# Patient Record
Sex: MALE | Race: BLACK OR AFRICAN AMERICAN | Employment: FULL TIME | URBAN - METROPOLITAN AREA
[De-identification: names, ages, dates, MRNs, and addresses within clinical notes are randomized per-mention and may not be internally consistent; named-entity substitution may affect disease eponyms.]

---

## 2017-01-03 ENCOUNTER — HOSPITAL ENCOUNTER (OUTPATIENT)
Dept: PHYSICAL THERAPY | Facility: CLINIC | Age: 62
Setting detail: THERAPIES SERIES
End: 2017-01-03
Attending: PHYSICAL MEDICINE & REHABILITATION
Payer: COMMERCIAL

## 2017-01-03 ENCOUNTER — HOSPITAL ENCOUNTER (OUTPATIENT)
Dept: OCCUPATIONAL THERAPY | Facility: CLINIC | Age: 62
Setting detail: THERAPIES SERIES
End: 2017-01-03
Attending: PHYSICAL MEDICINE & REHABILITATION
Payer: COMMERCIAL

## 2017-01-03 PROCEDURE — 97112 NEUROMUSCULAR REEDUCATION: CPT | Mod: GO | Performed by: OCCUPATIONAL THERAPIST

## 2017-01-03 PROCEDURE — 40000125 ZZHC STATISTIC OT OUTPT VISIT: Performed by: OCCUPATIONAL THERAPIST

## 2017-01-03 PROCEDURE — 97110 THERAPEUTIC EXERCISES: CPT | Mod: GP | Performed by: PHYSICAL THERAPIST

## 2017-01-03 PROCEDURE — 97116 GAIT TRAINING THERAPY: CPT | Mod: GP | Performed by: PHYSICAL THERAPIST

## 2017-01-03 PROCEDURE — 40000719 ZZHC STATISTIC PT DEPARTMENT NEURO VISIT: Performed by: PHYSICAL THERAPIST

## 2017-01-05 ENCOUNTER — HOSPITAL ENCOUNTER (OUTPATIENT)
Dept: PHYSICAL THERAPY | Facility: CLINIC | Age: 62
Setting detail: THERAPIES SERIES
End: 2017-01-05
Attending: PHYSICAL MEDICINE & REHABILITATION
Payer: COMMERCIAL

## 2017-01-05 PROCEDURE — 97116 GAIT TRAINING THERAPY: CPT | Mod: GP | Performed by: PHYSICAL THERAPIST

## 2017-01-05 PROCEDURE — 97112 NEUROMUSCULAR REEDUCATION: CPT | Mod: GP | Performed by: PHYSICAL THERAPIST

## 2017-01-05 PROCEDURE — 40000719 ZZHC STATISTIC PT DEPARTMENT NEURO VISIT: Performed by: PHYSICAL THERAPIST

## 2017-01-10 ENCOUNTER — HOSPITAL ENCOUNTER (OUTPATIENT)
Dept: OCCUPATIONAL THERAPY | Facility: CLINIC | Age: 62
Setting detail: THERAPIES SERIES
End: 2017-01-10
Attending: PHYSICAL MEDICINE & REHABILITATION
Payer: COMMERCIAL

## 2017-01-10 PROCEDURE — 97110 THERAPEUTIC EXERCISES: CPT | Mod: GO | Performed by: OCCUPATIONAL THERAPIST

## 2017-01-10 PROCEDURE — 40000125 ZZHC STATISTIC OT OUTPT VISIT: Performed by: OCCUPATIONAL THERAPIST

## 2017-01-10 PROCEDURE — 97112 NEUROMUSCULAR REEDUCATION: CPT | Mod: GO | Performed by: OCCUPATIONAL THERAPIST

## 2017-01-12 ENCOUNTER — HOSPITAL ENCOUNTER (OUTPATIENT)
Dept: PHYSICAL THERAPY | Facility: CLINIC | Age: 62
Setting detail: THERAPIES SERIES
End: 2017-01-12
Attending: PHYSICAL MEDICINE & REHABILITATION
Payer: COMMERCIAL

## 2017-01-12 PROCEDURE — 40000719 ZZHC STATISTIC PT DEPARTMENT NEURO VISIT: Performed by: PHYSICAL THERAPIST

## 2017-01-12 PROCEDURE — 97110 THERAPEUTIC EXERCISES: CPT | Mod: GP | Performed by: PHYSICAL THERAPIST

## 2017-01-12 PROCEDURE — 97112 NEUROMUSCULAR REEDUCATION: CPT | Mod: GP | Performed by: PHYSICAL THERAPIST

## 2017-01-13 NOTE — ADDENDUM NOTE
Encounter addended by: Chayo Mckeon, OT on: 1/13/2017  2:30 PM<BR>     Documentation filed: Inpatient Document Flowsheet

## 2017-01-16 ENCOUNTER — OFFICE VISIT (OUTPATIENT)
Dept: PHYSICAL MEDICINE AND REHAB | Facility: CLINIC | Age: 62
End: 2017-01-16

## 2017-01-16 VITALS
BODY MASS INDEX: 29.43 KG/M2 | DIASTOLIC BLOOD PRESSURE: 76 MMHG | WEIGHT: 172.4 LBS | HEIGHT: 64 IN | HEART RATE: 96 BPM | SYSTOLIC BLOOD PRESSURE: 137 MMHG

## 2017-01-16 DIAGNOSIS — G81.11 RIGHT SPASTIC HEMIPARESIS (H): Primary | ICD-10-CM

## 2017-01-16 ASSESSMENT — PAIN SCALES - GENERAL: PAINLEVEL: NO PAIN (0)

## 2017-01-16 NOTE — Clinical Note
"1/16/2017       RE: Luis Trinidad  [address]  [facility] [address]     Dear Colleague,    Thank you for referring your patient, Luis Trinidad, to the [facility] PHYSICAL MEDICINE AND REHABILITATION at [facility]. Please see a copy of my visit note below.    PM&R Clinic & Procedure Note:    Luis Trinidad is 61-year-old male with a history of stroke resulting in residual right spastic hemiparesis.  Last seen 10/17/2016, at which time he received 200 U Botox to his right upper extremity.  Rogelio reports some reduction in tone which facilitated some movements and ROM in the right arm. The benefits have started to war off about the last week (thrapy reporting some wear off sooner than that). Denies any side effects from that.    He has participated in considerable outpatient therapy. I have notes and update from OT Chayo Mckeon. Plan is to work on an intense cycled approach with 4 days per week followed by 3 weeks off. Chayo is endorsing tone reduction at the biceps and finger flexors in particular.    He would like to proceed with repeat Botox injections today.  He denies receiving any vaccinations within the last 2 weeks, and acknowledges understanding that he is to wait 2 weeks before receiving any vaccines in the future.     Physical exam:  /76 mmHg  Pulse 96  Ht 5' 4\" (1.626 m)  Wt 172 lb 6.4 oz (78.2 kg)  BMI 29.58 kg/m2  Alert, conversant, very pleasant, no acute distress  Limited active range of motion in right upper extremity, not full range flexion or extension at fingers but some partial range. Similar with elbow flexors/extensors have some movement but not full range. Passively however able to full range elbow, wrist and fingers. Shoulder PROM approximately shoulder height.  Passive range of motion demonstrates 2/5 spasticity of finger flexors/extensors and able to achieve full range of motion, elbow flexors 3/5 with full ROM, elbow extensors 2/5 with " full ROM, pronators 3/5 with full ROM, shoulder abduction to 90 is achieved without pain as is 90 degrees of shoulder flexion.  Slight anterior shift of glenohumeral joint, mild sulcus sign with humeral traction but none at rest, minimal pain on palpation of shoulder.    Assessment and recommendations:  1. Repeat Botox though increase dose to 300 U. Given improvement in pain and and some function as well discussed slight risk of excess weakness.     2. Continue with ROM and other therapies. Agree with trial of intense cycle OT for UE function.  3. Continue oral baclofen for partial tone reduction in broader distribution.  4. Workability: Rogelio continues to be fully disabled and NOT able to return to work. He is disabled and at maximal medical improvement from that perspective.    5. Follow up in 3 months, can re-evaluate sooner if concern for weakness.    15 minutes spent in direct patient interaction discussing above items, greater than 50% in education. This is independent from procedure time.      Procedure:   Chemodenervation to RUE utilizing botulinum toxin.   Began with formal consent which he signed. Had formal time-out prior to procedure.    300 U of Botox lot -F6, expiration 08/2019 , Botox NDC 4598-4421-58 was utilized. Diluted with 2 mL normal saline in a 1:1 ratio.   All areas were cleansed with chlor prep prior to injecting.   EMG guidance was utilized to identify most active motor units while avoiding surrounding structures.     The following muscles were then injected, two body areas  Right trunk:  1. Right Pectoralis Major: 60 units divided in 2 sites    Right arm  1. Biceps 80 units divided 2 sites.  2. FDS 30 units  3. FDP 30 units  4. FCR 10 units  5. FCU 10 units  6. Triceps 70 units, divided 2 sites.  7. Pronator teres 10 units    Procedure was tolerated well, no adverse reactions.    Chele Reno MD

## 2017-01-16 NOTE — MR AVS SNAPSHOT
After Visit Summary   1/16/2017    Luis Trinidad    MRN: [id number]           Patient Information     Date Of Birth          1955        Visit Information        Provider Department      1/16/2017 10:00 AM Chele Reno MD [facility] Physical Medicine and Rehabilitation         Follow-ups after your visit        Your next 10 appointments already scheduled     Jan 17, 2017 10:30 AM   Treatment 45 with Yen Villanueva, PT   [facility] Physical Therapy ([facility])    [address] [facility] [address]   [phone]            Jan 17, 2017 11:15 AM   Treatment 45 with Chayo Mckeon, OT   [facility] Occupational Therapy ([facility])    [address] [facility] [address]   [phone]            Jan 19, 2017 10:30 AM   Treatment 45 with Yen Villanueva, PT   [facility] Physical Therapy ([facility])    [address] [facility] [address]   [phone]            Jan 24, 2017 10:30 AM   Treatment 45 with Yen Villanueva, PT   [facility] Physical Therapy ([facility])    [address] [facility] [address]   [phone]            Jan 24, 2017 11:15 AM   Treatment 45 with Chayo Mckeon, OT   [facility] Occupational Therapy ([facility])    [address] [facility] [address]   [phone]            Jan 26, 2017 12:00 PM   Treatment 45 with Yen Villanueva, PT   [facility] Physical Therapy ([facility])    [address] [facility] [address]   [phone]            Jan 31, 2017 10:30 AM   Treatment 45 with Yen Villanueva, PT   [facility] Physical Therapy ([facility])    [address] [facility] [address]   [phone]            Jan 31, 2017 11:15 AM   Treatment 45 with Chayo Mckeon, OT   [facility] Occupational Therapy ([facility])    [address]  [facility] [address]   [phone]            2017 10:00 AM   (Arrive by 9:45 AM)   Return Botox with Chele Reno MD   [facility] Physical Medicine and Rehabilitation ([facility])    [address] [place] [id number] [phone]              Who to contact     Please call your clinic at [phone] to:    Ask questions about your health    Make or cancel appointments    Discuss your medicines    Learn about your test results    Speak to your doctor   If you have compliments or concerns about an experience at your clinic, or if you wish to file a complaint, please contact [facility] Patient Relations at [phone] or email us at [email or web address]         Additional Information About Your Visit        [employer] Information     [employer] is an electronic gateway that provides easy, online access to your medical records. With [employer], you can request a clinic appointment, read your test results, renew a prescription or communicate with your care team.     To sign up for [employer] visit the website at [email or web address]   You will be asked to enter the access code listed below, as well as some personal information. Please follow the directions to create your username and password.     Your access code is: [email or web address]  Expires: 2017 10:56 AM     Your access code will  in 90 days. If you need help or a new code, please contact your [facility] Clinic or call [phone] for assistance.        Care EveryWhere ID     This is your Care EveryWhere ID. This could be used by other organizations to access your [place] medical records  [id number]        Your Vitals Were     Pulse Height BMI (Body Mass Index)             96 1.626 m (5' 4\") 29.58 kg/m2          Blood Pressure from Last 3 Encounters:   17 137/76   10/17/16 140/87   16 138/76    Weight " from Last 3 Encounters:   01/16/17 78.2 kg (172 lb 6.4 oz)   10/17/16 75.932 kg (167 lb 6.4 oz)   07/11/16 72.576 kg (160 lb)              Today, you had the following     No orders found for display       Primary Care Provider Office Phone # Fax #    Zaid Whitten [phone]       FRANCISCO NICOLLET [place] [address]   [place] [facility] [address]        Thank you!     Thank you for choosing [facility] PHYSICAL MEDICINE AND REHABILITATION  for your care. Our goal is always to provide you with excellent care. Hearing back from our patients is one way we can continue to improve our services. Please take a few minutes to complete the written survey that you may receive in the mail after your visit with us. Thank you!             Your Updated Medication List - Protect others around you: Learn how to safely use, store and throw away your medicines at www.disposemymeds.org.          This list is accurate as of: 1/16/17 10:56 AM.  Always use your most recent med list.                   Brand Name Dispense Instructions for use    aspirin 81 MG EC tablet      Take 1 tablet (81 mg) by mouth daily       baclofen 10 MG tablet    LIORESAL    210 tablet    20 mg in morning, 20 mg mid day and 30 mg bedtime.       cholecalciferol 2000 UNITS tablet     30 tablet    Take 2,000 Units by mouth daily       clopidogrel 75 MG tablet    PLAVIX    30 tablet    Take 1 tablet (75 mg) by mouth daily       co-enzyme Q-10 100 MG Caps capsule     30 capsule    Take 1 capsule (100 mg) by mouth daily       lisinopril-hydrochlorothiazide 10-12.5 MG per tablet    PRINZIDE/ZESTORETIC     Take 1 tablet by mouth       simvastatin 10 MG tablet    ZOCOR    30 tablet    Take 1 tablet (10 mg) by mouth every evening       tetrahydrozoline 0.05 % ophthalmic solution      Place 1 drop into both eyes 3 times daily as needed

## 2017-01-17 ENCOUNTER — HOSPITAL ENCOUNTER (OUTPATIENT)
Dept: OCCUPATIONAL THERAPY | Facility: CLINIC | Age: 62
Setting detail: THERAPIES SERIES
End: 2017-01-17
Attending: PHYSICAL MEDICINE & REHABILITATION
Payer: COMMERCIAL

## 2017-01-17 ENCOUNTER — HOSPITAL ENCOUNTER (OUTPATIENT)
Dept: PHYSICAL THERAPY | Facility: CLINIC | Age: 62
Setting detail: THERAPIES SERIES
End: 2017-01-17
Attending: PHYSICAL MEDICINE & REHABILITATION
Payer: COMMERCIAL

## 2017-01-17 PROCEDURE — 97112 NEUROMUSCULAR REEDUCATION: CPT | Mod: GO | Performed by: OCCUPATIONAL THERAPIST

## 2017-01-17 PROCEDURE — 97110 THERAPEUTIC EXERCISES: CPT | Mod: GP | Performed by: PHYSICAL THERAPIST

## 2017-01-17 PROCEDURE — 40000125 ZZHC STATISTIC OT OUTPT VISIT: Performed by: OCCUPATIONAL THERAPIST

## 2017-01-17 PROCEDURE — 97112 NEUROMUSCULAR REEDUCATION: CPT | Mod: GP | Performed by: PHYSICAL THERAPIST

## 2017-01-17 PROCEDURE — 40000719 ZZHC STATISTIC PT DEPARTMENT NEURO VISIT: Performed by: PHYSICAL THERAPIST

## 2017-01-19 ENCOUNTER — HOSPITAL ENCOUNTER (OUTPATIENT)
Dept: PHYSICAL THERAPY | Facility: CLINIC | Age: 62
Setting detail: THERAPIES SERIES
End: 2017-01-19
Attending: PHYSICAL MEDICINE & REHABILITATION
Payer: COMMERCIAL

## 2017-01-19 PROCEDURE — 40000719 ZZHC STATISTIC PT DEPARTMENT NEURO VISIT: Performed by: PHYSICAL THERAPIST

## 2017-01-19 PROCEDURE — 97112 NEUROMUSCULAR REEDUCATION: CPT | Mod: GP | Performed by: PHYSICAL THERAPIST

## 2017-01-19 PROCEDURE — 97110 THERAPEUTIC EXERCISES: CPT | Mod: GP | Performed by: PHYSICAL THERAPIST

## 2017-01-22 NOTE — PROGRESS NOTES
"PM&R Clinic & Procedure Note:    Luis Trinidad is 61-year-old male with a history of stroke resulting in residual right spastic hemiparesis.  Last seen 10/17/2016, at which time he received 200 U Botox to his right upper extremity.  Rogelio reports some reduction in tone which facilitated some movements and ROM in the right arm. The benefits have started to war off about the last week (thrapy reporting some wear off sooner than that). Denies any side effects from that.    He has participated in considerable outpatient therapy. I have notes and update from OT Chayo Mckeon. Plan is to work on an intense cycled approach with 4 days per week followed by 3 weeks off. Chayo is endorsing tone reduction at the biceps and finger flexors in particular.    He would like to proceed with repeat Botox injections today.  He denies receiving any vaccinations within the last 2 weeks, and acknowledges understanding that he is to wait 2 weeks before receiving any vaccines in the future.     Physical exam:  /76 mmHg  Pulse 96  Ht 5' 4\" (1.626 m)  Wt 172 lb 6.4 oz (78.2 kg)  BMI 29.58 kg/m2  Alert, conversant, very pleasant, no acute distress  Limited active range of motion in right upper extremity, not full range flexion or extension at fingers but some partial range. Similar with elbow flexors/extensors have some movement but not full range. Passively however able to full range elbow, wrist and fingers. Shoulder PROM approximately shoulder height.  Passive range of motion demonstrates 2/5 spasticity of finger flexors/extensors and able to achieve full range of motion, elbow flexors 3/5 with full ROM, elbow extensors 2/5 with full ROM, pronators 3/5 with full ROM, shoulder abduction to 90 is achieved without pain as is 90 degrees of shoulder flexion.  Slight anterior shift of glenohumeral joint, mild sulcus sign with humeral traction but none at rest, minimal pain on palpation of shoulder.    Assessment and recommendations:  1. " Repeat Botox though increase dose to 300 U. Given improvement in pain and and some function as well discussed slight risk of excess weakness.     2. Continue with ROM and other therapies. Agree with trial of intense cycle OT for UE function.  3. Continue oral baclofen for partial tone reduction in broader distribution.  4. Workability: Rogelio continues to be fully disabled and NOT able to return to work. He is disabled and at maximal medical improvement from that perspective.    5. Follow up in 3 months, can re-evaluate sooner if concern for weakness.    15 minutes spent in direct patient interaction discussing above items, greater than 50% in education. This is independent from procedure time.      Procedure:   Chemodenervation to RUE utilizing botulinum toxin.   Began with formal consent which he signed. Had formal time-out prior to procedure.    300 U of Botox lot -J3, expiration 08/2019 , Botox NDC 3697-0110-24 was utilized. Diluted with 2 mL normal saline in a 1:1 ratio.   All areas were cleansed with chlor prep prior to injecting.   EMG guidance was utilized to identify most active motor units while avoiding surrounding structures.     The following muscles were then injected, two body areas  Right trunk:  1. Right Pectoralis Major: 60 units divided in 2 sites    Right arm  1. Biceps 80 units divided 2 sites.  2. FDS 30 units  3. FDP 30 units  4. FCR 10 units  5. FCU 10 units  6. Triceps 70 units, divided 2 sites.  7. Pronator teres 10 units    Procedure was tolerated well, no adverse reactions.

## 2017-01-24 ENCOUNTER — HOSPITAL ENCOUNTER (OUTPATIENT)
Dept: PHYSICAL THERAPY | Facility: CLINIC | Age: 62
Setting detail: THERAPIES SERIES
End: 2017-01-24
Attending: PHYSICAL MEDICINE & REHABILITATION
Payer: COMMERCIAL

## 2017-01-24 ENCOUNTER — HOSPITAL ENCOUNTER (OUTPATIENT)
Dept: OCCUPATIONAL THERAPY | Facility: CLINIC | Age: 62
Setting detail: THERAPIES SERIES
End: 2017-01-24
Attending: PHYSICAL MEDICINE & REHABILITATION
Payer: COMMERCIAL

## 2017-01-24 PROCEDURE — 97112 NEUROMUSCULAR REEDUCATION: CPT | Mod: GP | Performed by: PHYSICAL THERAPIST

## 2017-01-24 PROCEDURE — 97112 NEUROMUSCULAR REEDUCATION: CPT | Mod: GO | Performed by: OCCUPATIONAL THERAPIST

## 2017-01-24 PROCEDURE — 40000719 ZZHC STATISTIC PT DEPARTMENT NEURO VISIT: Performed by: PHYSICAL THERAPIST

## 2017-01-24 PROCEDURE — 40000125 ZZHC STATISTIC OT OUTPT VISIT: Performed by: OCCUPATIONAL THERAPIST

## 2017-01-24 PROCEDURE — 97110 THERAPEUTIC EXERCISES: CPT | Mod: GP | Performed by: PHYSICAL THERAPIST

## 2017-01-25 DIAGNOSIS — R25.2 SPASTICITY: Primary | ICD-10-CM

## 2017-01-25 RX ORDER — BACLOFEN 10 MG/1
TABLET ORAL
Qty: 210 TABLET | Refills: 11 | Status: SHIPPED | OUTPATIENT
Start: 2017-01-25 | End: 2017-07-24

## 2017-01-26 ENCOUNTER — HOSPITAL ENCOUNTER (OUTPATIENT)
Dept: PHYSICAL THERAPY | Facility: CLINIC | Age: 62
Setting detail: THERAPIES SERIES
End: 2017-01-26
Attending: PHYSICAL MEDICINE & REHABILITATION
Payer: COMMERCIAL

## 2017-01-26 PROCEDURE — 97110 THERAPEUTIC EXERCISES: CPT | Mod: GP | Performed by: PHYSICAL THERAPIST

## 2017-01-26 PROCEDURE — 97112 NEUROMUSCULAR REEDUCATION: CPT | Mod: GP | Performed by: PHYSICAL THERAPIST

## 2017-01-26 PROCEDURE — 40000719 ZZHC STATISTIC PT DEPARTMENT NEURO VISIT: Performed by: PHYSICAL THERAPIST

## 2017-01-31 ENCOUNTER — HOSPITAL ENCOUNTER (OUTPATIENT)
Dept: OCCUPATIONAL THERAPY | Facility: CLINIC | Age: 62
Setting detail: THERAPIES SERIES
End: 2017-01-31
Attending: PHYSICAL MEDICINE & REHABILITATION
Payer: COMMERCIAL

## 2017-01-31 ENCOUNTER — HOSPITAL ENCOUNTER (OUTPATIENT)
Dept: PHYSICAL THERAPY | Facility: CLINIC | Age: 62
Setting detail: THERAPIES SERIES
End: 2017-01-31
Attending: PHYSICAL MEDICINE & REHABILITATION
Payer: COMMERCIAL

## 2017-01-31 PROCEDURE — 40000125 ZZHC STATISTIC OT OUTPT VISIT: Performed by: OCCUPATIONAL THERAPIST

## 2017-01-31 PROCEDURE — 97140 MANUAL THERAPY 1/> REGIONS: CPT | Mod: GO | Performed by: OCCUPATIONAL THERAPIST

## 2017-01-31 PROCEDURE — 97112 NEUROMUSCULAR REEDUCATION: CPT | Mod: GO | Performed by: OCCUPATIONAL THERAPIST

## 2017-01-31 PROCEDURE — 97110 THERAPEUTIC EXERCISES: CPT | Mod: GP | Performed by: PHYSICAL THERAPIST

## 2017-01-31 PROCEDURE — 40000719 ZZHC STATISTIC PT DEPARTMENT NEURO VISIT: Performed by: PHYSICAL THERAPIST

## 2017-01-31 PROCEDURE — 97112 NEUROMUSCULAR REEDUCATION: CPT | Mod: GP | Performed by: PHYSICAL THERAPIST

## 2017-01-31 NOTE — PROGRESS NOTES
Outpatient Occupational Therapy Progress Note     Patient: Yumiko Trinidad  : 1955  Insurance:   Payor/Plan Subscriber Name Rel Member # Group #   MEDICA - MEDICA YUMIKO ONTIVEROS  [id number]      [address] [address]       Beginning/End Dates of Reporting Period:  16 to 2017    Referring Provider: Chele Reno Diagnosis: CVA    Client Self Report: Patient reports he continues to do the dishes and is using his R UE as able.     Objective Measurements:     Objective Measure: R  strength post botox   Details: 34# (though R elbow went into extension towards the end which likely increased the strength measurement and not in total correct position of elbow at 90 degrees), 23# on 2nd trial (most likely diminished from 1/10 temporarily due to botox)      The following measurements completed on 1/10/17 (pre-botox - received botox in R UE on 17)    R  strength    27# and 29#, improved from: 22# on , was 17# on , was average of 14# on  and 8# on         R Pinch strength    Lateral: 14, 14# today; on : 14#, was 9# on  and  and was 8#, 7# on ; Palmar Pinch: 6# today; was 6# on - used medial portion of 1st digit (was 4# on ,  and  but hard to hold - supporting at R wrist and therapist assisting with placement of digits)        R UE AROM    scapular elevation AROM 75% (same as 16 and was 50-75% on 16), scapular retraction near 100%/WNLs (same); the following standing, shoulder flexion AROM: 38 degrees flexion and 31 degrees extension with min to moderate compensation into scaption, but improved (was 55 degrees moderately compensating into scaption  on 16 and was 30 degrees on  and 30 degrees prior to this), shoulder abduction: 60 degrees with min to mod compensation of trunk and shoulder elevation (was 70 degrees with compensation on ); elbow flexion -120 degrees to 110 degrees with only minimal compensation (same as  11/1/16)    R UE strength  scapular elevation and retraction: 5/5 of available range against gravity (same); shoulder flexion and extension 2+/5 - improved, R elbow flexion and extension 5/5 when provided with resistance (same); forearm pronation 3+ (slight decline due to hypertonicity) and supination 1+/5 (same), wrist flexion: 1+/5 (slight decline), 1+/5 for wrist extension (same); R digit flexion - see  above, much improved, R digit extension 1+/5 (varies based on tone)      Outcome Measures (most recent score):    [facility] not completed since May - will have patient complete in near future     Goals:                          Goal Identifier R UE as gross assist   Goal Description Patient to demonstrate functional tasks (feeding self, wiping the counter/table, washing dishes, etc.) with 30% use of R UE as gross stabilizer or gross assist for increased ADL/IADL independence and closer return to prior level of function.   Target Date 01/24/17   Date Met      Progress:  Goal not met but improving.  He is currently able to utilize R UE 20-25% during sessions with cues to utilize properly without compensating.  Also, he is able to use for gross assist (improving) in addition to gross stabilizer.       Goal Identifier functional/normal movment patterns in R UE and trunk   Goal Description Patient to complete therapeutic task (simple kitchen tasks, laundry, etc.) with use of B UE's and no more than 2 cues in 10 minute time frame for correct body mechanics and for decreasing compensatory movements at the trunk and upper body for encouragement of normal movement patterns, increased R UE function and increased ADL/IADL independence.   Target Date 01/24/17   Date Met      Progress:  Goal not fully met but notable improvement demonstrated.  Patient with increasing awareness of appropriate movement patterns vs. compensatory behaviors and is requiring less cues overall, resulting in improved functional use of R UE.     Goal  Identifier R GM/FM coordination   Goal Description Patient to improve R GM/FM coordination skills for increased independence during ADL/IADL tasks (dressing, light housework, etc.) by completing the Box and Blocks Test with R UE in standing with 10 blocks.   Target Date 01/24/17   Date Met      Progress:  Not tested recently, however, patient's tone continues to improve with continued botox injections, closed chain tasks in therapy, use of NMES in therapy and at home, etc.     Goal Identifier visual scanning and reaction time   Goal Description Patient will demonstrate WFLs  visual reaction time and visual scanning skills and divided attention skills for improved visual skills and safe independent community mobility (crossing the street, driving, etc.) by scoring WNLs on all modes of the [employer].   Target Date 01/24/17   Date Met      Progress:  Not completed/addressed since last progress note.     Goal Identifier R pinch strength   Goal Description Patient will demonstrate increased right hand pinch strength (both lateral and palmar) by 3# each for increased independence with ADL/IADLs such as opening containers, pull up pants, maintaining pinch to hold items, etc.   Target Date 01/24/17   Date Met      Progress:  Goal not met - see above.  Notable improvement in  pre-botox.     Progress Toward Goals:    Patient has been seen for 18 sessions since last progress note dated 11/1/16 and a total of 66 sessions since the initial evaluation dated 3/1/16.  Patient continues to demonstrate improvement in R UE function as noted above with largest progress noted in use of R UE in functional, therapeutic tasks. Patient continues to receive botox in R UE, allowing for greater ability to progress patient's R UE for increased functional gain.  Patient has been able to decrease compensatory behaviors further to perform more normal movement patterns in R UE which has contributed to increased function of R UE.  Patient will  continue to benefit from skilled outpatient occupational therapy to address decreased R UE function, decreased functional mobility, questionable visual perceptual deficits (reaction time, scanning, etc.) for improved independence with daily tasks in the patient's home, work and community environments.  Will continue to see patient in outpatient occupational therapy at a frequency of 1x/week, decreasing frequency prn for approximately 8 more weeks as appropriate.  Anticipate that patient will be discharged from occupational therapy within 8 weeks (increased duration since last progress note due to continued progress and patient's recent botox which is allowing greater ability to increase strength and function of R UE).      Plan:  Continue per POC as noted above for 8 more weeks.      Ideally, would like to trial 4 visits/week followed by patient led HEP x 2 weeks x3-4 cycles to maximize motor learning and carryover with neuroplastic changes, however, patient unable to complete this POC due to limited options with transportation.  Will continue at frequency of 1x/week as outlined above.    Discharge:  No

## 2017-01-31 NOTE — ADDENDUM NOTE
Encounter addended by: Chayo Mckeon, OT on: 1/31/2017  4:45 PM<BR>     Documentation filed: Inpatient Document Flowsheet, Notes Section

## 2017-02-03 ENCOUNTER — HOSPITAL ENCOUNTER (OUTPATIENT)
Dept: PHYSICAL THERAPY | Facility: CLINIC | Age: 62
Setting detail: THERAPIES SERIES
End: 2017-02-03
Attending: PHYSICAL MEDICINE & REHABILITATION
Payer: COMMERCIAL

## 2017-02-03 PROCEDURE — 97116 GAIT TRAINING THERAPY: CPT | Mod: GP | Performed by: PHYSICAL THERAPIST

## 2017-02-03 PROCEDURE — 40000719 ZZHC STATISTIC PT DEPARTMENT NEURO VISIT: Performed by: PHYSICAL THERAPIST

## 2017-02-03 PROCEDURE — 97112 NEUROMUSCULAR REEDUCATION: CPT | Mod: GP | Performed by: PHYSICAL THERAPIST

## 2017-02-07 ENCOUNTER — HOSPITAL ENCOUNTER (OUTPATIENT)
Dept: PHYSICAL THERAPY | Facility: CLINIC | Age: 62
Setting detail: THERAPIES SERIES
End: 2017-02-07
Attending: PHYSICAL MEDICINE & REHABILITATION
Payer: COMMERCIAL

## 2017-02-07 PROCEDURE — 97116 GAIT TRAINING THERAPY: CPT | Mod: GP | Performed by: PHYSICAL THERAPIST

## 2017-02-07 PROCEDURE — 97110 THERAPEUTIC EXERCISES: CPT | Mod: GP | Performed by: PHYSICAL THERAPIST

## 2017-02-07 PROCEDURE — 40000719 ZZHC STATISTIC PT DEPARTMENT NEURO VISIT: Performed by: PHYSICAL THERAPIST

## 2017-02-07 NOTE — PROGRESS NOTES
Outpatient Physical Therapy Progress Note     Patient: Luis Trinidad  : 1955    Beginning/End Dates of Reporting Period:  2017 to 2017    Referring Provider: Dr Chele Reno    Therapy Diagnosis: impaired participation in life roles with R hemiparesis s/p CVA     Client Self Report: Hand has felt loose still but feels really tight in shoulder.      Objective Measurements:    Objective Measure: TUG  Details: with airsport brace and cane 16.7 sec,  16.9 sec.  without cane 17 sec,  15.8 sec.    Objective Measure: 6 MWT  Details: 778 feet.  caught toe x 1 during test.  Improved gait pattern and stability.             Goals:  Goal Identifier 1 - [facility]   Goal Description Pt to report improved basic mobility per AM-PAC score >/= 52 to achieve stage 3 for improved functional mobility indoors. (baseline: 38.44, stage 2, limited moving indoors).   Target Date 17   Date Met      Progress:  [facility] not recently taken.  Plan to administer at upcoming session.      Goal Identifier 2 - Endurance: 6MWT   (baseline 16 781' with SPC and R aircast brace, pt reports min-mod fatigue LEs, demos 3x dec R toe clearance tripping with mild mild LOB recovered (I) with SPC, inc fatigued after 2 minutes, maintains abset heelstrike throughout, dec R hip and knee flex in swing, inc R hip circumduction and L trunk lean on SPC with fatigue)   Goal Description Pt to demonstrate improved walking endurance per 6MWT with SPC to 893-981 . (increase by  112-200  considered MDC for pts who have had stroke.)   Target Date 17   Date Met   not met - in progress   Progress:  Testing today pt amb 778 feet with SPC and R aircast brace - no change in distance but demonstrating improved gait pattern, stability and tolerance.  Pt denies significant fatigue in LEs and no significant SOB.  Demonstrates improved knee flexion in swing with improved pendulum motion and decreased circumduction, minimal hip hike and improved trunk  alignment.  Caught right toe at initial swing only once and was able to correct independently with only slight deviation.       Goal Identifier 3 - TUG   Goal Description Pt to achieve dec risk for falls per TUG completed in  < 13.5 sec with less restrictive AD (SEC). (baseline: amb with keren walker and solid AFO 22.91 sec  (> 13.5 inc fall risk).)   Target Date 02/01/17   Date Met      Progress: Improved from baseline but no change in time in last 4 weeks; however, Rogelio is showing improved quality of his gait/movement patterns in that time and is reporting less muscular fatigue.      Goal Identifier 4- Neuromotor control   Goal Description Rogelio will be able to increase active flexion of right knee with hip extended as well as flexed in order to demonstrate improved right LE position in swing for clearance of foot and decreased need for circumduction and compensations on left LE.  This will allow for increased safety and efficiency with gait at home and in the community.     Target Date 02/01/17   Date Met   in progress - improvements noted   Progress:  Rogelio is demonstrating improved pendulum motion of right LE in swing phase with greater ease of movement and flexibility for knee motion, decreased circumduction and decreased right hip hike.  He still has compensations of left foot supination in stance phase but he can correct partially when cued.           Goal Identifier 6 - HEP   Goal Description The client will be independent with a balance, strengthening, and walking program for long term management.   Target Date 02/01/17   Date Met   in progress   Progress:  No recent changes to home program.  Working on increasing challenges for home practice.      Progress Toward Goals:   Progress this reporting period: Rogelio has noted that his R arm (hand specifically) and leg are feeling looser over the last few weeks.  He has been working hard on his home program and practicing his gait pattern at home.  He is showing  noticeable improvements in his gait pattern and especially right knee flexibility with decreased circumduction in swing phase and reporting decreased muscular fatigue with activities in clinic as well as at home.  Although his gait speed on the TUG and 6 MWT have not changed in the last 4 weeks, he is demonstrating improvements in his gait pattern and stability with significant decrease frequency in catching his right toes and less fatigue.      Progress limited due to spasticity of right UE/LE and weakness of knee flexors and ankle dorsiflexors, however, Rogelio is showing gradual but noticeable improvements in these areas.  He will benefit from continued PT to further maximize return of more normal movement patterns for greater safety and efficiency with mobility in all environments.     Plan:  Continue therapy per current plan of care of 2 x per week for another 90 days with reduction in frequency/duration as appropriate.    Changes to therapy plan of care: Was planning on adjusting POC to a more intensive frequency of 4 x per week, every other week to maximize motor learning and enforcement of improved motor pathways, however patient's transportation cannot bring him at that frequency, unfortunately.      Changes to goals: continue with goals above until 5/1/17.     Discharge:  No

## 2017-02-09 ENCOUNTER — HOSPITAL ENCOUNTER (OUTPATIENT)
Dept: PHYSICAL THERAPY | Facility: CLINIC | Age: 62
Setting detail: THERAPIES SERIES
End: 2017-02-09
Attending: PHYSICAL MEDICINE & REHABILITATION
Payer: COMMERCIAL

## 2017-02-09 PROCEDURE — 97112 NEUROMUSCULAR REEDUCATION: CPT | Mod: GP | Performed by: PHYSICAL THERAPIST

## 2017-02-09 PROCEDURE — 40000719 ZZHC STATISTIC PT DEPARTMENT NEURO VISIT: Performed by: PHYSICAL THERAPIST

## 2017-02-09 PROCEDURE — 97116 GAIT TRAINING THERAPY: CPT | Mod: GP | Performed by: PHYSICAL THERAPIST

## 2017-02-14 ENCOUNTER — HOSPITAL ENCOUNTER (OUTPATIENT)
Dept: PHYSICAL THERAPY | Facility: CLINIC | Age: 62
Setting detail: THERAPIES SERIES
End: 2017-02-14
Attending: PHYSICAL MEDICINE & REHABILITATION
Payer: COMMERCIAL

## 2017-02-14 PROCEDURE — 97110 THERAPEUTIC EXERCISES: CPT | Mod: GP | Performed by: PHYSICAL THERAPIST

## 2017-02-14 PROCEDURE — 97116 GAIT TRAINING THERAPY: CPT | Mod: GP | Performed by: PHYSICAL THERAPIST

## 2017-02-14 PROCEDURE — 97112 NEUROMUSCULAR REEDUCATION: CPT | Mod: GP | Performed by: PHYSICAL THERAPIST

## 2017-02-14 PROCEDURE — 40000719 ZZHC STATISTIC PT DEPARTMENT NEURO VISIT: Performed by: PHYSICAL THERAPIST

## 2017-02-21 ENCOUNTER — HOSPITAL ENCOUNTER (OUTPATIENT)
Dept: OCCUPATIONAL THERAPY | Facility: CLINIC | Age: 62
Setting detail: THERAPIES SERIES
End: 2017-02-21
Attending: PHYSICAL MEDICINE & REHABILITATION
Payer: COMMERCIAL

## 2017-02-21 PROCEDURE — 97112 NEUROMUSCULAR REEDUCATION: CPT | Mod: GO | Performed by: OCCUPATIONAL THERAPIST

## 2017-02-21 PROCEDURE — 97110 THERAPEUTIC EXERCISES: CPT | Mod: GO | Performed by: OCCUPATIONAL THERAPIST

## 2017-02-21 PROCEDURE — 40000125 ZZHC STATISTIC OT OUTPT VISIT: Performed by: OCCUPATIONAL THERAPIST

## 2017-02-23 ENCOUNTER — HOSPITAL ENCOUNTER (OUTPATIENT)
Dept: PHYSICAL THERAPY | Facility: CLINIC | Age: 62
Setting detail: THERAPIES SERIES
End: 2017-02-23
Attending: PHYSICAL MEDICINE & REHABILITATION
Payer: COMMERCIAL

## 2017-02-23 PROCEDURE — 97116 GAIT TRAINING THERAPY: CPT | Mod: GP | Performed by: PHYSICAL THERAPIST

## 2017-02-23 PROCEDURE — 40000719 ZZHC STATISTIC PT DEPARTMENT NEURO VISIT: Performed by: PHYSICAL THERAPIST

## 2017-02-23 PROCEDURE — 97112 NEUROMUSCULAR REEDUCATION: CPT | Mod: GP | Performed by: PHYSICAL THERAPIST

## 2017-03-02 ENCOUNTER — HOSPITAL ENCOUNTER (OUTPATIENT)
Dept: OCCUPATIONAL THERAPY | Facility: CLINIC | Age: 62
Setting detail: THERAPIES SERIES
End: 2017-03-02
Attending: PHYSICAL MEDICINE & REHABILITATION
Payer: COMMERCIAL

## 2017-03-02 PROCEDURE — 97110 THERAPEUTIC EXERCISES: CPT | Mod: GO | Performed by: OCCUPATIONAL THERAPIST

## 2017-03-02 PROCEDURE — 97535 SELF CARE MNGMENT TRAINING: CPT | Mod: GO | Performed by: OCCUPATIONAL THERAPIST

## 2017-03-02 PROCEDURE — 97112 NEUROMUSCULAR REEDUCATION: CPT | Mod: GO | Performed by: OCCUPATIONAL THERAPIST

## 2017-03-02 PROCEDURE — 40000125 ZZHC STATISTIC OT OUTPT VISIT: Performed by: OCCUPATIONAL THERAPIST

## 2017-03-07 ENCOUNTER — HOSPITAL ENCOUNTER (OUTPATIENT)
Dept: PHYSICAL THERAPY | Facility: CLINIC | Age: 62
Setting detail: THERAPIES SERIES
End: 2017-03-07
Attending: PHYSICAL MEDICINE & REHABILITATION
Payer: COMMERCIAL

## 2017-03-07 PROCEDURE — 40000719 ZZHC STATISTIC PT DEPARTMENT NEURO VISIT: Performed by: PHYSICAL THERAPIST

## 2017-03-07 PROCEDURE — 97112 NEUROMUSCULAR REEDUCATION: CPT | Mod: GP | Performed by: PHYSICAL THERAPIST

## 2017-03-07 PROCEDURE — 97116 GAIT TRAINING THERAPY: CPT | Mod: GP | Performed by: PHYSICAL THERAPIST

## 2017-03-09 ENCOUNTER — HOSPITAL ENCOUNTER (OUTPATIENT)
Dept: PHYSICAL THERAPY | Facility: CLINIC | Age: 62
Setting detail: THERAPIES SERIES
End: 2017-03-09
Attending: PHYSICAL MEDICINE & REHABILITATION
Payer: COMMERCIAL

## 2017-03-09 ENCOUNTER — HOSPITAL ENCOUNTER (OUTPATIENT)
Dept: OCCUPATIONAL THERAPY | Facility: CLINIC | Age: 62
Setting detail: THERAPIES SERIES
End: 2017-03-09
Attending: PHYSICAL MEDICINE & REHABILITATION
Payer: COMMERCIAL

## 2017-03-09 PROCEDURE — 97110 THERAPEUTIC EXERCISES: CPT | Mod: GP | Performed by: PHYSICAL THERAPIST

## 2017-03-09 PROCEDURE — 40000125 ZZHC STATISTIC OT OUTPT VISIT: Performed by: OCCUPATIONAL THERAPIST

## 2017-03-09 PROCEDURE — 40000719 ZZHC STATISTIC PT DEPARTMENT NEURO VISIT: Performed by: PHYSICAL THERAPIST

## 2017-03-09 PROCEDURE — 97112 NEUROMUSCULAR REEDUCATION: CPT | Mod: GP | Performed by: PHYSICAL THERAPIST

## 2017-03-09 PROCEDURE — 97112 NEUROMUSCULAR REEDUCATION: CPT | Mod: GO | Performed by: OCCUPATIONAL THERAPIST

## 2017-03-14 ENCOUNTER — HOSPITAL ENCOUNTER (OUTPATIENT)
Dept: PHYSICAL THERAPY | Facility: CLINIC | Age: 62
Setting detail: THERAPIES SERIES
End: 2017-03-14
Attending: PHYSICAL MEDICINE & REHABILITATION
Payer: COMMERCIAL

## 2017-03-14 PROCEDURE — 40000719 ZZHC STATISTIC PT DEPARTMENT NEURO VISIT: Performed by: PHYSICAL THERAPIST

## 2017-03-14 PROCEDURE — 97110 THERAPEUTIC EXERCISES: CPT | Mod: GP | Performed by: PHYSICAL THERAPIST

## 2017-03-14 PROCEDURE — 97112 NEUROMUSCULAR REEDUCATION: CPT | Mod: GP | Performed by: PHYSICAL THERAPIST

## 2017-03-16 ENCOUNTER — HOSPITAL ENCOUNTER (OUTPATIENT)
Dept: OCCUPATIONAL THERAPY | Facility: CLINIC | Age: 62
Setting detail: THERAPIES SERIES
End: 2017-03-16
Attending: PHYSICAL MEDICINE & REHABILITATION
Payer: COMMERCIAL

## 2017-03-16 ENCOUNTER — HOSPITAL ENCOUNTER (OUTPATIENT)
Dept: PHYSICAL THERAPY | Facility: CLINIC | Age: 62
Setting detail: THERAPIES SERIES
End: 2017-03-16
Attending: PHYSICAL MEDICINE & REHABILITATION
Payer: COMMERCIAL

## 2017-03-16 PROCEDURE — 97110 THERAPEUTIC EXERCISES: CPT | Mod: GP | Performed by: PHYSICAL THERAPIST

## 2017-03-16 PROCEDURE — 40000125 ZZHC STATISTIC OT OUTPT VISIT: Performed by: OCCUPATIONAL THERAPIST

## 2017-03-16 PROCEDURE — 97112 NEUROMUSCULAR REEDUCATION: CPT | Mod: GP | Performed by: PHYSICAL THERAPIST

## 2017-03-16 PROCEDURE — 97530 THERAPEUTIC ACTIVITIES: CPT | Mod: GO | Performed by: OCCUPATIONAL THERAPIST

## 2017-03-16 PROCEDURE — 40000719 ZZHC STATISTIC PT DEPARTMENT NEURO VISIT: Performed by: PHYSICAL THERAPIST

## 2017-03-16 PROCEDURE — 97112 NEUROMUSCULAR REEDUCATION: CPT | Mod: GO | Performed by: OCCUPATIONAL THERAPIST

## 2017-03-16 PROCEDURE — 97110 THERAPEUTIC EXERCISES: CPT | Mod: GO | Performed by: OCCUPATIONAL THERAPIST

## 2017-03-21 ENCOUNTER — HOSPITAL ENCOUNTER (OUTPATIENT)
Dept: OCCUPATIONAL THERAPY | Facility: CLINIC | Age: 62
Setting detail: THERAPIES SERIES
End: 2017-03-21
Attending: PHYSICAL MEDICINE & REHABILITATION
Payer: COMMERCIAL

## 2017-03-21 PROCEDURE — 40000125 ZZHC STATISTIC OT OUTPT VISIT: Performed by: OCCUPATIONAL THERAPIST

## 2017-03-21 PROCEDURE — 97530 THERAPEUTIC ACTIVITIES: CPT | Mod: GO | Performed by: OCCUPATIONAL THERAPIST

## 2017-03-21 PROCEDURE — 97110 THERAPEUTIC EXERCISES: CPT | Mod: GO | Performed by: OCCUPATIONAL THERAPIST

## 2017-03-23 ENCOUNTER — HOSPITAL ENCOUNTER (OUTPATIENT)
Dept: PHYSICAL THERAPY | Facility: CLINIC | Age: 62
Setting detail: THERAPIES SERIES
End: 2017-03-23
Attending: PHYSICAL MEDICINE & REHABILITATION
Payer: COMMERCIAL

## 2017-03-23 PROCEDURE — 40000719 ZZHC STATISTIC PT DEPARTMENT NEURO VISIT: Performed by: PHYSICAL THERAPIST

## 2017-03-23 PROCEDURE — 97110 THERAPEUTIC EXERCISES: CPT | Mod: GP | Performed by: PHYSICAL THERAPIST

## 2017-03-23 PROCEDURE — 97116 GAIT TRAINING THERAPY: CPT | Mod: GP | Performed by: PHYSICAL THERAPIST

## 2017-03-23 PROCEDURE — 97112 NEUROMUSCULAR REEDUCATION: CPT | Mod: GP | Performed by: PHYSICAL THERAPIST

## 2017-03-28 ENCOUNTER — HOSPITAL ENCOUNTER (OUTPATIENT)
Dept: PHYSICAL THERAPY | Facility: CLINIC | Age: 62
Setting detail: THERAPIES SERIES
End: 2017-03-28
Attending: PHYSICAL MEDICINE & REHABILITATION
Payer: COMMERCIAL

## 2017-03-28 PROCEDURE — 40000719 ZZHC STATISTIC PT DEPARTMENT NEURO VISIT: Performed by: PHYSICAL THERAPIST

## 2017-03-28 PROCEDURE — 97112 NEUROMUSCULAR REEDUCATION: CPT | Mod: GP | Performed by: PHYSICAL THERAPIST

## 2017-03-28 PROCEDURE — 97110 THERAPEUTIC EXERCISES: CPT | Mod: GP | Performed by: PHYSICAL THERAPIST

## 2017-03-28 NOTE — ADDENDUM NOTE
Encounter addended by: Chayo Mckeon, OT on: 3/28/2017  6:03 PM<BR>     Actions taken: Pend clinical note, Sign clinical note, Episode resolved

## 2017-03-28 NOTE — ADDENDUM NOTE
Encounter addended by: Chayo Mckeon, OT on: 3/28/2017  1:58 PM<BR>     Actions taken: Pend clinical note

## 2017-03-28 NOTE — PROGRESS NOTES
Outpatient Occupational Therapy Discharge Note     Patient: Yumiko Trinidad  : 1955  Insurance:   Payor/Plan Subscriber Name Rel Member # Group #   MEDICA - MEDICA YUMIKO ONTIVEROS  [id number]       [address] [id number]       Beginning/End Dates of Reporting Period:  17 to 3/21/2017    Referring Provider: Chele Reno Diagnosis: CVA    Client Self Report: Patient reports he is not using his R arm a whole lot at home, however, in further conversation with him, patient is likely using it at least 5% or a little more than he thinks (to stabilize objects primarily).    Objective Measurements:       Objective Measure: R UE ROM and strength   Details: scapular elevation AROM 75%, scapular retraction near 100%/WNLs; the following standing: shoulder flexion AROM: 35 degrees flexion with min to moderate compensation into scaption, shoulder abduction: 60 degrees with min to mod compensation of trunk and shoulder elevation; elbow flexion -120 degrees to 110 degrees with only minimal compensation and can extend to -80 degrees (unable to extend farther due to tone), 1+/5 for digit extension      R  strength post botox:  27, 24#  (similar strength to 17)        Pinch Strength:  Lateral: 8, 11# (was 14# on 1/10/17); palmar: 4# (used medial portion of 1st digit as previously (was 6# on 1/10/17)    Outcome Measures (most recent score):  [facility] - unable to complete at discharge due to needing to leave (has scheduled transportation).  Will complete in future physical therapy session.     Goals:     Goal Identifier     Goal Description     Target Date     Date Met      Progress:     Goal Identifier R UE as gross assist   Goal Description Patient to demonstrate functional tasks (feeding self, wiping the counter/table, washing dishes, etc.) with 30% use of R UE as gross stabilizer or gross assist for increased ADL/IADL independence and closer return to prior level of function.    Target Date 17    Date Met      Progress:  Goal not fully met, though much improvement noted.  He is likely completing tasks at home with ~5% use of R UE.  During therapy tasks, he is using his R UE up to 20-30% as a gross stabilizer and gross assist primarily.     Goal Identifier functional/normal movment patterns in R UE and trunk   Goal Description Patient to complete therapeutic task (simple kitchen tasks, laundry, etc.) with use of B UE's and no more than 2 cues in 10 minute time frame for correct body mechanics and for decreasing compensatory movements at the trunk and upper body for encouragement of normal movement patterns, increased R UE function and increased ADL/IADL independence.   Target Date 03/21/17   Date Met      Progress:  Goal not fully met, although much improvement noted in this goal.     Goal Identifier R GM/FM coordination   Goal Description Patient to improve R GM/FM coordination skills for increased independence during ADL/IADL tasks (dressing, light housework, etc.) by completing the Box and Blocks Test with R UE in standing with 10 blocks. (NT at discharge)   Target Date 03/21/17   Date Met      Progress:  NT at discharge due to time constraints.  Goal most likely not met due to increased hypertonicity more recently.     Goal Identifier visual scanning and reaction time   Goal Description Patient will demonstrate WFLs  visual reaction time and visual scanning skills and divided attention skills for improved visual skills and safe independent community mobility (crossing the street, driving, etc.) by scoring WNLs on all modes of the [employer].   Target Date 03/21/17   Date Met   11/8/16   Progress:  Goal met with L UE only.     Goal Identifier R pinch strength   Goal Description Patient will demonstrate increased right hand pinch strength (both lateral and palmar) by 3# each for increased independence with ADL/IADLs such as opening containers, pull up pants, maintaining pinch to hold items, etc.   Target  Date 03/21/17   Date Met      Progress:  Goal not met.  Patient actually declined some in lateral pinch with R hand from previous testing.     Progress Toward Goals/Assessment and Recommendations:  Patient has been seen for a total of 72 sessions since the initial evaluation dated 3/1/16 and 6 sessions since last progress note dated 1/24/17 - please refer back to this progress note (and other progress notes) for details of earlier progress.  Patient was seen for skilled outpatient occupational therapy to address decreased R UE function, decreased functional mobility, questionable visual perceptual deficits (reaction time, scanning, etc.), working towards maximizing independence with daily tasks in the patient's home, work and community environments.      Since the evaluation, patient has demonstrated notable progress in R UE strength and function and has increased his functional use of his R UE with daily tasks. Hypertonicity continues to affect progress to some degree and he continues to receive botox in R UE to assist with this.  Patient has been able to decrease compensatory behaviors in trunk and B shoulders to perform more normal movement patterns in R UE which has contributed to increased function of R UE.   In general, patient is now dressing himself completely without assistance and assisting more with household tasks such as washing dishes.  Patient has been provided with behind the wheel driving assessment resources should he decide to complete an evaluation and possibly train in use of adapted vehicle for community mobility.  Main limiting factor with community mobility is decreased R UE and R LE function.    Patient is being discharged from occupational therapy due to limited progress in R UE more recently.    Plan:  Discontinue formal outpatient occupational therapy services at this time.  Patient to continue with current home program as instructed.  Patient in agreement with the plan.  If there are  notable changes in R UE in the future, a re-assessment would likely be warranted.    Discharge:  Yes

## 2017-03-30 ENCOUNTER — HOSPITAL ENCOUNTER (OUTPATIENT)
Dept: PHYSICAL THERAPY | Facility: CLINIC | Age: 62
Setting detail: THERAPIES SERIES
End: 2017-03-30
Attending: PHYSICAL MEDICINE & REHABILITATION
Payer: COMMERCIAL

## 2017-03-30 PROCEDURE — 97116 GAIT TRAINING THERAPY: CPT | Mod: GP | Performed by: PHYSICAL THERAPIST

## 2017-03-30 PROCEDURE — 40000719 ZZHC STATISTIC PT DEPARTMENT NEURO VISIT: Performed by: PHYSICAL THERAPIST

## 2017-03-30 PROCEDURE — 97112 NEUROMUSCULAR REEDUCATION: CPT | Mod: GP | Performed by: PHYSICAL THERAPIST

## 2017-04-04 ENCOUNTER — HOSPITAL ENCOUNTER (OUTPATIENT)
Dept: PHYSICAL THERAPY | Facility: CLINIC | Age: 62
Setting detail: THERAPIES SERIES
End: 2017-04-04
Attending: PHYSICAL MEDICINE & REHABILITATION
Payer: COMMERCIAL

## 2017-04-04 PROCEDURE — 97112 NEUROMUSCULAR REEDUCATION: CPT | Mod: GP | Performed by: PHYSICAL THERAPIST

## 2017-04-04 PROCEDURE — 97110 THERAPEUTIC EXERCISES: CPT | Mod: GP | Performed by: PHYSICAL THERAPIST

## 2017-04-04 PROCEDURE — 40000719 ZZHC STATISTIC PT DEPARTMENT NEURO VISIT: Performed by: PHYSICAL THERAPIST

## 2017-04-11 ENCOUNTER — HOSPITAL ENCOUNTER (OUTPATIENT)
Dept: PHYSICAL THERAPY | Facility: CLINIC | Age: 62
Setting detail: THERAPIES SERIES
End: 2017-04-11
Attending: PHYSICAL MEDICINE & REHABILITATION
Payer: COMMERCIAL

## 2017-04-11 PROCEDURE — 40000719 ZZHC STATISTIC PT DEPARTMENT NEURO VISIT: Performed by: PHYSICAL THERAPIST

## 2017-04-11 PROCEDURE — 97112 NEUROMUSCULAR REEDUCATION: CPT | Mod: GP | Performed by: PHYSICAL THERAPIST

## 2017-04-13 ENCOUNTER — HOSPITAL ENCOUNTER (OUTPATIENT)
Dept: PHYSICAL THERAPY | Facility: CLINIC | Age: 62
Setting detail: THERAPIES SERIES
End: 2017-04-13
Attending: PHYSICAL MEDICINE & REHABILITATION
Payer: COMMERCIAL

## 2017-04-13 PROCEDURE — 97112 NEUROMUSCULAR REEDUCATION: CPT | Mod: GP | Performed by: PHYSICAL THERAPIST

## 2017-04-13 PROCEDURE — 40000719 ZZHC STATISTIC PT DEPARTMENT NEURO VISIT: Performed by: PHYSICAL THERAPIST

## 2017-04-17 ENCOUNTER — OFFICE VISIT (OUTPATIENT)
Dept: PHYSICAL MEDICINE AND REHAB | Facility: CLINIC | Age: 62
End: 2017-04-17

## 2017-04-17 VITALS
WEIGHT: 174.5 LBS | HEIGHT: 64 IN | HEART RATE: 96 BPM | SYSTOLIC BLOOD PRESSURE: 128 MMHG | DIASTOLIC BLOOD PRESSURE: 78 MMHG | BODY MASS INDEX: 29.79 KG/M2

## 2017-04-17 DIAGNOSIS — I69.359 HEMIPARESIS AS LATE EFFECT OF CEREBROVASCULAR ACCIDENT (CVA) (H): Primary | ICD-10-CM

## 2017-04-17 ASSESSMENT — PAIN SCALES - GENERAL: PAINLEVEL: NO PAIN (0)

## 2017-04-17 NOTE — MR AVS SNAPSHOT
After Visit Summary   4/17/2017    Luis Trinidad    MRN: [id number]           Patient Information     Date Of Birth          1955        Visit Information        Provider Department      4/17/2017 10:00 AM Chele Reno MD [facility]        Today's Diagnoses     Hemiparesis as late effect of cerebrovascular accident (CVA) (H)    -  1       Follow-ups after your visit        Follow-up notes from your care team     Return in about 3 months (around 7/17/2017).      Your next 10 appointments already scheduled     Apr 18, 2017  3:15 PM CDT   Treatment 45 with Yen Villanueva, PT   [facility] ([facility])    [address] [address]   [phone]            Apr 20, 2017 10:30 AM CDT   Treatment 45 with Yen Villanueva, PT   [facility] ([facility])    [address] [address]   [phone]            Apr 25, 2017  9:45 AM CDT   Treatment 45 with Yen Villanueva, PT   [facility] ([facility])    [address] [address]   [phone]            Apr 27, 2017 10:30 AM CDT   Treatment 45 with Yen Villanueva, PT   [facility] ([facility])    [address] [address]   [phone]            May 02, 2017 10:30 AM CDT   Treatment 45 with Yen Villanueva, PT   [facility] ([facility])    [address] [address]   [phone]            May 04, 2017 10:30 AM CDT   Treatment 45 with Yen Villanueva, PT   [facility] ([facility])    [address] [address]   [phone]            May 09, 2017 10:30 AM CDT   Treatment 45 with Yen Villanueva, PT   [facility] ([facility])    [address] [facility] [address]   [phone]            May 11, 2017 10:30 AM CDT   Treatment 45 with Yen Villanueva, PT   [facility] ([facility])    [address] [facility] [address]   [phone]            May 16, 2017 10:30 AM CDT   Treatment 45 with Yen Villanueva, PT   [facility] ([facility])    [address] [address]   [phone]            May 18, 2017 10:30 AM CDT   Treatment 45 with Yen Villanueva, PT   [facility] ([facility])    [address] [address]   [phone]              Who to contact     Please call your clinic at [phone] to:    Ask questions about your health    Make or cancel appointments    Discuss your medicines    Learn about your test results    Speak to your doctor   If you have compliments or concerns about an experience at your clinic, or if you wish to file a complaint, please contact [facility] Patient Relations at [phone] or email us at [email or web address]         Additional Information About Your Visit        VSHORE Information     VSHORE is an electronic gateway that provides easy, online access to your medical records. With VSHORE, you can request a clinic appointment, read your test results, renew a prescription or communicate with your care team.     To sign up for VSHORE visit the website at [email or web address]   You will be asked to enter the access code listed below, as well as some personal information. Please follow the directions to create your username and password.     Your access code is: [email or web address]  Expires: 2017 10:56 AM     Your access code will  in 90 days. If you need help or a new code, please contact your [facility] or call [phone] for  "assistance.        Care EveryWhere ID     This is your Care EveryWhere ID. This could be used by other organizations to access your [place] medical records  [id number]        Your Vitals Were     Pulse Height BMI (Body Mass Index)             96 1.626 m (5' 4\") 29.95 kg/m2          Blood Pressure from Last 3 Encounters:   04/17/17 128/78   01/16/17 137/76   10/17/16 140/87    Weight from Last 3 Encounters:   04/17/17 79.2 kg (174 lb 8 oz)   01/16/17 78.2 kg (172 lb 6.4 oz)   10/17/16 75.9 kg (167 lb 6.4 oz)              We Performed the Following     HC CHEMODENERVATION 1 EXTREMITY, 5 OR MORE MUSCLES     HC CHEMODENERVATION OF TRUNK, 6 OR MORE MUSCLES     NEEDLE EMG GUIDE W CHEMODENERVATION        Primary Care Provider Office Phone # Fax #    Zaid Whitten [phone]       [facility] [place] [address] [facility] [address]        Thank you!     Thank you for choosing [facility] PHYSICAL MEDICINE [facility] REHABILITATION  for your care. Our goal is always to provide you with excellent care. Hearing back from our patients is one way we can continue to improve our services. Please take a few minutes to complete the written survey that you may receive in the mail after your visit with us. Thank you!             Your Updated Medication List - Protect others around you: Learn how to safely use, store and throw away your medicines at www.disposemymeds.org.          This list is accurate as of: 4/17/17 10:58 AM.  Always use your most recent med list.                   Brand Name Dispense Instructions for use    aspirin 81 MG EC tablet      Take 1 tablet (81 mg) by mouth daily       baclofen 10 MG tablet    LIORESAL    210 tablet    20 mg in morning, 20 mg mid day and 30 mg bedtime.       cholecalciferol 2000 UNITS tablet     30 tablet    Take 2,000 Units by mouth daily       clopidogrel 75 MG tablet    PLAVIX    30 tablet    Take 1 tablet (75 mg) by mouth daily       co-enzyme Q-10 100 MG " Caps capsule     30 capsule    Take 1 capsule (100 mg) by mouth daily       lisinopril-hydrochlorothiazide 10-12.5 MG per tablet    PRINZIDE/ZESTORETIC     Take 1 tablet by mouth       simvastatin 10 MG tablet    ZOCOR    30 tablet    Take 1 tablet (10 mg) by mouth every evening       tetrahydrozoline 0.05 % ophthalmic solution      Place 1 drop into both eyes 3 times daily as needed

## 2017-04-17 NOTE — LETTER
"4/17/2017       RE: Luis Trinidad  [address]  [place] [address]     Dear Colleague,    Thank you for referring your patient, Luis Trinidad, to the [facility] PHYSICAL MEDICINE AND REHABILITATION at [facility]. Please see a copy of my visit note below.    PM&R Clinic & Procedure Note:    Luis Trinidad is 61-year-old male with a history of stroke resulting in residual right spastic hemiparesis.  Last seen 1/16/17/2016, at which time he received 300 U Botox to his right upper extremity and trunk.  Rogelio reports reduction in tone which facilitated some movements and ROM in the right arm. There is also a resolution of the right shoulder pain. The time prior we used 200 unites and he feels the 300 was incrementally better. The benefits have just started to war off about the last week.  Rogelio is noting some stiffness in the right lateral flank in the latissimus dorsi area. He says this has been present for longer period though we have focused more on the arm and pectoralis. Now those are relatively better, the LD is more noticed.  He denies any side effects from last or prior injections. Feeling well today. No recent vaccinations.     He has participated in considerable outpatient therapy. He has finished with the OT though continues with the PT. He asks today about this schedule.      Physical exam:  /78 (BP Location: Left arm, Patient Position: Chair, Cuff Size: Adult Regular)  Pulse 96  Ht 5' 4\" (1.626 m)  Wt 174 lb 8 oz (79.2 kg)  BMI 29.95 kg/m2  Alert, conversant, very pleasant, no acute distress  Limited active range of motion in right upper extremity, shoulder abduction to about 80 deg though PROM near full at shoulder. Similarly AROM at elbow and  only partial range with full PROM. Elbow with both flexion and extension tone 3/4, wrist fingers with flexion predominance and some pronation predominance.   Slight anterior shift of glenohumeral joint, " mild sulcus sign with humeral traction but none at rest, no pain on palpation of shoulder.  Brings his SEC though gait not observed today. Able to transition independently sit to stand and take few steps unsupported. Has right AFO on.    Assessment and recommendations:  1. Repeat Botox though increase dose to 400 U and add latissimus dorsi in the mix of muscles injected. Given resolution of pain and and some function as well discussed slight risk of excess weakness.  2. Continue with ROM and other maintenance exercises.  3. Had a trial with some more intense OT though unfortunately that didn't translate into the functional gains he was hoping for. Did discuss concept of new normal and a new baseline function. Stressed that some slight gains may occur moving forward though for the most part, his present function will be static.  4. Continue oral baclofen for partial tone reduction in broader distribution.  5. Workability: Rogelio continues to be fully disabled and NOT able to return to work. He is disabled and at maximal medical improvement from that perspective.    6. Follow up in 3 months, can re-evaluate sooner if concern for weakness.    15 minutes spent in direct patient interaction discussing above items, greater than 50% in education. This is independent from procedure time.      Procedure:   Chemodenervation to RUE utilizing botulinum toxin.   Began with formal consent which he signed. Had formal time-out prior to procedure.    400 U of Botox lot -P8, expiration 11/2019 , Botox NDC 8811-2987-55 was utilized. Diluted with normal saline in a 1:1 ratio, total of 4 mL.  All areas were cleansed with chlor prep prior to injecting.   EMG guidance was utilized to identify most active motor units while avoiding surrounding structures.     The following muscles were then injected, two body areas  Right trunk:  1. Right Pectoralis Major: 60 units divided in 2 sites  2. Latissimus Dorsi 50 units 2 sites.    Right  arm  1. Biceps 90 units divided 2 sites.  2. FDS 40 units  3. FDP 40 units  4. FCR 15 units  5. Triceps 90 units, divided 2 sites.  7. Pronator teres 15 units     Procedure was tolerated well, no adverse reactions.

## 2017-04-17 NOTE — PROGRESS NOTES
"PM&R Clinic & Procedure Note:    Luis Trinidad is 61-year-old male with a history of stroke resulting in residual right spastic hemiparesis.  Last seen 1/16/17/2016, at which time he received 300 U Botox to his right upper extremity and trunk.  Rogelio reports reduction in tone which facilitated some movements and ROM in the right arm. There is also a resolution of the right shoulder pain. The time prior we used 200 unites and he feels the 300 was incrementally better. The benefits have just started to war off about the last week.  Rogelio is noting some stiffness in the right lateral flank in the latissimus dorsi area. He says this has been present for longer period though we have focused more on the arm and pectoralis. Now those are relatively better, the LD is more noticed.  He denies any side effects from last or prior injections. Feeling well today. No recent vaccinations.     He has participated in considerable outpatient therapy. He has finished with the OT though continues with the PT. He asks today about this schedule.      Physical exam:  /78 (BP Location: Left arm, Patient Position: Chair, Cuff Size: Adult Regular)  Pulse 96  Ht 5' 4\" (1.626 m)  Wt 174 lb 8 oz (79.2 kg)  BMI 29.95 kg/m2  Alert, conversant, very pleasant, no acute distress  Limited active range of motion in right upper extremity, shoulder abduction to about 80 deg though PROM near full at shoulder. Similarly AROM at elbow and  only partial range with full PROM. Elbow with both flexion and extension tone 3/4, wrist fingers with flexion predominance and some pronation predominance.   Slight anterior shift of glenohumeral joint, mild sulcus sign with humeral traction but none at rest, no pain on palpation of shoulder.  Brings his SEC though gait not observed today. Able to transition independently sit to stand and take few steps unsupported. Has right AFO on.    Assessment and recommendations:  1. Repeat Botox though increase dose to " 400 U and add latissimus dorsi in the mix of muscles injected. Given resolution of pain and and some function as well discussed slight risk of excess weakness.  2. Continue with ROM and other maintenance exercises.  3. Had a trial with some more intense OT though unfortunately that didn't translate into the functional gains he was hoping for. Did discuss concept of new normal and a new baseline function. Stressed that some slight gains may occur moving forward though for the most part, his present function will be static.  4. Continue oral baclofen for partial tone reduction in broader distribution.  5. Workability: Rogelio continues to be fully disabled and NOT able to return to work. He is disabled and at maximal medical improvement from that perspective.    6. Follow up in 3 months, can re-evaluate sooner if concern for weakness.    15 minutes spent in direct patient interaction discussing above items, greater than 50% in education. This is independent from procedure time.      Procedure:   Chemodenervation to RUE utilizing botulinum toxin.   Began with formal consent which he signed. Had formal time-out prior to procedure.    400 U of Botox lot -L9, expiration 11/2019 , Botox NDC 4934-4229-16 was utilized. Diluted with normal saline in a 1:1 ratio, total of 4 mL.  All areas were cleansed with chlor prep prior to injecting.   EMG guidance was utilized to identify most active motor units while avoiding surrounding structures.     The following muscles were then injected, two body areas  Right trunk:  1. Right Pectoralis Major: 60 units divided in 2 sites  2. Latissimus Dorsi 50 units 2 sites.    Right arm  1. Biceps 90 units divided 2 sites.  2. FDS 40 units  3. FDP 40 units  4. FCR 15 units  5. Triceps 90 units, divided 2 sites.  7. Pronator teres 15 units     Procedure was tolerated well, no adverse reactions.

## 2017-04-18 ENCOUNTER — HOSPITAL ENCOUNTER (OUTPATIENT)
Dept: PHYSICAL THERAPY | Facility: CLINIC | Age: 62
Setting detail: THERAPIES SERIES
End: 2017-04-18
Attending: PHYSICAL MEDICINE & REHABILITATION
Payer: COMMERCIAL

## 2017-04-18 PROCEDURE — 40000719 ZZHC STATISTIC PT DEPARTMENT NEURO VISIT: Performed by: PHYSICAL THERAPIST

## 2017-04-18 PROCEDURE — 97112 NEUROMUSCULAR REEDUCATION: CPT | Mod: GP | Performed by: PHYSICAL THERAPIST

## 2017-04-18 PROCEDURE — 97116 GAIT TRAINING THERAPY: CPT | Mod: GP | Performed by: PHYSICAL THERAPIST

## 2017-04-19 DIAGNOSIS — R25.2 SPASTICITY: ICD-10-CM

## 2017-04-19 RX ORDER — BACLOFEN 10 MG/1
TABLET ORAL
Qty: 210 TABLET | Refills: 0 | Status: SHIPPED | OUTPATIENT
Start: 2017-04-19 | End: 2017-07-26

## 2017-04-20 ENCOUNTER — HOSPITAL ENCOUNTER (OUTPATIENT)
Dept: PHYSICAL THERAPY | Facility: CLINIC | Age: 62
Setting detail: THERAPIES SERIES
End: 2017-04-20
Attending: PHYSICAL MEDICINE & REHABILITATION
Payer: COMMERCIAL

## 2017-04-20 PROCEDURE — 97112 NEUROMUSCULAR REEDUCATION: CPT | Mod: GP | Performed by: PHYSICAL THERAPIST

## 2017-04-20 PROCEDURE — 97116 GAIT TRAINING THERAPY: CPT | Mod: GP | Performed by: PHYSICAL THERAPIST

## 2017-04-20 PROCEDURE — 40000719 ZZHC STATISTIC PT DEPARTMENT NEURO VISIT: Performed by: PHYSICAL THERAPIST

## 2017-04-20 PROCEDURE — 97110 THERAPEUTIC EXERCISES: CPT | Mod: GP | Performed by: PHYSICAL THERAPIST

## 2017-04-25 ENCOUNTER — HOSPITAL ENCOUNTER (OUTPATIENT)
Dept: PHYSICAL THERAPY | Facility: CLINIC | Age: 62
Setting detail: THERAPIES SERIES
End: 2017-04-25
Attending: PHYSICAL MEDICINE & REHABILITATION
Payer: COMMERCIAL

## 2017-04-25 PROCEDURE — 97112 NEUROMUSCULAR REEDUCATION: CPT | Mod: GP | Performed by: PHYSICAL THERAPIST

## 2017-04-25 PROCEDURE — 40000719 ZZHC STATISTIC PT DEPARTMENT NEURO VISIT: Performed by: PHYSICAL THERAPIST

## 2017-04-27 ENCOUNTER — HOSPITAL ENCOUNTER (OUTPATIENT)
Dept: PHYSICAL THERAPY | Facility: CLINIC | Age: 62
Setting detail: THERAPIES SERIES
End: 2017-04-27
Attending: PHYSICAL MEDICINE & REHABILITATION
Payer: COMMERCIAL

## 2017-04-27 PROCEDURE — 97112 NEUROMUSCULAR REEDUCATION: CPT | Mod: GP | Performed by: PHYSICAL THERAPIST

## 2017-04-27 PROCEDURE — 97116 GAIT TRAINING THERAPY: CPT | Mod: GP | Performed by: PHYSICAL THERAPIST

## 2017-04-27 PROCEDURE — 40000719 ZZHC STATISTIC PT DEPARTMENT NEURO VISIT: Performed by: PHYSICAL THERAPIST

## 2017-05-02 ENCOUNTER — HOSPITAL ENCOUNTER (OUTPATIENT)
Dept: PHYSICAL THERAPY | Facility: CLINIC | Age: 62
Setting detail: THERAPIES SERIES
End: 2017-05-02
Attending: PHYSICAL MEDICINE & REHABILITATION
Payer: COMMERCIAL

## 2017-05-02 PROCEDURE — 97116 GAIT TRAINING THERAPY: CPT | Mod: GP | Performed by: PHYSICAL THERAPIST

## 2017-05-02 PROCEDURE — 97112 NEUROMUSCULAR REEDUCATION: CPT | Mod: GP | Performed by: PHYSICAL THERAPIST

## 2017-05-02 PROCEDURE — 40000719 ZZHC STATISTIC PT DEPARTMENT NEURO VISIT: Performed by: PHYSICAL THERAPIST

## 2017-05-04 ENCOUNTER — HOSPITAL ENCOUNTER (OUTPATIENT)
Dept: PHYSICAL THERAPY | Facility: CLINIC | Age: 62
Setting detail: THERAPIES SERIES
End: 2017-05-04
Attending: PHYSICAL MEDICINE & REHABILITATION
Payer: COMMERCIAL

## 2017-05-04 PROCEDURE — 40000719 ZZHC STATISTIC PT DEPARTMENT NEURO VISIT: Performed by: PHYSICAL THERAPIST

## 2017-05-04 PROCEDURE — 97112 NEUROMUSCULAR REEDUCATION: CPT | Mod: GP | Performed by: PHYSICAL THERAPIST

## 2017-05-09 ENCOUNTER — HOSPITAL ENCOUNTER (OUTPATIENT)
Dept: PHYSICAL THERAPY | Facility: CLINIC | Age: 62
Setting detail: THERAPIES SERIES
End: 2017-05-09
Attending: PHYSICAL MEDICINE & REHABILITATION
Payer: COMMERCIAL

## 2017-05-09 PROCEDURE — 40000719 ZZHC STATISTIC PT DEPARTMENT NEURO VISIT: Performed by: PHYSICAL THERAPIST

## 2017-05-09 PROCEDURE — 97112 NEUROMUSCULAR REEDUCATION: CPT | Mod: GP | Performed by: PHYSICAL THERAPIST

## 2017-05-09 PROCEDURE — 97116 GAIT TRAINING THERAPY: CPT | Mod: GP | Performed by: PHYSICAL THERAPIST

## 2017-05-11 ENCOUNTER — HOSPITAL ENCOUNTER (OUTPATIENT)
Dept: PHYSICAL THERAPY | Facility: CLINIC | Age: 62
Setting detail: THERAPIES SERIES
End: 2017-05-11
Attending: PHYSICAL MEDICINE & REHABILITATION
Payer: COMMERCIAL

## 2017-05-11 PROCEDURE — 97110 THERAPEUTIC EXERCISES: CPT | Mod: GP | Performed by: PHYSICAL THERAPIST

## 2017-05-11 PROCEDURE — 97112 NEUROMUSCULAR REEDUCATION: CPT | Mod: GP | Performed by: PHYSICAL THERAPIST

## 2017-05-11 PROCEDURE — 40000719 ZZHC STATISTIC PT DEPARTMENT NEURO VISIT: Performed by: PHYSICAL THERAPIST

## 2017-05-11 NOTE — ADDENDUM NOTE
Encounter addended by: Yen Villanueva PT on: 5/11/2017 10:53 AM<BR>     Actions taken: Pend clinical note

## 2017-05-11 NOTE — PROGRESS NOTES
Outpatient Physical Therapy Progress Note     Patient: Luis Trinidad  : 1955    Beginning/End Dates of Reporting Period:  2017 to 2017    Referring Provider: Dr Chele Reno    Therapy Diagnosis: impaired participation in life roles with R hemiparesis s/p CVA     Client Self Report: Feeling really stiff over the weekend and this morning - whole right side.      Objective Measurements:         Objective Measure: TUG (from )  Details: No cane - 15.8 sec.  With single end cane - 15.1 sec.     Objective Measure: 6 MWT  Details: 797 feet with cane.  decreased clearance right foot 25% of the time but overall improved gait mechanics with decreased compensations and improved symmetry.                   Outcome Measures (most recent score):   [facility] not recently tested            Goals:  Goal Identifier 1 - [facility]   Goal Description Pt to report improved basic mobility per AM-PAC score >/= 52 to achieve stage 3 for improved functional mobility indoors. (baseline: 38.44, stage 2, limited moving indoors).   Target Date 17   Date Met      Progress: [facility] not recently assessed.       Goal Identifier 2 - Endurance: 6MWT (baseline 16 781' with SPC and R aircast brace. demos 3x dec R toe clearance tripping with mild mild LOB recovered (I) with SPC,maintains absent heelstrike throughout, dec R hip and knee flex in swing, inc R hip circumduction and L trunk lean on SPC with fatigue)   Goal Description Pt to demonstrate improved walking endurance per 6MWT with SPC to 893-981 . (increase by  112-200  considered MDC for pts who have had stroke.)   Target Date 17   Date Met   in progress   Progress:  Rogelio in showing slow but gradual improvement in his 6MWT.  See objective measures.  Although distance has not yet improved significantly yet, his gait pattern has been showing ongoing excellent improvement and he is experiencing much less fatigue in LEs with walking due to his greater efficiency of his  movement patterns.  He is now able to demonstrate heelstrike bilaterally 75 % of the time, significant decreased circumduction with improved passive knee flexion at initial swing.  Still catches toe in swing at times due to decreased active knee flexion in midswing phase but he does not experience any significant LOB when this occurs.       Goal Identifier 3 - TUG   Goal Description Pt to achieve dec risk for falls per TUG completed in  < 13.5 sec with less restrictive AD (SEC). (baseline: amb with keren walker and solid AFO 22.91 sec  (> 13.5 inc fall risk).)   Target Date 05/01/17   Date Met   in progress   Progress:  Gradual, ongoing improvement from baseline.  See objective measures.      Goal Identifier 4- Neuromotor control   Goal Description Rogelio will be able to increase active flexion of right knee with hip extended as well as flexed in order to demonstrate improved right LE position in swing for clearance of foot and decreased need for circumduction and compensations on left LE.  This will allow for increased safety and efficiency with gait at home and in the community.     Target Date 05/01/17   Date Met   in progress   Progress:  Rogelio has been making ongoing improvement in his swing phase alignment on the right.  He is able to demonstrate improved passive knee flexion at initial swing to bring leg through without circumduction and to allow improved pendulum mechanics for heelstrike on right.  He still has weakness with active knee flexion to sustain adequate clearance of foot throughout swing phase on the right but this is improving gradually.         Goal Identifier 6 - HEP   Goal Description The client will be independent with a balance, strengthening, and walking program for long term management.   Target Date 05/01/17   Date Met    Meeting well to date   Progress:  Rogelio is doing a great job with consistency of his home program activities and walking practice.  Home activities continue to be modified and  progressed as clinically appropriate.      Progress Toward Goals:   Progress this reporting period: Rogelio is making continued consistent, although gradual, improvements in his alignment, neuromotor control and coordination and timing/sequencing of movement patterns to allow greater efficiency, safety, and tolerance for functional movement and activities.   He has excellent potential to progress with his gait and motor control to be able to walk in the community without a device with decreased falls risk, and to improve neuromotor function of his right upper extremity to assist with daily tasks and improve his independence in the home.   Progress limited due to spasticity of right UE/LE and weakness of knee flexors and ankle dorsiflexors, however, Rogelio is showing gradual but noticeable improvements in these areas.  He will benefit from continued PT to further maximize return of more normal movement patterns for greater safety and efficiency with mobility in all environments.     Plan:  Continue therapy per current plan of care of 2 x per week for another 90 days, then will reassess plan/frequency as appropriate.    Changes to goals: Cont goals above until 8/2/17.      New goal 5:   Pt will be able to navigate up and down his stairs with one rail in a reciprocal pattern with minimal circumduction of right LE in order for more efficient and safe access to all levels of his home.  To be met by 8/2/17.      New goal 7:  Rogelio will be able to ambulate community distances of up to 1000 feet without his cane and with minimal fatigue for improved access and safety in his community.  To be met by 8/2/17.        Discharge:  No

## 2017-05-16 ENCOUNTER — HOSPITAL ENCOUNTER (OUTPATIENT)
Dept: PHYSICAL THERAPY | Facility: CLINIC | Age: 62
Setting detail: THERAPIES SERIES
End: 2017-05-16
Attending: PHYSICAL MEDICINE & REHABILITATION
Payer: COMMERCIAL

## 2017-05-16 PROCEDURE — 97112 NEUROMUSCULAR REEDUCATION: CPT | Mod: GP | Performed by: PHYSICAL THERAPIST

## 2017-05-16 PROCEDURE — 40000719 ZZHC STATISTIC PT DEPARTMENT NEURO VISIT: Performed by: PHYSICAL THERAPIST

## 2017-05-18 ENCOUNTER — HOSPITAL ENCOUNTER (OUTPATIENT)
Dept: PHYSICAL THERAPY | Facility: CLINIC | Age: 62
Setting detail: THERAPIES SERIES
End: 2017-05-18
Attending: PHYSICAL MEDICINE & REHABILITATION
Payer: COMMERCIAL

## 2017-05-18 PROCEDURE — 97112 NEUROMUSCULAR REEDUCATION: CPT | Mod: GP | Performed by: PHYSICAL THERAPIST

## 2017-05-18 PROCEDURE — 97110 THERAPEUTIC EXERCISES: CPT | Mod: GP | Performed by: PHYSICAL THERAPIST

## 2017-05-18 PROCEDURE — 40000719 ZZHC STATISTIC PT DEPARTMENT NEURO VISIT: Performed by: PHYSICAL THERAPIST

## 2017-05-21 DIAGNOSIS — R25.2 SPASTICITY: ICD-10-CM

## 2017-05-22 RX ORDER — BACLOFEN 10 MG/1
TABLET ORAL
Qty: 210 TABLET | Refills: 0 | Status: SHIPPED | OUTPATIENT
Start: 2017-05-22 | End: 2018-08-01

## 2017-05-23 ENCOUNTER — HOSPITAL ENCOUNTER (OUTPATIENT)
Dept: PHYSICAL THERAPY | Facility: CLINIC | Age: 62
Setting detail: THERAPIES SERIES
End: 2017-05-23
Attending: PHYSICAL MEDICINE & REHABILITATION
Payer: COMMERCIAL

## 2017-05-23 PROCEDURE — 97110 THERAPEUTIC EXERCISES: CPT | Mod: GP | Performed by: PHYSICAL THERAPIST

## 2017-05-23 PROCEDURE — 97112 NEUROMUSCULAR REEDUCATION: CPT | Mod: GP | Performed by: PHYSICAL THERAPIST

## 2017-05-23 PROCEDURE — 40000719 ZZHC STATISTIC PT DEPARTMENT NEURO VISIT: Performed by: PHYSICAL THERAPIST

## 2017-05-25 ENCOUNTER — HOSPITAL ENCOUNTER (OUTPATIENT)
Dept: PHYSICAL THERAPY | Facility: CLINIC | Age: 62
Setting detail: THERAPIES SERIES
End: 2017-05-25
Attending: PHYSICAL MEDICINE & REHABILITATION
Payer: COMMERCIAL

## 2017-05-25 PROCEDURE — 97110 THERAPEUTIC EXERCISES: CPT | Mod: GP | Performed by: PHYSICAL THERAPIST

## 2017-05-25 PROCEDURE — 97112 NEUROMUSCULAR REEDUCATION: CPT | Mod: GP | Performed by: PHYSICAL THERAPIST

## 2017-05-25 PROCEDURE — 40000719 ZZHC STATISTIC PT DEPARTMENT NEURO VISIT: Performed by: PHYSICAL THERAPIST

## 2017-05-30 ENCOUNTER — HOSPITAL ENCOUNTER (OUTPATIENT)
Dept: PHYSICAL THERAPY | Facility: CLINIC | Age: 62
Setting detail: THERAPIES SERIES
End: 2017-05-30
Attending: PHYSICAL MEDICINE & REHABILITATION
Payer: COMMERCIAL

## 2017-05-30 PROCEDURE — 40000719 ZZHC STATISTIC PT DEPARTMENT NEURO VISIT: Performed by: PHYSICAL THERAPIST

## 2017-05-30 PROCEDURE — 97110 THERAPEUTIC EXERCISES: CPT | Mod: GP | Performed by: PHYSICAL THERAPIST

## 2017-05-30 PROCEDURE — 97112 NEUROMUSCULAR REEDUCATION: CPT | Mod: GP | Performed by: PHYSICAL THERAPIST

## 2017-06-01 ENCOUNTER — HOSPITAL ENCOUNTER (OUTPATIENT)
Dept: PHYSICAL THERAPY | Facility: CLINIC | Age: 62
Setting detail: THERAPIES SERIES
End: 2017-06-01
Attending: PHYSICAL MEDICINE & REHABILITATION
Payer: COMMERCIAL

## 2017-06-01 PROCEDURE — 97110 THERAPEUTIC EXERCISES: CPT | Mod: GP | Performed by: PHYSICAL THERAPIST

## 2017-06-01 PROCEDURE — 40000719 ZZHC STATISTIC PT DEPARTMENT NEURO VISIT: Performed by: PHYSICAL THERAPIST

## 2017-06-01 PROCEDURE — 97112 NEUROMUSCULAR REEDUCATION: CPT | Mod: GP | Performed by: PHYSICAL THERAPIST

## 2017-06-13 ENCOUNTER — HOSPITAL ENCOUNTER (OUTPATIENT)
Dept: PHYSICAL THERAPY | Facility: CLINIC | Age: 62
Setting detail: THERAPIES SERIES
End: 2017-06-13
Attending: PHYSICAL MEDICINE & REHABILITATION
Payer: COMMERCIAL

## 2017-06-13 PROCEDURE — 97112 NEUROMUSCULAR REEDUCATION: CPT | Mod: GP | Performed by: PHYSICAL THERAPIST

## 2017-06-13 PROCEDURE — 40000719 ZZHC STATISTIC PT DEPARTMENT NEURO VISIT: Performed by: PHYSICAL THERAPIST

## 2017-06-13 PROCEDURE — 97110 THERAPEUTIC EXERCISES: CPT | Mod: GP | Performed by: PHYSICAL THERAPIST

## 2017-06-15 ENCOUNTER — HOSPITAL ENCOUNTER (OUTPATIENT)
Dept: PHYSICAL THERAPY | Facility: CLINIC | Age: 62
Setting detail: THERAPIES SERIES
End: 2017-06-15
Attending: PHYSICAL MEDICINE & REHABILITATION
Payer: COMMERCIAL

## 2017-06-15 PROCEDURE — 40000719 ZZHC STATISTIC PT DEPARTMENT NEURO VISIT: Performed by: PHYSICAL THERAPIST

## 2017-06-15 PROCEDURE — 97116 GAIT TRAINING THERAPY: CPT | Mod: GP | Performed by: PHYSICAL THERAPIST

## 2017-06-15 PROCEDURE — 97112 NEUROMUSCULAR REEDUCATION: CPT | Mod: GP | Performed by: PHYSICAL THERAPIST

## 2017-06-20 ENCOUNTER — HOSPITAL ENCOUNTER (OUTPATIENT)
Dept: PHYSICAL THERAPY | Facility: CLINIC | Age: 62
Setting detail: THERAPIES SERIES
End: 2017-06-20
Attending: PHYSICAL MEDICINE & REHABILITATION
Payer: COMMERCIAL

## 2017-06-20 PROCEDURE — 97112 NEUROMUSCULAR REEDUCATION: CPT | Mod: GP | Performed by: PHYSICAL THERAPIST

## 2017-06-20 PROCEDURE — 97110 THERAPEUTIC EXERCISES: CPT | Mod: GP | Performed by: PHYSICAL THERAPIST

## 2017-06-20 PROCEDURE — 40000719 ZZHC STATISTIC PT DEPARTMENT NEURO VISIT: Performed by: PHYSICAL THERAPIST

## 2017-06-22 ENCOUNTER — HOSPITAL ENCOUNTER (OUTPATIENT)
Dept: PHYSICAL THERAPY | Facility: CLINIC | Age: 62
Setting detail: THERAPIES SERIES
End: 2017-06-22
Attending: PHYSICAL MEDICINE & REHABILITATION
Payer: COMMERCIAL

## 2017-06-22 DIAGNOSIS — R25.2 SPASTICITY: ICD-10-CM

## 2017-06-22 PROCEDURE — 97112 NEUROMUSCULAR REEDUCATION: CPT | Mod: GP | Performed by: PHYSICAL THERAPIST

## 2017-06-22 PROCEDURE — 40000719 ZZHC STATISTIC PT DEPARTMENT NEURO VISIT: Performed by: PHYSICAL THERAPIST

## 2017-06-22 PROCEDURE — 97116 GAIT TRAINING THERAPY: CPT | Mod: GP | Performed by: PHYSICAL THERAPIST

## 2017-06-23 NOTE — ADDENDUM NOTE
Encounter addended by: Yen Villanueva, PT on: 6/23/2017 12:51 PM<BR>     Actions taken: Sign clinical note

## 2017-06-23 NOTE — ADDENDUM NOTE
Encounter addended by: Yen Villanueva, PT on: 6/23/2017 12:56 PM<BR>     Actions taken: Flowsheet data copied forward, Flowsheet accepted

## 2017-06-23 NOTE — ADDENDUM NOTE
Encounter addended by: Yen Villanueva, PT on: 6/23/2017 11:34 AM<BR>     Actions taken: Pend clinical note

## 2017-06-26 RX ORDER — BACLOFEN 10 MG/1
TABLET ORAL
Qty: 210 TABLET | Refills: 0 | Status: SHIPPED | OUTPATIENT
Start: 2017-06-26 | End: 2018-08-01

## 2017-06-27 ENCOUNTER — HOSPITAL ENCOUNTER (OUTPATIENT)
Dept: PHYSICAL THERAPY | Facility: CLINIC | Age: 62
Setting detail: THERAPIES SERIES
End: 2017-06-27
Attending: PHYSICAL MEDICINE & REHABILITATION
Payer: COMMERCIAL

## 2017-06-27 PROCEDURE — 97116 GAIT TRAINING THERAPY: CPT | Mod: GP | Performed by: PHYSICAL THERAPIST

## 2017-06-27 PROCEDURE — 97112 NEUROMUSCULAR REEDUCATION: CPT | Mod: GP | Performed by: PHYSICAL THERAPIST

## 2017-06-27 PROCEDURE — 40000719 ZZHC STATISTIC PT DEPARTMENT NEURO VISIT: Performed by: PHYSICAL THERAPIST

## 2017-06-29 ENCOUNTER — HOSPITAL ENCOUNTER (OUTPATIENT)
Dept: PHYSICAL THERAPY | Facility: CLINIC | Age: 62
Setting detail: THERAPIES SERIES
End: 2017-06-29
Attending: PHYSICAL MEDICINE & REHABILITATION
Payer: COMMERCIAL

## 2017-06-29 PROCEDURE — 40000719 ZZHC STATISTIC PT DEPARTMENT NEURO VISIT: Performed by: PHYSICAL THERAPIST

## 2017-06-29 PROCEDURE — 97112 NEUROMUSCULAR REEDUCATION: CPT | Mod: GP | Performed by: PHYSICAL THERAPIST

## 2017-07-06 ENCOUNTER — HOSPITAL ENCOUNTER (OUTPATIENT)
Dept: PHYSICAL THERAPY | Facility: CLINIC | Age: 62
Setting detail: THERAPIES SERIES
End: 2017-07-06
Attending: PHYSICAL MEDICINE & REHABILITATION
Payer: COMMERCIAL

## 2017-07-06 PROCEDURE — 40000719 ZZHC STATISTIC PT DEPARTMENT NEURO VISIT: Performed by: PHYSICAL THERAPIST

## 2017-07-06 PROCEDURE — 97112 NEUROMUSCULAR REEDUCATION: CPT | Mod: GP | Performed by: PHYSICAL THERAPIST

## 2017-07-06 PROCEDURE — 97110 THERAPEUTIC EXERCISES: CPT | Mod: GP | Performed by: PHYSICAL THERAPIST

## 2017-07-11 ENCOUNTER — HOSPITAL ENCOUNTER (OUTPATIENT)
Dept: PHYSICAL THERAPY | Facility: CLINIC | Age: 62
Setting detail: THERAPIES SERIES
End: 2017-07-11
Attending: PHYSICAL MEDICINE & REHABILITATION
Payer: COMMERCIAL

## 2017-07-11 PROCEDURE — 97116 GAIT TRAINING THERAPY: CPT | Mod: GP | Performed by: PHYSICAL THERAPIST

## 2017-07-11 PROCEDURE — 40000719 ZZHC STATISTIC PT DEPARTMENT NEURO VISIT: Performed by: PHYSICAL THERAPIST

## 2017-07-11 PROCEDURE — 97112 NEUROMUSCULAR REEDUCATION: CPT | Mod: GP | Performed by: PHYSICAL THERAPIST

## 2017-07-11 PROCEDURE — 97110 THERAPEUTIC EXERCISES: CPT | Mod: GP | Performed by: PHYSICAL THERAPIST

## 2017-07-13 ENCOUNTER — HOSPITAL ENCOUNTER (OUTPATIENT)
Dept: PHYSICAL THERAPY | Facility: CLINIC | Age: 62
Setting detail: THERAPIES SERIES
End: 2017-07-13
Attending: PHYSICAL MEDICINE & REHABILITATION
Payer: COMMERCIAL

## 2017-07-13 PROCEDURE — 97110 THERAPEUTIC EXERCISES: CPT | Mod: GP | Performed by: PHYSICAL THERAPIST

## 2017-07-13 PROCEDURE — 97112 NEUROMUSCULAR REEDUCATION: CPT | Mod: GP | Performed by: PHYSICAL THERAPIST

## 2017-07-13 PROCEDURE — 40000719 ZZHC STATISTIC PT DEPARTMENT NEURO VISIT: Performed by: PHYSICAL THERAPIST

## 2017-07-18 ENCOUNTER — HOSPITAL ENCOUNTER (OUTPATIENT)
Dept: PHYSICAL THERAPY | Facility: CLINIC | Age: 62
Setting detail: THERAPIES SERIES
End: 2017-07-18
Attending: PHYSICAL MEDICINE & REHABILITATION
Payer: COMMERCIAL

## 2017-07-18 PROCEDURE — 40000719 ZZHC STATISTIC PT DEPARTMENT NEURO VISIT: Performed by: PHYSICAL THERAPIST

## 2017-07-18 PROCEDURE — 97112 NEUROMUSCULAR REEDUCATION: CPT | Mod: GP | Performed by: PHYSICAL THERAPIST

## 2017-07-18 PROCEDURE — 97110 THERAPEUTIC EXERCISES: CPT | Mod: GP | Performed by: PHYSICAL THERAPIST

## 2017-07-18 PROCEDURE — 97116 GAIT TRAINING THERAPY: CPT | Mod: GP | Performed by: PHYSICAL THERAPIST

## 2017-07-21 ENCOUNTER — HOSPITAL ENCOUNTER (OUTPATIENT)
Dept: PHYSICAL THERAPY | Facility: CLINIC | Age: 62
Setting detail: THERAPIES SERIES
End: 2017-07-21
Attending: PHYSICAL MEDICINE & REHABILITATION
Payer: COMMERCIAL

## 2017-07-21 PROCEDURE — 97112 NEUROMUSCULAR REEDUCATION: CPT | Mod: GP | Performed by: PHYSICAL THERAPIST

## 2017-07-21 PROCEDURE — 40000719 ZZHC STATISTIC PT DEPARTMENT NEURO VISIT: Performed by: PHYSICAL THERAPIST

## 2017-07-23 DIAGNOSIS — R25.2 SPASTICITY: ICD-10-CM

## 2017-07-24 DIAGNOSIS — R25.2 SPASTICITY: ICD-10-CM

## 2017-07-24 RX ORDER — BACLOFEN 10 MG/1
TABLET ORAL
Qty: 210 TABLET | Refills: 11 | Status: SHIPPED | OUTPATIENT
Start: 2017-07-24 | End: 2018-08-01

## 2017-07-24 RX ORDER — BACLOFEN 10 MG/1
TABLET ORAL
Qty: 210 TABLET | Refills: 0 | OUTPATIENT
Start: 2017-07-24

## 2017-07-25 ENCOUNTER — HOSPITAL ENCOUNTER (OUTPATIENT)
Dept: PHYSICAL THERAPY | Facility: CLINIC | Age: 62
Setting detail: THERAPIES SERIES
End: 2017-07-25
Attending: PHYSICAL MEDICINE & REHABILITATION
Payer: COMMERCIAL

## 2017-07-25 PROCEDURE — 97110 THERAPEUTIC EXERCISES: CPT | Mod: GP | Performed by: PHYSICAL THERAPIST

## 2017-07-25 PROCEDURE — 97112 NEUROMUSCULAR REEDUCATION: CPT | Mod: GP | Performed by: PHYSICAL THERAPIST

## 2017-07-25 PROCEDURE — 40000719 ZZHC STATISTIC PT DEPARTMENT NEURO VISIT: Performed by: PHYSICAL THERAPIST

## 2017-07-26 ENCOUNTER — OFFICE VISIT (OUTPATIENT)
Dept: PHYSICAL MEDICINE AND REHAB | Facility: CLINIC | Age: 62
End: 2017-07-26

## 2017-07-26 VITALS
HEART RATE: 100 BPM | SYSTOLIC BLOOD PRESSURE: 124 MMHG | HEIGHT: 64 IN | DIASTOLIC BLOOD PRESSURE: 69 MMHG | BODY MASS INDEX: 29.71 KG/M2 | WEIGHT: 174 LBS

## 2017-07-26 DIAGNOSIS — R25.2 SPASTICITY: ICD-10-CM

## 2017-07-26 DIAGNOSIS — G81.11 RIGHT SPASTIC HEMIPARESIS (H): Primary | ICD-10-CM

## 2017-07-26 RX ORDER — BACLOFEN 10 MG/1
TABLET ORAL
Qty: 630 TABLET | Refills: 3 | Status: SHIPPED | OUTPATIENT
Start: 2017-07-26 | End: 2018-02-14

## 2017-07-26 NOTE — MR AVS SNAPSHOT
After Visit Summary   7/26/2017    Luis Trinidad    MRN: [id number]           Patient Information     Date Of Birth          1955        Visit Information        Provider Department      7/26/2017 9:20 AM Chele Reno MD [facility]        Today's Diagnoses     Spasticity           Follow-ups after your visit        Your next 10 appointments already scheduled     Jul 27, 2017 10:30 AM CDT   Treatment 45 with Yen Villanueva, PT   [facility] [place] [facility] ([facility])    [address] [address]   [phone]            Aug 01, 2017  9:45 AM CDT   Treatment 45 with Danyelle Vidal, PT   [facility] [place] [facility] ([facility])    [address] [address]   [phone]            Aug 03, 2017 10:30 AM CDT   Treatment 45 with Yen Villanueva, PT   [facility] ([facility])    [address] [address]   [phone]            Aug 08, 2017 10:30 AM CDT   Treatment 45 with Danyelle Vidal, PT   [facility] ([facility])    [address] [facility] [address]   [phone]            Aug 10, 2017 10:30 AM CDT   Treatment 45 with Yen Villanueva, PT   [facility] [place] [facility] ([facility])    [address] [address]   [phone]            Aug 15, 2017  1:00 PM CDT   Treatment 45 with Danyelle Vidal, PT   [facility] [place] [facility] ([facility])    [address] [facility] [address]   [phone]            Aug 17, 2017 10:30 AM CDT   Treatment 45 with Yen Villanueva, PT   [facility] ([facility])    [address] [facility] [address]   [phone]            Aug 22, 2017 10:00 AM CDT   Treatment 45 with Yen Villanueva, PT    [facility] ([facility])    [address] [facility] [address]   [phone]            Aug 24, 2017 10:30 AM CDT   Treatment 45 with Yen Villanueva PT   [facility] ([facility])    [address] [facility] [address]   [phone]            Aug 29, 2017 10:45 AM CDT   Treatment 45 with Yen Villanueva PT   [facility] ([facility])    [address] [facility] [address]   [phone]              Who to contact     Please call your clinic at [phone] to:    Ask questions about your health    Make or cancel appointments    Discuss your medicines    Learn about your test results    Speak to your doctor   If you have compliments or concerns about an experience at your clinic, or if you wish to file a complaint, please contact [facility] Patient Relations at [phone] or email us at [email or web address]         Additional Information About Your Visit        [employer] Information     [employer] is an electronic gateway that provides easy, online access to your medical records. With [employer], you can request a clinic appointment, read your test results, renew a prescription or communicate with your care team.     To sign up for [employer] visit the website at [email or web address]   You will be asked to enter the access code listed below, as well as some personal information. Please follow the directions to create your username and password.     Your access code is: [email or web address]  Expires: 10/24/2017 10:10 AM     Your access code will  in 90 days. If you need help or a new code, please contact your [facility] or call [phone] for assistance.        Care EveryWhere ID     This is your Care EveryWhere ID. This could be used by other organizations to access your [facility]  "records  [id number]        Your Vitals Were     Pulse Height BMI (Body Mass Index)             100 1.626 m (5' 4\") 29.87 kg/m2          Blood Pressure from Last 3 Encounters:   07/26/17 124/69   04/17/17 128/78   01/16/17 137/76    Weight from Last 3 Encounters:   07/26/17 78.9 kg (174 lb)   04/17/17 79.2 kg (174 lb 8 oz)   01/16/17 78.2 kg (172 lb 6.4 oz)              Today, you had the following     No orders found for display         Today's Medication Changes          These changes are accurate as of: 7/26/17 10:11 AM.  If you have any questions, ask your nurse or doctor.               These medicines have changed or have updated prescriptions.        Dose/Directions    * baclofen 10 MG tablet   Commonly known as:  LIORESAL   This may have changed:  Another medication with the same name was changed. Make sure you understand how and when to take each.   Used for:  Spasticity   Changed by:  Chele Reno MD        TAKE 2 TABLETS BY MOUTH IN THE MORNING, 2 TABLETS MIDDAY, AND 3 TABLETS AT BEDTIME   Quantity:  210 tablet   Refills:  0       * baclofen 10 MG tablet   Commonly known as:  LIORESAL   This may have changed:  Another medication with the same name was changed. Make sure you understand how and when to take each.   Used for:  Spasticity   Changed by:  Chele Reno MD        TAKE 2 TABLETS BY MOUTH IN THE MORNING, 2 TABLETS MIDDAY, AND 3 TABLETS AT BEDTIME   Quantity:  210 tablet   Refills:  0       * baclofen 10 MG tablet   Commonly known as:  LIORESAL   This may have changed:  Another medication with the same name was changed. Make sure you understand how and when to take each.   Used for:  Spasticity   Changed by:  Chele Reno MD        20 mg in morning, 20 mg mid day and 30 mg bedtime.   Quantity:  210 tablet   Refills:  11       * baclofen 10 MG tablet   Commonly known as:  LIORESAL   This may have changed:  See the new instructions.   Used for:  Spasticity   Changed by:  Chele Reno MD    "     TAKE 2 TABLETS BY MOUTH IN THE MORNING, 2 TABLETS MIDDAY, AND 3 TABLETS AT BEDTIME   Quantity:  630 tablet   Refills:  3       * Notice:  This list has 4 medication(s) that are the same as other medications prescribed for you. Read the directions carefully, and ask your doctor or other care provider to review them with you.         Where to get your medicines      These medications were sent to [facility] [id number] [place] [address] [address]  AT [facility] [address]  [address] [facility] [address]     Phone:  [phone]     baclofen 10 MG tablet                Primary Care Provider Office Phone # Fax #    Zaid Whitten [phone]       [facility] [place] [address] [facility] [address]        Equal Access to Services     TERRY MCKEON : Hadii charla wilson hadasho Soomaali, waaxda luqadaha, qaybta kaalmada adeegyada, torsten aguilar hayaavishal garrison . So [facility] [phone].    ATENCIÓN: Si habla español, tiene a bolanos disposición servicios gratuitos de asistencia lingüística. Benoit al [phone].    We comply with applicable federal civil rights laws and [place] laws. We do not discriminate on the basis of race, color, national origin, age, disability sex, sexual orientation or gender identity.            Thank you!     Thank you for choosing [facility] PHYSICAL MEDICINE AND REHABILITATION  for your care. Our goal is always to provide you with excellent care. Hearing back from our patients is one way we can continue to improve our services. Please take a few minutes to complete the written survey that you may receive in the mail after your visit with us. Thank you!             Your Updated Medication List - Protect others around you: Learn how to safely use, store and throw away your medicines at www.disposemymeds.org.          This list is accurate as of: 7/26/17 10:11 AM.  Always use your most recent med list.                   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    Spasticity       * baclofen 10 MG tablet    LIORESAL    630 tablet    TAKE 2 TABLETS BY MOUTH IN THE MORNING, 2 TABLETS MIDDAY, AND 3 TABLETS AT BEDTIME    Spasticity       cholecalciferol 2000 UNITS tablet     30 tablet    Take 2,000 Units by mouth daily    Mixed hyperlipidemia       clopidogrel 75 MG tablet    PLAVIX    30 tablet    Take 1 tablet (75 mg) by mouth daily    Lacunar stroke of left subthalamic region (H)       co-enzyme Q-10 100 MG Caps capsule     30 capsule    Take 1 capsule (100 mg) by mouth daily    Mixed hyperlipidemia       lisinopril-hydrochlorothiazide 10-12.5 MG per tablet    PRINZIDE/ZESTORETIC     Take 1 tablet by mouth        simvastatin 10 MG tablet    ZOCOR    30 tablet    Take 1 tablet (10 mg) by mouth every evening    Mixed hyperlipidemia       tetrahydrozoline 0.05 % ophthalmic solution      Place 1 drop into both eyes 3 times daily as needed        * Notice:  This list has 4 medication(s) that are the same as other medications prescribed for you. Read the directions carefully, and ask your doctor or other care provider to review them with you.

## 2017-07-26 NOTE — LETTER
"7/26/2017       RE: Luis Trinidad  [address] [facility] [address]     Dear Colleague,    Thank you for referring your patient, Luis Trinidad, to the [facility] PH[facility]ICAL MEDICINE AND REHABILITATION at [facility]. Please see a copy of my visit note below.    PM&R Clinic & Procedure Note:    Luis Trinidad is 61-year-old male with a history of stroke resulting in residual right spastic hemiparesis.  Last seen 4/17/17, at which time he received 400 U Botox to his right upper extremity and trunk.  Rogelio reports some nice reduction in pain around the shoulder and latissimus dorsi. Reduction in tone facilitates some movements and ROM in the right arm. The benefits have just started to wear off the last few weeks.  He denies any side effects from last or prior injections. Feeling well today. No recent vaccinations.     He has participated in considerable outpatient therapy. He continues with estim though there has been some electrode malfunction he's working through getting new pads.     About 1-2 weeks ago he started having some sharper pains on the left chest over rib / below pectoralis area. Mostly with some stretches and especially in the morning. No shortness of breath.    Physical exam:  /69  Pulse 100  Ht 5' 4\" (1.626 m)  Wt 174 lb (78.9 kg)  BMI 29.87 kg/m2  Alert, conversant, very pleasant  Limited active range of motion in right upper extremity, active shoulder abduction to about 90 deg though PROM near full. Similarly AROM at elbow and  only partial range with full PROM. Elbow with both flexion and extension tone 3/4, wrist fingers with flexion predominance and some pronation predominance. With full wrist flexion, the fingers easily extend implicating the long finger flexors and not hand intrinsics.  Can reproduce some of the tenderness on left chest with some of the intercostal muscle palpation. No masses felt.    Assessment and recommendations:  1. Repeat " Botox injections and continue with 400 U.  2. Continue with ROM and other maintenance exercises.  3. Continues with some ongoing physical therapy working both on gait and arm functioning. Did review importance of continued maintenance exercise program once that ends. Defer to he and his therapists when they feel there has been maximal functional recovery.  4. Continue oral baclofen 20 20 30 for partial tone reduction in broader distribution.  5. Left chest pain consistent with some muscle strain in the intercostal muscles. Conservative management is fine, no work-up needed.  6. Rogelio continues to be fully disabled and NOT able to return to work. He is disabled and at maximal medical improvement from that perspective.    7. Feel travel is safe for him. Rx for 3 month supply for the baclofen sent to facilitate this. Defer the other meds to his primary provider.  8. Follow up in 3 months.    20 minutes spent in direct patient interaction discussing above items, greater than 50% in education. This is independent from procedure time.      Procedure:   Chemodenervation to right chest and RUE utilizing botulinum toxin.  Began with formal consent which he signed. Had formal time-out prior to procedure.  400 U of Botox lot -T1, expiration 2/2020 , Botox NDC 7605-1504-16 was utilized. Diluted with normal saline in a 100U:1mL ratio, total of 4 mL.  All areas were cleansed with chlor prep prior to injecting. EMG guidance was utilized to identify most active motor units while avoiding surrounding structures.     The following muscles were then injected, two body areas    Right trunk:  1. Right Pectoralis Major: 70 units divided in 2 sites  2. Latissimus Dorsi 50 units 2 sites.    Right arm  1. Biceps 90 units divided 2 sites.  2. FDS 55 units  3. FDP 50 units  4. FCR 20 units  5. Triceps 50 units, divided 2 sites.  6. Pronator teres 15 units     Procedure was tolerated well, no adverse reactions.  Again, thank you for  allowing me to participate in the care of your patient.    Sincerely,  Chele Reno MD

## 2017-07-27 ENCOUNTER — HOSPITAL ENCOUNTER (OUTPATIENT)
Dept: PHYSICAL THERAPY | Facility: CLINIC | Age: 62
Setting detail: THERAPIES SERIES
End: 2017-07-27
Attending: PHYSICAL MEDICINE & REHABILITATION
Payer: COMMERCIAL

## 2017-07-27 PROCEDURE — 97112 NEUROMUSCULAR REEDUCATION: CPT | Mod: GP | Performed by: PHYSICAL THERAPIST

## 2017-07-27 PROCEDURE — 97116 GAIT TRAINING THERAPY: CPT | Mod: GP | Performed by: PHYSICAL THERAPIST

## 2017-07-27 PROCEDURE — 40000719 ZZHC STATISTIC PT DEPARTMENT NEURO VISIT: Performed by: PHYSICAL THERAPIST

## 2017-08-01 ENCOUNTER — HOSPITAL ENCOUNTER (OUTPATIENT)
Dept: PHYSICAL THERAPY | Facility: CLINIC | Age: 62
Setting detail: THERAPIES SERIES
End: 2017-08-01
Attending: PHYSICAL MEDICINE & REHABILITATION
Payer: COMMERCIAL

## 2017-08-01 PROCEDURE — 97112 NEUROMUSCULAR REEDUCATION: CPT | Mod: GP | Performed by: PHYSICAL THERAPIST

## 2017-08-01 PROCEDURE — 97116 GAIT TRAINING THERAPY: CPT | Mod: GP | Performed by: PHYSICAL THERAPIST

## 2017-08-01 PROCEDURE — 40000719 ZZHC STATISTIC PT DEPARTMENT NEURO VISIT: Performed by: PHYSICAL THERAPIST

## 2017-08-03 ENCOUNTER — HOSPITAL ENCOUNTER (OUTPATIENT)
Dept: PHYSICAL THERAPY | Facility: CLINIC | Age: 62
Setting detail: THERAPIES SERIES
End: 2017-08-03
Attending: PHYSICAL MEDICINE & REHABILITATION
Payer: COMMERCIAL

## 2017-08-03 PROCEDURE — 97112 NEUROMUSCULAR REEDUCATION: CPT | Mod: GP | Performed by: PHYSICAL THERAPIST

## 2017-08-03 PROCEDURE — 97116 GAIT TRAINING THERAPY: CPT | Mod: GP | Performed by: PHYSICAL THERAPIST

## 2017-08-03 PROCEDURE — 40000719 ZZHC STATISTIC PT DEPARTMENT NEURO VISIT: Performed by: PHYSICAL THERAPIST

## 2017-08-03 PROCEDURE — 97110 THERAPEUTIC EXERCISES: CPT | Mod: GP | Performed by: PHYSICAL THERAPIST

## 2017-08-08 ENCOUNTER — HOSPITAL ENCOUNTER (OUTPATIENT)
Dept: PHYSICAL THERAPY | Facility: CLINIC | Age: 62
Setting detail: THERAPIES SERIES
End: 2017-08-08
Attending: PHYSICAL MEDICINE & REHABILITATION
Payer: COMMERCIAL

## 2017-08-08 PROCEDURE — 40000719 ZZHC STATISTIC PT DEPARTMENT NEURO VISIT: Performed by: PHYSICAL THERAPIST

## 2017-08-08 PROCEDURE — 97112 NEUROMUSCULAR REEDUCATION: CPT | Mod: GP | Performed by: PHYSICAL THERAPIST

## 2017-08-08 PROCEDURE — 97110 THERAPEUTIC EXERCISES: CPT | Mod: GP | Performed by: PHYSICAL THERAPIST

## 2017-08-08 PROCEDURE — 97116 GAIT TRAINING THERAPY: CPT | Mod: GP | Performed by: PHYSICAL THERAPIST

## 2017-08-10 ENCOUNTER — HOSPITAL ENCOUNTER (OUTPATIENT)
Dept: PHYSICAL THERAPY | Facility: CLINIC | Age: 62
Setting detail: THERAPIES SERIES
End: 2017-08-10
Attending: PHYSICAL MEDICINE & REHABILITATION
Payer: COMMERCIAL

## 2017-08-10 PROCEDURE — 97112 NEUROMUSCULAR REEDUCATION: CPT | Mod: GP | Performed by: PHYSICAL THERAPIST

## 2017-08-10 PROCEDURE — 40000719 ZZHC STATISTIC PT DEPARTMENT NEURO VISIT: Performed by: PHYSICAL THERAPIST

## 2017-08-15 ENCOUNTER — HOSPITAL ENCOUNTER (OUTPATIENT)
Dept: PHYSICAL THERAPY | Facility: CLINIC | Age: 62
Setting detail: THERAPIES SERIES
End: 2017-08-15
Attending: PHYSICAL MEDICINE & REHABILITATION
Payer: COMMERCIAL

## 2017-08-15 PROCEDURE — 97112 NEUROMUSCULAR REEDUCATION: CPT | Mod: GP | Performed by: PHYSICAL THERAPIST

## 2017-08-15 PROCEDURE — 40000719 ZZHC STATISTIC PT DEPARTMENT NEURO VISIT: Performed by: PHYSICAL THERAPIST

## 2017-08-15 PROCEDURE — 97116 GAIT TRAINING THERAPY: CPT | Mod: GP | Performed by: PHYSICAL THERAPIST

## 2017-08-17 ENCOUNTER — HOSPITAL ENCOUNTER (OUTPATIENT)
Dept: PHYSICAL THERAPY | Facility: CLINIC | Age: 62
Setting detail: THERAPIES SERIES
End: 2017-08-17
Attending: PHYSICAL MEDICINE & REHABILITATION
Payer: COMMERCIAL

## 2017-08-17 ENCOUNTER — CARE COORDINATION (OUTPATIENT)
Dept: PHYSICAL MEDICINE AND REHAB | Facility: CLINIC | Age: 62
End: 2017-08-17

## 2017-08-17 PROCEDURE — 97110 THERAPEUTIC EXERCISES: CPT | Mod: GP | Performed by: PHYSICAL THERAPIST

## 2017-08-17 PROCEDURE — 97112 NEUROMUSCULAR REEDUCATION: CPT | Mod: GP | Performed by: PHYSICAL THERAPIST

## 2017-08-17 PROCEDURE — 40000719 ZZHC STATISTIC PT DEPARTMENT NEURO VISIT: Performed by: PHYSICAL THERAPIST

## 2017-08-17 NOTE — ADDENDUM NOTE
Encounter addended by: Yen Villanueva, PT on: 8/17/2017 10:46 AM<BR>     Actions taken: Pend clinical note, Flowsheet accepted

## 2017-08-17 NOTE — PROGRESS NOTES
The Physical Capabilities Evaluation Form was completed and signed by Dr Reno and faxed back to [place] [phone] along with patient's signed release of information and Dr Reno's PMR clinic note dated 7/26/17.

## 2017-08-17 NOTE — PROGRESS NOTES
Outpatient Physical Therapy Progress Note     Patient: Luis Trinidad  : 1955    Beginning/End Dates of Reporting Period:  2017 to 8/3/2017    Referring Provider: Dr Chele Reno    Therapy Diagnosis: impaired participation in life roles with R hemiparesis s/p CVA     Client Self Report:      Objective Measurements:      Objective Measure: 6 MWT  Details: 987 feet with cane.  catches right toes in swing  approximately 2 x every 85 feet.      Objective Measure: 25 foot walk (from )  Details: 15.7 sec, 17 steps.  No AD    Objective Measure: TUG  (from )  Details: with cane 13.1 sec.    With no AD 13.7 sec (average of 2 trials)         Goals:    Goal Identifier 2 - Endurance: 6MWT   (baseline 16 781' with SPC and R aircast brace, demos 3x dec R toe clearance tripping with mild LOB recovered (I) with SPC, inc fatigued after 2 minutes, maintains absent heelstrike throughout, dec R hip and knee flex in swing, inc R hip circumduction and L trunk lean on SPC with fatigue)   Goal Description Pt to demonstrate improved walking endurance per 6MWT with SPC to 893-981 . (increase by  112-200  considered MDC for pts who have had stroke.)   Target Date 17   Date Met   8/3/2017   Progress:  Pt met this goal today with a distance of 987 feet.  He has progressed his distance consistently with each testing.  The only time his distance did not improve was when he performed the test without an AD.  See below for new goal.       Goal Identifier 3 - TUG   Goal Description Pt to achieve dec risk for falls per TUG completed in  < 13.5 sec with less restrictive AD (SEC). (baseline: amb with keren walker and solid AFO 22.91 sec  (> 13.5 inc fall risk).)   Target Date 17   Date Met   2017   Progress:       Goal Identifier 4- Neuromotor control   Goal Description Rogelio will be able to increase active flexion of right knee with hip extended as well as flexed in order to demonstrate improved right LE  position in swing for clearance of foot and decreased need for circumduction and compensations on left LE.  This will allow for increased safety and efficiency with gait at home and in the community.     Target Date 08/02/17   Date Met   in progress   Progress:  Rogelio is improving gradually with knee flexion in swing but still needs more training for timing/coordination of quads/hams on right at initial swing.       Goal Identifier New goal 5 - stairs   Goal Description Pt will be able to navigate up and down his stairs with one rail in a reciprocal pattern with minimal circumduction of right LE in order for more efficient and safe access to all levels of his home.   Target Date 08/02/17   Date Met   in progress   Progress:  Currently needs to use circumduction slightly to clear right foot going up stairs.  Able to navigate down stairs reciprocally with excellent alignment and control.       Goal Identifier 6 - HEP   Goal Description The client will be independent with a balance, strengthening, and walking program for long term management.   Target Date 08/02/17   Date Met   in progress   Progress:  Home program continues to be progressed and modified.       Goal Identifier New goal 7 - community ambulation   Goal Description Rogelio will be able to ambulate community distances of up to 1000 feet without his cane and with minimal fatigue for improved access and safety in his community.    Target Date 08/02/17   Date Met   in progress   Progress:  Rogelio is walking 2 x per day for 30 minutes but is using his cane.  He does state that he will walk without using it for support for short distances at a time but has not tried for full 1000 feet.  Still he is progressing with his mobility at home and in the community.       Progress Toward Goals:   Progress this reporting period: Rogelio has met goals 2 and 3 and has made progress toward his remaining goals.  He is demonstrating improved alignments and movement patterns that have made  his mobility more efficient and safe.      Progress limited due to continued issues with spasticity and tightness of his right UE, trunk and LE as well as weakness of his ankle DF and HS which has been improving but slowly. He continues to show potential to continue to progress with gait and motor control for increased safety and efficiency at home and in the community, and further reduce his risk for injury or further disability due to progression of secondary impairments.        Plan:  Continue therapy per current plan of care.  Changes to therapy plan of care: continue 2 x per week for another 90 days and reassess.    Changes to goals:     Goal Identifier 2 - Endurance: 6MWT   (baseline 6/21/16 781' with SPC and R aircast brace, demos 3x dec R toe clearance tripping with mild LOB recovered (I) with SPC, inc fatigued after 2 minutes, maintains absent heelstrike throughout, dec R hip and knee flex in swing, inc R hip circumduction and L trunk lean on SPC with fatigue)   Goal Description Rogelio will demonstrate improved walking endurance per 6MWT with no AD to 738-826' (increased by 112' - 200  considered MDC for pts who have had stroke.)   Target Date 11/1/2017     Goal Identifier 3 - TUG   Goal Description Rogelio will perform the TUG without and AD in 13.5 sec or less 3/3 trials to demonstrate improved gait abilities and decreased falls risk without AD.   (baseline: amb with keren walker and solid AFO 22.91 sec  (> 13.5 inc fall risk).)   Target Date 11/1/17     Discharge:  No

## 2017-08-22 ENCOUNTER — HOSPITAL ENCOUNTER (OUTPATIENT)
Dept: PHYSICAL THERAPY | Facility: CLINIC | Age: 62
Setting detail: THERAPIES SERIES
End: 2017-08-22
Attending: PHYSICAL MEDICINE & REHABILITATION
Payer: COMMERCIAL

## 2017-08-22 PROCEDURE — 97112 NEUROMUSCULAR REEDUCATION: CPT | Mod: GP | Performed by: PHYSICAL THERAPIST

## 2017-08-22 PROCEDURE — 97110 THERAPEUTIC EXERCISES: CPT | Mod: GP | Performed by: PHYSICAL THERAPIST

## 2017-08-22 PROCEDURE — 40000719 ZZHC STATISTIC PT DEPARTMENT NEURO VISIT: Performed by: PHYSICAL THERAPIST

## 2017-08-23 NOTE — ADDENDUM NOTE
Encounter addended by: Yen Villanueva, PT on: 8/23/2017  4:33 PM<BR>     Actions taken: Pend clinical note

## 2017-08-24 ENCOUNTER — HOSPITAL ENCOUNTER (OUTPATIENT)
Dept: PHYSICAL THERAPY | Facility: CLINIC | Age: 62
Setting detail: THERAPIES SERIES
End: 2017-08-24
Attending: PHYSICAL MEDICINE & REHABILITATION
Payer: COMMERCIAL

## 2017-08-24 PROCEDURE — 97110 THERAPEUTIC EXERCISES: CPT | Mod: GP | Performed by: PHYSICAL THERAPIST

## 2017-08-24 PROCEDURE — 97116 GAIT TRAINING THERAPY: CPT | Mod: GP | Performed by: PHYSICAL THERAPIST

## 2017-08-24 PROCEDURE — 40000719 ZZHC STATISTIC PT DEPARTMENT NEURO VISIT: Performed by: PHYSICAL THERAPIST

## 2017-08-24 PROCEDURE — 97112 NEUROMUSCULAR REEDUCATION: CPT | Mod: GP | Performed by: PHYSICAL THERAPIST

## 2017-08-29 ENCOUNTER — HOSPITAL ENCOUNTER (OUTPATIENT)
Dept: PHYSICAL THERAPY | Facility: CLINIC | Age: 62
Setting detail: THERAPIES SERIES
End: 2017-08-29
Attending: PHYSICAL MEDICINE & REHABILITATION
Payer: COMMERCIAL

## 2017-08-29 PROCEDURE — 97112 NEUROMUSCULAR REEDUCATION: CPT | Mod: GP | Performed by: PHYSICAL THERAPIST

## 2017-08-29 PROCEDURE — 97116 GAIT TRAINING THERAPY: CPT | Mod: GP | Performed by: PHYSICAL THERAPIST

## 2017-08-29 PROCEDURE — 40000719 ZZHC STATISTIC PT DEPARTMENT NEURO VISIT: Performed by: PHYSICAL THERAPIST

## 2017-08-29 PROCEDURE — 97110 THERAPEUTIC EXERCISES: CPT | Mod: GP | Performed by: PHYSICAL THERAPIST

## 2017-08-31 ENCOUNTER — HOSPITAL ENCOUNTER (OUTPATIENT)
Dept: PHYSICAL THERAPY | Facility: CLINIC | Age: 62
Setting detail: THERAPIES SERIES
End: 2017-08-31
Attending: PHYSICAL MEDICINE & REHABILITATION
Payer: COMMERCIAL

## 2017-08-31 PROCEDURE — 40000719 ZZHC STATISTIC PT DEPARTMENT NEURO VISIT: Performed by: PHYSICAL THERAPIST

## 2017-08-31 PROCEDURE — 97112 NEUROMUSCULAR REEDUCATION: CPT | Mod: GP | Performed by: PHYSICAL THERAPIST

## 2017-08-31 PROCEDURE — 97110 THERAPEUTIC EXERCISES: CPT | Mod: GP | Performed by: PHYSICAL THERAPIST

## 2017-09-05 ENCOUNTER — HOSPITAL ENCOUNTER (OUTPATIENT)
Dept: PHYSICAL THERAPY | Facility: CLINIC | Age: 62
Setting detail: THERAPIES SERIES
End: 2017-09-05
Attending: PHYSICAL MEDICINE & REHABILITATION
Payer: COMMERCIAL

## 2017-09-05 PROCEDURE — 97110 THERAPEUTIC EXERCISES: CPT | Mod: GP | Performed by: PHYSICAL THERAPIST

## 2017-09-05 PROCEDURE — 97112 NEUROMUSCULAR REEDUCATION: CPT | Mod: GP | Performed by: PHYSICAL THERAPIST

## 2017-09-05 PROCEDURE — 40000719 ZZHC STATISTIC PT DEPARTMENT NEURO VISIT: Performed by: PHYSICAL THERAPIST

## 2017-09-05 NOTE — ADDENDUM NOTE
Encounter addended by: Yen Villanueva, PT on: 9/5/2017  5:38 PM<BR>     Actions taken: Flowsheet accepted

## 2017-09-05 NOTE — ADDENDUM NOTE
Encounter addended by: Yen Villanueva, PT on: 9/5/2017  5:40 PM<BR>     Actions taken: Flowsheet data copied forward, Flowsheet accepted

## 2017-09-05 NOTE — ADDENDUM NOTE
Encounter addended by: Yen Villanueva, PT on: 9/5/2017  5:36 PM<BR>     Actions taken: Sign clinical note

## 2017-09-05 NOTE — ADDENDUM NOTE
Encounter addended by: Yen Villanueva, PT on: 9/5/2017  5:41 PM<BR>     Actions taken: Flowsheet accepted

## 2017-09-05 NOTE — ADDENDUM NOTE
Encounter addended by: Yen Villanueva PT on: 9/5/2017 11:10 AM<BR>     Actions taken: Pend clinical note

## 2017-09-07 ENCOUNTER — HOSPITAL ENCOUNTER (OUTPATIENT)
Dept: PHYSICAL THERAPY | Facility: CLINIC | Age: 62
Setting detail: THERAPIES SERIES
End: 2017-09-07
Attending: PHYSICAL MEDICINE & REHABILITATION
Payer: COMMERCIAL

## 2017-09-07 PROCEDURE — 97112 NEUROMUSCULAR REEDUCATION: CPT | Mod: GP | Performed by: PHYSICAL THERAPIST

## 2017-09-07 PROCEDURE — 97116 GAIT TRAINING THERAPY: CPT | Mod: GP | Performed by: PHYSICAL THERAPIST

## 2017-09-07 PROCEDURE — 40000719 ZZHC STATISTIC PT DEPARTMENT NEURO VISIT: Performed by: PHYSICAL THERAPIST

## 2017-09-07 PROCEDURE — 97110 THERAPEUTIC EXERCISES: CPT | Mod: GP | Performed by: PHYSICAL THERAPIST

## 2017-09-12 ENCOUNTER — HOSPITAL ENCOUNTER (OUTPATIENT)
Dept: PHYSICAL THERAPY | Facility: CLINIC | Age: 62
Setting detail: THERAPIES SERIES
End: 2017-09-12
Attending: PHYSICAL MEDICINE & REHABILITATION
Payer: COMMERCIAL

## 2017-09-12 PROCEDURE — 97116 GAIT TRAINING THERAPY: CPT | Mod: GP | Performed by: PHYSICAL THERAPIST

## 2017-09-12 PROCEDURE — 40000719 ZZHC STATISTIC PT DEPARTMENT NEURO VISIT: Performed by: PHYSICAL THERAPIST

## 2017-09-12 PROCEDURE — 97112 NEUROMUSCULAR REEDUCATION: CPT | Mod: GP | Performed by: PHYSICAL THERAPIST

## 2017-09-12 PROCEDURE — 97110 THERAPEUTIC EXERCISES: CPT | Mod: GP | Performed by: PHYSICAL THERAPIST

## 2017-09-14 ENCOUNTER — HOSPITAL ENCOUNTER (OUTPATIENT)
Dept: PHYSICAL THERAPY | Facility: CLINIC | Age: 62
Setting detail: THERAPIES SERIES
End: 2017-09-14
Attending: PHYSICAL MEDICINE & REHABILITATION
Payer: COMMERCIAL

## 2017-09-14 PROCEDURE — 97112 NEUROMUSCULAR REEDUCATION: CPT | Mod: GP | Performed by: PHYSICAL THERAPIST

## 2017-09-14 PROCEDURE — 97116 GAIT TRAINING THERAPY: CPT | Mod: GP | Performed by: PHYSICAL THERAPIST

## 2017-09-14 PROCEDURE — 40000719 ZZHC STATISTIC PT DEPARTMENT NEURO VISIT: Performed by: PHYSICAL THERAPIST

## 2017-09-14 PROCEDURE — 97110 THERAPEUTIC EXERCISES: CPT | Mod: GP | Performed by: PHYSICAL THERAPIST

## 2017-09-14 NOTE — PROGRESS NOTES
Outpatient Physical Therapy Discharge Note     Patient: Luis Trinidad  : 1955    Beginning/End Dates of Reporting Period:  8/3/2017 to 2017    Referring Provider: Dr Chele Reno    Therapy Diagnosis: impaired participation in life roles with R hemiparesis s/p CVA     Client Self Report: Pt is leaving for [place] on .  Will be there for 1-2 months.  Is excited but concerned about losing ground when he is there.  Is planning on seeing a physical therapist for treatment while he is there.  Plans to keep stretching as he is able and practicing walking, therapy activities.  Will see Dr. Reno when he returns.    Objective Measurements:        Objective Measure: 25 foot walk (from 17)  Details: with cane - 9.8 sec, 12 steps.   Without cane - 10.6 sec, 13 steps.   no catching of right toes on either trial.    Objective Measure: TUG  Details: 3 trials - 11.4, 11.6, 11.9 sec   no AD, with airsport brace  Objective Measure: 6 MWT (from 8/3/17)  Details: 987 feet with cane.  catches right toes in swing  approximately 2 x every 85 feet.              Goals:    Goal Identifier 2 - Endurance:  6 MWT    (baseline 16 781' with SPC and R aircast brace, demos 3x dec R toe clearance tripping with mild LOB recovered (I) with SPC, inc fatigued after 2 minutes, maintains absent heelstrike throughout, dec R hip and knee flex in swing, inc R hip circumduction and L trunk lean on SPC with fatigue)   Goal Description Rogelio will demonstrate improved walking endurance per 6MWT with no AD to 738-826' (increased by 112' - 200  considered MDC for pts who have had stroke.)   Target Date 17   Date Met      Progress:  Not retested since goal updated on 8/3.  Overall Rogelio has progressed his distance consistently with each testing with his cane.  He does have more challenge in clearing his right foot and with maintaining same distance without an AD.       Goal Identifier 3 - TUG   Goal Description Rogelio will perform  the TUG without an AD in 13.5 sec or less 3/3 trials to demonstrate improved gait abilities and decreased falls risk without AD.   Target Date 11/01/17   Date Met   9/14/2017   Progress:     Goal Identifier 4- Neuromotor control   Goal Description Rogelio will be able to increase active flexion of right knee with hip extended as well as flexed in order to demonstrate improved right LE position in swing for clearance of foot and decreased need for circumduction and compensations on left LE.  This will allow for increased safety and efficiency with gait at home and in the community.     Target Date 11/01/17   Date Met   not fully met.    Progress:  Rogelio has made small gains in right HS activation against gravity but is still limited and this has been slow to develop.  Still he is clearing his right LE more consistently during gait, although when  Trying to increase his speed he does have more difficulty with this.      Goal Identifier New goal 5 - stairs   Goal Description Pt will be able to navigate up and down his stairs with one rail in a reciprocal pattern with minimal circumduction of right LE in order for more efficient and safe access to all levels of his home.   Target Date 11/01/17   Date Met  Partially met   Progress: Currently needs to use circumduction slightly to clear right foot going up stairs.  Able to navigate down stairs reciprocally with excellent alignment and control.     Goal Identifier 6 - HEP   Goal Description The client will be independent with a balance, strengthening, and walking program for long term management.   Target Date 11/01/17   Date Met   met to date   Progress:  Rogelio's home program was progressed and modified ongoing.  Works hard at home and is independent with stretches, gait practice and standing activities.       Goal Identifier New goal 7 - community ambulation   Goal Description Rogelio will be able to ambulate community distances of up to 1000 feet without his cane and with minimal  fatigue for improved access and safety in his community.    Target Date 11/01/17   Date Met   goal not met   Progress:  Had been working toward this goal - improving with endurance, walking 2 x per day at home for up to 30 min each .       Progress Toward Goals:   Progress this reporting period: Rogelio has met goal 3 and shown progress with all other goals.  He is demonstrating improved alignments and movement patterns that have made his mobility more efficient and safe.    Progress limited due to continued issues with spasticity and tightness of his right UE, trunk and LE as well as weakness of his ankle DF and HS which has been improving but slowly.       Plan:  Rogelio continues to show potential to continue to progress with gait and motor control for increased safety and efficiency at home and in the community, and further reduce his risk for injury or further disability due to progression of secondary impairments, however he is going out of the country for a period of time to visit family, therefore will d/c PT at this time.  Pt plans to return to PT when he returns.  Will complete new evaluation at that time.       Discharge:    Reason for Discharge: Pt is going out of the country to [place] to visit his family for 1-2 months.      Equipment Issued: none    Discharge Plan: Patient to continue home program.  Other services: Patient plans to pursue PT services while he is in [place] with his family.    Recommend returning to PT after he returns in a few months.  Pt instructed to contact Dr. Reno's office for a new order to return to PT when he is back from his trip.

## 2017-11-22 DIAGNOSIS — R26.81 GAIT INSTABILITY: ICD-10-CM

## 2017-11-22 DIAGNOSIS — I69.351 HEMIPARESIS AFFECTING RIGHT SIDE AS LATE EFFECT OF STROKE (H): Primary | ICD-10-CM

## 2017-11-29 ENCOUNTER — OFFICE VISIT (OUTPATIENT)
Dept: PHYSICAL MEDICINE AND REHAB | Facility: CLINIC | Age: 62
End: 2017-11-29

## 2017-11-29 VITALS
DIASTOLIC BLOOD PRESSURE: 80 MMHG | BODY MASS INDEX: 30.49 KG/M2 | HEIGHT: 64 IN | SYSTOLIC BLOOD PRESSURE: 130 MMHG | HEART RATE: 84 BPM | WEIGHT: 178.6 LBS

## 2017-11-29 DIAGNOSIS — I69.351 HEMIPARESIS AFFECTING RIGHT SIDE AS LATE EFFECT OF STROKE (H): Primary | ICD-10-CM

## 2017-11-29 DIAGNOSIS — R25.2 SPASTICITY: ICD-10-CM

## 2017-11-29 ASSESSMENT — PAIN SCALES - GENERAL: PAINLEVEL: NO PAIN (0)

## 2017-11-29 NOTE — PROGRESS NOTES
[facility]   PM&R clinic note        Interval history:   Luis Trinidad is a 63 yo RHD M with PMH significant for L CVA with resultant right spastic hemiparesis who presents to clinic today for repeat botox injections.  He was last seen in clinic 7/26/17. At that visit, 400 units of baclofen was injected into his right arm and trunk.     He denies any side effects following the botox injections. He reports that following the botox he has much less pain. He is able to put his shirt on easier though still needs his wife help.    His wife has been attempting to help him with his HEP and ROM but she does not use enough force. The patient feels that his wife is afraid of hurting him. He notes that his wife has had no formal training on ROM/HEP for his RUE. He is planning on PT for additional work on his gait.     Functionally, he ambulates without an AD. He has not had any falls. He does have an AFO but he is not currently wearing it. He needs assistance with bathing his back from his wife. Ind with going to the restroom. He requires help for UBD and LBD from his wife. Ind with eating. No problems choking.     He continues to take the baclofen which he feels is helpful. He denies any side effects     Medications:  Current Outpatient Prescriptions   Medication Sig Dispense Refill     baclofen (LIORESAL) 10 MG tablet TAKE 2 TABLETS BY MOUTH IN THE MORNING, 2 TABLETS MIDDAY, AND 3 TABLETS AT BEDTIME 630 tablet 3     baclofen (LIORESAL) 10 MG tablet 20 mg in morning, 20 mg mid day and 30 mg bedtime. 210 tablet 11     baclofen (LIORESAL) 10 MG tablet TAKE 2 TABLETS BY MOUTH IN THE MORNING, 2 TABLETS MIDDAY, AND 3 TABLETS AT BEDTIME 210 tablet 0     baclofen (LIORESAL) 10 MG tablet TAKE 2 TABLETS BY MOUTH IN THE MORNING, 2 TABLETS MIDDAY, AND 3 TABLETS AT BEDTIME 210 tablet 0     co-enzyme Q-10 100 MG CAPS Take 1 capsule (100 mg) by mouth daily 30 capsule 0     aspirin 81 MG EC tablet Take  "1 tablet (81 mg) by mouth daily       simvastatin (ZOCOR) 10 MG tablet Take 1 tablet (10 mg) by mouth every evening 30 tablet 0     clopidogrel (PLAVIX) 75 MG tablet Take 1 tablet (75 mg) by mouth daily 30 tablet 0     cholecalciferol 2000 UNITS tablet Take 2,000 Units by mouth daily 30 tablet 0     tetrahydrozoline 0.05 % ophthalmic solution Place 1 drop into both eyes 3 times daily as needed       lisinopril-hydrochlorothiazide (PRINZIDE,ZESTORETIC) 10-12.5 MG per tablet Take 1 tablet by mouth            Physical Exam:   /80 (BP Location: Left arm, Patient Position: Sitting, Cuff Size: Adult Regular)  Pulse 84  Ht 1.626 m (5' 4\")  Wt 81 kg (178 lb 9.6 oz)  BMI 30.66 kg/m2  Gen: NAD, pleasant and cooperative   HEENT: NC/AT, glasses on   Cardio: regular pulse  Pulm: non-labored breathing in room air  Abd: benign  Ext: WWP, no edema in BLE, no tenderness in calves  Neuro/MSK:  Right shoulder is not subluxed, right elbow and knee with slow stretch can be fully extended     CN: subtle right facial weakness, hearing intact to conversation, EOMI   Motor: 4/5 Right shoulder abduction , 0/5 Right elbow flexion/extension, wrist extension, and finger extension    4/5 right hip flexion,  knee extension; 2/5 right dorsiflexion, 1/5 right hamstrings   Tone: 2-3 beats of clonus at right ankle    2/4 MAS right pec major    3/4 MAS right biceps    2/4 MAS with wrist extension    2/4 MAS with hamstring   Gait: hemiparetic gait pattern with circumduction of the right leg. He does clear the right toe but just barely. Lands with foot flat. Wide base of gait. Right foot is externally rotated. RUE adducted, internally rotated with elbow flexed and no arm swing. No genu recurvatum. Right shoe with toe wear.         Labs/Imaging:  Lab Results   Component Value Date    WBC 3.7 (L) 11/15/2015    HGB 16.1 11/07/2015    HCT 46.2 11/07/2015    MCV 93 11/07/2015     11/18/2015     Lab Results   Component Value Date     " 11/15/2015    POTASSIUM 4.0 11/15/2015    CHLORIDE 99 11/15/2015    CO2 29 11/15/2015     (H) 11/15/2015     Lab Results   Component Value Date    GFRESTIMATED 75 11/15/2015    GFRESTBLACK >90   GFR Calc   11/15/2015     No results found for: AST, ALT, GGT, ALKPHOS, BILITOTAL, BILICONJ, BILIDIRECT, CALI  Lab Results   Component Value Date    INR 1.02 11/03/2015     Lab Results   Component Value Date    BUN 15 11/15/2015    CR 1.01 11/15/2015              Assessment/Plan     Luis Trinidad is a 61 yo RHD M with PMH significant for L CVA with resultant right spastic hemiparesis which has impaired his mobility and ability to perform ADLs/IADLs.     1. Therapy/equipment/braces: PT ordered for ongoing gait management and OT ordered for family training on ROM/stretching. Patient will bring AFO to PT appointments. He was encouraged to continue ROM and HEP to prevent contracture.   2. Medications: Continue baclofen 20 20 30 for systemic tone management. No side effects.   3. Interventions: Botox injections not performed today given patient not appreciating noticeable difference with them. Will continue to monitor as patient definitely has spasticity. Goals of botox injections would be comfort, easing cares, and skin integrity.   4. Referral - e.g. psychology, ortho, neurology, NSG: Follow up: 3 months    The patient was examined and discussed with Dr. Reno.     Francisca Falcon MD  PGY-4  Physical Medicine & Rehabilitation    I, Dr. Reno, also saw and examined Rogelio. I have reviewed and edited the above resident note and agree.  My key decisions and exam we will forego botulinum toxin injections to the right arm. He definitely has spasticity though it is less clear whether the injections have made any difference with functionality, self-cares, hygiene or other appreciable domain. Stressed the importance of maintenance exercise program and stretching. He reports his wife is not feeling  comfortable with the stretching program. He had completed his occupational therapy though it is reasonable to have at least a short course to assess the functioning outside of the utilization of chin and toxin and to train his wife on the program. Should the therapist feel more strongly about benefits of by Rogelio toxin, we clearly can entertain reinjection. He will continue with a additional physical therapy as the purpose was feeling there is still progress being made with his gait. He does seem to have some right ankle inversion and may benefit still from the utilization of his ankle-foot orthosis though he does not have that with him today in clinic. He should bring it with to the therapy sessions.    40 minutes spent in direct patient interaction greater than 50% in counseling and education.

## 2017-11-29 NOTE — LETTER
11/29/2017       RE: Luis Trinidad  [id number] [facility] [address]     Dear Colleague,    Thank you for referring your patient, Luis Trinidad, to the [facility] PHYSICAL MEDICINE AND REHABILITATION at [facility]. Please see a copy of my visit note below.    [facility]   PM&R clinic note        Interval history:   Luis Trinidad is a 61 yo RHD M with PMH significant for L CVA with resultant right spastic hemiparesis who presents to clinic today for repeat botox injections.  He was last seen in clinic 7/26/17. At that visit, 400 units of baclofen was injected into his right arm and trunk.     He denies any side effects following the botox injections. He reports that following the botox he has much less pain. He is able to put his shirt on easier though still needs his wife help.    His wife has been attempting to help him with his HEP and ROM but she does not use enough force. The patient feels that his wife is afraid of hurting him. He notes that his wife has had no formal training on ROM/HEP for his RUE. He is planning on PT for additional work on his gait.     Functionally, he ambulates without an AD. He has not had any falls. He does have an AFO but he is not currently wearing it. He needs assistance with bathing his back from his wife. Ind with going to the restroom. He requires help for UBD and LBD from his wife. Ind with eating. No problems choking.     He continues to take the baclofen which he feels is helpful. He denies any side effects     Medications:  Current Outpatient Prescriptions   Medication Sig Dispense Refill     baclofen (LIORESAL) 10 MG tablet TAKE 2 TABLETS BY MOUTH IN THE MORNING, 2 TABLETS MIDDAY, AND 3 TABLETS AT BEDTIME 630 tablet 3     baclofen (LIORESAL) 10 MG tablet 20 mg in morning, 20 mg mid day and 30 mg bedtime. 210 tablet 11     baclofen (LIORESAL) 10 MG tablet TAKE 2 TABLETS BY MOUTH IN THE MORNING, 2  "TABLETS MIDDAY, AND 3 TABLETS AT BEDTIME 210 tablet 0     baclofen (LIORESAL) 10 MG tablet TAKE 2 TABLETS BY MOUTH IN THE MORNING, 2 TABLETS MIDDAY, AND 3 TABLETS AT BEDTIME 210 tablet 0     co-enzyme Q-10 100 MG CAPS Take 1 capsule (100 mg) by mouth daily 30 capsule 0     aspirin 81 MG EC tablet Take 1 tablet (81 mg) by mouth daily       simvastatin (ZOCOR) 10 MG tablet Take 1 tablet (10 mg) by mouth every evening 30 tablet 0     clopidogrel (PLAVIX) 75 MG tablet Take 1 tablet (75 mg) by mouth daily 30 tablet 0     cholecalciferol 2000 UNITS tablet Take 2,000 Units by mouth daily 30 tablet 0     tetrahydrozoline 0.05 % ophthalmic solution Place 1 drop into both eyes 3 times daily as needed       lisinopril-hydrochlorothiazide (PRINZIDE,ZESTORETIC) 10-12.5 MG per tablet Take 1 tablet by mouth            Physical Exam:   /80 (BP Location: Left arm, Patient Position: Sitting, Cuff Size: Adult Regular)  Pulse 84  Ht 1.626 m (5' 4\")  Wt 81 kg (178 lb 9.6 oz)  BMI 30.66 kg/m2  Gen: NAD, pleasant and cooperative   HEENT: NC/AT, glasses on   Cardio: regular pulse  Pulm: non-labored breathing in room air  Abd: benign  Ext: WWP, no edema in BLE, no tenderness in calves  Neuro/MSK:  Right shoulder is not subluxed, right elbow and knee with slow stretch can be fully extended     CN: subtle right facial weakness, hearing intact to conversation, EOMI   Motor: 4/5 Right shoulder abduction , 0/5 Right elbow flexion/extension, wrist extension, and finger extension    4/5 right hip flexion,  knee extension; 2/5 right dorsiflexion, 1/5 right hamstrings   Tone: 2-3 beats of clonus at right ankle    2/4 MAS right pec major    3/4 MAS right biceps    2/4 MAS with wrist extension    2/4 MAS with hamstring   Gait: hemiparetic gait pattern with circumduction of the right leg. He does clear the right toe but just barely. Lands with foot flat. Wide base of gait. Right foot is externally rotated. RUE adducted, internally rotated " with elbow flexed and no arm swing. No genu recurvatum. Right shoe with toe wear.         Labs/Imaging:  Lab Results   Component Value Date    WBC 3.7 (L) 11/15/2015    HGB 16.1 11/07/2015    HCT 46.2 11/07/2015    MCV 93 11/07/2015     11/18/2015     Lab Results   Component Value Date     11/15/2015    POTASSIUM 4.0 11/15/2015    CHLORIDE 99 11/15/2015    CO2 29 11/15/2015     (H) 11/15/2015     Lab Results   Component Value Date    GFRESTIMATED 75 11/15/2015    GFRESTBLACK >90   GFR Calc   11/15/2015     No results found for: AST, ALT, GGT, ALKPHOS, BILITOTAL, BILICONJ, BILIDIRECT, CALI  Lab Results   Component Value Date    INR 1.02 11/03/2015     Lab Results   Component Value Date    BUN 15 11/15/2015    CR 1.01 11/15/2015              Assessment/Plan     Luis Tirnidad is a 63 yo RHD M with PMH significant for L CVA with resultant right spastic hemiparesis which has impaired his mobility and ability to perform ADLs/IADLs.     1. Therapy/equipment/braces: PT ordered for ongoing gait management and OT ordered for family training on ROM/stretching. Patient will bring AFO to PT appointments. He was encouraged to continue ROM and HEP to prevent contracture.   2. Medications: Continue baclofen 20 20 30 for systemic tone management. No side effects.   3. Interventions: Botox injections not performed today given patient not appreciating noticeable difference with them. Will continue to monitor as patient definitely has spasticity. Goals of botox injections would be comfort, easing cares, and skin integrity.   4. Referral - e.g. psychology, ortho, neurology, NSG: Follow up: 3 months    The patient was examined and discussed with Dr. Reno.     Francisca Falcon MD  PGY-4  Physical Medicine & Rehabilitation    I, Dr. Reno, also saw and examined Rogelio. I have reviewed and edited the above resident note and agree.  My key decisions and exam we will forego botulinum toxin injections  to the right arm. He definitely has spasticity though it is less clear whether the injections have made any difference with functionality, self-cares, hygiene or other appreciable domain. Stressed the importance of maintenance exercise program and stretching. He reports his wife is not feeling comfortable with the stretching program. He had completed his occupational therapy though it is reasonable to have at least a short course to assess the functioning outside of the utilization of chin and toxin and to train his wife on the program. Should the therapist feel more strongly about benefits of by Rogelio toxin, we clearly can entertain reinjection. He will continue with a additional physical therapy as the purpose was feeling there is still progress being made with his gait. He does seem to have some right ankle inversion and may benefit still from the utilization of his ankle-foot orthosis though he does not have that with him today in clinic. He should bring it with to the therapy sessions.    40 minutes spent in direct patient interaction greater than 50% in counseling and education.      Again, thank you for allowing me to participate in the care of your patient.      Sincerely,    Chele Reno MD

## 2017-11-29 NOTE — MR AVS SNAPSHOT
After Visit Summary   11/29/2017    Luis Trinidad    MRN: [id number]           Patient Information     Date Of Birth          1955        Visit Information        Provider Department      11/29/2017 3:10 PM Chele Reno MD [facility] Physical Medicine and Rehabilitation        Today's Diagnoses     Hemiparesis affecting right side as late effect of stroke (H)    -  1       Follow-ups after your visit        Additional Services     OCCUPATIONAL THERAPY REFERRAL       *This order will print in the [facility] Central Scheduling Office*    [facility] provides Occupational Therapy evaluation and treatment and many specialty services across the [facility] system.  If requesting a specialty program, please choose from the list below.    Call one number to schedule at any [facility] location   [phone].    Treatment: Evaluation & Treatment  Special Instructions/Modalities: already had some extensive OT for right hemiparesis though we are going to try to manage tone without botulinum toxin injections. OT for some additional visits to optimize ROM program and will have his wife attend few visits to learn optimal assistance techniques. Advise if you feel strongly with resuming botulinum toxin injections.  Has seen Chayo CERNA from [facility] in past. Already scheduled for some PT sessions with Yen Magana same day if possible.    Please be aware that coverage of these services is subject to the terms and limitations of your health insurance plan.  Call member services at your health plan with any benefit or coverage questions.      **Note to Provider** To refer patients to therapy outside of the location list, change the order class to External Referral in the order composer.                  Your next 10 appointments already scheduled     Dec 13, 2017  9:00 AM CST   Evaluation with Yen Villanueva, PT   [facility]  Physical Therapy ([facility])    [address] [facility] [facility] [address]   [phone]            Jan 02, 2018  3:15 PM CST   Treatment 45 with Yen Villanueva, PT   [facility] Physical Therapy ([facility])    [address] [facility] [address]   [phone]            Jan 04, 2018 10:30 AM CST   Treatment 45 with Yen Villanueva, PT   [facility] Physical Therapy ([facility])    [address] [facility] [address]   [phone]            Jan 09, 2018 10:30 AM CST   Treatment 45 with Yen Villanueva, PT   [facility] Therapy ([facility])    [address] [facility] [address]   [phone]            Jan 11, 2018 10:30 AM CST   Treatment 45 with Yen Villanueva PT   [facility] Therapy ([facility])    [address] [facility] [address]   [phone]            Jan 16, 2018 10:30 AM CST   Treatment 45 with Yen Villanueva, NAOMI   [facility] Physical Therapy ([facility])    [address] [facility] [address]   [phone]            Jan 17, 2018  3:50 PM CST   (Arrive by 3:35 PM)   Return Botox with Chele Reno MD   [facility] Physical Medicine and Rehabilitation ([facility])    [address] [place] [address] [phone]            Jan 18, 2018 10:30 AM CST   Treatment 45 with Yen Villanueva, PT   [facility] ([facility])    [address] [facility] [address] [address]   [phone]            Jan 23, 2018 10:30 AM CST   Treatment 45 with Yen Villanueva, NAOMI   [facility] Therapy ([facility])    [address] [facility] [address] [address]   [phone]            Jan 25, 2018 10:30 AM CST   Treatment 45 with Yen Villanueva, PT   [facility] Physical Therapy  "([facility])    [address]  [facility] [id number] [phone]              Who to contact     Please call your clinic at [phone] to:    Ask questions about your health    Make or cancel appointments    Discuss your medicines    Learn about your test results    Speak to your doctor   If you have compliments or concerns about an experience at your clinic, or if you wish to file a complaint, please contact [facility] Patient Relations at [phone] or email us at [email or web address]         Additional Information About Your Visit        [employer] Information     [employer] is an electronic gateway that provides easy, online access to your medical records. With [employer], you can request a clinic appointment, read your test results, renew a prescription or communicate with your care team.     To sign up for [employer] visit the website at [email or web address]   You will be asked to enter the access code listed below, as well as some personal information. Please follow the directions to create your username and password.     Your access code is: [email or web address]  Expires: 2018  6:30 AM     Your access code will  in 90 days. If you need help or a new code, please contact your [facility] or call [phone] for assistance.        Care EveryWhere ID     This is your Care EveryWhere ID. This could be used by other organizations to access your [place] medical records  [id number]        Your Vitals Were     Pulse Height BMI (Body Mass Index)             84 1.626 m (5' 4\") 30.66 kg/m2          Blood Pressure from Last 3 Encounters:   17 130/80   17 124/69   17 128/78    Weight from Last 3 Encounters:   17 81 kg (178 lb 9.6 oz)   17 78.9 kg (174 lb)   17 79.2 kg (174 lb 8 oz)              We Performed the Following     OCCUPATIONAL THERAPY REFERRAL        Primary Care Provider " Office Phone # Fax #    Zaid Whitten [phone]       [facility] [address]   [place] [facility] [address]        Equal Access to Services     TERRY MCKEON : Leonarda wilson luizao Sodoroteoali, waaxda luqadaha, qaybta kaalmada adeegyada, torsten calix laKathleentucker lentz. So [facility] [phone].    ATENCIÓN: Si habla español, tiene a bolanos disposición servicios gratuitos de asistencia lingüística. LlProtestant [facility] [phone].    We comply with applicable federal civil rights laws and [place] laws. We do not discriminate on the basis of race, color, national origin, age, disability, sex, sexual orientation, or gender identity.            Thank you!     Thank you for choosing [facility] PHYSICAL MEDICINE AND REHABILITATION  for your care. Our goal is always to provide you with excellent care. Hearing back from our patients is one way we can continue to improve our services. Please take a few minutes to complete the written survey that you may receive in the mail after your visit with us. Thank you!             Your Updated Medication List - Protect others around you: Learn how to safely use, store and throw away your medicines at www.disposemymeds.org.          This list is accurate as of: 11/29/17  3:38 PM.  Always use your most recent med list.                   Brand Name Dispense Instructions for use Diagnosis    aspirin 81 MG EC tablet      Take 1 tablet (81 mg) by mouth daily    Lacunar stroke of left subthalamic region (H)       * baclofen 10 MG tablet    LIORESAL    210 tablet    TAKE 2 TABLETS BY MOUTH IN THE MORNING, 2 TABLETS MIDDAY, AND 3 TABLETS AT BEDTIME    Spasticity       * baclofen 10 MG tablet    LIORESAL    210 tablet    TAKE 2 TABLETS BY MOUTH IN THE MORNING, 2 TABLETS MIDDAY, AND 3 TABLETS AT BEDTIME    Spasticity       * baclofen 10 MG tablet    LIORESAL    210 tablet    20 mg in morning, 20 mg mid day and 30 mg bedtime.    Spasticity       * baclofen 10 MG tablet     LIORESAL    630 tablet    TAKE 2 TABLETS BY MOUTH IN THE MORNING, 2 TABLETS MIDDAY, AND 3 TABLETS AT BEDTIME    Spasticity       cholecalciferol 2000 UNITS tablet     30 tablet    Take 2,000 Units by mouth daily    Mixed hyperlipidemia       clopidogrel 75 MG tablet    PLAVIX    30 tablet    Take 1 tablet (75 mg) by mouth daily    Lacunar stroke of left subthalamic region (H)       co-enzyme Q-10 100 MG Caps capsule     30 capsule    Take 1 capsule (100 mg) by mouth daily    Mixed hyperlipidemia       lisinopril-hydrochlorothiazide 10-12.5 MG per tablet    PRINZIDE/ZESTORETIC     Take 1 tablet by mouth        simvastatin 10 MG tablet    ZOCOR    30 tablet    Take 1 tablet (10 mg) by mouth every evening    Mixed hyperlipidemia       tetrahydrozoline 0.05 % ophthalmic solution      Place 1 drop into both eyes 3 times daily as needed        * Notice:  This list has 4 medication(s) that are the same as other medications prescribed for you. Read the directions carefully, and ask your doctor or other care provider to review them with you.

## 2017-12-13 ENCOUNTER — HOSPITAL ENCOUNTER (OUTPATIENT)
Dept: PHYSICAL THERAPY | Facility: CLINIC | Age: 62
Setting detail: THERAPIES SERIES
End: 2017-12-13
Attending: PHYSICAL MEDICINE & REHABILITATION
Payer: COMMERCIAL

## 2017-12-13 PROCEDURE — 97116 GAIT TRAINING THERAPY: CPT | Mod: GP | Performed by: PHYSICAL THERAPIST

## 2017-12-13 PROCEDURE — 40000719 ZZHC STATISTIC PT DEPARTMENT NEURO VISIT: Performed by: PHYSICAL THERAPIST

## 2017-12-13 PROCEDURE — 97161 PT EVAL LOW COMPLEX 20 MIN: CPT | Mod: GP | Performed by: PHYSICAL THERAPIST

## 2017-12-13 PROCEDURE — 97112 NEUROMUSCULAR REEDUCATION: CPT | Mod: GP | Performed by: PHYSICAL THERAPIST

## 2017-12-13 NOTE — PROGRESS NOTES
12/13/17 0900   Quick Adds   Type of Visit Initial OP PT Evaluation   General Information   Start of Care Date 12/13/17   Referring Physician Chele Reno MD   Orders Evaluate and Treat as Indicated   Order Date 11/22/17   Medical Diagnosis Right hemiparesis due to chronic CVA   Onset of illness/injury or Date of Surgery 11/04/15   Precautions/Limitations fall precautions   Surgical/Medical history reviewed Yes   Pertinent history of current problem (include personal factors and/or comorbidities that impact the POC) Mr. Trinidad is a pleasant gentleman known to me from previous episode of care for right hemiparsis and movement impairments related to his stroke in 2015.  He recently went back to [facility] to visit his family for just under 2 months.  During that time he had physical therapy there 4 days a week for up to 3 hours per day.  Most of the activities focused around exercise such as recumbent bike and aggressive stretching.  He states the stretching was painful but did seem to help him loosen up and gain more movement in his arm and trunk.  Since being home he has noticed he has gotten tighter.  He tries to stretch on his own as much as he can but cannot do it as aggressively on his own.  He did not wear his ankle brace at all during his time in [place] and has been trying to walk most of the time without his cane, except in poor weather.     Pertinent Visual History  wears glasses   Prior level of function comment Prior to stroke pt was independent with all function, driving , working full time as  for [employer].     Improvement after PT Significant   Improvement after OT Mild   Current Community Support Family/friend caregiver;Transportation service  (Wife works 12 hour shifts 1pm to 1am. )   Patient role/Employment history Disabled  (wants to return to work)   Living environment House/townhome   Home/Community Accessibility Comments 14 steps to lower level.  Rail on left going up.  only one  rail.     Current Assistive Devices Standard Cane   ADL Devices Raised Toilet Seat   Assistive Devices Comments has been trying to reduce use of cane   Patient/Family Goals Statement Wants to increase his overall independence with mobility, be able to walk without AD at all times, in all envrironments, tolerate longer distances, increase speed of walking, return to driving and return to work.     Fall Risk Screen   Fall screen completed by PT   Have you fallen 2 or more times in the past year? No   Have you fallen and had an injury in the past year? No   Timed Up and Go score (seconds) deferred.     Is patient a fall risk? Yes   Fall screen comments pt has not had any falls but due to impairments and occasionally decreased clearance or right LE in swing, will consider pt at an increased risk for falls.    Pain   Patient currently in pain No   Cognitive Status Examination   Orientation orientation to person, place and time   Level of Consciousness alert   Follows Commands and Answers Questions 100% of the time   Personal Safety and Judgment intact   Memory intact   Cognitive Comment intact   Integumentary   Integumentary No deficits were identified   Posture   Posture Comments slight abd/elevation of right scapula with mild decreased thor ext at resting standing position.  slight decresaed wt shift to right LE but better than previous episode of care.     Range of Motion (ROM)   ROM Comment decreased length right shoulder/scap musculature, right pec, right elbow flexors, right wrist/finger flexors.  Decreased length right HS and mild right HC.  decreased trunk rotation   Strength   Strength Comments functionally demonstrates decreased sustained activation right right hip extensors and abductors, decreased force production of right HS,  impaired initiation of ankle DF and everters on right.    Bed Mobility   Bed Mobility Comments independent   Transfer Skills   Transfer Comments able to rise to stand and lower to sit  with good control, equal foot position and no UE support with adequate anterior wt shift over feet.     Gait   Gait Comments able to ambulate without AD with nearly symmetric step length.  Decresaed heelstrike at initial contact on right, improved forwrad wt shift over right LE into stance compared to prior episode, however, inconsistent with achieving 3rd rocker into terminal stance alignment on right.  Decreased initiation of right initial swing and knee flexion /isometric ankle DF in swing phase - intermittetnly catches toes.  compensates to clear toes by initiating right swing before fully coming to term stance so does not achieve proper pre and initial swing alignment.  decreased right UE arm swing with right UE in position of slight shoulder flex/IR and elbow flexion.     Gait Special Tests 25 Foot Timed Walk   Seconds 12.6   Steps 13 Steps   Comments no AD   Balance Special Tests Sit to Stand Reps in 30 Seconds   Reps in 30 seconds 8   Height 18 inches   Comments arms crossed at chest   Sensory Examination   Sensory Perception Comments decreased light touch on right UE/LE and trunk   Coordination   Coordination Comments impaired timing / coordination of right hip/knee flexion.  impaired inter-limb coordination   Muscle Tone   Muscle Tone Comments increased tone right UE, LE and trunk   Modality Interventions   Planned Modality Interventions Comments per PT discretion   Planned Therapy Interventions   Planned Therapy Interventions balance training;gait training;motor coordination training;neuromuscular re-education;strengthening;stretching;manual therapy   Clinical Impression   Criteria for Skilled Therapeutic Interventions Met yes, treatment indicated   PT Diagnosis impaired participation in life roles dues to R hemiparesis and sequalae of chronic CVA   Influenced by the following impairments impaired force production and sustainabiltiy of right LE mm, impaired activation and force production of right UE,  decreased tissue length in mm of trunk , right UE, right LE.  abnormal tone    Functional limitations due to impairments impaired postural stability, functional gait, transfers, increased fall risk, unable to work or drive   Clinical Presentation Stable/Uncomplicated   Clinical Presentation Rationale impairments are stable from last episode of care but needs proper amount of repetition, progression of challenge in  approrpriate motor patterns to continue to make gains in strength, functional movement and stability for greater independence and participation in household and community activities.     Clinical Decision Making (Complexity) Low complexity   Therapy Frequency 2 times/Week   Predicted Duration of Therapy Intervention (days/wks) 90 days.     Risk & Benefits of therapy have been explained Yes   Patient, Family & other staff in agreement with plan of care Yes   Education Assessment   Preferred Learning Style Demonstration;Pictures/video   Goal 1   Goal Identifier 1 - Gait pattern/safety   Goal Description Rogelio will be able to achieve proper midstance to terminal stance alignment on right LE and transition into pre and initial swing with proper clearance of right foot for greater efficiency and safety with gait at home and in the community.   Target Date 03/13/18   Goal 2   Goal Identifier 2- Gait speed   Goal Description Rogelio will increase his gait speed on the 25 foot timed walk to 9 sec or less without use of an assistive device  to indicate reduced level of gait impairment and improved community mobility.   Target Date 03/13/18   Goal 3   Goal Identifier 3- stairs   Goal Description Pt will be able to navigate up and down his stairs with one rail in a reciprocal pattern with minimal circumduction of right LE in order for more efficient and safe access to all levels of his home.   Target Date 03/13/18   Goal 4   Goal Identifier 4 - community ambulation   Goal Description Rogelio will be able to ambulate community  distances of up to 1000 feet without his cane and with minimal fatigue for improved access and safety in his community.    Target Date 03/13/18   Goal 5   Goal Identifier 5 - home program   Goal Description Rogelio will be independent with a home activity program to address his impairments and self manage his recovery.    Target Date 03/13/18   Total Evaluation Time   Total Evaluation Time (Minutes) 40

## 2018-01-09 ENCOUNTER — HOSPITAL ENCOUNTER (OUTPATIENT)
Dept: PHYSICAL THERAPY | Facility: CLINIC | Age: 63
Setting detail: THERAPIES SERIES
End: 2018-01-09
Attending: PHYSICAL MEDICINE & REHABILITATION
Payer: COMMERCIAL

## 2018-01-09 PROCEDURE — 97110 THERAPEUTIC EXERCISES: CPT | Mod: GP | Performed by: PHYSICAL THERAPIST

## 2018-01-09 PROCEDURE — 97112 NEUROMUSCULAR REEDUCATION: CPT | Mod: GP | Performed by: PHYSICAL THERAPIST

## 2018-01-09 PROCEDURE — 40000719 ZZHC STATISTIC PT DEPARTMENT NEURO VISIT: Performed by: PHYSICAL THERAPIST

## 2018-01-09 NOTE — ADDENDUM NOTE
Encounter addended by: Yen Villanueva, PT on: 1/9/2018 10:28 AM<BR>     Actions taken: Episode edited

## 2018-01-11 ENCOUNTER — HOSPITAL ENCOUNTER (OUTPATIENT)
Dept: PHYSICAL THERAPY | Facility: CLINIC | Age: 63
Setting detail: THERAPIES SERIES
End: 2018-01-11
Attending: PHYSICAL MEDICINE & REHABILITATION
Payer: COMMERCIAL

## 2018-01-11 DIAGNOSIS — G81.10 SPASTIC HEMIPLEGIA (H): Primary | ICD-10-CM

## 2018-01-11 PROCEDURE — 97112 NEUROMUSCULAR REEDUCATION: CPT | Mod: GP | Performed by: PHYSICAL THERAPIST

## 2018-01-11 PROCEDURE — 40000719 ZZHC STATISTIC PT DEPARTMENT NEURO VISIT: Performed by: PHYSICAL THERAPIST

## 2018-01-11 PROCEDURE — 97110 THERAPEUTIC EXERCISES: CPT | Mod: GP | Performed by: PHYSICAL THERAPIST

## 2018-01-16 ENCOUNTER — HOSPITAL ENCOUNTER (OUTPATIENT)
Dept: PHYSICAL THERAPY | Facility: CLINIC | Age: 63
Setting detail: THERAPIES SERIES
End: 2018-01-16
Attending: PHYSICAL MEDICINE & REHABILITATION
Payer: COMMERCIAL

## 2018-01-16 PROCEDURE — 97110 THERAPEUTIC EXERCISES: CPT | Mod: GP | Performed by: PHYSICAL THERAPIST

## 2018-01-16 PROCEDURE — 40000719 ZZHC STATISTIC PT DEPARTMENT NEURO VISIT: Performed by: PHYSICAL THERAPIST

## 2018-01-16 PROCEDURE — 97112 NEUROMUSCULAR REEDUCATION: CPT | Mod: GP | Performed by: PHYSICAL THERAPIST

## 2018-01-17 ENCOUNTER — OFFICE VISIT (OUTPATIENT)
Dept: PHYSICAL MEDICINE AND REHAB | Facility: CLINIC | Age: 63
End: 2018-01-17
Payer: COMMERCIAL

## 2018-01-17 VITALS
BODY MASS INDEX: 30.55 KG/M2 | HEART RATE: 91 BPM | DIASTOLIC BLOOD PRESSURE: 84 MMHG | SYSTOLIC BLOOD PRESSURE: 132 MMHG | WEIGHT: 178 LBS

## 2018-01-17 DIAGNOSIS — G81.11 RIGHT SPASTIC HEMIPARESIS (H): Primary | ICD-10-CM

## 2018-01-17 NOTE — MR AVS SNAPSHOT
After Visit Summary   1/17/2018    Luis Trinidad    MRN: [id number]           Patient Information     Date Of Birth          1955        Visit Information        Provider Department      1/17/2018 3:50 PM Chele Reno MD [facility] Physical Medicine and Rehabilitation         Follow-ups after your visit        Your next 10 appointments already scheduled     Jan 18, 2018 10:30 AM CST   Treatment 45 with Yen Villanueva, PT   [facility] [place] [facility] ([facility])    [address] [facility] [address]   [phone]            Jan 23, 2018 10:30 AM CST   Treatment 45 with Yen Villanueva, PT   [facility] ([facility])    [address] [address]   [phone]            Jan 25, 2018 10:30 AM CST   Treatment 45 with Yen Villanueva, PT   [facility] ([facility])    [address] [address]   [phone]            Jan 30, 2018 10:30 AM CST   Treatment 45 with Yen Villanueva, PT   [facility] ([facility]    [address] [address]   [phone]            Feb 01, 2018 10:30 AM CST   Treatment 45 with Yen Villanueva, PT   [facility] [place] [facility] ([facility])    [address] [address]   [phone]            Feb 06, 2018 11:15 AM CST   Treatment 45 with Yen Villanueva, PT   [facility] [place] [facility] ([facility]    [address] [address]   [phone]            Feb 08, 2018  9:15 AM CST   Neuro Eval with Chayo Mckeon, OT   [facility] ([facility])    [address] [address]   [phone]            Feb 08, 2018 10:30 AM CST   Treatment 45 with Yen Villanueva, PT   [facility] [place] [facility]  ([facility])    [address] [facility] [address]   [phone]            2018 10:30 AM CST   Treatment 45 with Yen Villanueva, PT   [facility] ([facility])    [address]  [facility] [address]   [phone]            Feb 15, 2018 10:30 AM CST   Treatment 45 with Yen Villanueva, PT   [facility] ([facility]    [address] [facility] [address]   [phone]              Who to contact     Please call your clinic at [phone] to:    Ask questions about your health    Make or cancel appointments    Discuss your medicines    Learn about your test results    Speak to your doctor   If you have compliments or concerns about an experience at your clinic, or if you wish to file a complaint, please contact [facility] Patient Relations at [phone] or email us at [email or web address]         Additional Information About Your Visit        [employer] Information     [employer] is an electronic gateway that provides easy, online access to your medical records. With [employer]ark, you can request a clinic appointment, read your test results, renew a prescription or communicate with your care team.     To sign up for [employer]ark visit the website at [email or web address]   You will be asked to enter the access code listed below, as well as some personal information. Please follow the directions to create your username and password.     Your access code is: [email or web address]  Expires: 2018  6:30 AM     Your access code will  in 90 days. If you need help or a new code, please contact your [facility] or call [phone] for assistance.        Care EveryWhere ID     This is your Care EveryWhere ID. This could be used by other organizations to access your [facility] medical records  [id number]        Your Vitals Were      Pulse BMI (Body Mass Index)                91 30.55 kg/m2           Blood Pressure from Last 3 Encounters:   01/17/18 132/84   11/29/17 130/80   07/26/17 124/69    Weight from Last 3 Encounters:   01/17/18 80.7 kg (178 lb)   11/29/17 81 kg (178 lb 9.6 oz)   07/26/17 78.9 kg (174 lb)              Today, you had the following     No orders found for display       Primary Care Provider Office Phone # Fax #    Zaid Whitten [phone]       [facility] [place] [address]  [place] [address]        Equal Access to Services     [facility] : Hadii charla Trejo, waaxda luqadaha, qaybta kaalmada adekimda, torsten lentz. So [facility] [phone].    ATENCIÓN: Si habla español, tiene a bolanos disposición servicios gratuitos de asistencia lingüística. Llame al [phone].    We comply with applicable federal civil rights laws and [place] laws. We do not discriminate on the basis of race, color, national origin, age, disability, sex, sexual orientation, or gender identity.            Thank you!     Thank you for choosing [facility] PHYSICAL MEDICINE AND REHABILITATION  for your care. Our goal is always to provide you with excellent care. Hearing back from our patients is one way we can continue to improve our services. Please take a few minutes to complete the written survey that you may receive in the mail after your visit with us. Thank you!             Your Updated Medication List - Protect others around you: Learn how to safely use, store and throw away your medicines at www.disposemymeds.org.          This list is accurate as of: 1/17/18  4:39 PM.  Always use your most recent med list.                   Brand Name Dispense Instructions for use Diagnosis    aspirin 81 MG EC tablet      Take 1 tablet (81 mg) by mouth daily    Lacunar stroke of left subthalamic region (H)       * baclofen 10 MG tablet    LIORESAL    210 tablet    TAKE 2 TABLETS BY  MOUTH IN THE MORNING, 2 TABLETS MIDDAY, AND 3 TABLETS AT BEDTIME    Spasticity       * baclofen 10 MG tablet    LIORESAL    210 tablet    TAKE 2 TABLETS BY MOUTH IN THE MORNING, 2 TABLETS MIDDAY, AND 3 TABLETS AT BEDTIME    Spasticity       * baclofen 10 MG tablet    LIORESAL    210 tablet    20 mg in morning, 20 mg mid day and 30 mg bedtime.    Spasticity       * baclofen 10 MG tablet    LIORESAL    630 tablet    TAKE 2 TABLETS BY MOUTH IN THE MORNING, 2 TABLETS MIDDAY, AND 3 TABLETS AT BEDTIME    Spasticity       botulinum toxin type A 100 UNITS injection    BOTOX    400 Units    Inject 400 Units into the muscle every 3 months    Spastic hemiplegia (H)       cholecalciferol 2000 UNITS tablet     30 tablet    Take 2,000 Units by mouth daily    Mixed hyperlipidemia       clopidogrel 75 MG tablet    PLAVIX    30 tablet    Take 1 tablet (75 mg) by mouth daily    Lacunar stroke of left subthalamic region (H)       co-enzyme Q-10 100 MG Caps capsule     30 capsule    Take 1 capsule (100 mg) by mouth daily    Mixed hyperlipidemia       lisinopril-hydrochlorothiazide 10-12.5 MG per tablet    PRINZIDE/ZESTORETIC     Take 1 tablet by mouth        simvastatin 10 MG tablet    ZOCOR    30 tablet    Take 1 tablet (10 mg) by mouth every evening    Mixed hyperlipidemia       tetrahydrozoline 0.05 % ophthalmic solution      Place 1 drop into both eyes 3 times daily as needed        * Notice:  This list has 4 medication(s) that are the same as other medications prescribed for you. Read the directions carefully, and ask your doctor or other care provider to review them with you.

## 2018-01-17 NOTE — LETTER
1/17/2018       RE: Luis Trinidad  [address] [facility] [address]     Dear Colleague,    Thank you for referring your patient, Luis Trinidad, to the [facility] PHYSICAL MEDICINE AND REHABILITATION at [facility]. Please see a copy of my visit note below.    PM&R Clinic & Procedure Note:    Luis Trinidad is 62-year-old male with a history of stroke resulting in residual right spastic hemiparesis.  Last seen 11/29/17, at which time we decided to forego botox given he did not appreciated any noticeable difference following his injection of 400 units of botox on 7/26/17.  Included pectoralis, biceps, triceps, FDS, FDP and pronator teres.  As we reflect today on what was or was not beneficial, there is some ADLs such as getting his shirt on which the elbow extending better and shoulder abducting that her may be advantageous.  He's no longer having shoulder pain but some difficulty abducting shoulder.    He is working with PT currently. Restarted in December, continues 2 x per week. Goals include community ambulation up to 1000' without a cane and minimal fatigue. Ascending/Descending stairs in reciprocal pattern. Improve gat speed on 25' timed walk.   He's consistent with every day exercise program. Includes stretching and Theraband program  Scheduled for OT in Feb at a time when his wife is able to also attend and co-learn assisted stretching program.    Physical exam:  /84  Pulse 91  Wt 178 lb (80.7 kg)  BMI 30.55 kg/m2  Alert, conversant, very pleasant  Limited active range of motion in right upper extremity, active shoulder abduction to about 80 deg though PROM near full. Similarly AROM at elbow and  only partial range with full PROM. Elbow with both flexion and extension tone 3/4, wrist fingers with flexion predominance and some pronation predominance. With full wrist flexion, the fingers easily extend implicating the long finger flexors and not hand  intrinsics.    Assessment and recommendations:  1.  We will plan to inject today but a more limited muscle group with some specific functional goals to abduct shoulder and extend elbow better.  Will target just pectoralis and biceps.  2. Continue with ROM and other maintenance exercises.  3. Continues with some ongoing physical therapy working both on gait and arm functioning. Upcoming OT session to help wife help with ROM assisted program.  4. Continue oral baclofen 20 20 30 for partial tone reduction in broader distribution.  5. Rogelio continues to be fully disabled and NOT able to return to work. He is disabled and at maximal medical improvement from that perspective.    6. Follow up in 3 months.    20 minutes spent in direct patient interaction discussing above items, greater than 50% in education. This is independent from procedure time.      Procedure:   Chemodenervation to right chest and RUE utilizing botulinum toxin.  Began with formal consent which he signed. Had formal time-out prior to procedure.   I was present through entire visit and procedure. Assisted in procedure by resident Dr. Francisca Falcon.  200 U of Botox lot [id number] , expiration 8/2020 , Botox NDC 7633-1836-37 was utilized. Diluted with normal saline in a 100U:1mL ratio, total of 2 mL.  All areas were cleansed with chlor prep prior to injecting. EMG guidance was utilized to identify most active motor units while avoiding surrounding structures.     The following muscles were then injected, two body areas    Right trunk:  1. Right Pectoralis Major: 80 units divided in 2 sites    Right arm  1. [id number] units divided 2 sites.     Procedure was tolerated well, no adverse reactions.        Again, thank you for allowing me to participate in the care of your patient.      Sincerely,    Chele Reno MD

## 2018-01-17 NOTE — PROGRESS NOTES
PM&R Clinic & Procedure Note:    Luis Trinidad is 62-year-old male with a history of stroke resulting in residual right spastic hemiparesis.  Last seen 11/29/17, at which time we decided to forego botox given he did not appreciated any noticeable difference following his injection of 400 units of botox on 7/26/17.  Included pectoralis, biceps, triceps, FDS, FDP and pronator teres.  As we reflect today on what was or was not beneficial, there is some ADLs such as getting his shirt on which the elbow extending better and shoulder abducting that her may be advantageous.  He's no longer having shoulder pain but some difficulty abducting shoulder.    He is working with PT currently. Restarted in December, continues 2 x per week. Goals include community ambulation up to 1000' without a cane and minimal fatigue. Ascending/Descending stairs in reciprocal pattern. Improve gat speed on 25' timed walk.   He's consistent with every day exercise program. Includes stretching and Theraband program  Scheduled for OT in Feb at a time when his wife is able to also attend and co-learn assisted stretching program.    Physical exam:  /84  Pulse 91  Wt 178 lb (80.7 kg)  BMI 30.55 kg/m2  Alert, conversant, very pleasant  Limited active range of motion in right upper extremity, active shoulder abduction to about 80 deg though PROM near full. Similarly AROM at elbow and  only partial range with full PROM. Elbow with both flexion and extension tone 3/4, wrist fingers with flexion predominance and some pronation predominance. With full wrist flexion, the fingers easily extend implicating the long finger flexors and not hand intrinsics.    Assessment and recommendations:  1.  We will plan to inject today but a more limited muscle group with some specific functional goals to abduct shoulder and extend elbow better.  Will target just pectoralis and biceps.  2. Continue with ROM and other maintenance exercises.  3. Continues with  some ongoing physical therapy working both on gait and arm functioning. Upcoming OT session to help wife help with ROM assisted program.  4. Continue oral baclofen 20 20 30 for partial tone reduction in broader distribution.  5. Rogelio continues to be fully disabled and NOT able to return to work. He is disabled and at maximal medical improvement from that perspective.    6. Follow up in 3 months.    20 minutes spent in direct patient interaction discussing above items, greater than 50% in education. This is independent from procedure time.      Procedure:   Chemodenervation to right chest and RUE utilizing botulinum toxin.  Began with formal consent which he signed. Had formal time-out prior to procedure.   I was present through entire visit and procedure. Assisted in procedure by resident Dr. Francisca Falcon.  200 U of Botox lot 4819 , expiration 8/2020 , Botox NDC 8341-1236-55 was utilized. Diluted with normal saline in a 100U:1mL ratio, total of 2 mL.  All areas were cleansed with chlor prep prior to injecting. EMG guidance was utilized to identify most active motor units while avoiding surrounding structures.     The following muscles were then injected, two body areas    Right trunk:  1. Right Pectoralis Major: 80 units divided in 2 sites    Right arm  1. [id number] units divided 2 sites.     Procedure was tolerated well, no adverse reactions.

## 2018-01-18 ENCOUNTER — HOSPITAL ENCOUNTER (OUTPATIENT)
Dept: PHYSICAL THERAPY | Facility: CLINIC | Age: 63
Setting detail: THERAPIES SERIES
End: 2018-01-18
Attending: PHYSICAL MEDICINE & REHABILITATION
Payer: COMMERCIAL

## 2018-01-18 PROCEDURE — 97110 THERAPEUTIC EXERCISES: CPT | Mod: GP | Performed by: PHYSICAL THERAPIST

## 2018-01-18 PROCEDURE — 97112 NEUROMUSCULAR REEDUCATION: CPT | Mod: GP | Performed by: PHYSICAL THERAPIST

## 2018-01-18 PROCEDURE — 40000719 ZZHC STATISTIC PT DEPARTMENT NEURO VISIT: Performed by: PHYSICAL THERAPIST

## 2018-01-25 ENCOUNTER — HOSPITAL ENCOUNTER (OUTPATIENT)
Dept: PHYSICAL THERAPY | Facility: CLINIC | Age: 63
Setting detail: THERAPIES SERIES
End: 2018-01-25
Attending: PHYSICAL MEDICINE & REHABILITATION
Payer: COMMERCIAL

## 2018-01-25 PROCEDURE — 40000719 ZZHC STATISTIC PT DEPARTMENT NEURO VISIT: Performed by: PHYSICAL THERAPIST

## 2018-01-25 PROCEDURE — 97112 NEUROMUSCULAR REEDUCATION: CPT | Mod: GP | Performed by: PHYSICAL THERAPIST

## 2018-01-25 PROCEDURE — 97110 THERAPEUTIC EXERCISES: CPT | Mod: GP | Performed by: PHYSICAL THERAPIST

## 2018-01-30 ENCOUNTER — HOSPITAL ENCOUNTER (OUTPATIENT)
Dept: PHYSICAL THERAPY | Facility: CLINIC | Age: 63
Setting detail: THERAPIES SERIES
End: 2018-01-30
Attending: PHYSICAL MEDICINE & REHABILITATION
Payer: COMMERCIAL

## 2018-01-30 PROCEDURE — 97110 THERAPEUTIC EXERCISES: CPT | Mod: GP | Performed by: PHYSICAL THERAPIST

## 2018-01-30 PROCEDURE — 40000719 ZZHC STATISTIC PT DEPARTMENT NEURO VISIT: Performed by: PHYSICAL THERAPIST

## 2018-01-30 PROCEDURE — 97112 NEUROMUSCULAR REEDUCATION: CPT | Mod: GP | Performed by: PHYSICAL THERAPIST

## 2018-02-01 ENCOUNTER — HOSPITAL ENCOUNTER (OUTPATIENT)
Dept: PHYSICAL THERAPY | Facility: CLINIC | Age: 63
Setting detail: THERAPIES SERIES
End: 2018-02-01
Attending: PHYSICAL MEDICINE & REHABILITATION
Payer: COMMERCIAL

## 2018-02-01 PROCEDURE — 40000719 ZZHC STATISTIC PT DEPARTMENT NEURO VISIT: Performed by: PHYSICAL THERAPIST

## 2018-02-01 PROCEDURE — 97110 THERAPEUTIC EXERCISES: CPT | Mod: GP | Performed by: PHYSICAL THERAPIST

## 2018-02-01 PROCEDURE — 97112 NEUROMUSCULAR REEDUCATION: CPT | Mod: GP | Performed by: PHYSICAL THERAPIST

## 2018-02-06 ENCOUNTER — HOSPITAL ENCOUNTER (OUTPATIENT)
Dept: PHYSICAL THERAPY | Facility: CLINIC | Age: 63
Setting detail: THERAPIES SERIES
End: 2018-02-06
Attending: PHYSICAL MEDICINE & REHABILITATION
Payer: COMMERCIAL

## 2018-02-06 PROCEDURE — 97110 THERAPEUTIC EXERCISES: CPT | Mod: GP | Performed by: PHYSICAL THERAPIST

## 2018-02-06 PROCEDURE — 40000719 ZZHC STATISTIC PT DEPARTMENT NEURO VISIT: Performed by: PHYSICAL THERAPIST

## 2018-02-06 PROCEDURE — 97112 NEUROMUSCULAR REEDUCATION: CPT | Mod: GP | Performed by: PHYSICAL THERAPIST

## 2018-02-08 ENCOUNTER — HOSPITAL ENCOUNTER (OUTPATIENT)
Dept: OCCUPATIONAL THERAPY | Facility: CLINIC | Age: 63
Setting detail: THERAPIES SERIES
End: 2018-02-08
Attending: PHYSICAL MEDICINE & REHABILITATION
Payer: COMMERCIAL

## 2018-02-08 ENCOUNTER — HOSPITAL ENCOUNTER (OUTPATIENT)
Dept: PHYSICAL THERAPY | Facility: CLINIC | Age: 63
Setting detail: THERAPIES SERIES
End: 2018-02-08
Attending: PHYSICAL MEDICINE & REHABILITATION
Payer: COMMERCIAL

## 2018-02-08 PROCEDURE — 97110 THERAPEUTIC EXERCISES: CPT | Mod: GP | Performed by: PHYSICAL THERAPIST

## 2018-02-08 PROCEDURE — 97110 THERAPEUTIC EXERCISES: CPT | Mod: GO | Performed by: OCCUPATIONAL THERAPIST

## 2018-02-08 PROCEDURE — 97112 NEUROMUSCULAR REEDUCATION: CPT | Mod: GP | Performed by: PHYSICAL THERAPIST

## 2018-02-08 PROCEDURE — 97166 OT EVAL MOD COMPLEX 45 MIN: CPT | Mod: GO | Performed by: OCCUPATIONAL THERAPIST

## 2018-02-08 PROCEDURE — 40000719 ZZHC STATISTIC PT DEPARTMENT NEURO VISIT: Performed by: PHYSICAL THERAPIST

## 2018-02-08 PROCEDURE — 40000125 ZZHC STATISTIC OT OUTPT VISIT: Performed by: OCCUPATIONAL THERAPIST

## 2018-02-12 NOTE — ADDENDUM NOTE
Encounter addended by: Chayo Mckeon, OT on: 2/12/2018  9:49 AM<BR>     Actions taken: Sign clinical note, Flowsheet accepted

## 2018-02-12 NOTE — PROGRESS NOTES
" 02/08/18 0900   Quick Adds   Type of Visit Initial Outpatient Occupational Therapy Evaluation   General Information   Start Of Care Date 02/08/18   Referring Physician Chele Reno MD   Orders Evaluate and treat as indicated   Other Orders on OT order from MD: \"already had some extensive OT for right hemiparesis though we are going to try to manage tone without botulinum toxin injections. OT for some additional visits to optimize ROM program and will have his wife attend few visits to learn optimal assistance techniques. Advise if you feel strongly with resuming botulinum toxin injections.\"   Orders Date 11/29/17   Medical Diagnosis Hemiparesis affecting right side as late effect of stroke   Onset of Illness/Injury or Date of Surgery 11/29/17  (order date)   Precautions/Limitations Fall precautions   Surgical/Medical History Reviewed Yes   Additional Occupational Profile Info/Pertinent History of Current Problem Patient diagnosed with ischemic stroke to his left hemisphere causing right-sided hemiparesis on 11/04/15.  Patient was seen at this clinic for a course of outpatient OT following his CVA from 3/2016 through 3/2017.  He went back to [facility] this past fall to visit his family for just under 2 months.  In [facility], he had physical therapy 4 days a week for up to 3 hours per day.  Most of the activities focused around exercise such as recumbent bike and aggressive stretching.  He states the stretching was painful but did seem to help him loosen up and gain more movement in his arm and trunk.  Since being home he has noticed he has gotten tighter.  He tries to stretch on his own as much as he can but cannot do it as aggressively on his own. PMH includes HTN.   Role/Living Environment   Current Community Support Family/friend caregiver  (spouse and teenage son)   Patient role/Employment history Disabled  (was a  for [employer] prior to CVA)   Community/Avocational Activities Hobbies: be with family and " "friends, go to movies, exercise/be active   Current Living Environment [facility]  ([facility] with wife and teenage son)   Number of Stairs Within Home ~16 stairs to upper level   Primary Bathroom Location/Comments uses walk-in shower on upper level; no grab bars; has shower chair but doesn't use   Home/Community Accessibility Comments washer/dryer, bedroom and master bathroom on upper level; 1/2 bath on main   Prior Level Comments Patient was completely independent with all ADL/IADLs prior to CVA in 2015.   Current Assistive Devices - Mobility Standard cane  (only on occasion - mostly community, not using today)   Role/Living Environment Comments patient currently washing dishes (doesn't use ) - trying to use R UE as gross stabilizer and assist; meal prep: uses microwave only and spouse does majority of cooking; doing some laundry - including folding, etc.; wife does cleaning all - she likes to do and doesn't want patient to do per his report   Patient/family Goals Statement would like to return to some type of work - not sure if would be able to do same job, would like to drive; less tightness in R UE, stronger in R UE   Pain   Patient currently in pain No   Pain comments no pain, \"stiffness\" in R UE and \"shoulder blades\"   Fall Risk Screen   Fall screen completed by PT   Have you fallen 2 or more times in the past year? No   Have you fallen and had an injury in the past year? No   Is patient a fall risk? Yes   Fall screen comments reports no falls in the last year; not wearing his ankle brace much anymore and has been trying to walk most of the time without his cane, except in poor weather.     Cognitive Status Examination   Orientation Orientation to person, place and time   Level of Consciousness Alert   Follows Commands and Answers Questions Able to follow single-step instructions;100% of the time   Personal Safety and Judgment Intact  (per observation)   Memory Intact  (per observation)   Memory " Comments In prior course of occupational therapy, patient had reported possible slight decline in memory, but not affecting his memory with taking medications, safety related, etc. and cognition not addressed as patient with overall good memory to recall home program, etc.   Cognitive Comment No noted concerns during evaluation.   Visual Perception   Visual Acuity wears cheaters only for reading   Visual Field appears WFLs per observation   Visual Attention will assess further via Dynavision and Scancourse   Oculomotor appears WFLs   Visual Perception Comments hasn't had eyes checked for a long time; will assess visual skills further in future as it relates to ADL/IADLs   Sensation   Sensation Comments R UE: no c/o numbness - improved, light touch intact, temperature slightly diminished; numb R side of face and gum which is bothersome to patient   Range of Motion (ROM)   ROM Comments L UE WNLs; R UE: AROM Scapular Elevation: 25-50%, scapular retraction % (mild to mod weakness/winging); in standing: shoulder abduction 50 degrees with mod compensation, 20 degrees shoulder flexion (movement moreso in scaption, stopped at 20 degrees as significant increased compensatory movements after this), shoulder extension 20 degrees; elbow: flexion (using shoulder moderately) to 85 degrees (starting at 50 degrees), extension to 65 degrees; wrist flexion 5 degrees, wrist extension 0 degrees   Strength   Strength Comments L UE WNLs; R UE: scapular elevation 4+ within available range; scapular retraction 4/5; shoulder 2+ (see above for specific measurements); elbow flexion: 4 to 4+ within available range; elbow extension 4+ to 5; pronation 2-; supination 0/5, wrist flexion 1+, wrist extension 0/5; digit extension 1+/5 for all digits though greater AROM 2nd and 3rd digits    Hand Strength   Hand Dominance Right   Right Hand  (pounds) (41, 40, 39; was 27# 3/2017)   Right Lateral Pinch (pounds) 18 pounds  (was 11# 3/2017)    Right Three Point Pinch (pounds) (10# (dycem and therapist supporting digits); was 4# 3/2017)   Hand Strength Comments squeezes ball, puts hand in cup; washing dishes - holding dish; reports using R UE <5% at home overall   Muscle Tone   Muscle Tone Assessment - RUE moderately increased tone   Muscle Tone Comments has not been wearing his R forearm/hand splint - instead putting a ball in his hand at night; reports had botox in R shoulder only recently   Coordination   Upper Extremity Coordination Right UE impaired   Coordination Comments will assess further via Box and Blocks in future session; weakness and tone interfering with coordination R UE; unable to do FM coordination tasks/tests   Functional Mobility   Functional Mobility Comments SBA to modified independent (cautious and slightly off balance at times) without AD today (as above, using cane only in bad weather and not wearing AFO as much like previous course of OT)   Tub/Shower Transfer   Tub/Shower Transfer Comments patient reports no assist; modified independent   Bathing   Bathing Comments patient reports no assist; modified independent   Upper Body Dressing   Upper Body Dressing Comments difficulty with fasteners in general, but has zipper for one of his shirts and he is now able to do; uses velcro with jacket   Lower Body Dressing   Lower Body Dressing Comments not wearing his AFO much anymore, just every once in a while and can get it on sometimes by himself - needing assist at other times; modified independent aside from AFO   Toileting   Toileting Comments patient reports no assist; modified independent   Grooming   Grooming Comments shaving - unknown   Eating/Self-Feeding   Level of [place]: Eating independent   Eating/Self Feeding Comments using L % (R hand dominant)   Planned Therapy Interventions   Planned Therapy Interventions ADL training;IADL training;Coordination training;Manual therapy;Neuromuscular re-education;Orthotic  fitting/training;ROM;Self care/Home management;Strengthening;Stretching;Therapeutic activities;Visual perception  (including PAMS prn (estim, Ultrasound, etc.))   Adult OT Eval Goals   OT Eval Goals (Adult) 1;2;3   OT Goal 1   Goal Identifier R shoulder strength   Goal Description Patient to increase R shoulder flexiion AROM to 40 degrees for improved R UE function for ADL/IADLs (during dressing and grooming tasks, carrying items, putting groceries and dishes away).   Target Date 05/03/18   OT Goal 2   Goal Identifier R pinch strength   Goal Description Patient will demonstrate increased right hand pinch strength (both lateral and palmar) by 3# each for increased independence with ADL/IADLs such as opening containers, pull up pants, maintaining pinch to hold items.   Target Date 05/03/18   OT Goal 3   Goal Identifier R GM/FM coordination   Goal Description Patient to improve R GM/FM coordination skills for increased independence during ADL/IADL tasks (dressing, kitchen tasks, feeding self) by completing the Box and Blocks Test with R UE in standing with 10 blocks.   Target Date 05/03/18   OT Goal 4   Goal Identifier R UE as gross assist   Goal Description Patient to demonstrate functional tasks (feeding self, wiping the counter/table, washing dishes, etc.) with 20% use of R UE as gross stabilizer or gross assist for increased ADL/IADL independence and closer return to prior level of function.  (2/8 (eval date): using ~3% at home)   Target Date 05/03/18   OT Goal 5   Goal Identifier functional/normal movment patterns in R UE and trunk   Goal Description Patient to complete therapeutic task (simple kitchen tasks, laundry, etc.) with use of B UE's and no more than 2 cues in 5 minute time frame for correct body mechanics and for decreasing compensatory movements at the trunk and upper body for encouragement of normal movement patterns, increased R UE function and increased ADL/IADL independence.   Target Date 05/03/18    OT Goal 6   Goal Identifier visual skills   Goal Description Patient will demonstrate WFLs visual skills (including reaction time and visual scanning skills) for safe independent community mobility (crossing the street, driving, grocery shopping) by scoring WNLs on all modes of the Dynavision, Scancourse and other visual screens.   Target Date 05/03/18   Clinical Impression   Criteria for Skilled Therapeutic Interventions Met Yes, treatment indicated   OT Diagnosis decreased ADL/IADL independence   Influenced by the following impairments decreased R UE function (strength, tone, coordination, ROM); decreased functional mobility; questionable visual perceptual skills   Assessment of Occupational Performance 3-5 Performance Deficits   Identified Performance Deficits decreased independence with all ADLs (increased effort and time), inability to work and drive and complete household tasks   Clinical Decision Making (Complexity) Moderate complexity   Rehab Potential good, to achieve stated therapy goals   Therapy Frequency 2x/week   Predicted Duration of Therapy Intervention (days/wks) 12 weeks   Risks and Benefits of Treatment have been explained. Yes   Patient, Family & other staff in agreement with plan of care Yes   Clinical Impression Comments Patient known to this therapist from previous course of occupational therapy for post-CVA rehabilitation.  Patient presents today with notable improvement in R hand function since seen last (almost 1 year ago).  He is appropriate for an additional course of occupational therapy to address R UE with these notable changes with potential to continue to improve function and ADL/IADL independence.   Education Assessment   Barriers To Learning Language;Physical;Cultural  (speaks English, though broken at times)   Preferred Learning Style Listening;Demonstration;Pictures/video   Total Evaluation Time   Total Evaluation Time 41

## 2018-02-13 ENCOUNTER — HOSPITAL ENCOUNTER (OUTPATIENT)
Dept: PHYSICAL THERAPY | Facility: CLINIC | Age: 63
Setting detail: THERAPIES SERIES
End: 2018-02-13
Attending: PHYSICAL MEDICINE & REHABILITATION
Payer: COMMERCIAL

## 2018-02-13 PROCEDURE — 40000719 ZZHC STATISTIC PT DEPARTMENT NEURO VISIT: Performed by: PHYSICAL THERAPIST

## 2018-02-13 PROCEDURE — 97110 THERAPEUTIC EXERCISES: CPT | Mod: GP | Performed by: PHYSICAL THERAPIST

## 2018-02-13 PROCEDURE — 97112 NEUROMUSCULAR REEDUCATION: CPT | Mod: GP | Performed by: PHYSICAL THERAPIST

## 2018-02-14 DIAGNOSIS — R25.2 SPASTICITY: ICD-10-CM

## 2018-02-14 RX ORDER — BACLOFEN 10 MG/1
TABLET ORAL
Qty: 630 TABLET | Refills: 3 | Status: SHIPPED | OUTPATIENT
Start: 2018-02-14 | End: 2019-05-16

## 2018-02-15 ENCOUNTER — HOSPITAL ENCOUNTER (OUTPATIENT)
Dept: PHYSICAL THERAPY | Facility: CLINIC | Age: 63
Setting detail: THERAPIES SERIES
End: 2018-02-15
Attending: PHYSICAL MEDICINE & REHABILITATION
Payer: COMMERCIAL

## 2018-02-15 PROCEDURE — 40000719 ZZHC STATISTIC PT DEPARTMENT NEURO VISIT: Performed by: PHYSICAL THERAPIST

## 2018-02-15 PROCEDURE — 97112 NEUROMUSCULAR REEDUCATION: CPT | Mod: GP | Performed by: PHYSICAL THERAPIST

## 2018-02-15 PROCEDURE — 97110 THERAPEUTIC EXERCISES: CPT | Mod: GP | Performed by: PHYSICAL THERAPIST

## 2018-02-20 ENCOUNTER — HOSPITAL ENCOUNTER (OUTPATIENT)
Dept: PHYSICAL THERAPY | Facility: CLINIC | Age: 63
Setting detail: THERAPIES SERIES
End: 2018-02-20
Attending: PHYSICAL MEDICINE & REHABILITATION
Payer: COMMERCIAL

## 2018-02-20 PROCEDURE — 97110 THERAPEUTIC EXERCISES: CPT | Mod: GP | Performed by: PHYSICAL THERAPIST

## 2018-02-20 PROCEDURE — 40000719 ZZHC STATISTIC PT DEPARTMENT NEURO VISIT: Performed by: PHYSICAL THERAPIST

## 2018-02-22 ENCOUNTER — HOSPITAL ENCOUNTER (OUTPATIENT)
Dept: PHYSICAL THERAPY | Facility: CLINIC | Age: 63
Setting detail: THERAPIES SERIES
End: 2018-02-22
Attending: PHYSICAL MEDICINE & REHABILITATION
Payer: COMMERCIAL

## 2018-02-22 PROCEDURE — 97110 THERAPEUTIC EXERCISES: CPT | Mod: GP | Performed by: PHYSICAL THERAPIST

## 2018-02-22 PROCEDURE — 40000719 ZZHC STATISTIC PT DEPARTMENT NEURO VISIT: Performed by: PHYSICAL THERAPIST

## 2018-02-22 PROCEDURE — 97112 NEUROMUSCULAR REEDUCATION: CPT | Mod: GP | Performed by: PHYSICAL THERAPIST

## 2018-03-01 ENCOUNTER — HOSPITAL ENCOUNTER (OUTPATIENT)
Dept: PHYSICAL THERAPY | Facility: CLINIC | Age: 63
Setting detail: THERAPIES SERIES
End: 2018-03-01
Attending: PHYSICAL MEDICINE & REHABILITATION
Payer: COMMERCIAL

## 2018-03-01 PROCEDURE — 40000719 ZZHC STATISTIC PT DEPARTMENT NEURO VISIT: Performed by: PHYSICAL THERAPIST

## 2018-03-01 PROCEDURE — 97116 GAIT TRAINING THERAPY: CPT | Mod: GP | Performed by: PHYSICAL THERAPIST

## 2018-03-01 PROCEDURE — 97112 NEUROMUSCULAR REEDUCATION: CPT | Mod: GP | Performed by: PHYSICAL THERAPIST

## 2018-03-01 PROCEDURE — 97110 THERAPEUTIC EXERCISES: CPT | Mod: GP | Performed by: PHYSICAL THERAPIST

## 2018-03-13 ENCOUNTER — HOSPITAL ENCOUNTER (OUTPATIENT)
Dept: PHYSICAL THERAPY | Facility: CLINIC | Age: 63
Setting detail: THERAPIES SERIES
End: 2018-03-13
Attending: PHYSICAL MEDICINE & REHABILITATION
Payer: COMMERCIAL

## 2018-03-13 PROCEDURE — 40000719 ZZHC STATISTIC PT DEPARTMENT NEURO VISIT: Performed by: PHYSICAL THERAPIST

## 2018-03-13 PROCEDURE — 97110 THERAPEUTIC EXERCISES: CPT | Mod: GP | Performed by: PHYSICAL THERAPIST

## 2018-03-13 PROCEDURE — 97116 GAIT TRAINING THERAPY: CPT | Mod: GP | Performed by: PHYSICAL THERAPIST

## 2018-03-15 ENCOUNTER — HOSPITAL ENCOUNTER (OUTPATIENT)
Dept: PHYSICAL THERAPY | Facility: CLINIC | Age: 63
Setting detail: THERAPIES SERIES
End: 2018-03-15
Attending: PHYSICAL MEDICINE & REHABILITATION
Payer: COMMERCIAL

## 2018-03-15 PROCEDURE — 40000719 ZZHC STATISTIC PT DEPARTMENT NEURO VISIT: Performed by: PHYSICAL THERAPIST

## 2018-03-15 PROCEDURE — 97110 THERAPEUTIC EXERCISES: CPT | Mod: GP | Performed by: PHYSICAL THERAPIST

## 2018-03-15 PROCEDURE — 97112 NEUROMUSCULAR REEDUCATION: CPT | Mod: GP | Performed by: PHYSICAL THERAPIST

## 2018-03-20 ENCOUNTER — HOSPITAL ENCOUNTER (OUTPATIENT)
Dept: PHYSICAL THERAPY | Facility: CLINIC | Age: 63
Setting detail: THERAPIES SERIES
End: 2018-03-20
Attending: PHYSICAL MEDICINE & REHABILITATION
Payer: COMMERCIAL

## 2018-03-20 PROCEDURE — 40000719 ZZHC STATISTIC PT DEPARTMENT NEURO VISIT: Performed by: PHYSICAL THERAPIST

## 2018-03-20 PROCEDURE — 97110 THERAPEUTIC EXERCISES: CPT | Mod: GP | Performed by: PHYSICAL THERAPIST

## 2018-03-22 ENCOUNTER — HOSPITAL ENCOUNTER (OUTPATIENT)
Dept: PHYSICAL THERAPY | Facility: CLINIC | Age: 63
Setting detail: THERAPIES SERIES
End: 2018-03-22
Attending: PHYSICAL MEDICINE & REHABILITATION
Payer: COMMERCIAL

## 2018-03-22 PROCEDURE — 97112 NEUROMUSCULAR REEDUCATION: CPT | Mod: GP | Performed by: PHYSICAL THERAPIST

## 2018-03-22 PROCEDURE — 40000719 ZZHC STATISTIC PT DEPARTMENT NEURO VISIT: Performed by: PHYSICAL THERAPIST

## 2018-03-22 PROCEDURE — 97110 THERAPEUTIC EXERCISES: CPT | Mod: GP | Performed by: PHYSICAL THERAPIST

## 2018-03-29 ENCOUNTER — HOSPITAL ENCOUNTER (OUTPATIENT)
Dept: PHYSICAL THERAPY | Facility: CLINIC | Age: 63
Setting detail: THERAPIES SERIES
End: 2018-03-29
Attending: PHYSICAL MEDICINE & REHABILITATION
Payer: COMMERCIAL

## 2018-03-29 PROCEDURE — 97112 NEUROMUSCULAR REEDUCATION: CPT | Mod: GP | Performed by: PHYSICAL THERAPIST

## 2018-03-29 PROCEDURE — 40000719 ZZHC STATISTIC PT DEPARTMENT NEURO VISIT: Performed by: PHYSICAL THERAPIST

## 2018-03-29 PROCEDURE — 97110 THERAPEUTIC EXERCISES: CPT | Mod: GP | Performed by: PHYSICAL THERAPIST

## 2018-04-05 ENCOUNTER — HOSPITAL ENCOUNTER (OUTPATIENT)
Dept: PHYSICAL THERAPY | Facility: CLINIC | Age: 63
Setting detail: THERAPIES SERIES
End: 2018-04-05
Attending: PHYSICAL MEDICINE & REHABILITATION
Payer: COMMERCIAL

## 2018-04-05 PROCEDURE — 40000719 ZZHC STATISTIC PT DEPARTMENT NEURO VISIT: Performed by: PHYSICAL THERAPIST

## 2018-04-05 PROCEDURE — 97110 THERAPEUTIC EXERCISES: CPT | Mod: GP | Performed by: PHYSICAL THERAPIST

## 2018-04-05 PROCEDURE — 97112 NEUROMUSCULAR REEDUCATION: CPT | Mod: GP | Performed by: PHYSICAL THERAPIST

## 2018-04-10 ENCOUNTER — HOSPITAL ENCOUNTER (OUTPATIENT)
Dept: PHYSICAL THERAPY | Facility: CLINIC | Age: 63
Setting detail: THERAPIES SERIES
End: 2018-04-10
Attending: PHYSICAL MEDICINE & REHABILITATION
Payer: COMMERCIAL

## 2018-04-10 PROCEDURE — 97110 THERAPEUTIC EXERCISES: CPT | Mod: GP | Performed by: PHYSICAL THERAPIST

## 2018-04-10 PROCEDURE — 97112 NEUROMUSCULAR REEDUCATION: CPT | Mod: GP | Performed by: PHYSICAL THERAPIST

## 2018-04-10 PROCEDURE — 40000719 ZZHC STATISTIC PT DEPARTMENT NEURO VISIT: Performed by: PHYSICAL THERAPIST

## 2018-04-12 ENCOUNTER — HOSPITAL ENCOUNTER (OUTPATIENT)
Dept: PHYSICAL THERAPY | Facility: CLINIC | Age: 63
Setting detail: THERAPIES SERIES
End: 2018-04-12
Attending: PHYSICAL MEDICINE & REHABILITATION
Payer: COMMERCIAL

## 2018-04-12 PROCEDURE — 97112 NEUROMUSCULAR REEDUCATION: CPT | Mod: GP | Performed by: PHYSICAL THERAPIST

## 2018-04-12 PROCEDURE — 40000719 ZZHC STATISTIC PT DEPARTMENT NEURO VISIT: Performed by: PHYSICAL THERAPIST

## 2018-04-12 PROCEDURE — 97110 THERAPEUTIC EXERCISES: CPT | Mod: GP | Performed by: PHYSICAL THERAPIST

## 2018-04-12 PROCEDURE — 97116 GAIT TRAINING THERAPY: CPT | Mod: GP | Performed by: PHYSICAL THERAPIST

## 2018-04-17 ENCOUNTER — HOSPITAL ENCOUNTER (OUTPATIENT)
Dept: PHYSICAL THERAPY | Facility: CLINIC | Age: 63
Setting detail: THERAPIES SERIES
End: 2018-04-17
Attending: PHYSICAL MEDICINE & REHABILITATION
Payer: COMMERCIAL

## 2018-04-17 PROCEDURE — 97112 NEUROMUSCULAR REEDUCATION: CPT | Mod: GP | Performed by: PHYSICAL THERAPIST

## 2018-04-17 PROCEDURE — 97110 THERAPEUTIC EXERCISES: CPT | Mod: GP | Performed by: PHYSICAL THERAPIST

## 2018-04-17 PROCEDURE — 40000719 ZZHC STATISTIC PT DEPARTMENT NEURO VISIT: Performed by: PHYSICAL THERAPIST

## 2018-04-19 ENCOUNTER — HOSPITAL ENCOUNTER (OUTPATIENT)
Dept: PHYSICAL THERAPY | Facility: CLINIC | Age: 63
Setting detail: THERAPIES SERIES
End: 2018-04-19
Attending: PHYSICAL MEDICINE & REHABILITATION
Payer: COMMERCIAL

## 2018-04-19 PROCEDURE — 40000719 ZZHC STATISTIC PT DEPARTMENT NEURO VISIT: Performed by: PHYSICAL THERAPIST

## 2018-04-19 PROCEDURE — 97110 THERAPEUTIC EXERCISES: CPT | Mod: GP | Performed by: PHYSICAL THERAPIST

## 2018-04-19 PROCEDURE — 97112 NEUROMUSCULAR REEDUCATION: CPT | Mod: GP | Performed by: PHYSICAL THERAPIST

## 2018-04-24 ENCOUNTER — HOSPITAL ENCOUNTER (OUTPATIENT)
Dept: PHYSICAL THERAPY | Facility: CLINIC | Age: 63
Setting detail: THERAPIES SERIES
End: 2018-04-24
Attending: PHYSICAL MEDICINE & REHABILITATION
Payer: COMMERCIAL

## 2018-04-24 PROCEDURE — 97116 GAIT TRAINING THERAPY: CPT | Mod: GP | Performed by: PHYSICAL THERAPIST

## 2018-04-24 PROCEDURE — 97112 NEUROMUSCULAR REEDUCATION: CPT | Mod: GP | Performed by: PHYSICAL THERAPIST

## 2018-04-24 PROCEDURE — 97110 THERAPEUTIC EXERCISES: CPT | Mod: GP | Performed by: PHYSICAL THERAPIST

## 2018-04-24 PROCEDURE — 40000719 ZZHC STATISTIC PT DEPARTMENT NEURO VISIT: Performed by: PHYSICAL THERAPIST

## 2018-04-24 NOTE — PROGRESS NOTES
Outpatient Physical Therapy Progress Note     Patient: Luis Trinidad  : 1955    Beginning/End Dates of Reporting Period:  2017 to 3/13/2018    Referring Provider: Dr. Chele Reno     Therapy Diagnosis: impaired participation in life roles dues to R hemiparesis and sequalae of chronic CVA     Client Self Report: Feels really stiff from not having any PT last week.      Objective Measurements:    Objective Measure: vitals  Details: with Large BP cuff (per MD instruction) - taken after stepper and walking trials - /78,  HR 96 with steady, regular rhythm.      Objective Measure: 25 foot walk   Details: at comfortable pace - 15.3 sec, 16 steps.  when cued for faster pace 14.7 sec, 17 steps.           Goals:  Goal Identifier 1 - Gait pattern/safety   Goal Description Rogelio will be able to achieve proper midstance to terminal stance alignment on right LE and transition into pre and initial swing with proper clearance of right foot for greater efficiency and safety with gait at home and in the community.   Target Date 18   Date Met   not fully met.    Progress:  In progress.  Rogelio is showing gradual but noticeable improvements in his right foot clearance in swing phase with greater passive knee flexion at preswing to initial swing. He is more consistent with symmetry of his step length and also with stability in right stance phase with greater ability to sustain hip extension to allow better initial contact on the right without right knee hyperextension.       Goal Identifier 2- Gait speed   Goal Description Rogelio will increase his gait speed on the 25 foot timed walk to 9 sec or less without use of an assistive device  to indicate reduced level of gait impairment and improved community mobility.   Target Date 18   Date Met   in progress   Progress:  Rogelio's gait speed has actually slowed slightly since initial evaluation however he is showing improvement in his quality of gait with less  circumduction of his right LE in swing which accounts for his decreased speed.  Rogelio has demonstrated walking 25 feet in 10 seconds but does have decline in some components of the gait patter and will catch his right foot in swing 1-2 x in a 25 foot distance at this faster speed.      Goal Identifier 3- stairs   Goal Description Pt will be able to navigate up and down his stairs with one rail in a reciprocal pattern with minimal circumduction of right LE in order for more efficient and safe access to all levels of his home.   Target Date 03/13/18   Date Met   in progress   Progress:  Rogelio needs only minimal facilitation for right knee flexion to ascend steps without circumduction - this is improving.  Descending he needs no facilitation - only cues to shift forward over his right LE.       Goal Identifier 4 - community ambulation   Goal Description Rogelio will be able to ambulate community distances of up to 1000 feet without his cane and with minimal fatigue for improved access and safety in his community.    Target Date 03/13/18   Date Met   in progress   Progress:  Due to winter season, pt has not been able to work on walking outdoors and therapy sessions have not been focusing on distance as focus has been on improving components of gait and quality of movement patterns with postural stability.  Once weather permits, he will resume gait outdoors; also future sessions in PT plan to incorporate Lite Gait treadmill again to work on greater distances, speed and movement patterns.      Goal Identifier 5 - home program   Goal Description Rogelio will be independent with a home activity program to address his impairments and self manage his recovery.    Target Date 03/13/18   Date Met   in progress     Progress: Rogelio works hard at home - home activities continue to be progressed and modified as appropriate.       Progress Toward Goals:   Progress this reporting period: Rogelio is making continual progress toward all goals as  indicated above.      Progress limited due to continued spasticity and tightness of his right UE, trunk and LE as well as weakness of his ankle DF and HS which has been improving but slowly.  Pt is scheduled to have botox injections next month.     Plan:  Continue therapy per current plan of care.  Changes to therapy plan of care: Feel Rogelio would benefit from more intensive frequency of 4 x per week, every other week to maximize carryover of neuroplastic changes but unfortunately his transportation service cannot accommodate this.    Changes to goals: Continue with goals above until 6/13/18.     Discharge:  No

## 2018-04-24 NOTE — ADDENDUM NOTE
Encounter addended by: Yen Villanueva, PT on: 4/24/2018  1:32 PM<BR>     Actions taken: Flowsheet accepted

## 2018-04-24 NOTE — ADDENDUM NOTE
Encounter addended by: Yen Villanueva, PT on: 4/24/2018  1:31 PM<BR>     Actions taken: Sign clinical note, Flowsheet accepted

## 2018-04-25 ENCOUNTER — OFFICE VISIT (OUTPATIENT)
Dept: PHYSICAL MEDICINE AND REHAB | Facility: CLINIC | Age: 63
End: 2018-04-25

## 2018-04-25 VITALS
HEART RATE: 96 BPM | SYSTOLIC BLOOD PRESSURE: 157 MMHG | WEIGHT: 181 LBS | DIASTOLIC BLOOD PRESSURE: 86 MMHG | HEIGHT: 64 IN | BODY MASS INDEX: 30.9 KG/M2

## 2018-04-25 DIAGNOSIS — G81.11 RIGHT SPASTIC HEMIPARESIS (H): Primary | ICD-10-CM

## 2018-04-25 RX ORDER — LISINOPRIL/HYDROCHLOROTHIAZIDE 10-12.5 MG
2 TABLET ORAL
COMMUNITY
Start: 2018-04-23

## 2018-04-25 RX ORDER — CLOPIDOGREL BISULFATE 75 MG/1
75 TABLET ORAL
COMMUNITY
Start: 2018-04-23

## 2018-04-25 RX ORDER — ATORVASTATIN CALCIUM 40 MG/1
40 TABLET, FILM COATED ORAL
COMMUNITY
Start: 2018-04-23

## 2018-04-25 ASSESSMENT — PAIN SCALES - GENERAL: PAINLEVEL: NO PAIN (0)

## 2018-04-25 NOTE — ADDENDUM NOTE
Encounter addended by: Yen Villanueva, PT on: 4/25/2018  5:04 PM<BR>     Actions taken: Flowsheet data copied forward, Flowsheet accepted

## 2018-04-25 NOTE — ADDENDUM NOTE
Encounter addended by: Yen Villanueva, PT on: 4/25/2018  5:00 PM<BR>     Actions taken: Flowsheet data copied forward, Flowsheet accepted

## 2018-04-25 NOTE — ADDENDUM NOTE
Encounter addended by: Yen Villanueva, PT on: 4/25/2018  5:15 PM<BR>     Actions taken: Flowsheet data copied forward, Flowsheet accepted

## 2018-04-25 NOTE — ADDENDUM NOTE
Encounter addended by: Yen Villanueva, PT on: 4/25/2018  5:02 PM<BR>     Actions taken: Flowsheet data copied forward, Flowsheet accepted

## 2018-04-25 NOTE — ADDENDUM NOTE
Encounter addended by: Yen Villanueva, PT on: 4/25/2018  4:59 PM<BR>     Actions taken: Flowsheet accepted

## 2018-04-25 NOTE — PROGRESS NOTES
"[facility] & Procedure Note:    Luis Trinidad is 62-year-old male with a history of stroke resulting in residual right spastic hemiparesis.  Last seen 1/17/2018, at which time we decreased dose from 400 to just 200 units trial forego right finger / wrist flexors. In hind sight he feels tighter in hand and wanting to go back to prior dose / similar distribution.     He is still working with PT and OT currently.    Physical exam:  /86  Pulse 96  Ht 5' 4\" (1.626 m)  Wt 181 lb (82.1 kg)  BMI 31.07 kg/m2  Alert, conversant, very pleasant  Limited active range of motion in right upper extremity, active shoulder abduction to about 90 deg though PROM near full. Similarly AROM at elbow and  only partial range with full PROM. Some increased difficulty getting to full finger / wrist extension compared to prior visits after those muscles were injected. Elbow with both flexion and extension tone 3/4, wrist fingers with flexion predominance.     Assessment and recommendations:  1.  We will plan to inject today again back to the 400 unit dose with right hest and right arm with finger and wrist flexors.   2.  Continue with ROM and other maintenance exercises.  3.  Continues with some ongoing physical therapy working both on gait and arm functioning.  4.  Continue oral baclofen 20 20 30 for partial tone reduction in broader distribution.  5.  Questions about his atorvastatin. Some confusion between his primary provider and cardiologist. Switched from simvastatin to atorvastatin with questions of suboptimal cholesterol control. I'll need to defer back to those [facility] providers to clarify their intended doses or any discrepancy between them.  6. Rogelio continues to be fully disabled and NOT able to return to work. He is disabled and at maximal medical improvement from that perspective.    7. Follow up in 3 months.    15 minutes spent in direct patient interaction discussing above items, greater than 50% in education. This " is independent from procedure time.      Procedure:   Chemodenervation to right chest and RUE utilizing botulinum toxin.  Began with formal consent which he signed. Had formal time-out prior to procedure.  400 U of Botox lot [id number], expiration 11/2020 , Botox NDC 2787-0521-91 was utilized. Diluted with normal saline in a 100 U:1mL ratio, total of 4 mL.  All areas were cleansed with chlor prep prior to injecting. EMG guidance was utilized to identify most active motor units while avoiding surrounding structures.     The following muscles were then injected, two body areas     Right trunk:  1. Right Pectoralis Major: 100 units divided in 2 sites  2. Latissimus Dorsi 50 units 2 sites. (note less involuntary muscle activity, consider reduced or no dose dislocation next time.)     Right arm  1. Biceps 100 units divided 2 sites.  2. FDS 50 units  3. FDP 50 units  4. FCR 25 units  5. FCU 25 units       Procedure was tolerated well, no adverse reactions.

## 2018-04-25 NOTE — MR AVS SNAPSHOT
After Visit Summary   4/25/2018    Luis Trinidad    MRN: [id number]           Patient Information     Date Of Birth          1955        Visit Information        Provider Department      4/25/2018 12:20 PM Chele Reno MD [facility]        Today's Diagnoses     Right spastic hemiparesis (H)    -  1       Follow-ups after your visit        Follow-up notes from your care team     Return in about 3 months (around 7/25/2018).      Your next 10 appointments already scheduled     Apr 26, 2018  9:45 AM CDT   Treatment 60 with Yen Villanueva, PT   [facility] ([facility])    [address] [address]   [phone]            May 01, 2018  9:45 AM CDT   Neuro Treatment with Chayo Mckeon, OT   [facility] ([facility])    [address] [address]   [phone]            May 01, 2018 10:45 AM CDT   Treatment 60 with Yen Villanueva, PT   [facility] ([facility])    [address] [address]   [phone]            May 03, 2018  9:45 AM CDT   Neuro Treatment with Allyssa Martinez, OT   [facility] ([facility])    [address] [address]   [phone]            May 08, 2018 10:15 AM CDT   Neuro Treatment with Chayo Mckeon, OT   [facility] ([facility])    [address] [address]   [phone]            May 08, 2018 11:15 AM CDT   Treatment 60 with Yen Villanueva, PT   [facility] ([facility])    [address] [address]   [phone]            May 10, 2018  8:45 AM CDT   Neuro Treatment with Chayo Mckeon, OT   [facility] ([facility])    [address] [address]   [phone]            May 10, 2018  9:45 AM CDT   Treatment 60 with Yen Villanueva, PT   [facility] ([facility])    [address] [address]   [phone]            May 15, 2018  9:45 AM CDT   Neuro Treatment with Chayo Mckeon, OT   [facility] ([facility])    [address] [id number] [phone]            May 17, 2018 10:15 AM CDT   Neuro Treatment with Chayo Mckeon, OT   [facility] ([facility])    [address] [address] [phone]              Who to contact     Please call your clinic at [phone] to:    Ask questions about your health    Make or cancel appointments    Discuss your medicines    Learn about your test results    Speak to your doctor            Additional Information About Your Visit        Analiza Information     Analiza is an electronic gateway that provides easy, online access to your medical records. With Analiza, you can request a clinic appointment, read your test results, renew a prescription or communicate with your care team.     To sign up for Analiza visit the website at [email or web address]   You will be asked to enter the access code listed below, as well as some personal information. Please follow the directions to create your username and password.     Your access code is: [email or web address]  Expires: 7/10/2018  6:31 AM     Your access code will  in 90 days. If you need help or a new code, please contact your [facility] or call [phone] for assistance.        Care EveryWhere ID     This is your Care EveryWhere ID. This could be used by other organizations to access your [facility] medical records  [id number]        Your Vitals Were     Pulse Height BMI (Body Mass Index)             96 5' 4\" " (1.626 m) 31.07 kg/m2          Blood Pressure from Last 3 Encounters:   04/25/18 157/86   01/17/18 132/84   11/29/17 130/80    Weight from Last 3 Encounters:   04/25/18 181 lb (82.1 kg)   01/17/18 178 lb (80.7 kg)   11/29/17 178 lb 9.6 oz (81 kg)              We Performed the Following     HC CHEMODENERVATION 1 EXTREMITY, 5 OR MORE MUSCLES     HC CHEMODENERVATION OF TRUNK, 6 OR MORE MUSCLES     NEEDLE EMG GUIDE W CHEMODENERVATION        Primary Care Provider Office Phone # Fax #    Zaid Whitten [phone]       [facility] [address]   [facility] [address]        Equal Access to Services     BERT MCKEON : Hadii charla jarretto Somorris, waaxda luqadaha, qaybta kaalmada adeegyada, torsten garrison . So [facility] [phone].    ATENCIÓN: Si habla español, tiene a bolanos disposición servicios gratuitos de asistencia lingüística. [facility] [phone].    We comply with applicable federal civil rights laws and [place] laws. We do not discriminate on the basis of race, color, national origin, age, disability, sex, sexual orientation, or gender identity.            Thank you!     Thank you for choosing [facility] PHYSICAL MEDICINE AND REHABILITATION  for your care. Our goal is always to provide you with excellent care. Hearing back from our patients is one way we can continue to improve our services. Please take a few minutes to complete the written survey that you may receive in the mail after your visit with us. Thank you!             Your Updated Medication List - Protect others around you: Learn how to safely use, store and throw away your medicines at www.disposemymeds.org.          This list is accurate as of 4/25/18  1:17 PM.  Always use your most recent med list.                   Brand Name Dispense Instructions for use Diagnosis    aspirin 81 MG EC tablet      Take 1 tablet (81 mg) by mouth daily    Lacunar stroke of left subthalamic region (H)        atorvastatin 40 MG tablet    LIPITOR     Take 40 mg by mouth        * baclofen 10 MG tablet    LIORESAL    210 tablet    TAKE 2 TABLETS BY MOUTH IN THE MORNING, 2 TABLETS MIDDAY, AND 3 TABLETS AT BEDTIME    Spasticity       * baclofen 10 MG tablet    LIORESAL    210 tablet    TAKE 2 TABLETS BY MOUTH IN THE MORNING, 2 TABLETS MIDDAY, AND 3 TABLETS AT BEDTIME    Spasticity       * baclofen 10 MG tablet    LIORESAL    210 tablet    20 mg in morning, 20 mg mid day and 30 mg bedtime.    Spasticity       * baclofen 10 MG tablet    LIORESAL    630 tablet    TAKE 2 TABLETS BY MOUTH IN THE MORNING, 2 TABLETS MIDDAY, AND 3 TABLETS AT BEDTIME    Spasticity       botulinum toxin type A 100 units injection    BOTOX    400 Units    Inject 400 Units into the muscle every 3 months    Spastic hemiplegia (H)       cholecalciferol 2000 units tablet     30 tablet    Take 2,000 Units by mouth daily    Mixed hyperlipidemia       * clopidogrel 75 MG tablet    PLAVIX    30 tablet    Take 1 tablet (75 mg) by mouth daily    Lacunar stroke of left subthalamic region (H)       * clopidogrel 75 MG tablet    PLAVIX     Take 75 mg by mouth        co-enzyme Q-10 100 MG Caps capsule     30 capsule    Take 1 capsule (100 mg) by mouth daily    Mixed hyperlipidemia       * lisinopril-hydrochlorothiazide 10-12.5 MG per tablet    PRINZIDE/ZESTORETIC     Take 1 tablet by mouth        * lisinopril-hydrochlorothiazide 10-12.5 MG per tablet    PRINZIDE/ZESTORETIC     Take 2 tablets by mouth        simvastatin 10 MG tablet    ZOCOR    30 tablet    Take 1 tablet (10 mg) by mouth every evening    Mixed hyperlipidemia       tetrahydrozoline 0.05 % ophthalmic solution      Place 1 drop into both eyes 3 times daily as needed        * Notice:  This list has 8 medication(s) that are the same as other medications prescribed for you. Read the directions carefully, and ask your doctor or other care provider to review them with you.

## 2018-04-25 NOTE — ADDENDUM NOTE
Encounter addended by: Yen Villanueva, PT on: 4/25/2018  5:03 PM<BR>     Actions taken: Flowsheet data copied forward, Flowsheet accepted

## 2018-04-25 NOTE — LETTER
"4/25/2018       RE: Luis Trinidad  [address] [facility] [address]     Dear Colleague,    Thank you for referring your patient, Luis Trinidad, to the [facility] PHYSICAL MEDICINE AND REHABILITATION at [facility]. Please see a copy of my visit note below.    PM&R Clinic & Procedure Note:    Luis Trinidad is 62-year-old male with a history of stroke resulting in residual right spastic hemiparesis.  Last seen 1/17/2018, at which time we decreased dose from 400 to just 200 units trial forego right finger / wrist flexors. In hind sight he feels tighter in hand and wanting to go back to prior dose / similar distribution.     He is still working with PT and OT currently.    Physical exam:  /86  Pulse 96  Ht 5' 4\" (1.626 m)  Wt 181 lb (82.1 kg)  BMI 31.07 kg/m2  Alert, conversant, very pleasant  Limited active range of motion in right upper extremity, active shoulder abduction to about 90 deg though PROM near full. Similarly AROM at elbow and  only partial range with full PROM. Some increased difficulty getting to full finger / wrist extension compared to prior visits after those muscles were injected. Elbow with both flexion and extension tone 3/4, wrist fingers with flexion predominance.     Assessment and recommendations:  1.  We will plan to inject today again back to the 400 unit dose with right hest and right arm with finger and wrist flexors.   2.  Continue with ROM and other maintenance exercises.  3.  Continues with some ongoing physical therapy working both on gait and arm functioning.  4.  Continue oral baclofen 20 20 30 for partial tone reduction in broader distribution.  5.  Questions about his atorvastatin. Some confusion between his primary provider and cardiologist. Switched from simvastatin to atorvastatin with questions of suboptimal cholesterol control. I'll need to defer back to those [facility] providers to clarify their intended doses or any " discrepancy between them.  6. Rogelio continues to be fully disabled and NOT able to return to work. He is disabled and at maximal medical improvement from that perspective.    7. Follow up in 3 months.    15 minutes spent in direct patient interaction discussing above items, greater than 50% in education. This is independent from procedure time.      Procedure:   Chemodenervation to right chest and RUE utilizing botulinum toxin.  Began with formal consent which he signed. Had formal time-out prior to procedure.  400 U of Botox lot [id number], expiration 11/2020 , Botox NDC 9811-8659-65 was utilized. Diluted with normal saline in a 100 U:1mL ratio, total of 4 mL.  All areas were cleansed with chlor prep prior to injecting. EMG guidance was utilized to identify most active motor units while avoiding surrounding structures.     The following muscles were then injected, two body areas     Right trunk:  1. Right Pectoralis Major: 100 units divided in 2 sites  2. Latissimus Dorsi 50 units 2 sites. (note less involuntary muscle activity, consider reduced or no dose dislocation next time.)     Right arm  1. Biceps 100 units divided 2 sites.  2. FDS 50 units  3. FDP 50 units  4. FCR 25 units  5. FCU 25 units       Procedure was tolerated well, no adverse reactions.        Again, thank you for allowing me to participate in the care of your patient.      Sincerely,    Chele Reno MD

## 2018-04-25 NOTE — ADDENDUM NOTE
Encounter addended by: Yen Villanueva, PT on: 4/25/2018  5:16 PM<BR>     Actions taken: Flowsheet data copied forward, Flowsheet accepted

## 2018-05-01 ENCOUNTER — HOSPITAL ENCOUNTER (OUTPATIENT)
Dept: PHYSICAL THERAPY | Facility: CLINIC | Age: 63
Setting detail: THERAPIES SERIES
End: 2018-05-01
Attending: PHYSICAL MEDICINE & REHABILITATION
Payer: MEDICARE

## 2018-05-01 PROCEDURE — 97112 NEUROMUSCULAR REEDUCATION: CPT | Mod: GP | Performed by: PHYSICAL THERAPIST

## 2018-05-01 PROCEDURE — 40000719 ZZHC STATISTIC PT DEPARTMENT NEURO VISIT: Performed by: PHYSICAL THERAPIST

## 2018-05-01 PROCEDURE — G8978 MOBILITY CURRENT STATUS: HCPCS | Mod: GP,CL | Performed by: PHYSICAL THERAPIST

## 2018-05-01 PROCEDURE — G8979 MOBILITY GOAL STATUS: HCPCS | Mod: GP,CJ | Performed by: PHYSICAL THERAPIST

## 2018-05-01 PROCEDURE — 97110 THERAPEUTIC EXERCISES: CPT | Mod: GP | Performed by: PHYSICAL THERAPIST

## 2018-05-03 ENCOUNTER — HOSPITAL ENCOUNTER (OUTPATIENT)
Dept: OCCUPATIONAL THERAPY | Facility: CLINIC | Age: 63
Setting detail: THERAPIES SERIES
End: 2018-05-03
Attending: PHYSICAL MEDICINE & REHABILITATION
Payer: MEDICARE

## 2018-05-03 PROCEDURE — 40000125 ZZHC STATISTIC OT OUTPT VISIT: Performed by: OCCUPATIONAL THERAPIST

## 2018-05-03 PROCEDURE — 97535 SELF CARE MNGMENT TRAINING: CPT | Mod: GO | Performed by: OCCUPATIONAL THERAPIST

## 2018-05-03 PROCEDURE — G8985 CARRY GOAL STATUS: HCPCS | Mod: GO,CJ | Performed by: OCCUPATIONAL THERAPIST

## 2018-05-03 PROCEDURE — 97110 THERAPEUTIC EXERCISES: CPT | Mod: GO | Performed by: OCCUPATIONAL THERAPIST

## 2018-05-03 PROCEDURE — G8984 CARRY CURRENT STATUS: HCPCS | Mod: GO,CL | Performed by: OCCUPATIONAL THERAPIST

## 2018-05-08 ENCOUNTER — HOSPITAL ENCOUNTER (OUTPATIENT)
Dept: PHYSICAL THERAPY | Facility: CLINIC | Age: 63
Setting detail: THERAPIES SERIES
End: 2018-05-08
Attending: PHYSICAL MEDICINE & REHABILITATION
Payer: MEDICARE

## 2018-05-08 PROCEDURE — 97112 NEUROMUSCULAR REEDUCATION: CPT | Mod: GP | Performed by: PHYSICAL THERAPIST

## 2018-05-08 PROCEDURE — 97110 THERAPEUTIC EXERCISES: CPT | Mod: GP | Performed by: PHYSICAL THERAPIST

## 2018-05-08 PROCEDURE — 40000719 ZZHC STATISTIC PT DEPARTMENT NEURO VISIT: Performed by: PHYSICAL THERAPIST

## 2018-05-10 ENCOUNTER — HOSPITAL ENCOUNTER (OUTPATIENT)
Dept: PHYSICAL THERAPY | Facility: CLINIC | Age: 63
Setting detail: THERAPIES SERIES
End: 2018-05-10
Attending: PHYSICAL MEDICINE & REHABILITATION
Payer: MEDICARE

## 2018-05-10 ENCOUNTER — HOSPITAL ENCOUNTER (OUTPATIENT)
Dept: OCCUPATIONAL THERAPY | Facility: CLINIC | Age: 63
Setting detail: THERAPIES SERIES
End: 2018-05-10
Attending: PHYSICAL MEDICINE & REHABILITATION
Payer: MEDICARE

## 2018-05-10 PROCEDURE — 97112 NEUROMUSCULAR REEDUCATION: CPT | Mod: GO | Performed by: OCCUPATIONAL THERAPIST

## 2018-05-10 PROCEDURE — 97535 SELF CARE MNGMENT TRAINING: CPT | Mod: GO | Performed by: OCCUPATIONAL THERAPIST

## 2018-05-10 PROCEDURE — 40000719 ZZHC STATISTIC PT DEPARTMENT NEURO VISIT: Performed by: PHYSICAL THERAPIST

## 2018-05-10 PROCEDURE — 97110 THERAPEUTIC EXERCISES: CPT | Mod: GP | Performed by: PHYSICAL THERAPIST

## 2018-05-10 PROCEDURE — 97112 NEUROMUSCULAR REEDUCATION: CPT | Mod: GP | Performed by: PHYSICAL THERAPIST

## 2018-05-10 PROCEDURE — 40000125 ZZHC STATISTIC OT OUTPT VISIT: Performed by: OCCUPATIONAL THERAPIST

## 2018-05-17 ENCOUNTER — HOSPITAL ENCOUNTER (OUTPATIENT)
Dept: OCCUPATIONAL THERAPY | Facility: CLINIC | Age: 63
Setting detail: THERAPIES SERIES
End: 2018-05-17
Attending: PHYSICAL MEDICINE & REHABILITATION
Payer: MEDICARE

## 2018-05-17 ENCOUNTER — HOSPITAL ENCOUNTER (OUTPATIENT)
Dept: PHYSICAL THERAPY | Facility: CLINIC | Age: 63
Setting detail: THERAPIES SERIES
End: 2018-05-17
Attending: PHYSICAL MEDICINE & REHABILITATION
Payer: MEDICARE

## 2018-05-17 PROCEDURE — 97535 SELF CARE MNGMENT TRAINING: CPT | Mod: GO | Performed by: OCCUPATIONAL THERAPIST

## 2018-05-17 PROCEDURE — 40000125 ZZHC STATISTIC OT OUTPT VISIT: Performed by: OCCUPATIONAL THERAPIST

## 2018-05-17 PROCEDURE — 97110 THERAPEUTIC EXERCISES: CPT | Mod: GO | Performed by: OCCUPATIONAL THERAPIST

## 2018-05-17 PROCEDURE — 40000719 ZZHC STATISTIC PT DEPARTMENT NEURO VISIT: Performed by: PHYSICAL THERAPIST

## 2018-05-17 PROCEDURE — 97116 GAIT TRAINING THERAPY: CPT | Mod: GP | Performed by: PHYSICAL THERAPIST

## 2018-05-17 PROCEDURE — 97112 NEUROMUSCULAR REEDUCATION: CPT | Mod: GP | Performed by: PHYSICAL THERAPIST

## 2018-05-22 ENCOUNTER — HOSPITAL ENCOUNTER (OUTPATIENT)
Dept: OCCUPATIONAL THERAPY | Facility: CLINIC | Age: 63
Setting detail: THERAPIES SERIES
End: 2018-05-22
Attending: PHYSICAL MEDICINE & REHABILITATION
Payer: MEDICARE

## 2018-05-22 PROCEDURE — 40000125 ZZHC STATISTIC OT OUTPT VISIT: Performed by: OCCUPATIONAL THERAPIST

## 2018-05-22 PROCEDURE — 97110 THERAPEUTIC EXERCISES: CPT | Mod: GO | Performed by: OCCUPATIONAL THERAPIST

## 2018-05-22 PROCEDURE — 97535 SELF CARE MNGMENT TRAINING: CPT | Mod: GO | Performed by: OCCUPATIONAL THERAPIST

## 2018-05-24 ENCOUNTER — HOSPITAL ENCOUNTER (OUTPATIENT)
Dept: OCCUPATIONAL THERAPY | Facility: CLINIC | Age: 63
Setting detail: THERAPIES SERIES
End: 2018-05-24
Attending: PHYSICAL MEDICINE & REHABILITATION
Payer: MEDICARE

## 2018-05-24 PROCEDURE — 97110 THERAPEUTIC EXERCISES: CPT | Mod: GO | Performed by: OCCUPATIONAL THERAPIST

## 2018-05-24 PROCEDURE — 40000125 ZZHC STATISTIC OT OUTPT VISIT: Performed by: OCCUPATIONAL THERAPIST

## 2018-05-24 PROCEDURE — 97112 NEUROMUSCULAR REEDUCATION: CPT | Mod: GO | Performed by: OCCUPATIONAL THERAPIST

## 2018-05-29 ENCOUNTER — HOSPITAL ENCOUNTER (OUTPATIENT)
Dept: PHYSICAL THERAPY | Facility: CLINIC | Age: 63
Setting detail: THERAPIES SERIES
End: 2018-05-29
Attending: PHYSICAL MEDICINE & REHABILITATION
Payer: MEDICARE

## 2018-05-29 PROCEDURE — 97112 NEUROMUSCULAR REEDUCATION: CPT | Mod: GP | Performed by: PHYSICAL THERAPIST

## 2018-05-29 PROCEDURE — 97116 GAIT TRAINING THERAPY: CPT | Mod: GP | Performed by: PHYSICAL THERAPIST

## 2018-05-29 PROCEDURE — 40000719 ZZHC STATISTIC PT DEPARTMENT NEURO VISIT: Performed by: PHYSICAL THERAPIST

## 2018-05-29 PROCEDURE — 97110 THERAPEUTIC EXERCISES: CPT | Mod: GP | Performed by: PHYSICAL THERAPIST

## 2018-05-31 ENCOUNTER — HOSPITAL ENCOUNTER (OUTPATIENT)
Dept: PHYSICAL THERAPY | Facility: CLINIC | Age: 63
Setting detail: THERAPIES SERIES
End: 2018-05-31
Attending: PHYSICAL MEDICINE & REHABILITATION
Payer: MEDICARE

## 2018-05-31 ENCOUNTER — HOSPITAL ENCOUNTER (OUTPATIENT)
Dept: OCCUPATIONAL THERAPY | Facility: CLINIC | Age: 63
Setting detail: THERAPIES SERIES
End: 2018-05-31
Attending: PHYSICAL MEDICINE & REHABILITATION
Payer: MEDICARE

## 2018-05-31 PROCEDURE — 97110 THERAPEUTIC EXERCISES: CPT | Mod: GP | Performed by: PHYSICAL THERAPIST

## 2018-05-31 PROCEDURE — 97116 GAIT TRAINING THERAPY: CPT | Mod: GP | Performed by: PHYSICAL THERAPIST

## 2018-05-31 PROCEDURE — 40000125 ZZHC STATISTIC OT OUTPT VISIT: Performed by: OCCUPATIONAL THERAPIST

## 2018-05-31 PROCEDURE — 97535 SELF CARE MNGMENT TRAINING: CPT | Mod: GO | Performed by: OCCUPATIONAL THERAPIST

## 2018-05-31 PROCEDURE — 97112 NEUROMUSCULAR REEDUCATION: CPT | Mod: GP | Performed by: PHYSICAL THERAPIST

## 2018-05-31 PROCEDURE — 97110 THERAPEUTIC EXERCISES: CPT | Mod: GO | Performed by: OCCUPATIONAL THERAPIST

## 2018-05-31 PROCEDURE — 40000719 ZZHC STATISTIC PT DEPARTMENT NEURO VISIT: Performed by: PHYSICAL THERAPIST

## 2018-05-31 NOTE — PROGRESS NOTES
Outpatient Occupational Therapy Progress Note     Patient: Luis Trinidad  : 1955    Beginning/End Dates of Reporting Period:  18 to 2018    Referring Provider: Chele Reno MD    Therapy Diagnosis: Hemiparesis affecting right side as late effect of stroke    Client Self Report: N/A - see below    Objective Measurements:  N/A    Goals:     Goal Identifier R shoulder strength   Goal Description Patient to increase R shoulder flexiion AROM to 40 degrees for improved R UE function for ADL/IADLs (during dressing and grooming tasks, carrying items, putting groceries and dishes away).   Target Date 18   Date Met      Progress:  Goal not met due to no treatment beyond evaluation     Goal Identifier R pinch strength   Goal Description Patient will demonstrate increased right hand pinch strength (both lateral and palmar) by 3# each for increased independence with ADL/IADLs such as opening containers, pull up pants, maintaining pinch to hold items.   Target Date 18   Date Met      Progress:  Goal not met due to no treatment beyond evaluation     Goal Identifier R GM/FM coordination   Goal Description Patient to improve R GM/FM coordination skills for increased independence during ADL/IADL tasks (dressing, kitchen tasks, feeding self) by completing the Box and Blocks Test with R UE in standing with 10 blocks.   Target Date 18   Date Met      Progress:  Goal not met due to no treatment beyond evaluation     Goal Identifier R UE as gross assist   Goal Description Patient to demonstrate functional tasks (feeding self, wiping the counter/table, washing dishes, etc.) with 20% use of R UE as gross stabilizer or gross assist for increased ADL/IADL independence and closer return to prior level of function. ( (eval date): using ~3% at home)   Target Date 18   Date Met      Progress:  Goal not met due to no treatment beyond evaluation     Goal Identifier functional/normal movment patterns  in R UE and trunk   Goal Description Patient to complete therapeutic task (simple kitchen tasks, laundry, etc.) with use of B UE's and no more than 2 cues in 5 minute time frame for correct body mechanics and for decreasing compensatory movements at the trunk and upper body for encouragement of normal movement patterns, increased R UE function and increased ADL/IADL independence.   Target Date 07/26/18   Date Met      Progress:  Goal not met due to no treatment beyond evaluation     Goal Identifier visual skills   Goal Description Patient will demonstrate WFLs visual skills (including reaction time and visual scanning skills) for safe independent community mobility (crossing the street, driving, grocery shopping) by scoring WNLs on all modes of the Dynavision, Scancourse and other visual screens.   Target Date 07/26/18   Date Met      Progress:  Goal not met due to no treatment beyond evaluation     Progress Toward Goals:   Not assessed this period.  Goals not met due to no treatment beyond evaluation due to scheduling and transportation conflicts.     Plan:  Continue therapy per original plan of care at frequency of 2x/week for 12 weeks.    Discharge:  No

## 2018-05-31 NOTE — ADDENDUM NOTE
Encounter addended by: Chayo Mckeon, OT on: 5/31/2018  8:20 AM<BR>     Actions taken: Sign clinical note, Flowsheet data copied forward, Flowsheet accepted

## 2018-06-07 ENCOUNTER — HOSPITAL ENCOUNTER (OUTPATIENT)
Dept: PHYSICAL THERAPY | Facility: CLINIC | Age: 63
Setting detail: THERAPIES SERIES
End: 2018-06-07
Attending: PHYSICAL MEDICINE & REHABILITATION
Payer: MEDICARE

## 2018-06-07 PROCEDURE — 40000719 ZZHC STATISTIC PT DEPARTMENT NEURO VISIT: Performed by: PHYSICAL THERAPIST

## 2018-06-07 PROCEDURE — 97116 GAIT TRAINING THERAPY: CPT | Mod: GP | Performed by: PHYSICAL THERAPIST

## 2018-06-07 PROCEDURE — 97110 THERAPEUTIC EXERCISES: CPT | Mod: GP | Performed by: PHYSICAL THERAPIST

## 2018-06-12 ENCOUNTER — HOSPITAL ENCOUNTER (OUTPATIENT)
Dept: OCCUPATIONAL THERAPY | Facility: CLINIC | Age: 63
Setting detail: THERAPIES SERIES
End: 2018-06-12
Attending: PHYSICAL MEDICINE & REHABILITATION
Payer: MEDICARE

## 2018-06-12 ENCOUNTER — HOSPITAL ENCOUNTER (OUTPATIENT)
Dept: PHYSICAL THERAPY | Facility: CLINIC | Age: 63
Setting detail: THERAPIES SERIES
End: 2018-06-12
Attending: PHYSICAL MEDICINE & REHABILITATION
Payer: MEDICARE

## 2018-06-12 PROCEDURE — 97110 THERAPEUTIC EXERCISES: CPT | Mod: GP | Performed by: PHYSICAL THERAPIST

## 2018-06-12 PROCEDURE — G8978 MOBILITY CURRENT STATUS: HCPCS | Mod: GP,CL | Performed by: PHYSICAL THERAPIST

## 2018-06-12 PROCEDURE — G8979 MOBILITY GOAL STATUS: HCPCS | Mod: GP,CJ | Performed by: PHYSICAL THERAPIST

## 2018-06-12 PROCEDURE — 97110 THERAPEUTIC EXERCISES: CPT | Mod: GO | Performed by: OCCUPATIONAL THERAPIST

## 2018-06-12 PROCEDURE — 97116 GAIT TRAINING THERAPY: CPT | Mod: GP | Performed by: PHYSICAL THERAPIST

## 2018-06-12 PROCEDURE — 40000125 ZZHC STATISTIC OT OUTPT VISIT: Performed by: OCCUPATIONAL THERAPIST

## 2018-06-12 PROCEDURE — 40000719 ZZHC STATISTIC PT DEPARTMENT NEURO VISIT: Performed by: PHYSICAL THERAPIST

## 2018-06-14 ENCOUNTER — HOSPITAL ENCOUNTER (OUTPATIENT)
Dept: PHYSICAL THERAPY | Facility: CLINIC | Age: 63
Setting detail: THERAPIES SERIES
End: 2018-06-14
Attending: PHYSICAL MEDICINE & REHABILITATION
Payer: MEDICARE

## 2018-06-14 ENCOUNTER — HOSPITAL ENCOUNTER (OUTPATIENT)
Dept: OCCUPATIONAL THERAPY | Facility: CLINIC | Age: 63
Setting detail: THERAPIES SERIES
End: 2018-06-14
Attending: PHYSICAL MEDICINE & REHABILITATION
Payer: MEDICARE

## 2018-06-14 PROCEDURE — 97112 NEUROMUSCULAR REEDUCATION: CPT | Mod: GO | Performed by: OCCUPATIONAL THERAPIST

## 2018-06-14 PROCEDURE — 40000719 ZZHC STATISTIC PT DEPARTMENT NEURO VISIT: Performed by: PHYSICAL THERAPIST

## 2018-06-14 PROCEDURE — 97110 THERAPEUTIC EXERCISES: CPT | Mod: GO | Performed by: OCCUPATIONAL THERAPIST

## 2018-06-14 PROCEDURE — 97116 GAIT TRAINING THERAPY: CPT | Mod: GP | Performed by: PHYSICAL THERAPIST

## 2018-06-14 PROCEDURE — 97110 THERAPEUTIC EXERCISES: CPT | Mod: GP | Performed by: PHYSICAL THERAPIST

## 2018-06-14 PROCEDURE — 40000125 ZZHC STATISTIC OT OUTPT VISIT: Performed by: OCCUPATIONAL THERAPIST

## 2018-06-19 ENCOUNTER — HOSPITAL ENCOUNTER (OUTPATIENT)
Dept: OCCUPATIONAL THERAPY | Facility: CLINIC | Age: 63
Setting detail: THERAPIES SERIES
End: 2018-06-19
Attending: PHYSICAL MEDICINE & REHABILITATION
Payer: MEDICARE

## 2018-06-19 ENCOUNTER — HOSPITAL ENCOUNTER (OUTPATIENT)
Dept: PHYSICAL THERAPY | Facility: CLINIC | Age: 63
Setting detail: THERAPIES SERIES
End: 2018-06-19
Attending: PHYSICAL MEDICINE & REHABILITATION
Payer: MEDICARE

## 2018-06-19 PROCEDURE — 97112 NEUROMUSCULAR REEDUCATION: CPT | Mod: GP | Performed by: PHYSICAL THERAPIST

## 2018-06-19 PROCEDURE — G8978 MOBILITY CURRENT STATUS: HCPCS | Mod: GP,CL | Performed by: PHYSICAL THERAPIST

## 2018-06-19 PROCEDURE — 40000125 ZZHC STATISTIC OT OUTPT VISIT: Performed by: OCCUPATIONAL THERAPIST

## 2018-06-19 PROCEDURE — 40000719 ZZHC STATISTIC PT DEPARTMENT NEURO VISIT: Performed by: PHYSICAL THERAPIST

## 2018-06-19 PROCEDURE — 97116 GAIT TRAINING THERAPY: CPT | Mod: GP | Performed by: PHYSICAL THERAPIST

## 2018-06-19 PROCEDURE — 97110 THERAPEUTIC EXERCISES: CPT | Mod: GP | Performed by: PHYSICAL THERAPIST

## 2018-06-19 PROCEDURE — G8979 MOBILITY GOAL STATUS: HCPCS | Mod: GP,CJ | Performed by: PHYSICAL THERAPIST

## 2018-06-19 PROCEDURE — 97112 NEUROMUSCULAR REEDUCATION: CPT | Mod: GO | Performed by: OCCUPATIONAL THERAPIST

## 2018-06-19 PROCEDURE — 97110 THERAPEUTIC EXERCISES: CPT | Mod: GO | Performed by: OCCUPATIONAL THERAPIST

## 2018-06-21 ENCOUNTER — HOSPITAL ENCOUNTER (OUTPATIENT)
Dept: PHYSICAL THERAPY | Facility: CLINIC | Age: 63
Setting detail: THERAPIES SERIES
End: 2018-06-21
Attending: PHYSICAL MEDICINE & REHABILITATION
Payer: MEDICARE

## 2018-06-21 ENCOUNTER — HOSPITAL ENCOUNTER (OUTPATIENT)
Dept: OCCUPATIONAL THERAPY | Facility: CLINIC | Age: 63
Setting detail: THERAPIES SERIES
End: 2018-06-21
Attending: PHYSICAL MEDICINE & REHABILITATION
Payer: MEDICARE

## 2018-06-21 PROCEDURE — 40000719 ZZHC STATISTIC PT DEPARTMENT NEURO VISIT: Performed by: PHYSICAL THERAPIST

## 2018-06-21 PROCEDURE — 97112 NEUROMUSCULAR REEDUCATION: CPT | Mod: GP | Performed by: PHYSICAL THERAPIST

## 2018-06-21 PROCEDURE — 97110 THERAPEUTIC EXERCISES: CPT | Mod: GO | Performed by: OCCUPATIONAL THERAPIST

## 2018-06-21 PROCEDURE — 97110 THERAPEUTIC EXERCISES: CPT | Mod: GP | Performed by: PHYSICAL THERAPIST

## 2018-06-21 PROCEDURE — 97116 GAIT TRAINING THERAPY: CPT | Mod: GP | Performed by: PHYSICAL THERAPIST

## 2018-06-21 PROCEDURE — 40000125 ZZHC STATISTIC OT OUTPT VISIT: Performed by: OCCUPATIONAL THERAPIST

## 2018-06-21 PROCEDURE — 97535 SELF CARE MNGMENT TRAINING: CPT | Mod: GO | Performed by: OCCUPATIONAL THERAPIST

## 2018-06-21 PROCEDURE — 97112 NEUROMUSCULAR REEDUCATION: CPT | Mod: GO | Performed by: OCCUPATIONAL THERAPIST

## 2018-06-26 ENCOUNTER — HOSPITAL ENCOUNTER (OUTPATIENT)
Dept: OCCUPATIONAL THERAPY | Facility: CLINIC | Age: 63
Setting detail: THERAPIES SERIES
End: 2018-06-26
Attending: PHYSICAL MEDICINE & REHABILITATION
Payer: MEDICARE

## 2018-06-26 ENCOUNTER — HOSPITAL ENCOUNTER (OUTPATIENT)
Dept: PHYSICAL THERAPY | Facility: CLINIC | Age: 63
Setting detail: THERAPIES SERIES
End: 2018-06-26
Attending: PHYSICAL MEDICINE & REHABILITATION
Payer: MEDICARE

## 2018-06-26 PROCEDURE — 97116 GAIT TRAINING THERAPY: CPT | Mod: GP | Performed by: PHYSICAL THERAPIST

## 2018-06-26 PROCEDURE — 97110 THERAPEUTIC EXERCISES: CPT | Mod: GO | Performed by: OCCUPATIONAL THERAPIST

## 2018-06-26 PROCEDURE — 40000125 ZZHC STATISTIC OT OUTPT VISIT: Performed by: OCCUPATIONAL THERAPIST

## 2018-06-26 PROCEDURE — G8984 CARRY CURRENT STATUS: HCPCS | Mod: GO,CL | Performed by: OCCUPATIONAL THERAPIST

## 2018-06-26 PROCEDURE — 97112 NEUROMUSCULAR REEDUCATION: CPT | Mod: GO | Performed by: OCCUPATIONAL THERAPIST

## 2018-06-26 PROCEDURE — 40000719 ZZHC STATISTIC PT DEPARTMENT NEURO VISIT: Performed by: PHYSICAL THERAPIST

## 2018-06-26 PROCEDURE — 97110 THERAPEUTIC EXERCISES: CPT | Mod: GP | Performed by: PHYSICAL THERAPIST

## 2018-06-26 PROCEDURE — G8985 CARRY GOAL STATUS: HCPCS | Mod: GO,CJ | Performed by: OCCUPATIONAL THERAPIST

## 2018-06-26 PROCEDURE — 97112 NEUROMUSCULAR REEDUCATION: CPT | Mod: GP | Performed by: PHYSICAL THERAPIST

## 2018-06-28 ENCOUNTER — HOSPITAL ENCOUNTER (OUTPATIENT)
Dept: PHYSICAL THERAPY | Facility: CLINIC | Age: 63
Setting detail: THERAPIES SERIES
End: 2018-06-28
Attending: PHYSICAL MEDICINE & REHABILITATION
Payer: MEDICARE

## 2018-06-28 PROCEDURE — 97112 NEUROMUSCULAR REEDUCATION: CPT | Mod: GP | Performed by: PHYSICAL THERAPIST

## 2018-06-28 PROCEDURE — 40000719 ZZHC STATISTIC PT DEPARTMENT NEURO VISIT: Performed by: PHYSICAL THERAPIST

## 2018-06-28 PROCEDURE — 97110 THERAPEUTIC EXERCISES: CPT | Mod: GP | Performed by: PHYSICAL THERAPIST

## 2018-07-03 ENCOUNTER — HOSPITAL ENCOUNTER (OUTPATIENT)
Dept: OCCUPATIONAL THERAPY | Facility: CLINIC | Age: 63
Setting detail: THERAPIES SERIES
End: 2018-07-03
Attending: PHYSICAL MEDICINE & REHABILITATION
Payer: MEDICARE

## 2018-07-03 PROCEDURE — 97110 THERAPEUTIC EXERCISES: CPT | Mod: GO | Performed by: OCCUPATIONAL THERAPIST

## 2018-07-03 PROCEDURE — 40000125 ZZHC STATISTIC OT OUTPT VISIT: Performed by: OCCUPATIONAL THERAPIST

## 2018-07-05 ENCOUNTER — HOSPITAL ENCOUNTER (OUTPATIENT)
Dept: OCCUPATIONAL THERAPY | Facility: CLINIC | Age: 63
Setting detail: THERAPIES SERIES
End: 2018-07-05
Attending: PHYSICAL MEDICINE & REHABILITATION
Payer: MEDICARE

## 2018-07-05 ENCOUNTER — HOSPITAL ENCOUNTER (OUTPATIENT)
Dept: PHYSICAL THERAPY | Facility: CLINIC | Age: 63
Setting detail: THERAPIES SERIES
End: 2018-07-05
Attending: PHYSICAL MEDICINE & REHABILITATION
Payer: MEDICARE

## 2018-07-05 PROCEDURE — 40000719 ZZHC STATISTIC PT DEPARTMENT NEURO VISIT: Performed by: PHYSICAL THERAPIST

## 2018-07-05 PROCEDURE — 97110 THERAPEUTIC EXERCISES: CPT | Mod: GO | Performed by: OCCUPATIONAL THERAPIST

## 2018-07-05 PROCEDURE — 97110 THERAPEUTIC EXERCISES: CPT | Mod: GP | Performed by: PHYSICAL THERAPIST

## 2018-07-05 PROCEDURE — 40000125 ZZHC STATISTIC OT OUTPT VISIT: Performed by: OCCUPATIONAL THERAPIST

## 2018-07-05 PROCEDURE — 97116 GAIT TRAINING THERAPY: CPT | Mod: GP | Performed by: PHYSICAL THERAPIST

## 2018-07-05 PROCEDURE — 97112 NEUROMUSCULAR REEDUCATION: CPT | Mod: GP | Performed by: PHYSICAL THERAPIST

## 2018-07-10 ENCOUNTER — HOSPITAL ENCOUNTER (OUTPATIENT)
Dept: OCCUPATIONAL THERAPY | Facility: CLINIC | Age: 63
Setting detail: THERAPIES SERIES
End: 2018-07-10
Attending: PHYSICAL MEDICINE & REHABILITATION
Payer: MEDICARE

## 2018-07-10 ENCOUNTER — HOSPITAL ENCOUNTER (OUTPATIENT)
Dept: PHYSICAL THERAPY | Facility: CLINIC | Age: 63
Setting detail: THERAPIES SERIES
End: 2018-07-10
Attending: PHYSICAL MEDICINE & REHABILITATION
Payer: MEDICARE

## 2018-07-10 PROCEDURE — 97110 THERAPEUTIC EXERCISES: CPT | Mod: GP | Performed by: PHYSICAL THERAPIST

## 2018-07-10 PROCEDURE — 97535 SELF CARE MNGMENT TRAINING: CPT | Mod: GO | Performed by: OCCUPATIONAL THERAPIST

## 2018-07-10 PROCEDURE — 97112 NEUROMUSCULAR REEDUCATION: CPT | Mod: GO | Performed by: OCCUPATIONAL THERAPIST

## 2018-07-10 PROCEDURE — 40000719 ZZHC STATISTIC PT DEPARTMENT NEURO VISIT: Performed by: PHYSICAL THERAPIST

## 2018-07-10 PROCEDURE — 40000125 ZZHC STATISTIC OT OUTPT VISIT: Performed by: OCCUPATIONAL THERAPIST

## 2018-07-10 PROCEDURE — 97112 NEUROMUSCULAR REEDUCATION: CPT | Mod: GP | Performed by: PHYSICAL THERAPIST

## 2018-07-12 ENCOUNTER — HOSPITAL ENCOUNTER (OUTPATIENT)
Dept: PHYSICAL THERAPY | Facility: CLINIC | Age: 63
Setting detail: THERAPIES SERIES
End: 2018-07-12
Attending: PHYSICAL MEDICINE & REHABILITATION
Payer: MEDICARE

## 2018-07-12 ENCOUNTER — CARE COORDINATION (OUTPATIENT)
Dept: PHYSICAL MEDICINE AND REHAB | Facility: CLINIC | Age: 63
End: 2018-07-12

## 2018-07-12 PROCEDURE — 40000719 ZZHC STATISTIC PT DEPARTMENT NEURO VISIT: Performed by: PHYSICAL THERAPIST

## 2018-07-12 PROCEDURE — 97112 NEUROMUSCULAR REEDUCATION: CPT | Mod: GP | Performed by: PHYSICAL THERAPIST

## 2018-07-12 PROCEDURE — 97110 THERAPEUTIC EXERCISES: CPT | Mod: GP | Performed by: PHYSICAL THERAPIST

## 2018-07-17 ENCOUNTER — HOSPITAL ENCOUNTER (OUTPATIENT)
Dept: OCCUPATIONAL THERAPY | Facility: CLINIC | Age: 63
Setting detail: THERAPIES SERIES
End: 2018-07-17
Attending: PHYSICAL MEDICINE & REHABILITATION
Payer: MEDICARE

## 2018-07-17 ENCOUNTER — HOSPITAL ENCOUNTER (OUTPATIENT)
Dept: PHYSICAL THERAPY | Facility: CLINIC | Age: 63
Setting detail: THERAPIES SERIES
End: 2018-07-17
Attending: PHYSICAL MEDICINE & REHABILITATION
Payer: MEDICARE

## 2018-07-17 PROCEDURE — 97112 NEUROMUSCULAR REEDUCATION: CPT | Mod: GO | Performed by: OCCUPATIONAL THERAPIST

## 2018-07-17 PROCEDURE — 40000719 ZZHC STATISTIC PT DEPARTMENT NEURO VISIT: Performed by: PHYSICAL THERAPIST

## 2018-07-17 PROCEDURE — 97110 THERAPEUTIC EXERCISES: CPT | Mod: GO | Performed by: OCCUPATIONAL THERAPIST

## 2018-07-17 PROCEDURE — 97110 THERAPEUTIC EXERCISES: CPT | Mod: GP | Performed by: PHYSICAL THERAPIST

## 2018-07-17 PROCEDURE — 97112 NEUROMUSCULAR REEDUCATION: CPT | Mod: GP | Performed by: PHYSICAL THERAPIST

## 2018-07-17 PROCEDURE — 40000125 ZZHC STATISTIC OT OUTPT VISIT: Performed by: OCCUPATIONAL THERAPIST

## 2018-07-18 NOTE — ADDENDUM NOTE
Encounter addended by: Yen Villanueva, PT on: 7/18/2018 10:31 AM<BR>     Actions taken: Flowsheet data copied forward, Flowsheet accepted

## 2018-07-18 NOTE — ADDENDUM NOTE
Encounter addended by: Yen Villanueva, PT on: 7/18/2018 10:27 AM<BR>     Actions taken: Flowsheet data copied forward, Flowsheet accepted

## 2018-07-18 NOTE — PROGRESS NOTES
Outpatient Physical Therapy Progress Note     Patient: Luis Trinidad  : 1955    Beginning/End Dates of Reporting Period:  3/13/2018 to 2018    Referring Provider: Dr. Chele Reno                          Therapy Diagnosis: impaired participation in life roles dues to R hemiparesis and sequalae of chronic CVA     Client Self Report: Did not go to son's birthday party due to feeling too stiff.  Has not been able to walk outside due to the weather and feels this is affecting his stiffness.      Objective Measurements:        Objective Measure: 25 foot walk   Details: 14.5 sec, 15 steps.  Improved left heel contact placement at initial contact.                 Goals:  Goal Identifier 1 - Gait pattern/safety   Goal Description Rogelio will be able to achieve proper midstance to terminal stance alignment on right LE and transition into pre and initial swing with proper clearance of right foot for greater efficiency and safety with gait at home and in the community.   Target Date 18   Date Met   in progress     Progress:  Rogelio is gradually improving his gait pattern, especially transition into midstance phase.  He is also demonstrating improved relaxation of quads into passive knee flexion for trailing limb position/preswing, however does not fully clear foot in swing phase yet due to continued impairments in knee flexion activation.       Goal Identifier 2- Gait speed   Goal Description Rogelio will increase his gait speed on the 25 foot timed walk to 9 sec or less without use of an assistive device  to indicate reduced level of gait impairment and improved community mobility.   Target Date 18   Date Met   in progress   Progress:  Rogelio has shown slight improvement in gait speed with improved step length as well.  It is felt that his gait speed has not significantly improved yet because he is focused on improving his gait quality.  Overall his gait mechanics and alignment are showing consistent  improvements and with time will translate into more automaticity of gait that will allow improved speed and efficiency.  He is progressing toward this goal.       Goal Identifier 3- stairs   Goal Description Pt will be able to navigate up and down his stairs with one rail in a reciprocal pattern with minimal circumduction of right LE in order for more efficient and safe access to all levels of his home.   Target Date 06/13/18   Date Met   in progress - partially met.    Progress:  This is gradually improving as Rogelio needs only minimal facilitation to ascend steps without circumduction to clear foot.  This is limited by slow progression of knee flexion activation he needs to clear his foot up the step.  Descending Rogelio is doing very well and can descend reciprocally with one railing with appropriate alignment and stability on each side and without assist or facilitation.      Goal Identifier 4 - community ambulation   Goal Description Rogelio will be able to ambulate community distances of up to 1000 feet without his cane and with minimal fatigue for improved access and safety in his community.    Target Date 06/13/18   Date Met  06/12/18   Progress:     Goal Identifier 5 - home program   Goal Description Rogelio will be independent with a home activity program to address his impairments and self manage his recovery.    Target Date 06/13/18   Date Met   in progress   Progress:  Rogelio works hard on stretching and stance activities at home as well as walking 1-2 x per day.  His home program continues to be modified and progressed as needed.       Progress Toward Goals:   Progress this reporting period: Rogelio has met goal 4, partially met goal 3 and has made gradual but continual progress toward all other goals as indicated above.    Progress limited due to spasticity and tightness of his right UE, trunk and LE as well as weakness of his ankle DF and HS which has been improving but slowly.  Pt is scheduled to have botox injections on  August 1.     Plan:  Continue therapy per current plan of care.  Changes to goals: Continue with goals 1,2,3 and 5 until 9/12/18.      Discharge:  No

## 2018-07-18 NOTE — ADDENDUM NOTE
Encounter addended by: Yen Villanueva, PT on: 7/18/2018 10:27 AM<BR>     Actions taken: Flowsheet accepted

## 2018-07-18 NOTE — ADDENDUM NOTE
Encounter addended by: Yen Villanueva, PT on: 7/18/2018 10:30 AM<BR>     Actions taken: Flowsheet data copied forward, Flowsheet accepted

## 2018-07-18 NOTE — ADDENDUM NOTE
Encounter addended by: Yen Villanueva, PT on: 7/18/2018 10:29 AM<BR>     Actions taken: Flowsheet data copied forward, Flowsheet accepted

## 2018-07-18 NOTE — ADDENDUM NOTE
Encounter addended by: Yen Villanueva, PT on: 7/18/2018 10:28 AM<BR>     Actions taken: Flowsheet data copied forward, Flowsheet accepted

## 2018-07-18 NOTE — ADDENDUM NOTE
Encounter addended by: Yen Villanueva, PT on: 7/18/2018 10:26 AM<BR>     Actions taken: Sign clinical note, Flowsheet accepted

## 2018-07-19 ENCOUNTER — HOSPITAL ENCOUNTER (OUTPATIENT)
Dept: PHYSICAL THERAPY | Facility: CLINIC | Age: 63
Setting detail: THERAPIES SERIES
End: 2018-07-19
Attending: PHYSICAL MEDICINE & REHABILITATION
Payer: MEDICARE

## 2018-07-19 PROCEDURE — 97110 THERAPEUTIC EXERCISES: CPT | Mod: GP | Performed by: PHYSICAL THERAPIST

## 2018-07-19 PROCEDURE — G8979 MOBILITY GOAL STATUS: HCPCS | Mod: GP,CJ | Performed by: PHYSICAL THERAPIST

## 2018-07-19 PROCEDURE — G8978 MOBILITY CURRENT STATUS: HCPCS | Mod: GP,CL | Performed by: PHYSICAL THERAPIST

## 2018-07-19 PROCEDURE — 97112 NEUROMUSCULAR REEDUCATION: CPT | Mod: GP | Performed by: PHYSICAL THERAPIST

## 2018-07-19 PROCEDURE — 40000719 ZZHC STATISTIC PT DEPARTMENT NEURO VISIT: Performed by: PHYSICAL THERAPIST

## 2018-07-24 ENCOUNTER — HOSPITAL ENCOUNTER (OUTPATIENT)
Dept: PHYSICAL THERAPY | Facility: CLINIC | Age: 63
Setting detail: THERAPIES SERIES
End: 2018-07-24
Attending: PHYSICAL MEDICINE & REHABILITATION
Payer: MEDICARE

## 2018-07-24 ENCOUNTER — HOSPITAL ENCOUNTER (OUTPATIENT)
Dept: OCCUPATIONAL THERAPY | Facility: CLINIC | Age: 63
Setting detail: THERAPIES SERIES
End: 2018-07-24
Attending: PHYSICAL MEDICINE & REHABILITATION
Payer: MEDICARE

## 2018-07-24 PROCEDURE — 97110 THERAPEUTIC EXERCISES: CPT | Mod: GP | Performed by: PHYSICAL THERAPIST

## 2018-07-24 PROCEDURE — 40000125 ZZHC STATISTIC OT OUTPT VISIT: Performed by: OCCUPATIONAL THERAPIST

## 2018-07-24 PROCEDURE — 40000719 ZZHC STATISTIC PT DEPARTMENT NEURO VISIT: Performed by: PHYSICAL THERAPIST

## 2018-07-24 PROCEDURE — 97116 GAIT TRAINING THERAPY: CPT | Mod: GP | Performed by: PHYSICAL THERAPIST

## 2018-07-24 PROCEDURE — G8984 CARRY CURRENT STATUS: HCPCS | Mod: GO,CL | Performed by: OCCUPATIONAL THERAPIST

## 2018-07-24 PROCEDURE — 97110 THERAPEUTIC EXERCISES: CPT | Mod: GO | Performed by: OCCUPATIONAL THERAPIST

## 2018-07-24 PROCEDURE — G8985 CARRY GOAL STATUS: HCPCS | Mod: GO,CJ | Performed by: OCCUPATIONAL THERAPIST

## 2018-07-24 NOTE — PROGRESS NOTES
"Outpatient Occupational Therapy Progress Note     Patient: Luis Trinidad  : 1955    Beginning/End Dates of Reporting Period:  18 to 2018       Referring Provider: Chele Reno MD     Therapy Diagnosis: Hemiparesis affecting right side as late effect of stroke    Client Self Report: Patient is observed to use his right hand more functionally by pulling open his bag with his extended index finger.  Patient finding it easier to move his arm for ex or w the pulley system immediately after doing NMES on his wrist/hand first ( extensor pattern of wrist/fingers).  Continues to struggle with very reactive hypertonicity that fluctuates several times ( spasms at times, pulling at others) during a skilled therapy session. Likes using the pulley kit, \" helps me get my shoulder going.\"    Objective Measurements:          Objective Measure: Pinch Strength   Details: Lateral/Key: R 12,10,8 # ( was 18# at OT eval 2018);  ( L 23#) . palmar:  R 3# (using medial portion of 1st digit  vs side of thumb, fluctuates per tone.  Was 10# in Feb OT eval), dycem in place during testing. ( L 17#)      Objective Measure: R UE AROM   Details: scapular elevation AROM 75%, scapular retraction near 100%/WNLs.  Pt has limited scapular upward roation to <1/4 of arc. In standing, shoulder flexion AROM: 25 degrees flexion with moderate compensation into scaption per tone pattern. Shoulder abduction: improved to 78 degrees (from 60 degrees) with min  compensation of trunk and shoulder elevation.       Objective Measure: Box and Blocks   Details: Now moves 7 blocks across 8 inch barrier from inside the box with R hand.   ( 18, was 10 blocks in 10 minutes from side to side in 4 inch box, 8 in barrier removed).    Goals:     Goal Identifier R shoulder strength   Goal Description Patient to increase R shoulder flexion AROM to 40 degrees for improved R UE function for ADL/IADLs (during dressing and grooming tasks, carrying " items, putting groceries and dishes away).   Target Date 10/24/18   Date Met      Progress: Pt is able to carry his tote bag over his R arm in/out of therapy sessions x 75 ft at a time.       Goal Identifier R pinch strength   Goal Description Patient will demonstrate increased right hand pinch strength (both lateral and palmar) by 3# each for increased independence with ADL/IADLs such as opening containers, pull up pants, maintaining pinch to hold items.   Target Date 10/24/18   Date Met      Progress: goal continued--pinch strength is actually decreased, while FMC has improved on Blocks test.       Goal Identifier R GM/FM coordination   Goal Description Patient to improve R GM/FM coordination skills for increased independence during ADL/IADL tasks (dressing, kitchen tasks, feeding self) by completing the Box and Blocks Test with R UE in standing with 10 blocks.   Target Date 10/24/18   Date Met      Progress: Improved per box and blocks test above.      Goal Identifier R UE as gross assist   Goal Description Patient to demonstrate functional tasks (feeding self, wiping the counter/table, washing dishes, etc.) with 20% use of R UE as gross stabilizer or gross assist for increased ADL/IADL independence and closer return to prior level of function. (2/8 (eval date): using ~3% at home)   Target Date 07/26/18   Date Met      Progress:  Using his R hand to stabilize bottom of jacket zipper as he zips with L hand and pulls open drawstring bag w R hand.     Goal Identifier functional/normal movement patterns in R UE and trunk   Goal Description Patient to complete therapeutic task (simple kitchen tasks, laundry, etc.) with use of B UE's and no more than 2 cues in 5 minute time frame for correct body mechanics and for decreasing compensatory movements at the trunk and upper body for encouragement of normal movement patterns, increased R UE function and increased ADL/IADL independence.   Target Date 10/24/18   Date Met       Progress:  Pt in the process of applying body mechanics to carrying and self ROM exercises, requires cues every 1-2 minutes during functional carrying tasks, every 5 minutes with exercises.      Goal Identifier visual skills   Goal Description Patient will demonstrate WFLs visual skills (including reaction time and visual scanning skills) for safe independent community mobility (crossing the street, driving, grocery shopping) by scoring WNLs on all modes of the [employer], [employer]anc[employer] and other visual screens.   Target Date 10/24/18   Date Met      Progress: Not tested recently; goal continued.       Progress Toward Goals:   Progress this reporting period: Measurable gains made in 4 of 6 goals, seen x 16 visits since Feb OT eval.    Current emphasis is getting HEP program to manage his tone better ( via weightbearing, NMES, shoulder pulley kit, prolonged stretches)  and help refine movement patterns to reduce compenstory patterns that can cotnribute to pain/ROM loss if not addressed. We are strarting with pplying these patterns to repetive exercises and tranferring them to home based 2 handed functional tasks ( laundry, washing dishes, carrying/put away groceries).    Plan:  Continue therapy per current plan of care.    Discharge:  Anticipated that pt will continue with OT 2x/week x 12 weeks following botox injection, to maixmize highlest level functional hand use with better tone management.    Thank you for this thoughtful referral! If you have any questions, please call me [phone].  Nice to share in this patient's care with you.    Sincerely,   Danyelle Evans, ARYAN/margarito

## 2018-07-26 ENCOUNTER — HOSPITAL ENCOUNTER (OUTPATIENT)
Dept: PHYSICAL THERAPY | Facility: CLINIC | Age: 63
Setting detail: THERAPIES SERIES
End: 2018-07-26
Attending: PHYSICAL MEDICINE & REHABILITATION
Payer: MEDICARE

## 2018-07-26 PROCEDURE — 40000719 ZZHC STATISTIC PT DEPARTMENT NEURO VISIT: Performed by: PHYSICAL THERAPIST

## 2018-07-26 PROCEDURE — 97110 THERAPEUTIC EXERCISES: CPT | Mod: GP | Performed by: PHYSICAL THERAPIST

## 2018-07-26 PROCEDURE — 97112 NEUROMUSCULAR REEDUCATION: CPT | Mod: GP | Performed by: PHYSICAL THERAPIST

## 2018-07-31 ENCOUNTER — HOSPITAL ENCOUNTER (OUTPATIENT)
Dept: PHYSICAL THERAPY | Facility: CLINIC | Age: 63
Setting detail: THERAPIES SERIES
End: 2018-07-31
Attending: PHYSICAL MEDICINE & REHABILITATION
Payer: MEDICARE

## 2018-07-31 PROCEDURE — 97110 THERAPEUTIC EXERCISES: CPT | Mod: GP | Performed by: PHYSICAL THERAPIST

## 2018-07-31 PROCEDURE — 97116 GAIT TRAINING THERAPY: CPT | Mod: GP | Performed by: PHYSICAL THERAPIST

## 2018-08-01 ENCOUNTER — OFFICE VISIT (OUTPATIENT)
Dept: PHYSICAL MEDICINE AND REHAB | Facility: CLINIC | Age: 63
End: 2018-08-01
Payer: MEDICARE

## 2018-08-01 VITALS
BODY MASS INDEX: 30.9 KG/M2 | WEIGHT: 181 LBS | HEART RATE: 61 BPM | DIASTOLIC BLOOD PRESSURE: 87 MMHG | SYSTOLIC BLOOD PRESSURE: 143 MMHG | HEIGHT: 64 IN

## 2018-08-01 DIAGNOSIS — G81.11 RIGHT SPASTIC HEMIPARESIS (H): Primary | ICD-10-CM

## 2018-08-01 ASSESSMENT — PAIN SCALES - GENERAL: PAINLEVEL: NO PAIN (0)

## 2018-08-01 NOTE — LETTER
"8/1/2018       RE: Luis Trinidad  [id number] [facility] [address]     Dear Colleague,    Thank you for referring your patient, Luis Trinidad, to the [facility] PHYSICAL MEDICINE AND REHABILITATION at [facility]. Please see a copy of my visit note below.    PM&R Clinic & Procedure Note:    Luis Trinidad is 62-year-old male with a history of stroke in Nov 2016 resulting in residual right spastic hemiparesis.  He is accompanied by his son.    1. Mr. Trinidad was last seen on 4/23/2018.  He received 400 units of botox at that time to his Right trunk and right arm.  He was previously seen on 1/17/2018 and received 200 units (in his right pec major and biceps).  He reports that after the last injection he only got about 1 week of good benefit, then a rapid decrease of effectiveness back to his baseline.  He is unable to describe exactly what benefit the botox was giving him on this round.  He feels that he got 'good' relief from his spasticity.  He denies any pain.  No side effects from the injection.  He has not had any vaccines in the past 2 weeks and has not had any new medical conditions, medications, or ED visits.  He is still compliant with his baclofen dosing without any reported side effects or adverse reactions    2. Mr. Trinidad was a former  at [employer].  He desires to return to driving and to return to employment with [employer].  He would like to return back to similar work, but at times, endorses that he realizes he will most likely not have the return of full function of his right arm.  He is still engaged with PT 2x/wk and performs his home exercises.  He denies any acute concerns.      Physical exam:  /87 (BP Location: Left arm, Patient Position: Sitting, Cuff Size: Adult Regular)  Pulse 61  Ht 1.626 m (5' 4\")  Wt 82.1 kg (181 lb)  BMI 31.07 kg/m2   Gen: AAOx3, NAD  Psych: mood is 'good.' slightly flattened affect noted  MSK: Reduced bulk noted in " RUE.  Increased tone noted in RUE and along right pec and right trunk (serratus vs. Intercostal vs. Lattissimus).  AROM: shoulder abduction 90 degree, elbow extension approx 140, wrist remains in neutral, able to extend fingers, but occassionally uses left hand for assistance.  MAS: SA: 3/4, EE 3/4, WE: 3/4, FE: 3/4  Neuro:  MS: speech noted to have some broken and slowed speech, repetition intact  Gait: hemispastic gait noted    Assessment and recommendations:  1.  Long conversation about continuing botulinum toxin injections as his benefit seems to variable.  Much conversation related to what his future goals are.  While botox can be beneficial in reducing spasticity, if there is little functional benefit to be gained, other treatment courses can be pursued.  After discussion with Mr. Trinidad and his son, it was agreed to do another round of botox to reduce the spasticity in his RUE.  Mr. Trinidad will keep a careful log of any changes and how long they last (i.e. Easier to put a shirt on, easier to hold objects, etc).  2. Plan to inject 400 units today into right chest and right arm.  3.  Continue with ROM and other maintenance exercises.  4.  Continues with some ongoing physical therapy working both on gait and arm functioning.  5.  Continue oral baclofen 20 20 30 for partial tone reduction in broader distribution.  5.  Long discussion about return to work.  Mr. Trinidad will look into [employer] options (including if they have an Occupational Medicine Department) for functional assessment and return to work discussion.  If needed, a letter can be provided detailing his current functional limitations but will defer until next appointment.  He is currently at maximal medical improvement with regard to his stroke effect.  6. Did discuss options for driving.  He will discuss with his PT/OT team.  7. Follow up in 3 months.    Procedure:   Chemodenervation to right chest and RUE utilizing botulinum toxin.  Began with formal  "consent which he signed. Had formal time-out prior to procedure.  400 U of Botox lot [id number], expiration 01 2021 , Botox NDC 7752-0882-80 was utilized. Diluted with normal saline in a 100 U:1mL ratio, total of 4 mL.  All areas were cleansed with alcohol and chloroprep prior to injecting. EMG guidance was utilized to identify most active motor units while avoiding surrounding structures.     The following muscles were then injected, two body areas     Right trunk:  1. Right Pectoralis Major: 100 units divided in 3 sites  2. Latissimus Dorsi 30 units 1 sites.     Right arm  1. Biceps 70 units divided 1 sites.  2. FDS 50 units  3. FDP 50 units  4. FCR 50 units  5. FCU 50 units    Procedure was tolerated well, no adverse reactions.    Timothy Harrell,   PGY-2, PM&R Residents    Seen with Dr. Reno    I, Dr. Reno, also saw and examined Rogelio. I have reviewed and edited the above resident note and agree. Was present for entire procedure.  My key decisions and exam benefiting from injections to address spasticity and reinjected again today as above. Longer conversation about workability. 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 He's motivated to return to work for financial reasons.  I expect anything would need to be highly accommodated and likely not meet minimum essential job functions definitions. After discussion, he's hesitant to approach this yet. Should he choose, I could document further for his work some functional concepts. If they need specifics, may need formal functional capacity evaluation.  25 minutes spent in direct patient interaction greater than 50% in counseling and education, This time independent of the procedure specific time.    Again, thank you for allowing me to participate in the care of your patient.      Sincerely,    Chele Reno MD      "

## 2018-08-01 NOTE — PATIENT INSTRUCTIONS
Rogelio,  Want you to be very intentional assessing what (if any) functional changes are occurring from the Botox injections.    If you want to try driving, I'd endorse that but would want a formal behind the wheel assessment and training.    If you are interested in going back to work in a highly accommodated way, I'd start by contacting [employer] human resources and ask if there is any occupational medicine provider that could help assess you. I'd be happy to write a letter at that time identifying some of the initial abilities I envision.

## 2018-08-01 NOTE — PROGRESS NOTES
"PM&R Clinic & Procedure Note:    Luis Trinidad is 62-year-old male with a history of stroke in Nov 2016 resulting in residual right spastic hemiparesis.  He is accompanied by his son.    1. Mr. Trinidad was last seen on 4/23/2018.  He received 400 units of botox at that time to his Right trunk and right arm.  He was previously seen on 1/17/2018 and received 200 units (in his right pec major and biceps).  He reports that after the last injection he only got about 1 week of good benefit, then a rapid decrease of effectiveness back to his baseline.  He is unable to describe exactly what benefit the botox was giving him on this round.  He feels that he got 'good' relief from his spasticity.  He denies any pain.  No side effects from the injection.  He has not had any vaccines in the past 2 weeks and has not had any new medical conditions, medications, or ED visits.  He is still compliant with his baclofen dosing without any reported side effects or adverse reactions    2. Mr. Trinidad was a former  at [employer].  He desires to return to driving and to return to employment with [employer].  He would like to return back to similar work, but at times, endorses that he realizes he will most likely not have the return of full function of his right arm.  He is still engaged with PT 2x/wk and performs his home exercises.  He denies any acute concerns.      Physical exam:  /87 (BP Location: Left arm, Patient Position: Sitting, Cuff Size: Adult Regular)  Pulse 61  Ht 1.626 m (5' 4\")  Wt 82.1 kg (181 lb)  BMI 31.07 kg/m2   Gen: AAOx3, NAD  Psych: mood is 'good.' slightly flattened affect noted  MSK: Reduced bulk noted in RUE.  Increased tone noted in RUE and along right pec and right trunk (serratus vs. Intercostal vs. Lattissimus).  AROM: shoulder abduction 90 degree, elbow extension approx 140, wrist remains in neutral, able to extend fingers, but occassionally uses left hand for assistance.  MAS: SA: 3/4, EE 3/4, " WE: 3/4, FE: 3/4  Neuro:  MS: speech noted to have some broken and slowed speech, repetition intact  Gait: hemispastic gait noted    Assessment and recommendations:  1.  Long conversation about continuing botulinum toxin injections as his benefit seems to variable.  Much conversation related to what his future goals are.  While botox can be beneficial in reducing spasticity, if there is little functional benefit to be gained, other treatment courses can be pursued.  After discussion with Mr. Trinidad and his son, it was agreed to do another round of botox to reduce the spasticity in his RUE.  Mr. Trinidad will keep a careful log of any changes and how long they last (i.e. Easier to put a shirt on, easier to hold objects, etc).  2. Plan to inject 400 units today into right chest and right arm.  3.  Continue with ROM and other maintenance exercises.  4.  Continues with some ongoing physical therapy working both on gait and arm functioning.  5.  Continue oral baclofen 20 20 30 for partial tone reduction in broader distribution.  5.  Long discussion about return to work.  Mr. Trinidad will look into [employer] options (including if they have an Occupational Medicine Department) for functional assessment and return to work discussion.  If needed, a letter can be provided detailing his current functional limitations but will defer until next appointment.  He is currently at maximal medical improvement with regard to his stroke effect.  6. Did discuss options for driving.  He will discuss with his PT/OT team.  7. Follow up in 3 months.    Procedure:   Chemodenervation to right chest and RUE utilizing botulinum toxin.  Began with formal consent which he signed. Had formal time-out prior to procedure.  400 U of Botox lot [id number], expiration 01 2021 , Botox NDC 5748-6615-63 was utilized. Diluted with normal saline in a 100 U:1mL ratio, total of 4 mL.  All areas were cleansed with alcohol and chloroprep prior to injecting. EMG guidance  "was utilized to identify most active motor units while avoiding surrounding structures.     The following muscles were then injected, two body areas     Right trunk:  1. Right Pectoralis Major: 100 units divided in 3 sites  2. Latissimus Dorsi 30 units 1 sites.     Right arm  1. Biceps 70 units divided 1 sites.  2. FDS 50 units  3. FDP 50 units  4. FCR 50 units  5. FCU 50 units    Procedure was tolerated well, no adverse reactions.    Timothy Harrell, DO  PGY-2, PM&R Residents    Seen with Dr. Reno    I, Dr. Reno, also saw and examined Rogelio. I have reviewed and edited the above resident note and agree. Was present for entire procedure.  My key decisions and exam benefiting from injections to address spasticity and reinjected again today as above. Longer conversation about workability. 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 He's motivated to return to work for financial reasons.  I expect anything would need to be highly accommodated and likely not meet minimum essential job functions definitions. After discussion, he's hesitant to approach this yet. Should he choose, I could document further for his work some functional concepts. If they need specifics, may need formal functional capacity evaluation.  25 minutes spent in direct patient interaction greater than 50% in counseling and education, This time independent of the procedure specific time.  "

## 2018-08-01 NOTE — MR AVS SNAPSHOT
After Visit Summary   8/1/2018    Luis Trinidad    MRN: [id number]           Patient Information     Date Of Birth          1955        Visit Information        Provider Department      8/1/2018 2:50 PM Chele Reno MD [facility] Medicine [facility] Rehabilitation        Care Instructions    Rogelio,  Want you to be very intentional assessing what (if any) functional changes are occurring from the Botox injections.    If you want to try driving, I'd endorse that but would want a formal behind the wheel assessment and training.    If you are interested in going back to work in a highly accommodated way, I'd start by contacting [employer] resources and ask if there is any occupational medicine provider that could help assess you. I'd be happy to write a letter at that time identifying some of the initial abilities I envision.           Follow-ups after your visit        Follow-up notes from your care team     Return in about 3 months (around 11/1/2018).      Your next 10 appointments already scheduled     Aug 02, 2018 10:15 AM CDT   Neuro Treatment with Chayo Mckeon, OT   [facility] Occupational Therapy ([facility])    [address] [address]   [phone]            Aug 02, 2018 11:00 AM CDT   Treatment 60 with Yen Villanueva, PT   [facility] Physical Therapy ([facility])    [address] [place] [address]   [phone]            Aug 07, 2018  9:30 AM CDT   Neuro Treatment with Danyelle Evans OTR   [facility] Occupational Therapy ([facility])    [address] [address]   [phone]            Aug 07, 2018 11:00 AM CDT   Treatment 60 with Yen Villanueva, PT   [facility] Physical Therapy ([facility])    [address] [address]   [phone]            Aug 09, 2018 11:00 AM CDT   Treatment 60 with Yen Villanueva, PT    [facility] ([facility])    [address] [facility] [address]   [phone]            Aug 14, 2018  9:30 AM CDT   Neuro Treatment with Danyelle Nathan, OTR   [facility] Occupational Therapy ([facility])    [address] [facility] [address]   [phone]            Aug 14, 2018 11:00 AM CDT   Treatment 60 with Yen Villanueva, PT   [facility] ([facility])    [address] [facility] [address]   [phone]            Aug 16, 2018 11:00 AM CDT   Treatment 60 with Yen Villanueva, PT   [facility] ([facility])    [address] [facility] [address]   [phone]            Aug 21, 2018  9:15 AM CDT   Neuro Treatment with Danyelle Nathan, OTR   [facility]ational [facility] ([facility])    [address] [facility] [address]   [phone]            Aug 21, 2018 10:00 AM CDT   Treatment 60 with Yen Villanueva, PT   [facility] ([facility])    [address] [place] [address]   [phone]              Who to contact     Please call your clinic at [phone] to:    Ask questions about your health    Make or cancel appointments    Discuss your medicines    Learn about your test results    Speak to your doctor            Additional Information About Your Visit        [employer] Information     [employer] is an electronic gateway that provides easy, online access to your medical records. With [employer], you can request a clinic appointment, read your test results, renew a prescription or communicate with your care team.     To sign up for [employer] visit the website at [email or web address]   You will be asked to enter the access code listed below, as well as some personal information. Please follow the directions to create your username and  "password.     Your access code is: [email or web address]  Expires: 10/16/2018  6:30 AM     Your access code will  in 90 days. If you need help or a new code, please contact your [facility] or call [phone] for assistance.        Care EveryWhere ID     This is your Care EveryWhere ID. This could be used by other organizations to access your [facility] medical records  [id number]        Your Vitals Were     Pulse Height BMI (Body Mass Index)             61 1.626 m (5' 4\") 31.07 kg/m2          Blood Pressure from Last 3 Encounters:   18 143/87   18 157/86   18 132/84    Weight from Last 3 Encounters:   18 82.1 kg (181 lb)   18 82.1 kg (181 lb)   18 80.7 kg (178 lb)              Today, you had the following     No orders found for display         Today's Medication Changes          These changes are accurate as of 18  4:19 PM.  If you have any questions, ask your nurse or doctor.               These medicines have changed or have updated prescriptions.        Dose/Directions    baclofen 10 MG tablet   Commonly known as:  LIORESAL   This may have changed:  Another medication with the same name was removed. Continue taking this medication, and follow the directions you see here.   Used for:  Spasticity   Changed by:  Chele Reno MD        TAKE 2 TABLETS BY MOUTH IN THE MORNING, 2 TABLETS MIDDAY, AND 3 TABLETS AT BEDTIME   Quantity:  630 tablet   Refills:  3                Primary Care Provider Office Phone # Fax #    Zaid Whitten [phone]       [place] [id number] [place] [facility] [address]        Equal Access to Services     [facility]: Hadii charla Trejo, waaxda luqadaha, qaybta kaalmada candieyada, torsten lentz. So [facility] [phone].    ATENCIÓN: Si habla español, tiene a bolanos disposición servicios gratuitos de asistencia lingüística. Llame al " [phone].    We comply with applicable federal civil rights laws and [place] laws. We do not discriminate on the basis of race, color, national origin, age, disability, sex, sexual orientation, or gender identity.            Thank you!     Thank you for choosing [facility] PHYSICAL MEDICINE AND REHABILITATION  for your care. Our goal is always to provide you with excellent care. Hearing back from our patients is one way we can continue to improve our services. Please take a few minutes to complete the written survey that you may receive in the mail after your visit with us. Thank you!             Your Updated Medication List - Protect others around you: Learn how to safely use, store and throw away your medicines at www.disposemymeds.org.          This list is accurate as of 8/1/18  4:19 PM.  Always use your most recent med list.                   Brand Name Dispense Instructions for use Diagnosis    aspirin 81 MG EC tablet      Take 1 tablet (81 mg) by mouth daily    Lacunar stroke of left subthalamic region (H)       atorvastatin 40 MG tablet    LIPITOR     Take 40 mg by mouth        baclofen 10 MG tablet    LIORESAL    630 tablet    TAKE 2 TABLETS BY MOUTH IN THE MORNING, 2 TABLETS MIDDAY, AND 3 TABLETS AT BEDTIME    Spasticity       botulinum toxin type A 100 units injection    BOTOX    400 Units    Inject 400 Units into the muscle every 3 months    Spastic hemiplegia (H)       cholecalciferol 2000 units tablet     30 tablet    Take 2,000 Units by mouth daily    Mixed hyperlipidemia       * clopidogrel 75 MG tablet    PLAVIX    30 tablet    Take 1 tablet (75 mg) by mouth daily    Lacunar stroke of left subthalamic region (H)       * clopidogrel 75 MG tablet    PLAVIX     Take 75 mg by mouth        co-enzyme Q-10 100 MG Caps capsule     30 capsule    Take 1 capsule (100 mg) by mouth daily    Mixed hyperlipidemia       * lisinopril-hydrochlorothiazide 10-12.5 MG per tablet    PRINZIDE/ZESTORETIC     Take 1  tablet by mouth        * lisinopril-hydrochlorothiazide 10-12.5 MG per tablet    PRINZIDE/ZESTORETIC     Take 2 tablets by mouth        simvastatin 10 MG tablet    ZOCOR    30 tablet    Take 1 tablet (10 mg) by mouth every evening    Mixed hyperlipidemia       tetrahydrozoline 0.05 % ophthalmic solution      Place 1 drop into both eyes 3 times daily as needed        * Notice:  This list has 4 medication(s) that are the same as other medications prescribed for you. Read the directions carefully, and ask your doctor or other care provider to review them with you.

## 2018-08-02 ENCOUNTER — HOSPITAL ENCOUNTER (OUTPATIENT)
Dept: PHYSICAL THERAPY | Facility: CLINIC | Age: 63
Setting detail: THERAPIES SERIES
End: 2018-08-02
Attending: PHYSICAL MEDICINE & REHABILITATION
Payer: MEDICARE

## 2018-08-02 ENCOUNTER — HOSPITAL ENCOUNTER (OUTPATIENT)
Dept: OCCUPATIONAL THERAPY | Facility: CLINIC | Age: 63
Setting detail: THERAPIES SERIES
End: 2018-08-02
Attending: PHYSICAL MEDICINE & REHABILITATION
Payer: MEDICARE

## 2018-08-02 PROCEDURE — 97112 NEUROMUSCULAR REEDUCATION: CPT | Mod: GO | Performed by: OCCUPATIONAL THERAPIST

## 2018-08-02 PROCEDURE — 97110 THERAPEUTIC EXERCISES: CPT | Mod: GO | Performed by: OCCUPATIONAL THERAPIST

## 2018-08-02 PROCEDURE — 97110 THERAPEUTIC EXERCISES: CPT | Mod: GP | Performed by: PHYSICAL THERAPIST

## 2018-08-02 PROCEDURE — 40000125 ZZHC STATISTIC OT OUTPT VISIT: Performed by: OCCUPATIONAL THERAPIST

## 2018-08-02 PROCEDURE — 40000719 ZZHC STATISTIC PT DEPARTMENT NEURO VISIT: Performed by: PHYSICAL THERAPIST

## 2018-08-02 PROCEDURE — 97112 NEUROMUSCULAR REEDUCATION: CPT | Mod: GP | Performed by: PHYSICAL THERAPIST

## 2018-08-07 ENCOUNTER — HOSPITAL ENCOUNTER (OUTPATIENT)
Dept: OCCUPATIONAL THERAPY | Facility: CLINIC | Age: 63
Setting detail: THERAPIES SERIES
End: 2018-08-07
Attending: PHYSICAL MEDICINE & REHABILITATION
Payer: MEDICARE

## 2018-08-07 ENCOUNTER — HOSPITAL ENCOUNTER (OUTPATIENT)
Dept: PHYSICAL THERAPY | Facility: CLINIC | Age: 63
Setting detail: THERAPIES SERIES
End: 2018-08-07
Attending: PHYSICAL MEDICINE & REHABILITATION
Payer: MEDICARE

## 2018-08-07 PROCEDURE — 97140 MANUAL THERAPY 1/> REGIONS: CPT | Mod: GO | Performed by: OCCUPATIONAL THERAPIST

## 2018-08-07 PROCEDURE — 97110 THERAPEUTIC EXERCISES: CPT | Mod: GP | Performed by: PHYSICAL THERAPIST

## 2018-08-07 PROCEDURE — 40000719 ZZHC STATISTIC PT DEPARTMENT NEURO VISIT: Performed by: PHYSICAL THERAPIST

## 2018-08-07 PROCEDURE — 97530 THERAPEUTIC ACTIVITIES: CPT | Mod: GO | Performed by: OCCUPATIONAL THERAPIST

## 2018-08-07 PROCEDURE — 40000125 ZZHC STATISTIC OT OUTPT VISIT: Performed by: OCCUPATIONAL THERAPIST

## 2018-08-07 PROCEDURE — 97112 NEUROMUSCULAR REEDUCATION: CPT | Mod: GP | Performed by: PHYSICAL THERAPIST

## 2018-08-09 ENCOUNTER — HOSPITAL ENCOUNTER (OUTPATIENT)
Dept: PHYSICAL THERAPY | Facility: CLINIC | Age: 63
Setting detail: THERAPIES SERIES
End: 2018-08-09
Attending: PHYSICAL MEDICINE & REHABILITATION
Payer: MEDICARE

## 2018-08-09 PROCEDURE — 97112 NEUROMUSCULAR REEDUCATION: CPT | Mod: GP | Performed by: PHYSICAL THERAPIST

## 2018-08-09 PROCEDURE — 40000719 ZZHC STATISTIC PT DEPARTMENT NEURO VISIT: Performed by: PHYSICAL THERAPIST

## 2018-08-09 PROCEDURE — 97110 THERAPEUTIC EXERCISES: CPT | Mod: GP | Performed by: PHYSICAL THERAPIST

## 2018-08-09 PROCEDURE — 97116 GAIT TRAINING THERAPY: CPT | Mod: GP | Performed by: PHYSICAL THERAPIST

## 2018-08-11 DIAGNOSIS — R25.2 SPASTICITY: ICD-10-CM

## 2018-08-13 RX ORDER — BACLOFEN 10 MG/1
TABLET ORAL
Qty: 630 TABLET | Refills: 0 | OUTPATIENT
Start: 2018-08-13

## 2018-08-14 ENCOUNTER — HOSPITAL ENCOUNTER (OUTPATIENT)
Dept: PHYSICAL THERAPY | Facility: CLINIC | Age: 63
Setting detail: THERAPIES SERIES
End: 2018-08-14
Attending: PHYSICAL MEDICINE & REHABILITATION
Payer: MEDICARE

## 2018-08-14 ENCOUNTER — HOSPITAL ENCOUNTER (OUTPATIENT)
Dept: OCCUPATIONAL THERAPY | Facility: CLINIC | Age: 63
Setting detail: THERAPIES SERIES
End: 2018-08-14
Attending: PHYSICAL MEDICINE & REHABILITATION
Payer: MEDICARE

## 2018-08-14 PROCEDURE — 40000125 ZZHC STATISTIC OT OUTPT VISIT: Performed by: OCCUPATIONAL THERAPIST

## 2018-08-14 PROCEDURE — 97763 ORTHC/PROSTC MGMT SBSQ ENC: CPT | Mod: GO | Performed by: OCCUPATIONAL THERAPIST

## 2018-08-14 PROCEDURE — 97116 GAIT TRAINING THERAPY: CPT | Mod: GP | Performed by: PHYSICAL THERAPIST

## 2018-08-14 PROCEDURE — 97140 MANUAL THERAPY 1/> REGIONS: CPT | Mod: GO,59 | Performed by: OCCUPATIONAL THERAPIST

## 2018-08-14 PROCEDURE — 97140 MANUAL THERAPY 1/> REGIONS: CPT | Mod: GP | Performed by: PHYSICAL THERAPIST

## 2018-08-14 PROCEDURE — 97110 THERAPEUTIC EXERCISES: CPT | Mod: GP,59 | Performed by: PHYSICAL THERAPIST

## 2018-08-14 PROCEDURE — 40000719 ZZHC STATISTIC PT DEPARTMENT NEURO VISIT: Performed by: PHYSICAL THERAPIST

## 2018-08-16 ENCOUNTER — HOSPITAL ENCOUNTER (OUTPATIENT)
Dept: PHYSICAL THERAPY | Facility: CLINIC | Age: 63
Setting detail: THERAPIES SERIES
End: 2018-08-16
Attending: PHYSICAL MEDICINE & REHABILITATION
Payer: MEDICARE

## 2018-08-16 PROCEDURE — 97140 MANUAL THERAPY 1/> REGIONS: CPT | Mod: GP | Performed by: PHYSICAL THERAPIST

## 2018-08-16 PROCEDURE — 97112 NEUROMUSCULAR REEDUCATION: CPT | Mod: GP | Performed by: PHYSICAL THERAPIST

## 2018-08-16 PROCEDURE — 40000719 ZZHC STATISTIC PT DEPARTMENT NEURO VISIT: Performed by: PHYSICAL THERAPIST

## 2018-08-16 PROCEDURE — 97110 THERAPEUTIC EXERCISES: CPT | Mod: GP | Performed by: PHYSICAL THERAPIST

## 2018-08-21 ENCOUNTER — HOSPITAL ENCOUNTER (OUTPATIENT)
Dept: OCCUPATIONAL THERAPY | Facility: CLINIC | Age: 63
Setting detail: THERAPIES SERIES
End: 2018-08-21
Attending: PHYSICAL MEDICINE & REHABILITATION
Payer: MEDICARE

## 2018-08-21 PROCEDURE — 97140 MANUAL THERAPY 1/> REGIONS: CPT | Mod: GO | Performed by: OCCUPATIONAL THERAPIST

## 2018-08-21 PROCEDURE — 40000125 ZZHC STATISTIC OT OUTPT VISIT: Performed by: OCCUPATIONAL THERAPIST

## 2018-08-28 ENCOUNTER — HOSPITAL ENCOUNTER (OUTPATIENT)
Dept: OCCUPATIONAL THERAPY | Facility: CLINIC | Age: 63
Setting detail: THERAPIES SERIES
End: 2018-08-28
Attending: PHYSICAL MEDICINE & REHABILITATION
Payer: MEDICARE

## 2018-08-28 ENCOUNTER — HOSPITAL ENCOUNTER (OUTPATIENT)
Dept: PHYSICAL THERAPY | Facility: CLINIC | Age: 63
Setting detail: THERAPIES SERIES
End: 2018-08-28
Attending: PHYSICAL MEDICINE & REHABILITATION
Payer: MEDICARE

## 2018-08-28 PROCEDURE — 97110 THERAPEUTIC EXERCISES: CPT | Mod: GP | Performed by: PHYSICAL THERAPIST

## 2018-08-28 PROCEDURE — 97140 MANUAL THERAPY 1/> REGIONS: CPT | Mod: GO | Performed by: OCCUPATIONAL THERAPIST

## 2018-08-28 PROCEDURE — 97112 NEUROMUSCULAR REEDUCATION: CPT | Mod: GP | Performed by: PHYSICAL THERAPIST

## 2018-08-28 PROCEDURE — 40000125 ZZHC STATISTIC OT OUTPT VISIT: Performed by: OCCUPATIONAL THERAPIST

## 2018-08-28 PROCEDURE — 40000719 ZZHC STATISTIC PT DEPARTMENT NEURO VISIT: Performed by: PHYSICAL THERAPIST

## 2018-08-28 PROCEDURE — 97116 GAIT TRAINING THERAPY: CPT | Mod: GP | Performed by: PHYSICAL THERAPIST

## 2018-08-29 NOTE — ADDENDUM NOTE
Encounter addended by: Danyelle Evans OTR on: 8/29/2018 11:50 AM<BR>     Actions taken: Pend clinical note, Sign clinical note, Flowsheet data copied forward, Flowsheet accepted, Charge Capture section accepted

## 2018-08-29 NOTE — PROGRESS NOTES
---10TH VISIT NOTE---       06/26/18 1700   Signing Clinician's Name / Credentials   Signing clinician's name / credentials ARYAN Marinelli/MAGALIE   Session Number   Session Number 11 ([employer] CHOICE)   Additional Session Number 10/10 Medicare   Progress/Recertification   Progress Note Due 07/26/18   Carry: Carrying, Moving & Handling Objects   Carry Current Status,  (eval/re-eval & every progress note) CL: 60-79% impairment   Current Carry Modifier Rationale pt can open bag with Rhand as L stabilizes.   Carry Goal,  (eval/re-eval & every progress note) CJ: 20-39% impairment   Adult OT Eval Goals   OT Eval Goals (Adult) 1;2;3   OT Goal 1   Goal Identifier R shoulder strength   Goal Description Patient to increase R shoulder flexiion AROM to 40 degrees for improved R UE function for ADL/IADLs (during dressing and grooming tasks, carrying items, putting groceries and dishes away).   Target Date 07/26/18   OT Goal 2   Goal Identifier R pinch strength   Goal Description Patient will demonstrate increased right hand pinch strength (both lateral and palmar) by 3# each for increased independence with ADL/IADLs such as opening containers, pull up pants, maintaining pinch to hold items.   Target Date 07/26/18   OT Goal 3   Goal Identifier R GM/FM coordination   Goal Description Patient to improve R GM/FM coordination skills for increased independence during ADL/IADL tasks (dressing, kitchen tasks, feeding self) by completing the Box and Blocks Test with R UE in standing with 10 blocks.   Target Date 07/26/18   OT Goal 4   Goal Identifier R UE as gross assist   Goal Description Patient to demonstrate functional tasks (feeding self, wiping the counter/table, washing dishes, etc.) with 20% use of R UE as gross stabilizer or gross assist for increased ADL/IADL independence and closer return to prior level of function.  (2/8 (eval date): using ~3% at home)   Target Date 07/26/18   OT Goal 5   Goal Identifier functional/normal  movment patterns in R UE and trunk   Goal Description Patient to complete therapeutic task (simple kitchen tasks, laundry, etc.) with use of B UE's and no more than 2 cues in 5 minute time frame for correct body mechanics and for decreasing compensatory movements at the trunk and upper body for encouragement of normal movement patterns, increased R UE function and increased ADL/IADL independence.   Target Date 07/26/18   OT Goal 6   Goal Identifier visual skills   Goal Description Patient will demonstrate WFLs visual skills (including reaction time and visual scanning skills) for safe independent community mobility (crossing the street, driving, grocery shopping) by scoring WNLs on all modes of the Dynavision, Scancourse and other visual screens.   Target Date 07/26/18   Subjective Report   Subjective Report Patient ordered a shoulder pulley but has not received it yet.  Patient would like to drive in the future but is not yet feeling ready to pursue this.  NMES unit not working at home, hasn't been using estim - will bring in next session.   Neuromuscular Re-education   Minutes 17 Minutes   Skilled Intervention neuro-muscular reeducation of R UE for increased functional use, decreased pain/discomfort, etc.   Patient Response appeared to like functional task with  RUE   Treatment Detail Closed chain activities for right upper extremity: Weightbearing on elbows and standing ×3 sets of 20-30 seconds.  Also weightbearing during functional task of squeezing the juice out of an orange using hand citrus juicer - patient able to demonstrate task with minimal assist to support hand on the kitchen tool, good use of shoulder and triceps to complete functional task with R UE.  Hypertonicity in R wrist flexors apparent during task, however, able to adapt with nature of kitchen tool and min assist.   Therapeutic Procedure/Exercise   Minutes 25 Minutes   Skilled Intervention R UE ROM, strengthening for increased functional use  and decreased pain/discomfort   Patient Response likes the feel of the stretches, slight discomfort in posterior deltoid area with abductiona and ER stretches   Treatment Detail Reviewed numerous right shoulder stretches including abduction and external rotation with use of cane in supine position, external rotation with hands clasped behind his head in supine, seated and standing internal rotation with right UE behind his back clasped with left hand incorporating shoulder extension as well.  Patient completed all stretches with minimal to moderate cues for technique and positioning as well as to decrease compensation, 4-6 reps each with 20 to 30 sec holds.  He required moderate assist to keep right shoulder abducted for external rotation stretch in supine and moderate assist for ER with hands clasped behind his head.  Right shoulder active range of motion in gravity eliminated position in supine with mild to moderate compensation with abduction and adduction, unable to fully obtain shoulder adduction.  1+ shoulder external rotation active range of motion in side-lying position.  Patient's hypertonicity interferes at times with active range of motion.  Strength in right upper extremity continues to improve.   Plan   Homework ADL log   Plan for next session have him bring in estim and review HEP; f/u on pulley (was ordered), wt bearing at kitchen counter,f/u on wt bearing at home (prone/4 pt vs forerm WB on table w shin height reaching), forward reach, ,IADL box, have him bring in dowel for  HEP if he'd like it adapted with elastic handle ( instrctions in chart),  stretches with pole, and table stretches/ADL log. do full review of his HEP (see previous course of OT for past HEP) and modify/upgrade (will especially need upgrade for R hand with improved function/strength); neuro re-ed. with much focus on decreasing tone (trial prone on elbows, WB in 4 point, etc.); check splint - have him bring in (he hasn't been  wearing, using a ball in hand at night);  R UE as gross stabilizer and gross assist; LATER: IADL eval and assist with adapted driving evaluation as appropriate; consider vocational rehab referral for return to work if appropriate or volunteer opportunities   Total Session Time   Timed Code Treatment Minutes 42   Total Treatment Time (sum of timed and untimed services) 42       Thank you for this thoughtful referral! If you have any questions, please call me [phone].  Nice to share in this patient's care with you.    Sincerely,   Danyelle Evans, OTR/l

## 2018-08-29 NOTE — ADDENDUM NOTE
Encounter addended by: Danyelle Evans OTR on: 8/29/2018 11:37 AM<BR>     Actions taken: Pend clinical note, Sign clinical note, Flowsheet accepted, Charge Capture section accepted

## 2018-08-29 NOTE — PROGRESS NOTES
[facility]          OUTPATIENT OCCUPATIONAL THERAPY  EVALUATION  PLAN OF TREATMENT FOR OUTPATIENT REHABILITATION  (COMPLETE FOR INITIAL CLAIMS ONLY)  Patient's Last Name, First Name, M.I.  YOB: 1955  Luis Trinidad                        Provider's Name  [facility] Medical Record No.  [id number]                               Onset Date:      11/29/17   Start of Care Date:      2/8/18   Type:     ___PT   _X_OT   ___SLP Medical Diagnosis:     Hemiparesis affecting right side as late effect of stroke                          OT Diagnosis:     decreased ADL/IADL independence Visits from SOC:  1   _________________________________________________________________________________  Plan of Treatment/Functional Goals:       *DELAYED ENTRY*-- Notified of new Medicare status/changed on 5/1/18, OT/PT notified Aug 2018.            Goals  Goal Identifier: R shoulder strength  Goal Description: Patient to increase R shoulder flexiion AROM to 40 degrees for improved R UE function for ADL/IADLs (during dressing and grooming tasks, carrying items, putting groceries and dishes away).  Target Date: 07/26/18     Goal Identifier: R pinch strength  Goal Description: Patient will demonstrate increased right hand pinch strength (both lateral and palmar) by 3# each for increased independence with ADL/IADLs such as opening containers, pull up pants, maintaining pinch to hold items.  Target Date: 07/26/18     Goal Identifier: R GM/FM coordination  Goal Description: Patient to improve R GM/FM coordination skills for increased independence during ADL/IADL tasks (dressing, kitchen tasks, feeding self) by completing the Box and Blocks Test with R UE in standing with 10 blocks.  Target Date: 07/26/18     Goal Identifier: R UE as gross assist  Goal Description: Patient to demonstrate functional tasks (feeding  self, wiping the counter/table, washing dishes, etc.) with 20% use of R UE as gross stabilizer or gross assist for increased ADL/IADL independence and closer return to prior level of function. (2/8 (eval date): using ~3% at home)  Target Date: 07/26/18     Goal Identifier: functional/normal movment patterns in R UE and trunk  Goal Description: Patient to complete therapeutic task (simple kitchen tasks, laundry, etc.) with use of B UE's and no more than 2 cues in 5 minute time frame for correct body mechanics and for decreasing compensatory movements at the trunk and upper body for encouragement of normal movement patterns, increased R UE function and increased ADL/IADL independence.  Target Date: 07/26/18     Goal Identifier: visual skills  Goal Description: Patient will demonstrate WFLs visual skills (including reaction time and visual scanning skills) for safe independent community mobility (crossing the street, driving, grocery shopping) by scoring WNLs on all modes of the Dynavision, Scancourse and other visual screens.  Target Date: 07/26/18               Allyssa Martinez OT          I CERTIFY THE NEED FOR THESE SERVICES FURNISHED UNDER        THIS PLAN OF TREATMENT AND WHILE UNDER MY CARE     (Physician co-signature of this document indicates review and certification of the therapy plan).                 ,     ,                       Initial Assessment        See Epic Evaluation

## 2018-08-29 NOTE — ADDENDUM NOTE
Encounter addended by: Danyelle Evans OTR on: 8/29/2018 11:44 AM<BR>     Actions taken: Flowsheet data copied forward, Sign clinical note, Flowsheet accepted, Charge Capture section accepted

## 2018-08-29 NOTE — PROGRESS NOTES
[facility]    Patient's Last Name, First Name, M.I.                                             YOB: 1955  Luis Trinidad      Provider's Name  [facility] Medical Record No.  [id number]                             Onset Date:      11/29/17 Start of Care Date:      2/8/18   Type:     ___PT   _X_OT   ___SLP Medical Diagnosis:     Hemiparesis affecting right side as late effect of stroke                          OT Diagnosis:     decreased ADL/IADL independence Visits from SOC:  16   _________________________________________________________________________________  Plan of Treatment/Functional Goals:        *DELAYED ENTRY*-- Notified of new Medicare status/changed on 5/1/18, OT/PT notified Aug 2018.               Goals  Goal Identifier: R shoulder strength  Goal Description: Patient to increase R shoulder flexiion AROM to 40 degrees for improved R UE function for ADL/IADLs (during dressing and grooming tasks, carrying items, putting groceries and dishes away).  Target Date: 10/24/18     Goal Identifier: R pinch strength  Goal Description: Patient will demonstrate increased right hand pinch strength (both lateral and palmar) by 3# each for increased independence with ADL/IADLs such as opening containers, pull up pants, maintaining pinch to hold items.  Target Date: 10/24/18     Goal Identifier: R GM/FM coordination  Goal Description: Patient to improve R GM/FM coordination skills for increased independence during ADL/IADL tasks (dressing, kitchen tasks, feeding self) by completing the Box and Blocks Test with R UE in standing with 10 blocks.  Target Date: 10/24/18     Goal Identifier: R UE as gross assist  Goal Description: Patient to demonstrate functional tasks (feeding self, wiping the counter/table, washing dishes, etc.) with 20% use of R UE as gross stabilizer or gross  assist for increased ADL/IADL independence and closer return to prior level of function. (2/8 (eval date): using ~3% at home)  Target Date: 10/24/18     Goal Identifier: functional/normal movment patterns in R UE and trunk  Goal Description: Patient to complete therapeutic task (simple kitchen tasks, laundry, etc.) with use of B UE's and no more than 2 cues in 5 minute time frame for correct body mechanics and for decreasing compensatory movements at the trunk and upper body for encouragement of normal movement patterns, increased R UE function and increased ADL/IADL independence.  Target Date: 10/24/18     Goal Identifier: visual skills  Goal Description: Patient will demonstrate WFLs visual skills (including reaction time and visual scanning skills) for safe independent community mobility (crossing the street, driving, grocery shopping) by scoring WNLs on all modes of the Dynavision, Scancourse and other visual screens.  Target Date: 10/24/18        ARYAN Mathis          I CERTIFY THE NEED FOR THESE SERVICES FURNISHED UNDER        THIS PLAN OF TREATMENT AND WHILE UNDER MY CARE     (Physician co-signature of this document indicates review and certification of the therapy plan).                 ,     ,                       Initial Assessment        See Epic Evaluation

## 2018-08-29 NOTE — ADDENDUM NOTE
Encounter addended by: Danyelle Evans OTR on: 8/29/2018 11:39 AM<BR>     Actions taken: Flowsheet accepted

## 2018-08-30 ENCOUNTER — HOSPITAL ENCOUNTER (OUTPATIENT)
Dept: PHYSICAL THERAPY | Facility: CLINIC | Age: 63
Setting detail: THERAPIES SERIES
End: 2018-08-30
Attending: PHYSICAL MEDICINE & REHABILITATION
Payer: MEDICARE

## 2018-08-30 PROCEDURE — G8979 MOBILITY GOAL STATUS: HCPCS | Mod: GP,CJ | Performed by: PHYSICAL THERAPIST

## 2018-08-30 PROCEDURE — G8978 MOBILITY CURRENT STATUS: HCPCS | Mod: GP,CL | Performed by: PHYSICAL THERAPIST

## 2018-08-30 PROCEDURE — 97116 GAIT TRAINING THERAPY: CPT | Mod: GP | Performed by: PHYSICAL THERAPIST

## 2018-08-30 PROCEDURE — 97112 NEUROMUSCULAR REEDUCATION: CPT | Mod: GP | Performed by: PHYSICAL THERAPIST

## 2018-08-30 PROCEDURE — 40000719 ZZHC STATISTIC PT DEPARTMENT NEURO VISIT: Performed by: PHYSICAL THERAPIST

## 2018-08-30 PROCEDURE — 97110 THERAPEUTIC EXERCISES: CPT | Mod: GP | Performed by: PHYSICAL THERAPIST

## 2018-09-04 ENCOUNTER — HOSPITAL ENCOUNTER (OUTPATIENT)
Dept: OCCUPATIONAL THERAPY | Facility: CLINIC | Age: 63
Setting detail: THERAPIES SERIES
End: 2018-09-04
Attending: PHYSICAL MEDICINE & REHABILITATION
Payer: MEDICARE

## 2018-09-04 PROCEDURE — 40000125 ZZHC STATISTIC OT OUTPT VISIT: Performed by: OCCUPATIONAL THERAPIST

## 2018-09-04 PROCEDURE — 97140 MANUAL THERAPY 1/> REGIONS: CPT | Mod: GO | Performed by: OCCUPATIONAL THERAPIST

## 2018-09-06 ENCOUNTER — HOSPITAL ENCOUNTER (OUTPATIENT)
Dept: PHYSICAL THERAPY | Facility: CLINIC | Age: 63
Setting detail: THERAPIES SERIES
End: 2018-09-06
Attending: PHYSICAL MEDICINE & REHABILITATION
Payer: MEDICARE

## 2018-09-06 ENCOUNTER — HOSPITAL ENCOUNTER (OUTPATIENT)
Dept: OCCUPATIONAL THERAPY | Facility: CLINIC | Age: 63
Setting detail: THERAPIES SERIES
End: 2018-09-06
Attending: PHYSICAL MEDICINE & REHABILITATION
Payer: MEDICARE

## 2018-09-06 PROCEDURE — 40000719 ZZHC STATISTIC PT DEPARTMENT NEURO VISIT: Performed by: PHYSICAL THERAPIST

## 2018-09-06 PROCEDURE — 40000125 ZZHC STATISTIC OT OUTPT VISIT: Performed by: OCCUPATIONAL THERAPIST

## 2018-09-06 PROCEDURE — 97110 THERAPEUTIC EXERCISES: CPT | Mod: GP | Performed by: PHYSICAL THERAPIST

## 2018-09-06 PROCEDURE — 97112 NEUROMUSCULAR REEDUCATION: CPT | Mod: GP | Performed by: PHYSICAL THERAPIST

## 2018-09-06 PROCEDURE — 97110 THERAPEUTIC EXERCISES: CPT | Mod: GO | Performed by: OCCUPATIONAL THERAPIST

## 2018-09-06 PROCEDURE — 97116 GAIT TRAINING THERAPY: CPT | Mod: GP | Performed by: PHYSICAL THERAPIST

## 2018-09-11 ENCOUNTER — HOSPITAL ENCOUNTER (OUTPATIENT)
Dept: PHYSICAL THERAPY | Facility: CLINIC | Age: 63
Setting detail: THERAPIES SERIES
End: 2018-09-11
Attending: PHYSICAL MEDICINE & REHABILITATION
Payer: MEDICARE

## 2018-09-11 PROCEDURE — 97116 GAIT TRAINING THERAPY: CPT | Mod: GP | Performed by: PHYSICAL THERAPIST

## 2018-09-11 PROCEDURE — 40000719 ZZHC STATISTIC PT DEPARTMENT NEURO VISIT: Performed by: PHYSICAL THERAPIST

## 2018-09-11 PROCEDURE — 97112 NEUROMUSCULAR REEDUCATION: CPT | Mod: GP | Performed by: PHYSICAL THERAPIST

## 2018-09-11 PROCEDURE — 97110 THERAPEUTIC EXERCISES: CPT | Mod: GP | Performed by: PHYSICAL THERAPIST

## 2018-09-12 NOTE — ADDENDUM NOTE
Encounter addended by: Yen Villanueva, PT on: 9/12/2018  9:57 AM<BR>     Actions taken: Sign clinical note, Flowsheet accepted

## 2018-09-12 NOTE — PROGRESS NOTES
[facility]        OUTPATIENT PHYSICAL THERAPY FUNCTIONAL EVALUATION  PLAN OF TREATMENT FOR OUTPATIENT REHABILITATION  (COMPLETE FOR INITIAL CLAIMS ONLY)  Patient's Last Name, First Name, M.I.  YOB: 1955  Luis Trinidad     Provider's Name   [facility]   Medical Record No.  [id number]     Start of Care Date:   5/1/2018   Onset Date:   11/4/2015   Type:     _X__PT   ____OT  ____SLP Medical Diagnosis:   Ischemic CVA     PT Diagnosis:   Impaired gait and mobility due to right hemiparesis and spasticity Visits from SOC:  1                              __________________________________________________________________________________  Plan of Treatment/Functional Goals:              GOALS  1 - Gait pattern/safety  Rogelio will be able to achieve proper midstance to terminal stance alignment on right LE and transition into pre and initial swing with proper clearance of right foot for greater efficiency and safety with gait at home and in the community.  07/30/18    2- Gait speed  Rogelio will increase his gait speed on the 25 foot timed walk to 9 sec or less without use of an assistive device  to indicate reduced level of gait impairment and improved community mobility.  07/30/18    3- stairs  Pt will be able to navigate up and down his stairs with one rail in a reciprocal pattern with minimal circumduction of right LE in order for more efficient and safe access to all levels of his home.  07/30/18    4 - community ambulation  Rogelio will be able to ambulate community distances of up to 1000 feet without his cane and with minimal fatigue for improved access and safety in his community.   07/30/18    5 - home program  Rogelio will be independent with a home activity program to address his impairments and self manage his recovery.   07/30/18                Therapy Frequency:    2x per  week  Predicted Duration of Therapy Intervention:   90 days    Yen Villanueva, PT                                    I CERTIFY THE NEED FOR THESE SERVICES FURNISHED UNDER        THIS PLAN OF TREATMENT AND WHILE UNDER MY CARE     (Physician co-signature of this document indicates review and certification of the therapy plan).                Certification Date From:  5/1/2018    Certification Date To:   7/30/2018    Referring Provider:   Dr. Chele Reno    Initial Assessment  See Epic Evaluation-

## 2018-09-12 NOTE — ADDENDUM NOTE
Encounter addended by: Yen Villanueva, PT on: 9/12/2018 10:01 AM<BR>     Actions taken: Flowsheet data copied forward, Flowsheet accepted, Charge Capture section accepted

## 2018-09-12 NOTE — ADDENDUM NOTE
Encounter addended by: Yen Villanueva, PT on: 9/12/2018  2:35 PM<BR>     Actions taken: Flowsheet data copied forward, Flowsheet accepted, Charge Capture section accepted

## 2018-09-12 NOTE — ADDENDUM NOTE
Encounter addended by: Yen Villanueva, PT on: 9/12/2018  2:42 PM<BR>     Actions taken: Flowsheet data copied forward, Flowsheet accepted

## 2018-09-12 NOTE — ADDENDUM NOTE
Encounter addended by: Yen Vlilanueva, PT on: 9/12/2018  2:39 PM<BR>     Actions taken: Flowsheet accepted

## 2018-09-12 NOTE — ADDENDUM NOTE
Encounter addended by: Yen Villanueva, PT on: 9/12/2018  2:43 PM<BR>     Actions taken: Flowsheet data copied forward, Flowsheet accepted

## 2018-09-12 NOTE — ADDENDUM NOTE
Encounter addended by: Yen Villanueva, PT on: 9/12/2018  2:49 PM<BR>     Actions taken: Flowsheet data copied forward, Flowsheet accepted, Charge Capture section accepted, Sign clinical note

## 2018-09-12 NOTE — ADDENDUM NOTE
Encounter addended by: Yen Villanueva, PT on: 9/12/2018  2:59 PM<BR>     Actions taken: Flowsheet accepted

## 2018-09-12 NOTE — ADDENDUM NOTE
Encounter addended by: Yen Villanueva, PT on: 9/12/2018  2:50 PM<BR>     Actions taken: Flowsheet data copied forward, Flowsheet accepted

## 2018-09-12 NOTE — ADDENDUM NOTE
Encounter addended by: Yen Villanueva, PT on: 9/12/2018  2:36 PM<BR>     Actions taken: Flowsheet accepted

## 2018-09-12 NOTE — PROGRESS NOTES
Outpatient Physical Therapy Progress Note     Patient: Luis Trinidad  : 1955    Beginning/End Dates of Reporting Period:  2018 to 2018    Referring Provider: Dr. Chele Reno                          Therapy Diagnosis: impaired participation in life roles dues to R hemiparesis and sequalae of chronic CVA     Client Self Report: right side of face feeling very stiff today.  has constant tingling there as well.        Goals:  Goal Identifier 1 - Gait pattern/safety   Goal Description Rogelio will be able to achieve proper midstance to terminal stance alignment on right LE and transition into pre and initial swing with proper clearance of right foot for greater efficiency and safety with gait at home and in the community.   Target Date 18   Date Met   in progress   Progress:  Rogelio is gradually improving his gait pattern, especially transition into midstance phase.  He is also demonstrating improved relaxation of quads into passive knee flexion for trailing limb position/preswing, however does not fully clear foot in swing phase yet due to continued impairments in knee flexion activation.       Goal Identifier 2- Gait speed   Goal Description Rogelio will increase his gait speed on the 25 foot timed walk to 9 sec or less without use of an assistive device  to indicate reduced level of gait impairment and improved community mobility.   Target Date 18   Date Met   in progress   Progress:  Rogelio has shown slight improvement in gait speed with improved step length as well.  It is felt that his gait speed has not significantly improved yet because he is focused on improving his gait quality.  Overall his gait mechanics and alignment are showing consistent improvements and with time will translate into more automaticity of gait that will allow improved speed and efficiency.  He is progressing toward this goal     Goal Identifier 3- stairs   Goal Description Pt will be able to navigate up and down his  stairs with one rail in a reciprocal pattern with minimal circumduction of right LE in order for more efficient and safe access to all levels of his home.   Target Date 09/12/18   Date Met   in progress   Progress:   This is gradually improving as Rogelio needs only minimal facilitation to ascend steps without circumduction to clear foot.  This is limited by slow progression of knee flexion activation he needs to clear his foot up the step.  Descending Rogelio is doing very well and can descend reciprocally with one railing with appropriate alignment and stability on each side and without assist or facilitation.        Goal Identifier 5 - home program   Goal Description Rogelio will be independent with a home activity program to address his impairments and self manage his recovery.    Target Date 09/12/18   Date Met   in progress   Progress: Rogelio works hard on stretching and stance activities at home as well as walking 1-2 x per day.  His home program continues to be modified and progressed as needed.       Progress Toward Goals:   Progress limited due to spasticity and tightness of his right UE, trunk and LE as well as weakness of his ankle DF and HS which has been improving but slowly.  Pt is scheduled to have botox injections on August 1.     Plan:  Continue therapy per current plan of care.    Discharge:  No

## 2018-09-12 NOTE — ADDENDUM NOTE
Encounter addended by: Yen Villanueva, PT on: 9/12/2018  2:51 PM<BR>     Actions taken: Flowsheet accepted

## 2018-09-12 NOTE — ADDENDUM NOTE
Encounter addended by: Yen Villanueva, PT on: 9/12/2018  9:58 AM<BR>     Actions taken: Flowsheet accepted

## 2018-09-12 NOTE — ADDENDUM NOTE
Encounter addended by: Yen Villanueva, PT on: 9/12/2018  2:29 PM<BR>     Actions taken: Flowsheet data copied forward, Flowsheet accepted

## 2018-09-12 NOTE — ADDENDUM NOTE
Encounter addended by: Yen Villanueva, PT on: 9/12/2018  2:30 PM<BR>     Actions taken: Flowsheet data copied forward, Flowsheet accepted

## 2018-09-12 NOTE — ADDENDUM NOTE
Encounter addended by: Yen Villanueva, PT on: 9/12/2018  2:38 PM<BR>     Actions taken: Flowsheet accepted, Charge Capture section accepted

## 2018-09-12 NOTE — PROGRESS NOTES
[facility]      OUTPATIENT PHYSICAL THERAPY  PLAN OF TREATMENT FOR OUTPATIENT REHABILITATION    Patient's Last Name, First Name, M.I.                YOB: 1955  Luis Trinidad                        Provider's Name  [facility] Medical Record No.  [id number]                               Onset Date: 11/4/2015   Start of Care Date: 5/1/2018   Type:     _X_PT   ___OT   ___SLP Medical Diagnosis: ischemic CVA                       PT Diagnosis:   Impaired gait and mobility due to right hemiparesis and spasticity              _________________________________________________________________________________  Plan of Treatment:    Frequency/Duration: 2 x per week x 90 days     Goals:  Goal Identifier 1 - Gait pattern/safety   Goal Description Rogelio will be able to achieve proper midstance to terminal stance alignment on right LE and transition into pre and initial swing with proper clearance of right foot for greater efficiency and safety with gait at home and in the community.   Target Date 09/12/18   Date Met   in progress   Progress: Rogelio is gradually improving his gait pattern, especially transition into midstance phase.  He is also demonstrating improved relaxation of quads into passive knee flexion for trailing limb position/preswing, however does not fully clear foot in swing phase yet due to continued impairments in knee flexion activation.       Goal Identifier 2- Gait speed   Goal Description Rogelio will increase his gait speed on the 25 foot timed walk to 9 sec or less without use of an assistive device  to indicate reduced level of gait impairment and improved community mobility.   Target Date 09/12/18   Date Met   in progress   Progress: Rogelio has shown slight improvement in gait speed with improved step length as well.  It is felt that his gait speed has not significantly  improved yet because he is focused on improving his gait quality.  Overall his gait mechanics and alignment are showing consistent improvements and with time will translate into more automaticity of gait that will allow improved speed and efficiency.  He is progressing toward this goal     Goal Identifier 3- stairs   Goal Description Pt will be able to navigate up and down his stairs with one rail in a reciprocal pattern with minimal circumduction of right LE in order for more efficient and safe access to all levels of his home.   Target Date 09/12/18   Date Met   in progress   Progress: This is gradually improving as Rogelio needs only minimal facilitation to ascend steps without circumduction to clear foot.  This is limited by slow progression of knee flexion activation he needs to clear his foot up the step.  Descending Rogelio is doing very well and can descend reciprocally with one railing with appropriate alignment and stability on each side and without assist or facilitation.        Goal Identifier 5 - home program   Goal Description Rogelio will be independent with a home activity program to address his impairments and self manage his recovery.    Target Date 09/12/18   Date Met   in progress   Progress: Rogelio works hard on stretching and stance activities at home as well as walking 1-2 x per day.  His home program continues to be modified and progressed as needed.       Progress Toward Goals:   Progress limited due to spasticity and tightness of his right UE, trunk and LE as well as weakness of his ankle DF and HS which has been improving but slowly.  Pt is scheduled to have botox injections on August 1.     Plan: extend date of goals to 10/29/18.    Certification date from 7/31/2018 to 10/29/2018.    Yen Villanueva, PT          I CERTIFY THE NEED FOR THESE SERVICES FURNISHED UNDER        THIS PLAN OF TREATMENT AND WHILE UNDER MY CARE     (Physician co-signature of this document indicates review and certification of  the therapy plan).                Referring Provider: Dr. Chele Reno

## 2018-09-12 NOTE — ADDENDUM NOTE
Encounter addended by: Yen Villanueva, PT on: 9/12/2018  2:32 PM<BR>     Actions taken: Flowsheet data copied forward, Flowsheet accepted

## 2018-09-12 NOTE — ADDENDUM NOTE
Encounter addended by: Yen Villanueva, PT on: 9/12/2018  2:31 PM<BR>     Actions taken: Flowsheet data copied forward, Flowsheet accepted

## 2018-09-12 NOTE — ADDENDUM NOTE
Encounter addended by: Yen Villanueva, PT on: 9/12/2018 10:03 AM<BR>     Actions taken: Flowsheet data copied forward, Flowsheet accepted, Charge Capture section accepted

## 2018-09-12 NOTE — ADDENDUM NOTE
Encounter addended by: Yen Villanueva, PT on: 9/12/2018  2:40 PM<BR>     Actions taken: Flowsheet data copied forward, Flowsheet accepted

## 2018-09-13 ENCOUNTER — HOSPITAL ENCOUNTER (OUTPATIENT)
Dept: OCCUPATIONAL THERAPY | Facility: CLINIC | Age: 63
Setting detail: THERAPIES SERIES
End: 2018-09-13
Attending: PHYSICAL MEDICINE & REHABILITATION
Payer: MEDICARE

## 2018-09-13 ENCOUNTER — HOSPITAL ENCOUNTER (OUTPATIENT)
Dept: PHYSICAL THERAPY | Facility: CLINIC | Age: 63
Setting detail: THERAPIES SERIES
End: 2018-09-13
Attending: PHYSICAL MEDICINE & REHABILITATION
Payer: MEDICARE

## 2018-09-13 PROCEDURE — 97110 THERAPEUTIC EXERCISES: CPT | Mod: GP | Performed by: PHYSICAL THERAPIST

## 2018-09-13 PROCEDURE — 97112 NEUROMUSCULAR REEDUCATION: CPT | Mod: GP | Performed by: PHYSICAL THERAPIST

## 2018-09-13 PROCEDURE — 40000719 ZZHC STATISTIC PT DEPARTMENT NEURO VISIT: Performed by: PHYSICAL THERAPIST

## 2018-09-13 PROCEDURE — 97110 THERAPEUTIC EXERCISES: CPT | Mod: GO | Performed by: OCCUPATIONAL THERAPIST

## 2018-09-13 PROCEDURE — 40000125 ZZHC STATISTIC OT OUTPT VISIT: Performed by: OCCUPATIONAL THERAPIST

## 2018-09-13 PROCEDURE — 97116 GAIT TRAINING THERAPY: CPT | Mod: GP | Performed by: PHYSICAL THERAPIST

## 2018-09-18 ENCOUNTER — HOSPITAL ENCOUNTER (OUTPATIENT)
Dept: PHYSICAL THERAPY | Facility: CLINIC | Age: 63
Setting detail: THERAPIES SERIES
End: 2018-09-18
Attending: PHYSICAL MEDICINE & REHABILITATION
Payer: MEDICARE

## 2018-09-18 ENCOUNTER — HOSPITAL ENCOUNTER (OUTPATIENT)
Dept: OCCUPATIONAL THERAPY | Facility: CLINIC | Age: 63
Setting detail: THERAPIES SERIES
End: 2018-09-18
Attending: PHYSICAL MEDICINE & REHABILITATION
Payer: MEDICARE

## 2018-09-18 PROCEDURE — 97110 THERAPEUTIC EXERCISES: CPT | Mod: GP | Performed by: PHYSICAL THERAPIST

## 2018-09-18 PROCEDURE — 40000719 ZZHC STATISTIC PT DEPARTMENT NEURO VISIT: Performed by: PHYSICAL THERAPIST

## 2018-09-18 PROCEDURE — 97116 GAIT TRAINING THERAPY: CPT | Mod: GP | Performed by: PHYSICAL THERAPIST

## 2018-09-18 PROCEDURE — 97140 MANUAL THERAPY 1/> REGIONS: CPT | Mod: GO | Performed by: OCCUPATIONAL THERAPIST

## 2018-09-18 PROCEDURE — 40000125 ZZHC STATISTIC OT OUTPT VISIT: Performed by: OCCUPATIONAL THERAPIST

## 2018-09-18 PROCEDURE — 97112 NEUROMUSCULAR REEDUCATION: CPT | Mod: GP | Performed by: PHYSICAL THERAPIST

## 2018-09-18 NOTE — ADDENDUM NOTE
Encounter addended by: Yen Villanueva, PT on: 9/18/2018  9:48 AM<BR>     Actions taken: Flowsheet data copied forward, Charge Capture section accepted, Pend clinical note, Flowsheet accepted, Sign clinical note

## 2018-09-18 NOTE — ADDENDUM NOTE
Encounter addended by: Yen Villanueva, PT on: 9/18/2018  9:36 AM<BR>     Actions taken: Flowsheet data copied forward, Flowsheet accepted

## 2018-09-18 NOTE — ADDENDUM NOTE
Encounter addended by: Yen Villanueva, PT on: 9/18/2018  9:35 AM<BR>     Actions taken: Flowsheet data copied forward, Flowsheet accepted

## 2018-09-18 NOTE — ADDENDUM NOTE
Encounter addended by: Yen Villanueva, PT on: 9/18/2018 10:01 AM<BR>     Actions taken: Flowsheet data copied forward, Flowsheet accepted

## 2018-09-18 NOTE — ADDENDUM NOTE
Encounter addended by: Yen Villanueva, PT on: 9/18/2018  9:37 AM<BR>     Actions taken: Flowsheet data copied forward, Flowsheet accepted

## 2018-09-18 NOTE — PROGRESS NOTES
Physical Therapy Progress Note - 10th Visit Note    KURT Reno  Dates: 7/24/2018  To 8/30/2018 08/30/18 1100   Signing Clinician's Name / Credentials   Signing clinician's name / credentials Yen Villanueva PT   Session Number   Session Number 10/10 Medicare   Progress Note/Recertification   Recertification Due Date 10/29/18   Mobility: Walking & Moving Around   Mobility Current Status,  (eval/re-eval & every progress note) CL: 60-79% impairment   Current Mobility Modifier Rationale 25 foot walk no AD   Mobility Goal,  (eval/re-eval, every progress note, & discharge) CJ: 20-39% impairment   Goal 1   Goal Identifier 1 - Gait pattern/safety   Goal Description Rogelio will be able to achieve proper midstance to terminal stance alignment on right LE and transition into pre and initial swing with proper clearance of right foot for greater efficiency and safety with gait at home and in the community.   Target Date 10/29/18   Goal 2   Goal Identifier 2- Gait speed   Goal Description Rogelio will increase his gait speed on the 25 foot timed walk to 9 sec or less without use of an assistive device  to indicate reduced level of gait impairment and improved community mobility.   Target Date 10/29/18   Goal 3   Goal Identifier 3- stairs   Goal Description Pt will be able to navigate up and down his stairs with one rail in a reciprocal pattern with minimal circumduction of right LE in order for more efficient and safe access to all levels of his home.   Target Date 10/29/18   Goal 5   Goal Identifier 5 - home program   Goal Description Rogelio will be independent with a home activity program to address his impairments and self manage his recovery.    Target Date 10/29/18   Subjective Report   Subjective Report Right trunk is a little sore from Tuesday OT and PT sessions.     Vitals Signs   Heart Rate 92   Blood Pressure 121/83   Vital Signs Comments L UE electronic cuff prior to activity. after stepper -  HR 96,  BP  123/77   Therapeutic Procedure/exercise   Minutes 25   Skilled Intervention facil for trunk and right LE alignment, cues,  assist to stabilize right shoulder with UE movements on stepper.  monitoring vitals.    Patient Response right ankle more tight today, needed more frequent adjustments on pedal.     Treatment Detail r. stepper seat 18, 10 min total 30-45 sec  fast pace followed by 45-60 sec normal pace.   s tanding HC stretching on right with 2 UE support on rail and assist for alignment.    Neuromuscular Re-education   Minutes 22   Skilled Intervention facil for timing, sequencing and proper movemnt patterns and alignments.  facil for sustained thoracic extension throughout activity.     Patient Response fatigue but felt he had a good challenge    Treatment Detail activities for sustained right hip ext in midstance  alignments, transitions to term stance.   2 UE support on railing.   progression to tap up with rotation to left to promote right ankle pronation and glutmax/med stability - left foot to step stool, then push stool away, then pull back by hooking toe under edge of stool for right stance demand.     Gait Training   Minutes 10   Skilled Intervention facil to maintain right LE wt shift until IC on left, cues, instruction, education.     Patient Response good tolerance, able to ambulate with improved mechanics and left step lengt/ IC with reduced facilitaiton.    Treatment Detail gait fwd/backward on beam with left UE support, progress to just left HHA. emphasis on heelcontact forward and forwrd shift. gait on ground with emphasis on narrow base of support bringing left heelcontact near midline. progression to faster paces, providing facil to maintain right wt shift , upright coactive trunk.    Plan   Plan for next session monitor vitals.  right term stance stability. quad / hip flexor stretching. continue stepper. functional stretching of trunk , UE and LE, right LE open chain/modified chain activities  for timing/coordination. cardiovascular conditioning.    Comments   Comments scheduled through 11/1/18   Total Session Time   Timed Code Treatment Minutes 57   Total Treatment Time (sum of timed and untimed services) 57       Progress to goals:  Pt is progressing toward all goals.   Progress is slower due to spasticity and tightness of his right UE, trunk and LE as well as weakness of his ankle DF and HS which has been improving but slowly.  Pt received botox injections on August 1.     Plan: Pt will continue to benefit from skilled intervention to maximize functional mobility, movement patterns and safety and reduce risk of disuse atrophy and tissue shortening that lead to increased level of disability and falls risk.

## 2018-09-18 NOTE — ADDENDUM NOTE
Encounter addended by: Yen Villanueva, PT on: 9/18/2018 10:00 AM<BR>     Actions taken: Flowsheet accepted

## 2018-09-18 NOTE — ADDENDUM NOTE
Encounter addended by: Yen Villanueva, PT on: 9/18/2018  9:35 AM<BR>     Actions taken: Flowsheet accepted

## 2018-09-18 NOTE — ADDENDUM NOTE
Encounter addended by: Yen Villanueva, PT on: 9/18/2018  9:48 AM<BR>     Actions taken: Flowsheet data copied forward, Flowsheet accepted

## 2018-09-19 NOTE — ADDENDUM NOTE
Encounter addended by: Allyssa Martinez, OT on: 9/19/2018  1:25 PM<BR>     Actions taken: Flowsheet accepted

## 2018-09-20 ENCOUNTER — HOSPITAL ENCOUNTER (OUTPATIENT)
Dept: OCCUPATIONAL THERAPY | Facility: CLINIC | Age: 63
Setting detail: THERAPIES SERIES
End: 2018-09-20
Attending: PHYSICAL MEDICINE & REHABILITATION
Payer: MEDICARE

## 2018-09-20 ENCOUNTER — HOSPITAL ENCOUNTER (OUTPATIENT)
Dept: PHYSICAL THERAPY | Facility: CLINIC | Age: 63
Setting detail: THERAPIES SERIES
End: 2018-09-20
Attending: PHYSICAL MEDICINE & REHABILITATION
Payer: MEDICARE

## 2018-09-20 PROCEDURE — 40000719 ZZHC STATISTIC PT DEPARTMENT NEURO VISIT: Performed by: PHYSICAL THERAPIST

## 2018-09-20 PROCEDURE — 97112 NEUROMUSCULAR REEDUCATION: CPT | Mod: GP | Performed by: PHYSICAL THERAPIST

## 2018-09-20 PROCEDURE — 97110 THERAPEUTIC EXERCISES: CPT | Mod: GP | Performed by: PHYSICAL THERAPIST

## 2018-09-20 PROCEDURE — 97110 THERAPEUTIC EXERCISES: CPT | Mod: GO | Performed by: OCCUPATIONAL THERAPIST

## 2018-09-20 PROCEDURE — G8985 CARRY GOAL STATUS: HCPCS | Mod: GO,CJ | Performed by: OCCUPATIONAL THERAPIST

## 2018-09-20 PROCEDURE — G8984 CARRY CURRENT STATUS: HCPCS | Mod: GO,CL | Performed by: OCCUPATIONAL THERAPIST

## 2018-09-20 PROCEDURE — 40000125 ZZHC STATISTIC OT OUTPT VISIT: Performed by: OCCUPATIONAL THERAPIST

## 2018-09-25 ENCOUNTER — HOSPITAL ENCOUNTER (OUTPATIENT)
Dept: OCCUPATIONAL THERAPY | Facility: CLINIC | Age: 63
Setting detail: THERAPIES SERIES
End: 2018-09-25
Attending: PHYSICAL MEDICINE & REHABILITATION
Payer: MEDICARE

## 2018-09-25 ENCOUNTER — HOSPITAL ENCOUNTER (OUTPATIENT)
Dept: PHYSICAL THERAPY | Facility: CLINIC | Age: 63
Setting detail: THERAPIES SERIES
End: 2018-09-25
Attending: PHYSICAL MEDICINE & REHABILITATION
Payer: MEDICARE

## 2018-09-25 PROCEDURE — 97535 SELF CARE MNGMENT TRAINING: CPT | Mod: GO | Performed by: OCCUPATIONAL THERAPIST

## 2018-09-25 PROCEDURE — 40000125 ZZHC STATISTIC OT OUTPT VISIT: Performed by: OCCUPATIONAL THERAPIST

## 2018-09-25 PROCEDURE — 97110 THERAPEUTIC EXERCISES: CPT | Mod: GP | Performed by: PHYSICAL THERAPIST

## 2018-09-25 PROCEDURE — 97140 MANUAL THERAPY 1/> REGIONS: CPT | Mod: GO | Performed by: OCCUPATIONAL THERAPIST

## 2018-09-25 PROCEDURE — 40000719 ZZHC STATISTIC PT DEPARTMENT NEURO VISIT: Performed by: PHYSICAL THERAPIST

## 2018-09-25 PROCEDURE — 97112 NEUROMUSCULAR REEDUCATION: CPT | Mod: GP | Performed by: PHYSICAL THERAPIST

## 2018-09-26 NOTE — PROGRESS NOTES
"Outpatient Occupational Therapy Progress Note   Tenth visit Note  Patient: Luis Trinidad  : 1955    Beginning/End Dates of Reporting Period:  2018 to 2018    Referring Provider: Chele Reno MD    Therapy Diagnosis: Hemiparesis afffecting the R  Side as late effect of stroke    Client Self Report: Pt reports he hasn't been able to walk in a few days due to the rain.\" from -Pt this has been the best set of botox he has had yet, accurate placement and the strength has lasted longer.  (strong for 1st month, then faded to medium strength at 4 weeks, staying consistent that way at 6 weeks.) Continues to feel his elbow is looser this week.  He continues to lace fingers behind head before he gets up in the morning for a first stretch.    Objective Measurements:     Objective Measure: from R  strength    Details: R  was 32,20# 9tires quickly), tested start of session after gentle stretches ( L is 100#).   Objective Measure:  Pinch Strength   Details: Lateral/Key: R 15, 16 ( improved from 12,10,8 #);  ( L 23#) . palmar:  R 5,3 # (used medial portion of 1st digit  vs side of thumb.  Improved from 3,3#), dycem in place during testing. ( L 22#)   Objective Measure:  R UE /trunk AROM   Details: Trunk lateral flexion to 30 fo 45 to the R and to 15  of 45 to the L at start of session.  Improves with left lateral flexion to 25 of 45 .   Objective Measure:  R UE ROM and strength   Details: AROM of SH flexion to 35 degrees, mild trunk EXT.  SH ABD AROM to 95 degrees in standing.   Objective Measure: Dynavision       Objective Measure: Box and Blocks   Details: (end of session 4blocks form table top /over 4 inch box edge, needed to push blocks into  fixed ppoint/OTR's hand to engage his TH/pinch.          Goals:     Goal Identifier R shoulder strength   Goal Description Patient to increase R shoulder flexiion AROM to 40 degrees for improved R UE function for ADL/IADLs (during " dressing and grooming tasks, carrying items, putting groceries and dishes away).   Target Date 10/24/18   Date Met      Progress: Pt making gains see objective measure above     Goal Identifier R pinch strength   Goal Description Patient will demonstrate increased right hand pinch strength (both lateral and palmar) by 3# each for increased independence with ADL/IADLs such as opening containers, pull up pants, maintaining pinch to hold items.   Target Date 10/24/18   Date Met     Progress: Gains made with both lateral and carty pinch.  See objective measures above.      Goal Identifier R GM/FM coordination   Goal Description Patient to improve R GM/FM coordination skills for increased independence during ADL/IADL tasks (dressing, kitchen tasks, feeding self) by completing the Box and Blocks Test with R UE in standing with 10 blocks.   Target Date 10/24/18   Date Met      Progress:Pt making gains with fmc/gmc blocks.  See objective measure above.     Goal Identifier R UE as gross assist   Goal Description Patient to demonstrate functional tasks (feeding self, wiping the counter/table, washing dishes, etc.) with 20% use of R UE as gross stabilizer or gross assist for increased ADL/IADL independence and closer return to prior level of function. (2/8 (eval date): using ~3% at home)   Target Date 10/24/18   Date Met      Progress: In progress, Pt using R UE ~10%     Goal Identifier functional/normal movment patterns in R UE and trunk   Goal Description Patient to complete therapeutic task (simple kitchen tasks, laundry, etc.) with use of B UE's and no more than 2 cues in 5 minute time frame for correct body mechanics and for decreasing compensatory movements at the trunk and upper body for encouragement of normal movement patterns, increased R UE function and increased ADL/IADL independence.   Target Date 10/24/18   Date Met      Progress: In progress     Goal Identifier visual skills   Goal Description Patient will  demonstrate WFLs visual skills (including reaction time and visual scanning skills) for safe independent community mobility (crossing the street, driving, grocery shopping) by scoring WNLs on all modes of the Dynavision, Scancourse and other visual screens.   Target Date 10/24/18   Date Met      Progress: Not addressed recently due to other goals as primary focus         Progress Toward Goals:   Progress this reporting period: Progress Toward Goals:   Progress this reporting period: Measurable gains made in 4 of 6 goals, being seen 2x/week since February evaluation as transportation will allow.    Continued emphasis on getting HEP program to manage his tone better ( via weightbearing, NMES, shoulder pulley kit, prolonged stretches)  and help refine movement patterns to reduce compenstory patterns that can cotribute to pain/ROM loss if not addressed. We are applying these patterns to repetive exercises and tranferring them to home based 2 handed functional tasks ( laundry, washing dishes, carrying/put away groceries). Pt has made progress with self stretch at home using a pole for increased trunk flexibliity and and pulley system for R shoulder flexion.        Plan:  Continue therapy per current plan of care.

## 2018-09-26 NOTE — ADDENDUM NOTE
Encounter addended by: Allyssa Martinez, OT on: 9/26/2018  2:53 PM<BR>     Actions taken: Flowsheet accepted, Charge Capture section accepted, Sign clinical note

## 2018-09-27 ENCOUNTER — HOSPITAL ENCOUNTER (OUTPATIENT)
Dept: PHYSICAL THERAPY | Facility: CLINIC | Age: 63
Setting detail: THERAPIES SERIES
End: 2018-09-27
Attending: PHYSICAL MEDICINE & REHABILITATION
Payer: MEDICARE

## 2018-09-27 ENCOUNTER — HOSPITAL ENCOUNTER (OUTPATIENT)
Dept: OCCUPATIONAL THERAPY | Facility: CLINIC | Age: 63
Setting detail: THERAPIES SERIES
End: 2018-09-27
Attending: PHYSICAL MEDICINE & REHABILITATION
Payer: MEDICARE

## 2018-09-27 PROCEDURE — 40000125 ZZHC STATISTIC OT OUTPT VISIT: Performed by: OCCUPATIONAL THERAPIST

## 2018-09-27 PROCEDURE — 97112 NEUROMUSCULAR REEDUCATION: CPT | Mod: GP | Performed by: PHYSICAL THERAPIST

## 2018-09-27 PROCEDURE — 97116 GAIT TRAINING THERAPY: CPT | Mod: GP | Performed by: PHYSICAL THERAPIST

## 2018-09-27 PROCEDURE — 97110 THERAPEUTIC EXERCISES: CPT | Mod: GP | Performed by: PHYSICAL THERAPIST

## 2018-09-27 PROCEDURE — 97110 THERAPEUTIC EXERCISES: CPT | Mod: GO | Performed by: OCCUPATIONAL THERAPIST

## 2018-09-27 PROCEDURE — 40000719 ZZHC STATISTIC PT DEPARTMENT NEURO VISIT: Performed by: PHYSICAL THERAPIST

## 2018-10-02 ENCOUNTER — HOSPITAL ENCOUNTER (OUTPATIENT)
Dept: OCCUPATIONAL THERAPY | Facility: CLINIC | Age: 63
Setting detail: THERAPIES SERIES
End: 2018-10-02
Attending: PHYSICAL MEDICINE & REHABILITATION
Payer: MEDICARE

## 2018-10-02 ENCOUNTER — HOSPITAL ENCOUNTER (OUTPATIENT)
Dept: PHYSICAL THERAPY | Facility: CLINIC | Age: 63
Setting detail: THERAPIES SERIES
End: 2018-10-02
Attending: PHYSICAL MEDICINE & REHABILITATION
Payer: MEDICARE

## 2018-10-02 PROCEDURE — 97112 NEUROMUSCULAR REEDUCATION: CPT | Mod: GP | Performed by: PHYSICAL THERAPIST

## 2018-10-02 PROCEDURE — 40000719 ZZHC STATISTIC PT DEPARTMENT NEURO VISIT: Performed by: PHYSICAL THERAPIST

## 2018-10-02 PROCEDURE — 97140 MANUAL THERAPY 1/> REGIONS: CPT | Mod: GO | Performed by: OCCUPATIONAL THERAPIST

## 2018-10-02 PROCEDURE — 40000125 ZZHC STATISTIC OT OUTPT VISIT: Performed by: OCCUPATIONAL THERAPIST

## 2018-10-02 PROCEDURE — 97110 THERAPEUTIC EXERCISES: CPT | Mod: GP | Performed by: PHYSICAL THERAPIST

## 2018-10-04 ENCOUNTER — HOSPITAL ENCOUNTER (OUTPATIENT)
Dept: OCCUPATIONAL THERAPY | Facility: CLINIC | Age: 63
Setting detail: THERAPIES SERIES
End: 2018-10-04
Attending: PHYSICAL MEDICINE & REHABILITATION
Payer: MEDICARE

## 2018-10-04 PROCEDURE — 40000125 ZZHC STATISTIC OT OUTPT VISIT: Performed by: OCCUPATIONAL THERAPIST

## 2018-10-04 PROCEDURE — 97110 THERAPEUTIC EXERCISES: CPT | Mod: GO | Performed by: OCCUPATIONAL THERAPIST

## 2018-10-04 PROCEDURE — 97535 SELF CARE MNGMENT TRAINING: CPT | Mod: GO | Performed by: OCCUPATIONAL THERAPIST

## 2018-10-09 ENCOUNTER — HOSPITAL ENCOUNTER (OUTPATIENT)
Dept: PHYSICAL THERAPY | Facility: CLINIC | Age: 63
Setting detail: THERAPIES SERIES
End: 2018-10-09
Attending: PHYSICAL MEDICINE & REHABILITATION
Payer: MEDICARE

## 2018-10-09 ENCOUNTER — HOSPITAL ENCOUNTER (OUTPATIENT)
Dept: OCCUPATIONAL THERAPY | Facility: CLINIC | Age: 63
Setting detail: THERAPIES SERIES
End: 2018-10-09
Attending: PHYSICAL MEDICINE & REHABILITATION
Payer: MEDICARE

## 2018-10-09 PROCEDURE — 40000125 ZZHC STATISTIC OT OUTPT VISIT: Performed by: OCCUPATIONAL THERAPIST

## 2018-10-09 PROCEDURE — 97140 MANUAL THERAPY 1/> REGIONS: CPT | Mod: GP | Performed by: PHYSICAL THERAPIST

## 2018-10-09 PROCEDURE — 40000719 ZZHC STATISTIC PT DEPARTMENT NEURO VISIT: Performed by: PHYSICAL THERAPIST

## 2018-10-09 PROCEDURE — 97140 MANUAL THERAPY 1/> REGIONS: CPT | Mod: GO | Performed by: OCCUPATIONAL THERAPIST

## 2018-10-09 PROCEDURE — 97110 THERAPEUTIC EXERCISES: CPT | Mod: GP | Performed by: PHYSICAL THERAPIST

## 2018-10-11 ENCOUNTER — HOSPITAL ENCOUNTER (OUTPATIENT)
Dept: PHYSICAL THERAPY | Facility: CLINIC | Age: 63
Setting detail: THERAPIES SERIES
End: 2018-10-11
Attending: PHYSICAL MEDICINE & REHABILITATION
Payer: MEDICARE

## 2018-10-11 ENCOUNTER — HOSPITAL ENCOUNTER (OUTPATIENT)
Dept: OCCUPATIONAL THERAPY | Facility: CLINIC | Age: 63
Setting detail: THERAPIES SERIES
End: 2018-10-11
Attending: PHYSICAL MEDICINE & REHABILITATION
Payer: MEDICARE

## 2018-10-11 PROCEDURE — 97110 THERAPEUTIC EXERCISES: CPT | Mod: GP | Performed by: PHYSICAL THERAPIST

## 2018-10-11 PROCEDURE — 40000719 ZZHC STATISTIC PT DEPARTMENT NEURO VISIT: Performed by: PHYSICAL THERAPIST

## 2018-10-11 PROCEDURE — 97112 NEUROMUSCULAR REEDUCATION: CPT | Mod: GP | Performed by: PHYSICAL THERAPIST

## 2018-10-11 PROCEDURE — G8978 MOBILITY CURRENT STATUS: HCPCS | Mod: GP,CL | Performed by: PHYSICAL THERAPIST

## 2018-10-11 PROCEDURE — 97110 THERAPEUTIC EXERCISES: CPT | Mod: GO | Performed by: OCCUPATIONAL THERAPIST

## 2018-10-11 PROCEDURE — 40000125 ZZHC STATISTIC OT OUTPT VISIT: Performed by: OCCUPATIONAL THERAPIST

## 2018-10-11 PROCEDURE — 97116 GAIT TRAINING THERAPY: CPT | Mod: GP | Performed by: PHYSICAL THERAPIST

## 2018-10-11 PROCEDURE — G8979 MOBILITY GOAL STATUS: HCPCS | Mod: GP,CJ | Performed by: PHYSICAL THERAPIST

## 2018-10-16 ENCOUNTER — HOSPITAL ENCOUNTER (OUTPATIENT)
Dept: OCCUPATIONAL THERAPY | Facility: CLINIC | Age: 63
Setting detail: THERAPIES SERIES
End: 2018-10-16
Attending: PHYSICAL MEDICINE & REHABILITATION
Payer: MEDICARE

## 2018-10-16 ENCOUNTER — HOSPITAL ENCOUNTER (OUTPATIENT)
Dept: PHYSICAL THERAPY | Facility: CLINIC | Age: 63
Setting detail: THERAPIES SERIES
End: 2018-10-16
Attending: PHYSICAL MEDICINE & REHABILITATION
Payer: MEDICARE

## 2018-10-16 PROCEDURE — 40000719 ZZHC STATISTIC PT DEPARTMENT NEURO VISIT: Performed by: PHYSICAL THERAPIST

## 2018-10-16 PROCEDURE — 97112 NEUROMUSCULAR REEDUCATION: CPT | Mod: GP | Performed by: PHYSICAL THERAPIST

## 2018-10-16 PROCEDURE — 40000125 ZZHC STATISTIC OT OUTPT VISIT: Performed by: OCCUPATIONAL THERAPIST

## 2018-10-16 PROCEDURE — 97116 GAIT TRAINING THERAPY: CPT | Mod: GP | Performed by: PHYSICAL THERAPIST

## 2018-10-16 PROCEDURE — 97140 MANUAL THERAPY 1/> REGIONS: CPT | Mod: GO | Performed by: OCCUPATIONAL THERAPIST

## 2018-10-16 PROCEDURE — 97110 THERAPEUTIC EXERCISES: CPT | Mod: GP | Performed by: PHYSICAL THERAPIST

## 2018-10-18 ENCOUNTER — HOSPITAL ENCOUNTER (OUTPATIENT)
Dept: PHYSICAL THERAPY | Facility: CLINIC | Age: 63
Setting detail: THERAPIES SERIES
End: 2018-10-18
Attending: PHYSICAL MEDICINE & REHABILITATION
Payer: MEDICARE

## 2018-10-18 ENCOUNTER — HOSPITAL ENCOUNTER (OUTPATIENT)
Dept: OCCUPATIONAL THERAPY | Facility: CLINIC | Age: 63
Setting detail: THERAPIES SERIES
End: 2018-10-18
Attending: PHYSICAL MEDICINE & REHABILITATION
Payer: MEDICARE

## 2018-10-18 PROCEDURE — 97110 THERAPEUTIC EXERCISES: CPT | Mod: GP | Performed by: PHYSICAL THERAPIST

## 2018-10-18 PROCEDURE — 40000719 ZZHC STATISTIC PT DEPARTMENT NEURO VISIT: Performed by: PHYSICAL THERAPIST

## 2018-10-18 PROCEDURE — 97112 NEUROMUSCULAR REEDUCATION: CPT | Mod: GP | Performed by: PHYSICAL THERAPIST

## 2018-10-18 PROCEDURE — 97110 THERAPEUTIC EXERCISES: CPT | Mod: GO | Performed by: OCCUPATIONAL THERAPIST

## 2018-10-18 PROCEDURE — 40000125 ZZHC STATISTIC OT OUTPT VISIT: Performed by: OCCUPATIONAL THERAPIST

## 2018-10-23 ENCOUNTER — HOSPITAL ENCOUNTER (OUTPATIENT)
Dept: OCCUPATIONAL THERAPY | Facility: CLINIC | Age: 63
Setting detail: THERAPIES SERIES
End: 2018-10-23
Attending: PHYSICAL MEDICINE & REHABILITATION
Payer: MEDICARE

## 2018-10-23 ENCOUNTER — HOSPITAL ENCOUNTER (OUTPATIENT)
Dept: PHYSICAL THERAPY | Facility: CLINIC | Age: 63
Setting detail: THERAPIES SERIES
End: 2018-10-23
Attending: PHYSICAL MEDICINE & REHABILITATION
Payer: MEDICARE

## 2018-10-23 PROCEDURE — 97140 MANUAL THERAPY 1/> REGIONS: CPT | Mod: GP | Performed by: PHYSICAL THERAPIST

## 2018-10-23 PROCEDURE — 97140 MANUAL THERAPY 1/> REGIONS: CPT | Mod: GO | Performed by: OCCUPATIONAL THERAPIST

## 2018-10-23 PROCEDURE — 97116 GAIT TRAINING THERAPY: CPT | Mod: GP,XU | Performed by: PHYSICAL THERAPIST

## 2018-10-23 PROCEDURE — G8984 CARRY CURRENT STATUS: HCPCS | Mod: GO,CK | Performed by: OCCUPATIONAL THERAPIST

## 2018-10-23 PROCEDURE — 97530 THERAPEUTIC ACTIVITIES: CPT | Mod: GO | Performed by: OCCUPATIONAL THERAPIST

## 2018-10-23 PROCEDURE — G8985 CARRY GOAL STATUS: HCPCS | Mod: GO,CJ | Performed by: OCCUPATIONAL THERAPIST

## 2018-10-23 PROCEDURE — 97110 THERAPEUTIC EXERCISES: CPT | Mod: GP | Performed by: PHYSICAL THERAPIST

## 2018-10-23 PROCEDURE — 40000125 ZZHC STATISTIC OT OUTPT VISIT: Performed by: OCCUPATIONAL THERAPIST

## 2018-10-23 PROCEDURE — 40000719 ZZHC STATISTIC PT DEPARTMENT NEURO VISIT: Performed by: PHYSICAL THERAPIST

## 2018-10-23 NOTE — PROGRESS NOTES
[facility]      OUTPATIENT OCCUPATIONAL THERAPY  PLAN OF TREATMENT FOR OUTPATIENT REHABILITATION    Patient's Last Name, First Name, M.I.                YOB: 1955  [facility] Medical Record No.  [id number]                             Onset Date:      11/29/17 Start of Care Date:      2/8/18   Type:     ___PT   _X_OT   ___SLP Medical Diagnosis:     Hemiparesis affecting right side as late effect of stroke                          OT Diagnosis:     decreased ADL/IADL independence Visits from [facility] [place]:  33   _________________________________________________________________________________  Plan of Treatment/Functional Goals:       Continued emphasis on getting HEP program to manage his tone better ( via myofascial release/Instrument Assisted SOft Tissue Mobilization/IASTM Graston Technique, weightbearing, NMES, shoulder pulley kit, prolonged stretches)  and help refine movement patterns to reduce compenstory patterns that can cotribute to pain/ROM loss if not addressed. We are applying these patterns to repetive exercises and tranferring them to home based 2 handed functional tasks ( laundry, washing dishes, carrying/put away groceries).       Frequency/Duration: 2x/week x 12 weeks     Goals:    Goal Identifier R shoulder strength   Goal Description Patient to increase R shoulder flexiion AROM to 40 degrees for improved R UE function for ADL/IADLs (during dressing and grooming tasks, carrying items, putting groceries and dishes away).   Target Date 1/19/19   Date Met      Progress:     Goal Identifier R pinch strength   Goal Description Patient will demonstrate increased right hand pinch strength (both lateral and palmar) by 3# each for increased independence with ADL/IADLs such as opening containers, pull up pants, maintaining pinch to hold items.   Target Date 10/24/18   Date Met       Progress:     Goal Identifier R GM/FM coordination   Goal Description Patient to improve R GM/FM coordination skills for increased independence during ADL/IADL tasks (dressing, kitchen tasks, feeding self) by completing the Box and Blocks Test with R UE in standing with 10 blocks.   Target Date 1/19/19   Date Met      Progress:     Goal Identifier R UE as gross assist   Goal Description Patient to demonstrate functional tasks (feeding self, wiping the counter/table, washing dishes, etc.) with 20% use of R UE as gross stabilizer or gross assist for increased ADL/IADL independence and closer return to prior level of function. (2/8 (eval date): using ~3% at home)   Target Date 1/19/19   Date Met      Progress:     Goal Identifier functional/normal movment patterns in R UE and trunk   Goal Description Patient to complete therapeutic task (simple kitchen tasks, laundry, etc.) with use of B UE's and no more than 2 cues in 5 minute time frame for correct body mechanics and for decreasing compensatory movements at the trunk and upper body for encouragement of normal movement patterns, increased R UE function and increased ADL/IADL independence.   Target Date 1/19/19   Date Met      Progress: Carries a bag, manages door levers, faucet handles and drawer handles with R hand.  Working outward 2 hands on a cup with consistency.     Goal Identifier visual skills   Goal Description Patient will demonstrate WFLs visual skills (including reaction time and visual scanning skills) for safe independent community mobility (crossing the street, driving, grocery shopping) by scoring WNLs on all modes of the Dynavision, Scancourse and other visual screens.   Target Date 1/19/19   Date Met      Progress: Pt defers on option to pursue adapted driving for now. Not feeling ready for approaching return to work.  Wants most to keep focusing on making post CVA motor improvements.   Progress Toward Goals:   Progress this reporting period: See  attached progress note for details.     Certification date from 10/23/18 - 1/19/19.    Danyelle Evans, OTR          I CERTIFY THE NEED FOR THESE SERVICES FURNISHED UNDER        THIS PLAN OF TREATMENT AND WHILE UNDER MY CARE     (Physician co-signature of this document indicates review and certification of the therapy plan).                Referring Provider: Chele Reno MD

## 2018-10-23 NOTE — PROGRESS NOTES
Outpatient Occupational Therapy Progress Note     Patient: Luis Trinidad  : 1955    Beginning/End Dates of Reporting Period:  18 to 10/23/2018    Referring Provider: Chele Reno MD    Therapy Diagnosis: Decreased ADL/IADL    Client Self Report: Patient has been able to carry his bag in his right hand and also manage door handles with a lever.  With 2 hands on cup, able to move from chest height and sip, following thorough stretch out prior.     Objective Measurements:     Objective Measure: R  strength   32 # R and 100 # L  (as of 18)       Objective Measure: Pinch Strength  Key pinch:  15,16# R and 23 L.  3 pt:  5,3# R, and 22# L.     (as of 18)     Objective Measure:  UE /trunk AROM   Details: At start of session, Trunk lateral flexion to 36 of 45  to the R and to 26  of 45 to the L, trunk rotation/sitting is 36  L and 46  R. Rot'n and LF both pulling at R pec/ssternal margins/T8-10 lateral rib cage tolow axilla, mid/low trap/paraspinal t8-L3 rope like and  gritty, thick at outer 2/3 portions of QL/center looser.   After MFR, IASTM, improves to  38 LF to L and 438of 50 R LF and rotation: 57L and 55 R.  Less pulling noted in R shoulder with with AROM overhead in supine to 138 dgrees SH FL.       Objective Measure: R UE ROM and strength   Details: SH flexion AAROM to 138/180 degrees post IASTM, pulling at lateral ribs 8-10.  ER to 22 of 45 degrees, at ribside.     Objective Measure: Dynavision   Details:  GOAL MET as of 18--  Mode A:   > WNL, getting 70 hits /min where > 52 is WNl.  Mode B:  Gets 67/78 hits/min, > WNL where > 42 is WNL.  Mode B with divided attention:  Gets 37/68 hits while reading 10/10 numbers, WNL where WNL is > 35 hits and > 9/10 # read aloud.       Objective Measure: Box and Blocks  -- NT      Goals:     Goal Identifier R shoulder strength   Goal Description Patient to increase R shoulder flexiion AROM to 40 degrees for improved R UE function for ADL/IADLs  (during dressing and grooming tasks, carrying items, putting groceries and dishes away).   Target Date 01/19/19   Date Met      Progress:     Goal Identifier R pinch strength   Goal Description Patient will demonstrate increased right hand pinch strength (both lateral and palmar) by 3# each for increased independence with ADL/IADLs such as opening containers, pull up pants, maintaining pinch to hold items.   Target Date 01/19/19   Date Met      Progress:     Goal Identifier R GM/FM coordination   Goal Description Patient to improve R GM/FM coordination skills for increased independence during ADL/IADL tasks (dressing, kitchen tasks, feeding self) by completing the Box and Blocks Test with R UE in standing with 10 blocks.   Target Date 01/19/19   Date Met      Progress:     Goal Identifier R UE as gross assist   Goal Description Patient to demonstrate functional tasks (feeding self, wiping the counter/table, washing dishes, etc.) with 20% use of R UE as gross stabilizer or gross assist for increased ADL/IADL independence and closer return to prior level of function. (2/8 (eval date): using ~3% at home)   Target Date 01/19/19   Date Met      Progress: Carries a bag, manages door levers, faucet handles and drawer handles with R hand.  Working outward 2 hands on a cup with consistency.     Goal Identifier functional/normal movment patterns in R UE and trunk   Goal Description Patient to complete therapeutic task (simple kitchen tasks, laundry, etc.) with use of B UE's and no more than 2 cues in 5 minute time frame for correct body mechanics and for decreasing compensatory movements at the trunk and upper body for encouragement of normal movement patterns, increased R UE function and increased ADL/IADL independence.   Target Date 01/19/19   Date Met      Progress: per above     Goal Identifier visual skills   Goal Description Patient will demonstrate WFLs visual skills (including reaction time and visual scanning skills)  for safe independent community mobility (crossing the street, driving, grocery shopping) by scoring WNLs on all modes of the Dynavision, Scancourse and other visual screens.   Target Date 01/19/19   Date Met      Progress: Pt defers on option to pursue adapted driving for now. Not feeling ready for approaching return to work.  Wants most to keep focusing on making post CVA motor improvements.         Progress Toward Goals:   Progress this reporting period: Measurable steady gains made in 4 of 6 goals, being seen 2x/week since February evaluation as transportation will allow.    Continued emphasis on getting HEP program to manage his tone better (weightbearing, NMES, shoulder pulley kit, prolonged stretches),  releaasing tissue texture changes that span the R hip/thrunk/ribs T8-T10 /shoulder ( via myofascial release/Instrument Assisted Soft Tissue Mobilization/IASTM Graston Technique ) and help refine movement patterns to reduce compenstory patterns that can contribute to pain/ROM loss if not addressed. We are applying these patterns to repetitive exercises and tranferring them to home based 2 handed functional tasks ( laundry, washing dishes, carrying/put away groceries). Pt has made progress with self stretch at home using a pole for increased trunk flexibliity and daily use of pulley system for R shoulder flexion.     Plan:  Continue therapy per current plan of care. Current goals remain appropriate.    Discharge:  No.  Continues to be appropriate for 2x/wk x 12 weeks, as his spastic tone changes are affecting all upgrades of activity (walking, UE use and exercises).  It is necessary to keep addressing tone patterns that can be painful and contribute to disuse of affected extremities.    Thank you for this thoughtful referral! If you have any questions, please call me [phone].  Nice to share in this patient's care with you.    Sincerely,   Danyelle Evans, OTR/l

## 2018-10-25 ENCOUNTER — HOSPITAL ENCOUNTER (OUTPATIENT)
Dept: PHYSICAL THERAPY | Facility: CLINIC | Age: 63
Setting detail: THERAPIES SERIES
End: 2018-10-25
Attending: PHYSICAL MEDICINE & REHABILITATION
Payer: MEDICARE

## 2018-10-25 ENCOUNTER — HOSPITAL ENCOUNTER (OUTPATIENT)
Dept: OCCUPATIONAL THERAPY | Facility: CLINIC | Age: 63
Setting detail: THERAPIES SERIES
End: 2018-10-25
Attending: PHYSICAL MEDICINE & REHABILITATION
Payer: MEDICARE

## 2018-10-25 PROCEDURE — 97110 THERAPEUTIC EXERCISES: CPT | Mod: GO | Performed by: OCCUPATIONAL THERAPIST

## 2018-10-25 PROCEDURE — 97112 NEUROMUSCULAR REEDUCATION: CPT | Mod: GP | Performed by: PHYSICAL THERAPIST

## 2018-10-25 PROCEDURE — 97110 THERAPEUTIC EXERCISES: CPT | Mod: GP | Performed by: PHYSICAL THERAPIST

## 2018-10-25 PROCEDURE — 40000719 ZZHC STATISTIC PT DEPARTMENT NEURO VISIT: Performed by: PHYSICAL THERAPIST

## 2018-10-25 PROCEDURE — 97140 MANUAL THERAPY 1/> REGIONS: CPT | Mod: GP | Performed by: PHYSICAL THERAPIST

## 2018-10-25 PROCEDURE — 40000125 ZZHC STATISTIC OT OUTPT VISIT: Performed by: OCCUPATIONAL THERAPIST

## 2018-10-30 ENCOUNTER — HOSPITAL ENCOUNTER (OUTPATIENT)
Dept: PHYSICAL THERAPY | Facility: CLINIC | Age: 63
Setting detail: THERAPIES SERIES
End: 2018-10-30
Attending: PHYSICAL MEDICINE & REHABILITATION
Payer: MEDICARE

## 2018-10-30 ENCOUNTER — HOSPITAL ENCOUNTER (OUTPATIENT)
Dept: OCCUPATIONAL THERAPY | Facility: CLINIC | Age: 63
Setting detail: THERAPIES SERIES
End: 2018-10-30
Attending: PHYSICAL MEDICINE & REHABILITATION
Payer: MEDICARE

## 2018-10-30 PROCEDURE — 40000125 ZZHC STATISTIC OT OUTPT VISIT: Performed by: OCCUPATIONAL THERAPIST

## 2018-10-30 PROCEDURE — 97535 SELF CARE MNGMENT TRAINING: CPT | Mod: GO | Performed by: OCCUPATIONAL THERAPIST

## 2018-10-30 PROCEDURE — 97110 THERAPEUTIC EXERCISES: CPT | Mod: GP,KX | Performed by: PHYSICAL THERAPIST

## 2018-10-30 PROCEDURE — 97110 THERAPEUTIC EXERCISES: CPT | Mod: GO | Performed by: OCCUPATIONAL THERAPIST

## 2018-10-30 PROCEDURE — 97112 NEUROMUSCULAR REEDUCATION: CPT | Mod: GP,KX | Performed by: PHYSICAL THERAPIST

## 2018-10-30 PROCEDURE — G8978 MOBILITY CURRENT STATUS: HCPCS | Mod: GP,CL | Performed by: PHYSICAL THERAPIST

## 2018-10-30 PROCEDURE — 40000719 ZZHC STATISTIC PT DEPARTMENT NEURO VISIT: Performed by: PHYSICAL THERAPIST

## 2018-10-30 PROCEDURE — G8979 MOBILITY GOAL STATUS: HCPCS | Mod: GP,CJ | Performed by: PHYSICAL THERAPIST

## 2018-11-01 ENCOUNTER — HOSPITAL ENCOUNTER (OUTPATIENT)
Dept: PHYSICAL THERAPY | Facility: CLINIC | Age: 63
Setting detail: THERAPIES SERIES
End: 2018-11-01
Attending: PHYSICAL MEDICINE & REHABILITATION
Payer: MEDICARE

## 2018-11-01 ENCOUNTER — HOSPITAL ENCOUNTER (OUTPATIENT)
Dept: OCCUPATIONAL THERAPY | Facility: CLINIC | Age: 63
Setting detail: THERAPIES SERIES
End: 2018-11-01
Attending: PHYSICAL MEDICINE & REHABILITATION
Payer: MEDICARE

## 2018-11-01 PROCEDURE — 97112 NEUROMUSCULAR REEDUCATION: CPT | Mod: GP,KX | Performed by: PHYSICAL THERAPIST

## 2018-11-01 PROCEDURE — 40000125 ZZHC STATISTIC OT OUTPT VISIT: Performed by: OCCUPATIONAL THERAPIST

## 2018-11-01 PROCEDURE — 97140 MANUAL THERAPY 1/> REGIONS: CPT | Mod: GP,KX | Performed by: PHYSICAL THERAPIST

## 2018-11-01 PROCEDURE — 97110 THERAPEUTIC EXERCISES: CPT | Mod: GP,KX | Performed by: PHYSICAL THERAPIST

## 2018-11-01 PROCEDURE — 40000719 ZZHC STATISTIC PT DEPARTMENT NEURO VISIT: Performed by: PHYSICAL THERAPIST

## 2018-11-01 PROCEDURE — 97535 SELF CARE MNGMENT TRAINING: CPT | Mod: GO | Performed by: OCCUPATIONAL THERAPIST

## 2018-11-01 PROCEDURE — 97110 THERAPEUTIC EXERCISES: CPT | Mod: GO | Performed by: OCCUPATIONAL THERAPIST

## 2018-11-06 ENCOUNTER — HOSPITAL ENCOUNTER (OUTPATIENT)
Dept: OCCUPATIONAL THERAPY | Facility: CLINIC | Age: 63
Setting detail: THERAPIES SERIES
End: 2018-11-06
Attending: PHYSICAL MEDICINE & REHABILITATION
Payer: MEDICARE

## 2018-11-06 PROCEDURE — 97140 MANUAL THERAPY 1/> REGIONS: CPT | Mod: GO | Performed by: OCCUPATIONAL THERAPIST

## 2018-11-06 PROCEDURE — 97110 THERAPEUTIC EXERCISES: CPT | Mod: GO,KX | Performed by: OCCUPATIONAL THERAPIST

## 2018-11-06 PROCEDURE — 40000125 ZZHC STATISTIC OT OUTPT VISIT: Performed by: OCCUPATIONAL THERAPIST

## 2018-11-08 ENCOUNTER — HOSPITAL ENCOUNTER (OUTPATIENT)
Dept: OCCUPATIONAL THERAPY | Facility: CLINIC | Age: 63
Setting detail: THERAPIES SERIES
End: 2018-11-08
Attending: PHYSICAL MEDICINE & REHABILITATION
Payer: MEDICARE

## 2018-11-08 ENCOUNTER — HOSPITAL ENCOUNTER (OUTPATIENT)
Dept: PHYSICAL THERAPY | Facility: CLINIC | Age: 63
Setting detail: THERAPIES SERIES
End: 2018-11-08
Attending: PHYSICAL MEDICINE & REHABILITATION
Payer: MEDICARE

## 2018-11-08 PROCEDURE — 97535 SELF CARE MNGMENT TRAINING: CPT | Mod: GO,KX | Performed by: OCCUPATIONAL THERAPIST

## 2018-11-08 PROCEDURE — 40000125 ZZHC STATISTIC OT OUTPT VISIT: Performed by: OCCUPATIONAL THERAPIST

## 2018-11-08 PROCEDURE — 40000719 ZZHC STATISTIC PT DEPARTMENT NEURO VISIT: Performed by: PHYSICAL THERAPIST

## 2018-11-08 PROCEDURE — 97110 THERAPEUTIC EXERCISES: CPT | Mod: GP,KX | Performed by: PHYSICAL THERAPIST

## 2018-11-08 PROCEDURE — 97112 NEUROMUSCULAR REEDUCATION: CPT | Mod: GP,KX | Performed by: PHYSICAL THERAPIST

## 2018-11-08 PROCEDURE — 97116 GAIT TRAINING THERAPY: CPT | Mod: GP,KX | Performed by: PHYSICAL THERAPIST

## 2018-11-08 PROCEDURE — 97110 THERAPEUTIC EXERCISES: CPT | Mod: GO,KX | Performed by: OCCUPATIONAL THERAPIST

## 2018-11-08 NOTE — ADDENDUM NOTE
Encounter addended by: Allyssa Martinez, OT on: 11/8/2018 11:32 AM<BR>     Actions taken: Flowsheet accepted

## 2018-11-13 ENCOUNTER — HOSPITAL ENCOUNTER (OUTPATIENT)
Dept: OCCUPATIONAL THERAPY | Facility: CLINIC | Age: 63
Setting detail: THERAPIES SERIES
End: 2018-11-13
Attending: PHYSICAL MEDICINE & REHABILITATION
Payer: MEDICARE

## 2018-11-13 ENCOUNTER — HOSPITAL ENCOUNTER (OUTPATIENT)
Dept: PHYSICAL THERAPY | Facility: CLINIC | Age: 63
Setting detail: THERAPIES SERIES
End: 2018-11-13
Attending: PHYSICAL MEDICINE & REHABILITATION
Payer: MEDICARE

## 2018-11-13 PROCEDURE — 40000125 ZZHC STATISTIC OT OUTPT VISIT: Performed by: OCCUPATIONAL THERAPIST

## 2018-11-13 PROCEDURE — 97110 THERAPEUTIC EXERCISES: CPT | Mod: GP,KX | Performed by: PHYSICAL THERAPIST

## 2018-11-13 PROCEDURE — 97112 NEUROMUSCULAR REEDUCATION: CPT | Mod: GP,KX | Performed by: PHYSICAL THERAPIST

## 2018-11-13 PROCEDURE — 40000719 ZZHC STATISTIC PT DEPARTMENT NEURO VISIT: Performed by: PHYSICAL THERAPIST

## 2018-11-13 PROCEDURE — 97140 MANUAL THERAPY 1/> REGIONS: CPT | Mod: GO,KX | Performed by: OCCUPATIONAL THERAPIST

## 2018-11-13 NOTE — ADDENDUM NOTE
Encounter addended by: Danyelle Evans OTR on: 11/13/2018  9:03 AM<BR>     Actions taken: Flowsheet accepted, Charge Capture section accepted

## 2018-11-15 ENCOUNTER — HOSPITAL ENCOUNTER (OUTPATIENT)
Dept: OCCUPATIONAL THERAPY | Facility: CLINIC | Age: 63
Setting detail: THERAPIES SERIES
End: 2018-11-15
Attending: PHYSICAL MEDICINE & REHABILITATION
Payer: MEDICARE

## 2018-11-15 ENCOUNTER — HOSPITAL ENCOUNTER (OUTPATIENT)
Dept: PHYSICAL THERAPY | Facility: CLINIC | Age: 63
Setting detail: THERAPIES SERIES
End: 2018-11-15
Attending: PHYSICAL MEDICINE & REHABILITATION
Payer: MEDICARE

## 2018-11-15 PROCEDURE — 40000125 ZZHC STATISTIC OT OUTPT VISIT: Performed by: OCCUPATIONAL THERAPIST

## 2018-11-15 PROCEDURE — 97110 THERAPEUTIC EXERCISES: CPT | Mod: GO,KX | Performed by: OCCUPATIONAL THERAPIST

## 2018-11-15 PROCEDURE — 97110 THERAPEUTIC EXERCISES: CPT | Mod: GP,KX | Performed by: PHYSICAL THERAPIST

## 2018-11-15 PROCEDURE — 97535 SELF CARE MNGMENT TRAINING: CPT | Mod: GO,KX | Performed by: OCCUPATIONAL THERAPIST

## 2018-11-15 PROCEDURE — 40000719 ZZHC STATISTIC PT DEPARTMENT NEURO VISIT: Performed by: PHYSICAL THERAPIST

## 2018-11-15 PROCEDURE — 97112 NEUROMUSCULAR REEDUCATION: CPT | Mod: GP,KX | Performed by: PHYSICAL THERAPIST

## 2018-11-20 ENCOUNTER — HOSPITAL ENCOUNTER (OUTPATIENT)
Dept: PHYSICAL THERAPY | Facility: CLINIC | Age: 63
Setting detail: THERAPIES SERIES
End: 2018-11-20
Attending: PHYSICAL MEDICINE & REHABILITATION
Payer: MEDICARE

## 2018-11-20 PROCEDURE — 40000719 ZZHC STATISTIC PT DEPARTMENT NEURO VISIT: Performed by: PHYSICAL THERAPIST

## 2018-11-20 PROCEDURE — G8978 MOBILITY CURRENT STATUS: HCPCS | Mod: GP,CK | Performed by: PHYSICAL THERAPIST

## 2018-11-20 PROCEDURE — 97116 GAIT TRAINING THERAPY: CPT | Mod: GP,KX | Performed by: PHYSICAL THERAPIST

## 2018-11-20 PROCEDURE — 97110 THERAPEUTIC EXERCISES: CPT | Mod: GP,KX | Performed by: PHYSICAL THERAPIST

## 2018-11-20 PROCEDURE — G8979 MOBILITY GOAL STATUS: HCPCS | Mod: GP,CJ | Performed by: PHYSICAL THERAPIST

## 2018-11-20 NOTE — ADDENDUM NOTE
Encounter addended by: Yen Villanueva, PT on: 11/20/2018  3:34 PM<BR>     Actions taken: Sign clinical note

## 2018-11-20 NOTE — PROGRESS NOTES
[facility]      OUTPATIENT PHYSICAL THERAPY  PLAN OF TREATMENT FOR OUTPATIENT REHABILITATION    Patient's Last Name, First Name, M.I.                YOB: 1955  Luis Trinidad                        Provider's Name  [facility] Medical Record No.  [id number]                               Onset Date: 11/4/2015   Start of Care Date: 5/1/2018   Type:     _X_PT   ___OT   ___SLP Medical Diagnosis: ischemic CVA                       PT Diagnosis: Impaired gait and mobility due to right hemiparesis and spasticity      _________________________________________________________________________________  Plan of Treatment:    Frequency/Duration: 2 x per week x 90 days.     Goals:  Goal Identifier 1 - Gait pattern/safety   Goal Description Rogelio will be able to achieve proper midstance to terminal stance alignment on right LE and transition into pre and initial swing with proper clearance of right foot for greater efficiency and safety with gait at home and in the community.   Target Date 10/29/18   Date Met   in progress   Progress:  Rogelio is showing noticeable improvement in his base of support and right LE stance alignment.  This has allowed him to demo better forward progression and stable placement of left foot at initial contact.  Right foot clearance has improved with better alignment into terminal stance which sets him up for better swing phase, but weakness at ankle DF/everters and knee flexors still results in decreased clearance of his right foot.  This is most noticeable when trying to ambulate at faster paces.       Goal Identifier 2- Gait speed   Goal Description Rogelio will increase his gait speed on the 25 foot timed walk to 9 sec or less without use of an assistive device  to indicate reduced level of gait impairment and improved community mobility.   Target Date 10/29/18   Date  Met    In progress   Progress: best time to date 10.9.   Best time today 11 sec - no shoes, min facil to sustain right hip ext and wt shift through right stance phase.      Goal Identifier 3- stairs   Goal Description Pt will be able to navigate up and down his stairs with one rail in a reciprocal pattern with minimal circumduction of right LE in order for more efficient and safe access to all levels of his home.   Target Date 10/29/18    Date Met   in progress   Progress:  At home Rogelio needs to go down stairs backward due to having only 1 rail. He also needs to use step-to pattern at at home going up due to decreased knee flexion strength to clear step.  In the clinic Rogelio is able to descend steps reciprocally with rail on his right independently and with excellent technique.         Goal Identifier 5 - home program   Goal Description Rogelio will be independent with a home activity program to address his impairments and self manage his recovery.    Target Date 10/29/18   Date Met   in progress   Progress: cont to be progressed and modified as appropriate.     Progress Toward Goals:   Progress this reporting period:  Rogelio continues to make progress toward all goals as indicated above.  Progress toward stair goal limited by environmental factor of having only one railing.   Progress limited due to spasticity and tightness of his right UE, trunk and LE as well as weakness of his ankle DF and HS which has been improving but slowly.      Pt will continue to benefit from skilled intervention 2 x per week to maximize functional mobility, movement patterns and safety and reduce risk of disuse atrophy and tissue shortening that lead to increased level of disability and falls risk.      Certification date from 10/30/2018 to 1/28/2019.    Yen Villanueva, PT          I CERTIFY THE NEED FOR THESE SERVICES FURNISHED UNDER        THIS PLAN OF TREATMENT AND WHILE UNDER MY CARE     (Physician co-signature of this document indicates  review and certification of the therapy plan).                Referring Provider: Dr. Chele Reno

## 2018-11-27 ENCOUNTER — HOSPITAL ENCOUNTER (OUTPATIENT)
Dept: PHYSICAL THERAPY | Facility: CLINIC | Age: 63
Setting detail: THERAPIES SERIES
End: 2018-11-27
Attending: PHYSICAL MEDICINE & REHABILITATION
Payer: MEDICARE

## 2018-11-27 ENCOUNTER — HOSPITAL ENCOUNTER (OUTPATIENT)
Dept: OCCUPATIONAL THERAPY | Facility: CLINIC | Age: 63
Setting detail: THERAPIES SERIES
End: 2018-11-27
Attending: PHYSICAL MEDICINE & REHABILITATION
Payer: MEDICARE

## 2018-11-27 PROCEDURE — 97110 THERAPEUTIC EXERCISES: CPT | Mod: GP,KX | Performed by: PHYSICAL THERAPIST

## 2018-11-27 PROCEDURE — 97140 MANUAL THERAPY 1/> REGIONS: CPT | Mod: GO,KX | Performed by: OCCUPATIONAL THERAPIST

## 2018-11-27 PROCEDURE — 40000125 ZZHC STATISTIC OT OUTPT VISIT: Performed by: OCCUPATIONAL THERAPIST

## 2018-11-27 PROCEDURE — 40000719 ZZHC STATISTIC PT DEPARTMENT NEURO VISIT: Performed by: PHYSICAL THERAPIST

## 2018-11-27 PROCEDURE — 97112 NEUROMUSCULAR REEDUCATION: CPT | Mod: GP,KX | Performed by: PHYSICAL THERAPIST

## 2018-11-27 PROCEDURE — 97110 THERAPEUTIC EXERCISES: CPT | Mod: GO,KX | Performed by: OCCUPATIONAL THERAPIST

## 2018-11-28 NOTE — ADDENDUM NOTE
Encounter addended by: Yen Villanueva, PT on: 11/28/2018  3:11 PM<BR>     Actions taken: Flowsheet accepted

## 2018-11-28 NOTE — ADDENDUM NOTE
Encounter addended by: Yen Villanueva, PT on: 11/28/2018  3:14 PM<BR>     Actions taken: Flowsheet data copied forward, Flowsheet accepted

## 2018-11-28 NOTE — ADDENDUM NOTE
Encounter addended by: Yen Villanueva, PT on: 11/28/2018  3:17 PM<BR>     Actions taken: Flowsheet accepted

## 2018-11-28 NOTE — ADDENDUM NOTE
Encounter addended by: Yen Villanueva, PT on: 11/28/2018  3:15 PM<BR>     Actions taken: Flowsheet data copied forward, Flowsheet accepted

## 2018-11-28 NOTE — ADDENDUM NOTE
Encounter addended by: Yen Villanueva, PT on: 11/28/2018  3:19 PM<BR>     Actions taken: Flowsheet accepted

## 2018-11-28 NOTE — ADDENDUM NOTE
Encounter addended by: Yen Villanueva, PT on: 11/28/2018  3:16 PM<BR>     Actions taken: Flowsheet data copied forward, Flowsheet accepted

## 2018-11-28 NOTE — ADDENDUM NOTE
Encounter addended by: Yen Villanueva, PT on: 11/28/2018  3:09 PM<BR>     Actions taken: Flowsheet data copied forward, Flowsheet accepted, Charge Capture section accepted

## 2018-11-29 ENCOUNTER — HOSPITAL ENCOUNTER (OUTPATIENT)
Dept: PHYSICAL THERAPY | Facility: CLINIC | Age: 63
Setting detail: THERAPIES SERIES
End: 2018-11-29
Attending: PHYSICAL MEDICINE & REHABILITATION
Payer: MEDICARE

## 2018-11-29 ENCOUNTER — OFFICE VISIT (OUTPATIENT)
Dept: PHYSICAL MEDICINE AND REHAB | Facility: CLINIC | Age: 63
End: 2018-11-29
Payer: MEDICARE

## 2018-11-29 ENCOUNTER — HOSPITAL ENCOUNTER (OUTPATIENT)
Dept: OCCUPATIONAL THERAPY | Facility: CLINIC | Age: 63
Setting detail: THERAPIES SERIES
End: 2018-11-29
Attending: PHYSICAL MEDICINE & REHABILITATION
Payer: MEDICARE

## 2018-11-29 VITALS
WEIGHT: 182.8 LBS | OXYGEN SATURATION: 98 % | DIASTOLIC BLOOD PRESSURE: 90 MMHG | BODY MASS INDEX: 31.38 KG/M2 | HEART RATE: 102 BPM | TEMPERATURE: 98 F | SYSTOLIC BLOOD PRESSURE: 141 MMHG

## 2018-11-29 DIAGNOSIS — G81.11 RIGHT SPASTIC HEMIPARESIS (H): Primary | ICD-10-CM

## 2018-11-29 PROCEDURE — 97112 NEUROMUSCULAR REEDUCATION: CPT | Mod: GP,KX | Performed by: PHYSICAL THERAPIST

## 2018-11-29 PROCEDURE — 40000125 ZZHC STATISTIC OT OUTPT VISIT: Performed by: OCCUPATIONAL THERAPIST

## 2018-11-29 PROCEDURE — 97116 GAIT TRAINING THERAPY: CPT | Mod: GP,KX | Performed by: PHYSICAL THERAPIST

## 2018-11-29 PROCEDURE — 97110 THERAPEUTIC EXERCISES: CPT | Mod: GP,KX | Performed by: PHYSICAL THERAPIST

## 2018-11-29 PROCEDURE — 40000719 ZZHC STATISTIC PT DEPARTMENT NEURO VISIT: Performed by: PHYSICAL THERAPIST

## 2018-11-29 PROCEDURE — 97110 THERAPEUTIC EXERCISES: CPT | Mod: GO,KX | Performed by: OCCUPATIONAL THERAPIST

## 2018-11-29 RX ORDER — LISINOPRIL AND HYDROCHLOROTHIAZIDE 12.5; 2 MG/1; MG/1
2 TABLET ORAL
Refills: 3 | COMMUNITY
Start: 2018-11-13

## 2018-11-29 ASSESSMENT — PAIN SCALES - GENERAL: PAINLEVEL: NO PAIN (0)

## 2018-11-29 NOTE — MR AVS SNAPSHOT
After Visit Summary   11/29/2018    Luis Trinidad    MRN: [id number]           Patient Information     Date Of Birth          1955        Visit Information        Provider Department      11/29/2018 6:20 PM Chele Reno MD [facility] Physical Medicine and Rehabilitation        Today's Diagnoses     Right spastic hemiparesis (H)    -  1       Follow-ups after your visit        Follow-up notes from your care team     Return in about 3 months (around 2/28/2019).      Your next 10 appointments already scheduled     Dec 04, 2018 11:00 AM CST   Neuro Treatment with Danyelle Evans, OTR   [facility] Occupational Therapy ([facility])    [address] [facility] [address]   [phone]            Dec 04, 2018 12:00 PM CST   Neuro Treatment with Yen Villanueva, PT   [facility] Physical Therapy ([facility])    [address] [facility] [address]   [phone]            Dec 06, 2018 10:00 AM CST   Neuro Treatment with Yen Villanueva, PT   [facility] Physical Therapy ([facility])    [address] [facility] [address]   [phone]            Dec 06, 2018 11:15 AM CST   Neuro Treatment with Allyssa Martinez, OT   [facility] Occupational Therapy ([facility])    [address] [facility] [address]   [phone]            Dec 11, 2018 11:00 AM CST   Neuro Treatment with Danyelle Evans, OTR   [facility] Occupational Therapy ([facility])    [address] [facility] [address]   [phone]            Dec 11, 2018 12:00 PM CST   Neuro Treatment with Yen Villanueva, PT   [facility] Physical Therapy ([facility])    [address] [facility] [address]   [phone]            Dec 13, 2018 10:30 AM CST   Neuro Treatment with Allyssa Martinez, OT   [facility] Occupational Therapy ([facility])     [address] [facility] [address]   [phone]            Dec 13, 2018 11:30 AM CST   Neuro Treatment with Yen Villanueva, PT   [facility] Physical Therapy ([facility])    [address] [facility] [address] [phone]            Dec 18, 2018  8:45 AM CST   Neuro Treatment with Danyelle Evans OTR   [facility] Occupational Therapy ([facility])    [address] [facility] [id number] [phone]            Dec 18, 2018  9:45 AM CST   Neuro Treatment with Yen Villanueva, PT   [facility] Physical Therapy ([facility])    [address] [facility] [id number] [phone]              Who to contact     Please call your clinic at [phone] to:    Ask questions about your health    Make or cancel appointments    Discuss your medicines    Learn about your test results    Speak to your doctor            Additional Information About Your Visit        Care EveryWhere ID     This is your Care EveryWhere ID. This could be used by other organizations to access your [facility] medical records  [id number]        Your Vitals Were     Pulse Temperature Pulse Oximetry BMI (Body Mass Index)          102 98  F (36.7  C) (Oral) 98% 31.38 kg/m2         Blood Pressure from Last 3 Encounters:   11/29/18 141/90   08/01/18 143/87   04/25/18 157/86    Weight from Last 3 Encounters:   11/29/18 82.9 kg (182 lb 12.8 oz)   08/01/18 82.1 kg (181 lb)   04/25/18 82.1 kg (181 lb)              We Performed the Following     HC CHEMODENERVATION OF TRUNK MUSCLE, 1-5 MUSCLES     HC CHEMODENERVATION ONE EXTREMITY, 1-4 MUSCLE     NEEDLE EMG GUIDE W CHEMODENERVATION        Primary Care Provider Office Phone # Fax #    Zaid Whitten [phone]       [facility] [place] [address] [facility] [address]        Equal Access to Services     TERRY MCKEON AH: Leonarda Trejo, yomi cho,  elisabeth albertharpal mildredtorsten mosley elizabethbobby tinajeroaavishal ah. So [facility] [place] [phone].    ATENCIÓN: Si dax charles, tiene a bolanos disposición servicios gratuitos de asistencia lingüística. Benoit al [phone].    We comply with applicable federal civil rights laws and [place] laws. We do not discriminate on the basis of race, color, national origin, age, disability, sex, sexual orientation, or gender identity.            Thank you!     Thank you for choosing [facility] PHYSICAL MEDICINE AND REHABILITATION  for your care. Our goal is always to provide you with excellent care. Hearing back from our patients is one way we can continue to improve our services. Please take a few minutes to complete the written survey that you may receive in the mail after your visit with us. Thank you!             Your Updated Medication List - Protect others around you: Learn how to safely use, store and throw away your medicines at www.disposemymeds.org.          This list is accurate as of 11/29/18  7:17 PM.  Always use your most recent med list.                   Brand Name Dispense Instructions for use Diagnosis    aspirin 81 MG EC tablet    ASA     Take 1 tablet (81 mg) by mouth daily    Lacunar stroke of left subthalamic region (H)       atorvastatin 40 MG tablet    LIPITOR     Take 40 mg by mouth        baclofen 10 MG tablet    LIORESAL    630 tablet    TAKE 2 TABLETS BY MOUTH IN THE MORNING, 2 TABLETS MIDDAY, AND 3 TABLETS AT BEDTIME    Spasticity       botulinum toxin type A 100 units injection    BOTOX    400 Units    Inject 400 Units into the muscle every 3 months    Spastic hemiplegia (H)       cholecalciferol 2000 units tablet     30 tablet    Take 2,000 Units by mouth daily    Mixed hyperlipidemia       * clopidogrel 75 MG tablet    PLAVIX    30 tablet    Take 1 tablet (75 mg) by mouth daily    Lacunar stroke of left subthalamic region (H)       * clopidogrel 75 MG tablet    PLAVIX     Take 75 mg by mouth         co-enzyme Q-10 100 MG Caps capsule     30 capsule    Take 1 capsule (100 mg) by mouth daily    Mixed hyperlipidemia       * lisinopril-hydrochlorothiazide 10-12.5 MG tablet    PRINZIDE/ZESTORETIC     Take 1 tablet by mouth        * lisinopril-hydrochlorothiazide 10-12.5 MG tablet    PRINZIDE/ZESTORETIC     Take 2 tablets by mouth        * lisinopril-hydrochlorothiazide 20-12.5 MG tablet    PRINZIDE/ZESTORETIC     2 tablets        simvastatin 10 MG tablet    ZOCOR    30 tablet    Take 1 tablet (10 mg) by mouth every evening    Mixed hyperlipidemia       tetrahydrozoline 0.05 % ophthalmic solution    VISINE     Place 1 drop into both eyes 3 times daily as needed        * Notice:  This list has 5 medication(s) that are the same as other medications prescribed for you. Read the directions carefully, and ask your doctor or other care provider to review them with you.

## 2018-11-29 NOTE — LETTER
11/29/2018       RE: Luis Trinidad  [address] [facility] [address]     Dear Colleague,    Thank you for referring your patient, Luis Trinidad, to the [facility] PHYSICAL MEDICINE AND REHABILITATION at [facility]. Please see a copy of my visit note below.    PM&R Clinic & Procedure Note:    Luis Trinidad is 63-year-old male with a history of stroke in Nov 2016 resulting in residual right spastic hemiparesis.  Rogelio was last seen on 8/1/18 at which time he received 400 units of botox at that time to his Right trunk and right arm. He's pleased with the response with particular good benefit in reduction of spasticity, ability to open his fingers and extend the elbow for the first 4 weeks.  It started to then drop off and get more tight slowly over the next many weeks until now.  He continues to be aggressive with his therapy and exercises including range of motion and electrical stimulation.  He does not report any new medical concerns today and is feeling well.  Very interested in reinjection with botulinum toxin today.     Physical exam:  /90 (BP Location: Left arm, Patient Position: Chair, Cuff Size: Adult Regular)  Pulse 102  Temp 98  F (36.7  C) (Oral)  Wt 182 lb 12.8 oz (82.9 kg)  SpO2 98%  BMI 31.38 kg/m2   Alert pleasant bright affect   Increased tone noted in RUE and along right pec and right trunk. AROM: shoulder abduction 90 degree, elbow extension approx 140, wrist remains in neutral, able to extend fingers, but occassionally uses left hand for assistance.  MAS: SA: 3/4, EE 3/4, WE: 3/4, FE: 3/4  Neuro:  Gait: right hemispastic gait noted    Assessment and recommendations:  1.  Plan to re-inject 400 units today into right chest and right arm., slight shift in distribution from wrist flexors to long finger flexors.  2.  Continue with ROM and other maintenance exercises.  3.  Continue oral baclofen 20 20 30 for partial tone reduction in broader  distribution.  4. Follow up in 3 months.    Procedure:   Chemodenervation to right chest and RUE utilizing botulinum toxin.  Began with formal consent which he signed. Had formal time-out prior to procedure.  400 U of Botox lot  C3, expiration 06 2021 , Botox NDC 8166-6131-90 was utilized. Diluted with normal saline in a 100 U:1mL ratio, total of 4 mL.  All areas were cleansed with chloroprep prior to injecting. EMG guidance was utilized to identify most active motor units while avoiding surrounding structures.     The following muscles were then injected, two body areas     Right trunk:  1. Right Pectoralis Major: 100 units divided in 3 sites  2. Latissimus Dorsi 30 units 1 sites.     Right arm  1. Biceps 70 units divided 1 sites.  2. FDS 60 units  3. FDP 60 units  4. FCR 40 units  5. FCU 40 units    Procedure was tolerated well, he leaves clinic feeling well.    Again, thank you for allowing me to participate in the care of your patient.      Sincerely,    Chele Reno MD

## 2018-11-30 NOTE — NURSING NOTE
Chief Complaint   Patient presents with     RECHECK     [facility] RETURN BOTOX       Dannielle Chavarria, EMT

## 2018-11-30 NOTE — PROGRESS NOTES
PM&R Clinic & Procedure Note:    Luis Trinidad is 63-year-old male with a history of stroke in Nov 2016 resulting in residual right spastic hemiparesis.  Rogelio was last seen on 8/1/18 at which time he received 400 units of botox at that time to his Right trunk and right arm. He's pleased with the response with particular good benefit in reduction of spasticity, ability to open his fingers and extend the elbow for the first 4 weeks.  It started to then drop off and get more tight slowly over the next many weeks until now.  He continues to be aggressive with his therapy and exercises including range of motion and electrical stimulation.  He does not report any new medical concerns today and is feeling well.  Very interested in reinjection with botulinum toxin today.     Physical exam:  /90 (BP Location: Left arm, Patient Position: Chair, Cuff Size: Adult Regular)  Pulse 102  Temp 98  F (36.7  C) (Oral)  Wt 182 lb 12.8 oz (82.9 kg)  SpO2 98%  BMI 31.38 kg/m2   Alert pleasant bright affect   Increased tone noted in RUE and along right pec and right trunk. AROM: shoulder abduction 90 degree, elbow extension approx 140, wrist remains in neutral, able to extend fingers, but occassionally uses left hand for assistance.  MAS: SA: 3/4, EE 3/4, WE: 3/4, FE: 3/4  Neuro:  Gait: right hemispastic gait noted    Assessment and recommendations:  1.  Plan to re-inject 400 units today into right chest and right arm., slight shift in distribution from wrist flexors to long finger flexors.  2.  Continue with ROM and other maintenance exercises.  3.  Continue oral baclofen 20 20 30 for partial tone reduction in broader distribution.  4. Follow up in 3 months.    Procedure:   Chemodenervation to right chest and RUE utilizing botulinum toxin.  Began with formal consent which he signed. Had formal time-out prior to procedure.  400 U of Botox lot  C3, expiration 06 2021 , Botox NDC 4849-2578-66 was utilized. Diluted with normal  saline in a 100 U:1mL ratio, total of 4 mL.  All areas were cleansed with chloroprep prior to injecting. EMG guidance was utilized to identify most active motor units while avoiding surrounding structures.     The following muscles were then injected, two body areas     Right trunk:  1. Right Pectoralis Major: 100 units divided in 3 sites  2. Latissimus Dorsi 30 units 1 sites.     Right arm  1. Biceps 70 units divided 1 sites.  2. FDS 60 units  3. FDP 60 units  4. FCR 40 units  5. FCU 40 units    Procedure was tolerated well, he leaves clinic feeling well.

## 2018-12-04 ENCOUNTER — HOSPITAL ENCOUNTER (OUTPATIENT)
Dept: OCCUPATIONAL THERAPY | Facility: CLINIC | Age: 63
Setting detail: THERAPIES SERIES
End: 2018-12-04
Attending: PHYSICAL MEDICINE & REHABILITATION
Payer: MEDICARE

## 2018-12-04 ENCOUNTER — HOSPITAL ENCOUNTER (OUTPATIENT)
Dept: PHYSICAL THERAPY | Facility: CLINIC | Age: 63
Setting detail: THERAPIES SERIES
End: 2018-12-04
Attending: PHYSICAL MEDICINE & REHABILITATION
Payer: MEDICARE

## 2018-12-04 PROCEDURE — 40000719 ZZHC STATISTIC PT DEPARTMENT NEURO VISIT: Performed by: PHYSICAL THERAPIST

## 2018-12-04 PROCEDURE — 97110 THERAPEUTIC EXERCISES: CPT | Mod: GP | Performed by: PHYSICAL THERAPIST

## 2018-12-04 PROCEDURE — 97110 THERAPEUTIC EXERCISES: CPT | Mod: GO,KX | Performed by: OCCUPATIONAL THERAPIST

## 2018-12-04 PROCEDURE — 97116 GAIT TRAINING THERAPY: CPT | Mod: GP | Performed by: PHYSICAL THERAPIST

## 2018-12-04 PROCEDURE — 97112 NEUROMUSCULAR REEDUCATION: CPT | Mod: GP | Performed by: PHYSICAL THERAPIST

## 2018-12-04 PROCEDURE — 40000125 ZZHC STATISTIC OT OUTPT VISIT: Performed by: OCCUPATIONAL THERAPIST

## 2018-12-06 ENCOUNTER — HOSPITAL ENCOUNTER (OUTPATIENT)
Dept: OCCUPATIONAL THERAPY | Facility: CLINIC | Age: 63
Setting detail: THERAPIES SERIES
End: 2018-12-06
Attending: PHYSICAL MEDICINE & REHABILITATION
Payer: MEDICARE

## 2018-12-06 ENCOUNTER — HOSPITAL ENCOUNTER (OUTPATIENT)
Dept: PHYSICAL THERAPY | Facility: CLINIC | Age: 63
Setting detail: THERAPIES SERIES
End: 2018-12-06
Attending: PHYSICAL MEDICINE & REHABILITATION
Payer: MEDICARE

## 2018-12-06 PROCEDURE — G8984 CARRY CURRENT STATUS: HCPCS | Mod: GO,CL | Performed by: OCCUPATIONAL THERAPIST

## 2018-12-06 PROCEDURE — 97116 GAIT TRAINING THERAPY: CPT | Mod: GP,KX | Performed by: PHYSICAL THERAPIST

## 2018-12-06 PROCEDURE — 97110 THERAPEUTIC EXERCISES: CPT | Mod: GO | Performed by: OCCUPATIONAL THERAPIST

## 2018-12-06 PROCEDURE — 40000719 ZZHC STATISTIC PT DEPARTMENT NEURO VISIT: Performed by: PHYSICAL THERAPIST

## 2018-12-06 PROCEDURE — G8985 CARRY GOAL STATUS: HCPCS | Mod: GO,CJ | Performed by: OCCUPATIONAL THERAPIST

## 2018-12-06 PROCEDURE — 97110 THERAPEUTIC EXERCISES: CPT | Mod: GP,KX | Performed by: PHYSICAL THERAPIST

## 2018-12-11 ENCOUNTER — HOSPITAL ENCOUNTER (OUTPATIENT)
Dept: OCCUPATIONAL THERAPY | Facility: CLINIC | Age: 63
Setting detail: THERAPIES SERIES
End: 2018-12-11
Attending: PHYSICAL MEDICINE & REHABILITATION
Payer: MEDICARE

## 2018-12-11 ENCOUNTER — HOSPITAL ENCOUNTER (OUTPATIENT)
Dept: PHYSICAL THERAPY | Facility: CLINIC | Age: 63
Setting detail: THERAPIES SERIES
End: 2018-12-11
Attending: PHYSICAL MEDICINE & REHABILITATION
Payer: MEDICARE

## 2018-12-11 PROCEDURE — 97116 GAIT TRAINING THERAPY: CPT | Mod: GP,KX | Performed by: PHYSICAL THERAPIST

## 2018-12-11 PROCEDURE — G8979 MOBILITY GOAL STATUS: HCPCS | Mod: GP,CJ | Performed by: PHYSICAL THERAPIST

## 2018-12-11 PROCEDURE — 97110 THERAPEUTIC EXERCISES: CPT | Mod: GO | Performed by: OCCUPATIONAL THERAPIST

## 2018-12-11 PROCEDURE — G8978 MOBILITY CURRENT STATUS: HCPCS | Mod: GP,CK | Performed by: PHYSICAL THERAPIST

## 2018-12-11 PROCEDURE — 97112 NEUROMUSCULAR REEDUCATION: CPT | Mod: GP,KX | Performed by: PHYSICAL THERAPIST

## 2018-12-11 PROCEDURE — 97112 NEUROMUSCULAR REEDUCATION: CPT | Mod: GO | Performed by: OCCUPATIONAL THERAPIST

## 2018-12-11 PROCEDURE — 97110 THERAPEUTIC EXERCISES: CPT | Mod: GP,KX | Performed by: PHYSICAL THERAPIST

## 2018-12-11 NOTE — ADDENDUM NOTE
Encounter addended by: Allyssa Martinez, OT on: 12/11/2018 8:21 AM   Actions taken: Flowsheet accepted, Charge Capture section accepted

## 2018-12-12 NOTE — PROGRESS NOTES
Physical Therapy 10th Visit Progress Note    MD: Dr. Chele Reno  Date: 11/1/2018 - 12/11/2018 12/11/18 1200   Signing Clinician's Name / Credentials   Signing clinician's name / credentials Yen Villanueva PT   Session Number   Session Number 10/10 medicare   Authorization status kx modifier needed.  Pt is requires continued PT beyond the therapy cap to maximize safety and reduce risk for falls due to significant spasticity and impaired motor patterns related to his right hemiparesis.    Progress Note/Recertification   Recertification Due Date 01/28/19   PT G-Codes   G-code Mobility: Walking and moving around   Mobility: Walking and Moving Around Current Status () CK   Current Mobility Modifier Rationale 25 foot walk   Mobility: Walking and Moving Around Goal Status () CJ   Goal 1   Goal Description Rogelio will be able to achieve proper midstance to terminal stance alignment on right LE and transition into pre and initial swing with proper clearance of right foot for greater efficiency and safety with gait at home and in the community.   Target Date 01/28/19  (in progress)   Date Met (improving RK with gait, improved midstance alignment)   Goal Identifier 1 - Gait pattern/safety   Goal 2   Goal Description Rogelio will increase his gait speed on the 25 foot timed walk to 9 sec or less without use of an assistive device  to indicate reduced level of gait impairment and improved community mobility.   Target Date 01/28/19  (in progress)   Date Met (improving gait speed without shoes, inconsistent with shoes)   Goal Identifier 2- Gait speed   Goal 3   Goal Description Pt will be able to navigate up and down his stairs with one rail in a reciprocal pattern with minimal circumduction of right LE in order for more efficient and safe access to all levels of his home.   Target Date 01/28/19  (in progress)   Goal Identifier 3- stairs   Date Met (gradual progress in knee flex/ankle DF strenght/coordination)   Goal 5  "  Goal Description Rogelio will be independent with a home activity program to address his impairments and self manage his recovery.    Target Date 01/28/19  (in progress)   Date Met (home activities continue to be progressed and modified)   Goal Identifier 5 - home program   Subjective Report   Subjective Report Doing fine.  Feels Botox may be kicking in in his hand and shoulder a little     Objective Measure 2   Objective Measure 25 foot walk    Details 10.7 sec, 13 steps with facil to maintain wt shift in right stance - 2 trials,   10.8 sec, 14 steps without facil after practice.  with shoes. bootie on right toes.     Vitals Signs   Heart Rate 100   Blood Pressure 126/80   Vital Signs Comments L UE manual large adult cuff.    Treatment Interventions   Interventions Therapeutic Procedure/Exercise;Therapeutic Activity;Neuromuscular Re-education;Gait Training;Manual Therapy   Therapeutic Procedure/exercise   Skilled Intervention monitoring, assist, cues for alignment, manual and dynamic stretching techniques.    Patient Response no c/o.  Tender at achilles   Treatment Detail supine stretching bilateral hip flexors in arturo stretch position with gentle overpressure into knee flexion on right, opp limb passively held in KTC position. C/R technique for right quad stretch.  lifting right LE AG in hip/knee flexion repeated with gentle resistance at ankle x 10. LTR with contract relax and then overpressure into left rotation for right trunk stretch 3 x 30\", then 3 x 30 with hands behind head.  standing R HC strtch  3 x 30\" with gentle STM along achilles/MT junction.    Therapeutic Procedures: strength, endurance, ROM, flexibillity minutes (01756) 30   Neuromuscular Re-education   Skilled Intervention facil for proper alignment and movement patterns.     Patient Response challenged with activity but good performance   Treatment Detail bilateral UE closed chain on railing, tandem stand with left foot in front.  transition into " wt shift fwd with hold, then back. repeated.  repeat on other side.  starting with 2 UE support and gradually reducing left UE support, bringing up to wall, out to side on wall and then down at side, no support.    Neuromuscular re-ed of mvmt, balance, coord, kinesthetic sense, posture, proprioception minutes (92752) 20   Gait Training   Skilled Intervention facil ,cues, assist, progression to faster speed.  education    Patient Response c/o right 4th toe hurting in wt bearing - like it's curling under.    Treatment Detail gait with no shoes, no AD with emphsis on narrow RK and sustained right stance phase.  progression of speed.     gait with 2# weigthed bar behind back and elbows  to promote shoulder ER and greater thoracic exension.  facil to maintain wt shift in R stance.     Gait Training Minutes, includes stair climbing (09345) 10   Plan   Updates to plan of care Continue 2 x per week.  Goals remain valid for remainder of Certification period until 1/28/2019   Plan for next session monitor vitals. right term stance stability. quad / hip flexor stretching. continue stepper. functional stretching of trunk , UE and LE, right LE open chain/modified chain activities for timing/coordination. cardiovascular conditioning.    Total Session Time   Timed Code Treatment Minutes 60   Total Treatment Time (sum of timed and untimed services) 60

## 2018-12-13 ENCOUNTER — HOSPITAL ENCOUNTER (OUTPATIENT)
Dept: PHYSICAL THERAPY | Facility: CLINIC | Age: 63
Setting detail: THERAPIES SERIES
End: 2018-12-13
Attending: PHYSICAL MEDICINE & REHABILITATION
Payer: MEDICARE

## 2018-12-13 ENCOUNTER — HOSPITAL ENCOUNTER (OUTPATIENT)
Dept: OCCUPATIONAL THERAPY | Facility: CLINIC | Age: 63
Setting detail: THERAPIES SERIES
End: 2018-12-13
Attending: PHYSICAL MEDICINE & REHABILITATION
Payer: MEDICARE

## 2018-12-13 PROCEDURE — 97112 NEUROMUSCULAR REEDUCATION: CPT | Mod: GP,KX | Performed by: PHYSICAL THERAPIST

## 2018-12-13 PROCEDURE — 97110 THERAPEUTIC EXERCISES: CPT | Mod: GO,KX | Performed by: OCCUPATIONAL THERAPIST

## 2018-12-13 PROCEDURE — 97535 SELF CARE MNGMENT TRAINING: CPT | Mod: GO,KX | Performed by: OCCUPATIONAL THERAPIST

## 2018-12-13 PROCEDURE — 97116 GAIT TRAINING THERAPY: CPT | Mod: GP,KX | Performed by: PHYSICAL THERAPIST

## 2018-12-13 PROCEDURE — 97110 THERAPEUTIC EXERCISES: CPT | Mod: GP,KX | Performed by: PHYSICAL THERAPIST

## 2018-12-14 NOTE — ADDENDUM NOTE
Encounter addended by: Allyssa Martinez OT on: 12/14/2018 11:42 AM   Actions taken: Flowsheet accepted

## 2018-12-14 NOTE — ADDENDUM NOTE
Encounter addended by: Allyssa Martinez OT on: 12/14/2018 11:33 AM   Actions taken: Sign clinical note

## 2018-12-14 NOTE — PROGRESS NOTES
Outpatient Occupational Therapy Progress Note     Patient: Luis Trinidad  : 1955    Beginning/End Dates of Reporting Period:  10/24/2018 to 2018    Referring Provider: Chele Reno MD    Therapy Diagnosis: Decreased ADL/IADL    Client Self Report: Pt reports he went to see his physician on Thursday where he was given more Botox injections including an additional one for his wrist/fingers.     Objective Measurements:     Objective Measure: R  strength    Details: R 25# L 106#           Objective Measure: R UE ROM and strength   Details: from : At start of session, R shoulder with with AAROM w mod cosed chain, gets to 138 dgrees SH FL and 150 briefly at the end of prolonged breath guided stretching. Mod manual resistance 2+/5 for  SH HAdd IR, SH ABD.  2/5 biceps, SH EX gravity reduced plane. TH  ABD 1/5 ( new).           Objective Measure: Box and Blocks   Details: R seated at tabletop 5 blcks, L 58 blcks      Goals:     Goal Identifier R shoulder strength   Goal Description Patient to increase R shoulder flexiion AROM to 40 degrees for improved R UE function for ADL/IADLs (during dressing and grooming tasks, carrying items, putting groceries and dishes away).   Target Date 19   Date Met      Progress: Progress ongoing:  Pt demonstrated  R UE shoulder flexion/ROM in gravity eliminated, side lying position.     Goal Identifier R pinch strength   Goal Description Patient will demonstrate increased right hand pinch strength (both lateral and palmar) by 3# each for increased independence with ADL/IADLs such as opening containers, pull up pants, maintaining pinch to hold items.   Target Date 19   Date Met      Progress: Progress ongoing:  Pt demonstrating pinch on tissue and small objects     Goal Identifier R GM/FM coordination   Goal Description Patient to improve R GM/FM coordination skills for increased independence during ADL/IADL tasks (dressing, kitchen tasks, feeding self) by  completing the Box and Blocks Test with R UE in standing with 10 blocks.   Target Date 01/19/19   Date Met      Progress: Progress ongoing.  Pt demonstrated five blocks in seated position due to difficulties with completing box and block in stand on day of administration.     Goal Identifier R UE as gross assist   Goal Description Patient to demonstrate functional tasks (feeding self, wiping the counter/table, washing dishes, etc.) with 20% use of R UE as gross stabilizer or gross assist for increased ADL/IADL independence and closer return to prior level of function.(2/8 (eval date): using ~3% at home)   Target Date 01/19/19   Date Met      Progress: Progress ongoing: Pt demonstrating more functional tasks in kitchen with R UE such as opening refrigerator, , grasping items in a drawer     Goal Identifier functional/normal movment patterns in R UE and trunk   Goal Description Patient to complete therapeutic task (simple kitchen tasks, laundry, etc.) with use of B UE's and no more than 2 cues in 5 minute time frame for correct body mechanics and for decreasing compensatory movements at the trunk and upper body for encouragement of normal movement patterns, increased R UE function and increased ADL/IADL independence.   Target Date 01/19/19   Date Met      Progress:Progress ongoing     Goal Identifier visual skills   Goal Description Patient will demonstrate WFLs visual skills (including reaction time and visual scanning skills) for safe independent community mobility (crossing the street, driving, grocery shopping) by scoring WNLs on all modes of the [employer], Scancourse and other visual screens.   Target Date 01/19/19   Date Met      Progress: Pt continues to defer on option to pursue adapted driving for now. Wants most of focus to remain on post CVA motor improvements.       Progress Toward Goals:   Progress this reporting period: Pt being seen 2x per week since February evaluation. Gradual gains  demonstrated in 4/6 goals.   Continued emphasis on getting HEP program to manage his tone better (weightbearing, NMES, shoulder pulley kit, prolonged stretches),  releaasing tissue texture changes that span the R hip/thrunk/ribs T8-T10 /shoulder ( via myofascial release/Instrument Assisted Soft Tissue Mobilization/IASTM Graston Technique ) and help refine movement patterns to reduce compenstory patterns that can contribute to pain/ROM loss if not addressed. We are applying these patterns to repetitive exercises and tranferring them to home based 2 handed functional tasks ( laundry, washing dishes, carrying/put away groceries). Pt has made progress with self stretch at home using a pole for increased trunk flexibliity and daily use of pulley system for R shoulder flexion.  Pt has started positioning his R UE in external rotation as able at home during sleep or sitting during the day and noticed an increase ability with R LE when ambulating.       Plan:  Continue therapy per current plan of care.    Discharge:  No.   Pt continues to be appropriate for 2x per week for 12 weeks.  Patient's tone continues to be a factor in his walking, exercise and UE activity. Addressing patient tone patterns continues to be necessary as they can be painful and further contribute to disuse of affected extremities.    Thank you for your kind referral.  If you have any questions please contact Danyelle Evans OTR/L or Allyssa Martinez OTR/L. [phone]

## 2018-12-14 NOTE — ADDENDUM NOTE
Encounter addended by: Allyssa Martinez, OT on: 12/14/2018 11:41 AM   Actions taken: Flowsheet accepted, Charge Capture section accepted

## 2018-12-18 ENCOUNTER — HOSPITAL ENCOUNTER (OUTPATIENT)
Dept: PHYSICAL THERAPY | Facility: CLINIC | Age: 63
Setting detail: THERAPIES SERIES
End: 2018-12-18
Attending: PHYSICAL MEDICINE & REHABILITATION
Payer: MEDICARE

## 2018-12-18 PROCEDURE — 97116 GAIT TRAINING THERAPY: CPT | Mod: GP,KX | Performed by: PHYSICAL THERAPIST

## 2018-12-18 PROCEDURE — 97110 THERAPEUTIC EXERCISES: CPT | Mod: GP,KX | Performed by: PHYSICAL THERAPIST

## 2018-12-18 PROCEDURE — 97112 NEUROMUSCULAR REEDUCATION: CPT | Mod: GP,KX | Performed by: PHYSICAL THERAPIST

## 2018-12-20 ENCOUNTER — HOSPITAL ENCOUNTER (OUTPATIENT)
Dept: OCCUPATIONAL THERAPY | Facility: CLINIC | Age: 63
Setting detail: THERAPIES SERIES
End: 2018-12-20
Attending: PHYSICAL MEDICINE & REHABILITATION
Payer: MEDICARE

## 2018-12-20 ENCOUNTER — HOSPITAL ENCOUNTER (OUTPATIENT)
Dept: PHYSICAL THERAPY | Facility: CLINIC | Age: 63
Setting detail: THERAPIES SERIES
End: 2018-12-20
Attending: PHYSICAL MEDICINE & REHABILITATION
Payer: MEDICARE

## 2018-12-20 PROCEDURE — 97110 THERAPEUTIC EXERCISES: CPT | Mod: GP | Performed by: PHYSICAL THERAPIST

## 2018-12-20 PROCEDURE — 97110 THERAPEUTIC EXERCISES: CPT | Mod: GO,KX | Performed by: OCCUPATIONAL THERAPIST

## 2018-12-20 PROCEDURE — 97112 NEUROMUSCULAR REEDUCATION: CPT | Mod: GP,KX | Performed by: PHYSICAL THERAPIST

## 2018-12-27 ENCOUNTER — HOSPITAL ENCOUNTER (OUTPATIENT)
Dept: OCCUPATIONAL THERAPY | Facility: CLINIC | Age: 63
Setting detail: THERAPIES SERIES
End: 2018-12-27
Attending: PHYSICAL MEDICINE & REHABILITATION
Payer: MEDICARE

## 2018-12-27 PROCEDURE — 97535 SELF CARE MNGMENT TRAINING: CPT | Mod: GO,KX | Performed by: OCCUPATIONAL THERAPIST

## 2018-12-27 PROCEDURE — 97110 THERAPEUTIC EXERCISES: CPT | Mod: GO,KX | Performed by: OCCUPATIONAL THERAPIST

## 2019-01-03 ENCOUNTER — HOSPITAL ENCOUNTER (OUTPATIENT)
Dept: OCCUPATIONAL THERAPY | Facility: CLINIC | Age: 64
Setting detail: THERAPIES SERIES
End: 2019-01-03
Attending: PHYSICAL MEDICINE & REHABILITATION
Payer: MEDICARE

## 2019-01-03 ENCOUNTER — HOSPITAL ENCOUNTER (OUTPATIENT)
Dept: PHYSICAL THERAPY | Facility: CLINIC | Age: 64
Setting detail: THERAPIES SERIES
End: 2019-01-03
Attending: PHYSICAL MEDICINE & REHABILITATION
Payer: MEDICARE

## 2019-01-03 PROCEDURE — 97112 NEUROMUSCULAR REEDUCATION: CPT | Mod: GP | Performed by: PHYSICAL THERAPIST

## 2019-01-03 PROCEDURE — 97535 SELF CARE MNGMENT TRAINING: CPT | Mod: GO,KX | Performed by: OCCUPATIONAL THERAPIST

## 2019-01-03 PROCEDURE — 97112 NEUROMUSCULAR REEDUCATION: CPT | Mod: GO | Performed by: OCCUPATIONAL THERAPIST

## 2019-01-03 PROCEDURE — 97110 THERAPEUTIC EXERCISES: CPT | Mod: GP | Performed by: PHYSICAL THERAPIST

## 2019-01-03 PROCEDURE — 97110 THERAPEUTIC EXERCISES: CPT | Mod: GO | Performed by: OCCUPATIONAL THERAPIST

## 2019-01-08 ENCOUNTER — HOSPITAL ENCOUNTER (OUTPATIENT)
Dept: OCCUPATIONAL THERAPY | Facility: CLINIC | Age: 64
Setting detail: THERAPIES SERIES
End: 2019-01-08
Attending: PHYSICAL MEDICINE & REHABILITATION
Payer: MEDICARE

## 2019-01-08 PROCEDURE — 97535 SELF CARE MNGMENT TRAINING: CPT | Mod: GO | Performed by: OCCUPATIONAL THERAPIST

## 2019-01-08 PROCEDURE — 97140 MANUAL THERAPY 1/> REGIONS: CPT | Mod: GO | Performed by: OCCUPATIONAL THERAPIST

## 2019-01-08 NOTE — PROGRESS NOTES
Outpatient Occupational Therapy Progress Note     Patient: Luis Trinidad  : 1955    Beginning/End Dates of Reporting Period:  2018 - 19    Referring Provider: Chele Reno MD    Therapy Diagnosis: Decreased ADL/IADL    Client Self Report:  Pt feels his Botox is wearing off quickly, last applied on 2018 with Dr. Reno.  Notices it most in his R hip and lateral T8-T10 rib cage when trying to walk quickly.  Anitipicates the more challenging, uneven srufaces while visiting [facility] for two weeks ( leaves this Thursday 1/10) .    Objective Measurements:     Objective Measure: R  strength    Details: R 25# L 106# (stable since 18)    Objective Measure: R  Pinch strength    Details: Key pinch: 14,15,16 R; 22# L. (stable)    3 pt:  8,9 # [place] 22# L.  (Improved 3#  from 6,5,5 in Nov)         Objective Measure: R UE ROM and strength   Details: from : At start of session, R shoulder with with AAROM w mod cosed chain, gets to 138 dgrees SH FL and 150 briefly at the end of prolonged breath guided stretching. Mod manual resistance 2+/5 for  SH HAdd IR, SH ABD.  2/5 biceps, SH EX gravity reduced plane. TH  ABD 1/5 ( new).      Trunk  AROM:  At start of session,trunk rotation/sitting is 40 then 48  L and 52  R. Rot'n and LF both pulling at R pec/ssternal margins/T8-10 lateral rib cage tolow axilla, mid/low trap/paraspinal t8-L3 rope like and  gritty, thick at outer 2/3 portions of QL/center looser.          Objective Measure: Box and Blocks   Details: R standing at tabletop 4 blocks, L 58 blocks (stable today compared to 18)      Goals:     Goal Identifier R shoulder strength   Goal Description Patient to increase R shoulder flexiion AROM to 40 degrees for improved R UE function for ADL/IADLs (during dressing and grooming tasks, carrying items, putting groceries and dishes away).   Target Date 19   Date Met      Progress: Progress ongoing:  Pt able to demonstrate  R UE shoulder  flexion/ROM in gravity eliminated, side lying position.     Goal Identifier R pinch strength   Goal Description Patient will demonstrate increased right hand pinch strength (both lateral and palmar) by 3# each for increased independence with ADL/IADLs such as opening containers, pull up pants, maintaining pinch to hold items.   Target Date 01/19/19   Date Met      Progress: Progress ongoing:  Pt demonstrating pinch on 1/4 in to 1 in small objects. Learnig how to release them using new TH ABD to opposition movements.     Goal Identifier R GM/FM coordination   Goal Description Patient to improve R GM/FM coordination skills for increased independence during ADL/IADL tasks (dressing, kitchen tasks, feeding self) by completing the Box and Blocks Test with R UE in standing with 10 blocks.   Target Date 01/19/19   Date Met      Progress: Progress ongoing, per above       Goal Identifier R UE as gross assist   Goal Description Patient to demonstrate functional tasks (feeding self, wiping the counter/table, washing dishes, etc.) with 20% use of R UE as gross stabilizer or gross assist for increased ADL/IADL independence and closer return to prior level of function.(2/8 (eval date): using ~3% at home)   Target Date 01/19/19   Date Met      Progress: Progress ongoing: Pt demonstrating more functional tasks in kitchen with R UE such as opening refrigerator, , grasping items in a drawer     Goal Identifier functional/normal movement patterns in R UE and trunk   Goal Description Patient to complete therapeutic task (simple kitchen tasks, laundry, etc.) with use of B UE's and no more than 2 cues in 5 minute time frame for correct body mechanics and for decreasing compensatory movements at the trunk and upper body for encouragement of normal movement patterns, increased R UE function and increased ADL/IADL independence.   Target Date 01/19/19   Date Met      Progress:Progress ongoing     Goal Identifier visual skills    Goal Description Patient will demonstrate WFLs visual skills (including reaction time and visual scanning skills) for safe independent community mobility (crossing the street, driving, grocery shopping) by scoring WNLs on all modes of the Dynavision, Scancourse and other visual screens.   Target Date 01/19/19   Date Met      Progress: Pt continues to defer on option to pursue adapted driving for now. Wants most of focus to remain on post CVA motor improvements.       Progress Toward Goals:   Progress this reporting period: Pt being seen 2x per week (49 visits total)  since February evaluation. Gradual gains demonstrated in 4/6 goals.   Continued emphasis on getting HEP program to manage his tone better (weightbearing, NMES, shoulder pulley kit, prolonged stretches),  releaasing tissue texture changes that span the R hip/thrunk/ribs T8-T10 /shoulder ( via myofascial release/Instrument Assisted Soft Tissue Mobilization/IASTM Graston Technique ) and help refine movement patterns to reduce compenstory patterns that can contribute to pain/ROM loss if not addressed. We are applying these patterns to repetitive exercises and tranferring them to home based 2 handed functional tasks ( laundry, washing dishes, carrying/put away groceries). Pt has made progress with self stretch at home using a pole for increased trunk flexibliity and daily use of pulley system for R shoulder flexion.  Pt has started positioning his R UE in external rotation as able at home during sleep or sitting during the day and noticed an increase ability with R LE when ambulating.   recently added NMES for TH/finger extension.    Plan:  Continue therapy per current plan of care.    Discharge:  No.   Pt continues to be appropriate for 2x per week for 12 weeks.  Patient's tone continues to be a factor in his walking, exercise and UE activity. Addressing patient tone patterns continues to be necessary as they can be painful and further contribute to  disuse of affected extremities.    Thank you for your kind referral. He is a grace to work with.  If you have any questions please contact us!  Thanks!   Danyelle Evans, OTR/L & Allyssa Martinez, OTR/L.     [phone]

## 2019-01-15 NOTE — ADDENDUM NOTE
Encounter addended by: Danyelle Evans, OTR on: 1/15/2019 1:30 PM   Actions taken: Charge Capture section accepted

## 2019-01-16 NOTE — ADDENDUM NOTE
Encounter addended by: Allyssa Martinez, OT on: 1/15/2019 8:23 PM   Actions taken: Charge Capture section accepted

## 2019-01-29 ENCOUNTER — HOSPITAL ENCOUNTER (OUTPATIENT)
Dept: OCCUPATIONAL THERAPY | Facility: CLINIC | Age: 64
Setting detail: THERAPIES SERIES
End: 2019-01-29
Attending: PHYSICAL MEDICINE & REHABILITATION
Payer: MEDICARE

## 2019-01-29 PROCEDURE — 97140 MANUAL THERAPY 1/> REGIONS: CPT | Mod: GO | Performed by: OCCUPATIONAL THERAPIST

## 2019-01-29 PROCEDURE — 97112 NEUROMUSCULAR REEDUCATION: CPT | Mod: GO | Performed by: OCCUPATIONAL THERAPIST

## 2019-01-29 NOTE — ADDENDUM NOTE
Encounter addended by: Danyelle Evans, OTR on: 1/29/2019 1:03 PM   Actions taken: Pend clinical note, Flowsheet accepted, Document created, Document edited, Sign clinical note

## 2019-01-29 NOTE — PROGRESS NOTES
[facility]      OUTPATIENT OCCUPATIONAL THERAPY  PLAN OF TREATMENT FOR OUTPATIENT REHABILITATION    Patient's Last Name, First Name, M.I.                YOB: 1955  Luis Trinidad                        [facility] Medical Record No.  [id number]                             Onset Date:      11/29/17 Start of Care Date:      2/8/18   Type:     ___PT   _X_OT   ___SLP Medical Diagnosis:     Hemiparesis affecting right side as late effect of stroke                          OT Diagnosis:     decreased ADL/IADL independence Visits from SOC:  49   _________________________________________________________________________________  Plan of Treatment/Functional Goals:        Continued emphasis on getting HEP program to manage his tone better ( via myofascial release/Instrument Assisted SOft Tissue Mobilization/IASTM Graston Technique, weightbearing, NMES, shoulder pulley kit, prolonged stretches)  and help refine movement patterns to reduce compenstory patterns that can cotribute to pain/ROM loss if not addressed. We are applying these patterns to repetive exercises and tranferring them to home based 2 handed functional tasks ( laundry, washing dishes, carrying/put away groceries).       Frequency/Duration: 2x/week x 12 weeks         Goals:    Goal Identifier R shoulder strength   Goal Description Patient to increase R shoulder flexiion AROM to 40 degrees for improved R UE function for ADL/IADLs (during dressing and grooming tasks, carrying items, putting groceries and dishes away).   Target Date 4/5/19   Date Met      Progress:     Goal Identifier R pinch strength   Goal Description Patient will demonstrate increased right hand pinch strength (both lateral and palmar) by 3# each for increased independence with ADL/IADLs such as opening containers, pull up pants, maintaining pinch to hold  items.   Target Date 4/5/19   Date Met      Progress:     Goal Identifier R GM/FM coordination   Goal Description Patient to improve R GM/FM coordination skills for increased independence during ADL/IADL tasks (dressing, kitchen tasks, feeding self) by completing the Box and Blocks Test with R UE in standing with 10 blocks.   Target Date 4/5/19   Date Met      Progress:     Goal Identifier R UE as gross assist   Goal Description Patient to demonstrate functional tasks (feeding self, wiping the counter/table, washing dishes, etc.) with 20% use of R UE as gross stabilizer or gross assist for increased ADL/IADL independence and closer return to prior level of function.(2/8 (eval date): using ~3% at home)   Target Date 4/5/19   Date Met      Progress:     Goal Identifier functional/normal movment patterns in R UE and trunk   Goal Description Patient to complete therapeutic task (simple kitchen tasks, laundry, etc.) with use of B UE's and no more than 2 cues in 5 minute time frame for correct body mechanics and for decreasing compensatory movements at the trunk and upper body for encouragement of normal movement patterns, increased R UE function and increased ADL/IADL independence.   Target Date 4/5/19   Date Met      Progress:     Goal Identifier visual skills   Goal Description Patient will demonstrate WFLs visual skills (including reaction time and visual scanning skills) for safe independent community mobility (crossing the street, driving, grocery shopping) by scoring WNLs on all modes of the Dynavision, Scancourse and other visual screens.   Target Date 4/5/19   Date Met      Progress:       Progress Toward Goals:   Progress this reporting period: see attached progress note.    Certification date from 1/8/19  to 4/5/19.    ARYAN Mathis          I CERTIFY THE NEED FOR THESE SERVICES FURNISHED UNDER        THIS PLAN OF TREATMENT AND WHILE UNDER MY CARE     (Physician co-signature of this document indicates review  and certification of the therapy plan).                Referring Provider: Chele Reno MD

## 2019-02-05 ENCOUNTER — HOSPITAL ENCOUNTER (OUTPATIENT)
Dept: OCCUPATIONAL THERAPY | Facility: CLINIC | Age: 64
Setting detail: THERAPIES SERIES
End: 2019-02-05
Attending: PHYSICAL MEDICINE & REHABILITATION
Payer: MEDICARE

## 2019-02-05 ENCOUNTER — HOSPITAL ENCOUNTER (OUTPATIENT)
Dept: PHYSICAL THERAPY | Facility: CLINIC | Age: 64
Setting detail: THERAPIES SERIES
End: 2019-02-05
Attending: PHYSICAL MEDICINE & REHABILITATION
Payer: MEDICARE

## 2019-02-05 PROCEDURE — 97112 NEUROMUSCULAR REEDUCATION: CPT | Mod: GO | Performed by: OCCUPATIONAL THERAPIST

## 2019-02-05 PROCEDURE — 97110 THERAPEUTIC EXERCISES: CPT | Mod: GP | Performed by: PHYSICAL THERAPIST

## 2019-02-05 PROCEDURE — 97116 GAIT TRAINING THERAPY: CPT | Mod: GP | Performed by: PHYSICAL THERAPIST

## 2019-02-05 PROCEDURE — 97140 MANUAL THERAPY 1/> REGIONS: CPT | Mod: GO | Performed by: OCCUPATIONAL THERAPIST

## 2019-02-05 PROCEDURE — 97112 NEUROMUSCULAR REEDUCATION: CPT | Mod: GP | Performed by: PHYSICAL THERAPIST

## 2019-02-07 ENCOUNTER — HOSPITAL ENCOUNTER (OUTPATIENT)
Dept: PHYSICAL THERAPY | Facility: CLINIC | Age: 64
Setting detail: THERAPIES SERIES
End: 2019-02-07
Attending: PHYSICAL MEDICINE & REHABILITATION
Payer: MEDICARE

## 2019-02-07 ENCOUNTER — HOSPITAL ENCOUNTER (OUTPATIENT)
Dept: OCCUPATIONAL THERAPY | Facility: CLINIC | Age: 64
Setting detail: THERAPIES SERIES
End: 2019-02-07
Attending: PHYSICAL MEDICINE & REHABILITATION
Payer: MEDICARE

## 2019-02-07 PROCEDURE — 97110 THERAPEUTIC EXERCISES: CPT | Mod: GO | Performed by: OCCUPATIONAL THERAPIST

## 2019-02-07 PROCEDURE — 97110 THERAPEUTIC EXERCISES: CPT | Mod: GP | Performed by: PHYSICAL THERAPIST

## 2019-02-07 PROCEDURE — 97535 SELF CARE MNGMENT TRAINING: CPT | Mod: GO | Performed by: OCCUPATIONAL THERAPIST

## 2019-02-07 PROCEDURE — 97112 NEUROMUSCULAR REEDUCATION: CPT | Mod: GP | Performed by: PHYSICAL THERAPIST

## 2019-02-12 ENCOUNTER — HOSPITAL ENCOUNTER (OUTPATIENT)
Dept: PHYSICAL THERAPY | Facility: CLINIC | Age: 64
Setting detail: THERAPIES SERIES
End: 2019-02-12
Attending: PHYSICAL MEDICINE & REHABILITATION
Payer: MEDICARE

## 2019-02-12 ENCOUNTER — HOSPITAL ENCOUNTER (OUTPATIENT)
Dept: OCCUPATIONAL THERAPY | Facility: CLINIC | Age: 64
Setting detail: THERAPIES SERIES
End: 2019-02-12
Attending: PHYSICAL MEDICINE & REHABILITATION
Payer: MEDICARE

## 2019-02-12 PROCEDURE — 97110 THERAPEUTIC EXERCISES: CPT | Mod: GP | Performed by: PHYSICAL THERAPIST

## 2019-02-12 PROCEDURE — 97112 NEUROMUSCULAR REEDUCATION: CPT | Mod: GO | Performed by: OCCUPATIONAL THERAPIST

## 2019-02-12 PROCEDURE — 97112 NEUROMUSCULAR REEDUCATION: CPT | Mod: GP | Performed by: PHYSICAL THERAPIST

## 2019-02-12 PROCEDURE — 97140 MANUAL THERAPY 1/> REGIONS: CPT | Mod: GO | Performed by: OCCUPATIONAL THERAPIST

## 2019-02-14 ENCOUNTER — HOSPITAL ENCOUNTER (OUTPATIENT)
Dept: PHYSICAL THERAPY | Facility: CLINIC | Age: 64
Setting detail: THERAPIES SERIES
End: 2019-02-14
Attending: PHYSICAL MEDICINE & REHABILITATION
Payer: MEDICARE

## 2019-02-14 ENCOUNTER — HOSPITAL ENCOUNTER (OUTPATIENT)
Dept: OCCUPATIONAL THERAPY | Facility: CLINIC | Age: 64
Setting detail: THERAPIES SERIES
End: 2019-02-14
Attending: PHYSICAL MEDICINE & REHABILITATION
Payer: MEDICARE

## 2019-02-14 PROCEDURE — 97110 THERAPEUTIC EXERCISES: CPT | Mod: GO | Performed by: OCCUPATIONAL THERAPIST

## 2019-02-14 PROCEDURE — 97110 THERAPEUTIC EXERCISES: CPT | Mod: GP | Performed by: PHYSICAL THERAPIST

## 2019-02-14 PROCEDURE — 97112 NEUROMUSCULAR REEDUCATION: CPT | Mod: GP | Performed by: PHYSICAL THERAPIST

## 2019-02-19 ENCOUNTER — HOSPITAL ENCOUNTER (OUTPATIENT)
Dept: OCCUPATIONAL THERAPY | Facility: CLINIC | Age: 64
Setting detail: THERAPIES SERIES
End: 2019-02-19
Attending: PHYSICAL MEDICINE & REHABILITATION
Payer: MEDICARE

## 2019-02-19 ENCOUNTER — HOSPITAL ENCOUNTER (OUTPATIENT)
Dept: PHYSICAL THERAPY | Facility: CLINIC | Age: 64
Setting detail: THERAPIES SERIES
End: 2019-02-19
Attending: PHYSICAL MEDICINE & REHABILITATION
Payer: MEDICARE

## 2019-02-19 PROCEDURE — 97112 NEUROMUSCULAR REEDUCATION: CPT | Mod: GO | Performed by: OCCUPATIONAL THERAPIST

## 2019-02-19 PROCEDURE — 97110 THERAPEUTIC EXERCISES: CPT | Mod: GP | Performed by: PHYSICAL THERAPIST

## 2019-02-19 PROCEDURE — 97110 THERAPEUTIC EXERCISES: CPT | Mod: GO | Performed by: OCCUPATIONAL THERAPIST

## 2019-02-19 PROCEDURE — 97112 NEUROMUSCULAR REEDUCATION: CPT | Mod: GP | Performed by: PHYSICAL THERAPIST

## 2019-02-19 PROCEDURE — 97116 GAIT TRAINING THERAPY: CPT | Mod: GP | Performed by: PHYSICAL THERAPIST

## 2019-02-19 NOTE — ADDENDUM NOTE
Encounter addended by: Yen Villanueva, PT on: 2/19/2019 10:55 AM   Actions taken: Sign clinical note, Document created, Document edited, Flowsheet accepted

## 2019-02-19 NOTE — ADDENDUM NOTE
Encounter addended by: Yen Villanueva, PT on: 2/19/2019 10:59 AM   Actions taken: Flowsheet accepted

## 2019-02-19 NOTE — ADDENDUM NOTE
Encounter addended by: Yen Villanueva, PT on: 2/19/2019 10:57 AM   Actions taken: Flowsheet accepted

## 2019-02-19 NOTE — ADDENDUM NOTE
Encounter addended by: Yen Villanueva, PT on: 2/19/2019 11:02 AM   Actions taken: Flowsheet accepted

## 2019-02-19 NOTE — PROGRESS NOTES
[facility]      OUTPATIENT PHYSICAL THERAPY  PLAN OF TREATMENT FOR OUTPATIENT REHABILITATION    Patient's Last Name, First Name, M.I.                YOB: 1955  Luis Trinidad                        Provider's Name  [facility] Medical Record No.  [id number]                               Onset Date: 11/4/2015   Start of Care Date: 5/1/2018   Type:     _X_PT   ___OT   ___SLP Medical Diagnosis: ischemic CVA                       PT Diagnosis: Impaired gait and mobility due to right hemiparesis and spasticity         _________________________________________________________________________________  Plan of Treatment:    Frequency/Duration: 2 x per week x 90 days.     Goals:  Goal Identifier 1 - Gait pattern/safety   Goal Description Rogelio will be able to achieve proper midstance to terminal stance alignment on right LE and transition into pre and initial swing with proper clearance of right foot for greater efficiency and safety with gait at home and in the community.   Target Date 01/28/19   Date Met  In progress   Progress:  Rogelio is showing improving transitions over right LE into midstance but it is not consistent yet and often takes practice during session for him to get carryover with proper alignment.  Without shoes he is able to achieve better swing but his foot does drag on the floor due to impaired timing/coordination of R HS and ankle DF/everters.      Goal Identifier 2- Gait speed   Goal Description Rogelio will increase his gait speed on the 25 foot timed walk to 9 sec or less without use of an assistive device  to indicate reduced level of gait impairment and improved community mobility.   Target Date 01/28/19(in progress)   Date Met     Progress: improving gait speed without shoes, inconsistent with shoes due to intermittent catching of right foot.       Goal Identifier 3-  stairs   Goal Description Pt will be able to navigate up and down his stairs with one rail in a reciprocal pattern with minimal circumduction of right LE in order for more efficient and safe access to all levels of his home.   Target Date 01/28/19(in progress)   Date Met  (gradual progress in knee flex/ankle DF strenght/coordination)   Progress:  In progress - stairs have not been focus of therapy in recent sessions but overall working on improving knee flex/ankle DF strength, timing and coordination for gait as well as stairs.      Goal Identifier 5 - home program   Goal Description Rogelio will be independent with a home activity program to address his impairments and self manage his recovery.    Target Date 01/28/19(in progress)   Date Met  (home activities continue to be progressed and modified)   Progress:     Progress Toward Goals:   Progress this reporting period: see above  Progress limited due to spasticity and tightness of his right UE, trunk and LE as well as weakness of his ankle DF and HS which has been improving but slowly.        Pt will continue to benefit from skilled intervention 2 x per week to maximize functional mobility, movement patterns and safety and reduce risk of disuse atrophy and tissue shortening that lead to increased level of disability and falls risk.        Certification date from 1/29/2019 to 4/29/2019.    Yen Villanueva, PT          I CERTIFY THE NEED FOR THESE SERVICES FURNISHED UNDER        THIS PLAN OF TREATMENT AND WHILE UNDER MY CARE     (Physician co-signature of this document indicates review and certification of the therapy plan).                Referring Provider: Chele Reno

## 2019-02-19 NOTE — ADDENDUM NOTE
Encounter addended by: Yen Villanueva, PT on: 2/19/2019 11:01 AM   Actions taken: Flowsheet accepted

## 2019-02-19 NOTE — ADDENDUM NOTE
Encounter addended by: Yen Villanueva, PT on: 2/19/2019 10:58 AM   Actions taken: Flowsheet accepted

## 2019-02-21 ENCOUNTER — HOSPITAL ENCOUNTER (OUTPATIENT)
Dept: PHYSICAL THERAPY | Facility: CLINIC | Age: 64
Setting detail: THERAPIES SERIES
End: 2019-02-21
Attending: PHYSICAL MEDICINE & REHABILITATION
Payer: MEDICARE

## 2019-02-21 ENCOUNTER — HOSPITAL ENCOUNTER (OUTPATIENT)
Dept: OCCUPATIONAL THERAPY | Facility: CLINIC | Age: 64
Setting detail: THERAPIES SERIES
End: 2019-02-21
Attending: PHYSICAL MEDICINE & REHABILITATION
Payer: MEDICARE

## 2019-02-21 ENCOUNTER — OFFICE VISIT (OUTPATIENT)
Dept: PHYSICAL MEDICINE AND REHAB | Facility: CLINIC | Age: 64
End: 2019-02-21
Payer: MEDICARE

## 2019-02-21 VITALS
HEART RATE: 91 BPM | HEIGHT: 64 IN | DIASTOLIC BLOOD PRESSURE: 81 MMHG | TEMPERATURE: 98.4 F | WEIGHT: 182 LBS | BODY MASS INDEX: 31.07 KG/M2 | SYSTOLIC BLOOD PRESSURE: 139 MMHG | RESPIRATION RATE: 16 BRPM | OXYGEN SATURATION: 97 %

## 2019-02-21 DIAGNOSIS — G81.11 RIGHT SPASTIC HEMIPARESIS (H): Primary | ICD-10-CM

## 2019-02-21 PROCEDURE — 97110 THERAPEUTIC EXERCISES: CPT | Mod: GO | Performed by: OCCUPATIONAL THERAPIST

## 2019-02-21 PROCEDURE — 97112 NEUROMUSCULAR REEDUCATION: CPT | Mod: GO | Performed by: OCCUPATIONAL THERAPIST

## 2019-02-21 PROCEDURE — 97110 THERAPEUTIC EXERCISES: CPT | Mod: GP | Performed by: PHYSICAL THERAPIST

## 2019-02-21 PROCEDURE — 97116 GAIT TRAINING THERAPY: CPT | Mod: GP | Performed by: PHYSICAL THERAPIST

## 2019-02-21 PROCEDURE — 97112 NEUROMUSCULAR REEDUCATION: CPT | Mod: GP | Performed by: PHYSICAL THERAPIST

## 2019-02-21 ASSESSMENT — PAIN SCALES - GENERAL: PAINLEVEL: NO PAIN (0)

## 2019-02-21 ASSESSMENT — MIFFLIN-ST. JEOR: SCORE: 1531.55

## 2019-02-21 NOTE — LETTER
"2/21/2019       RE: Luis Trinidad  [address] [facility] [address]     Dear Colleague,    Thank you for referring your patient, Luis Trinidad, to the [facility] PHYSICAL MEDICINE AND REHABILITATION at [facility]. Please see a copy of my visit note below.    PM&R Clinic & Procedure Note:    Luis Trinidad is 63-year-old male with a history of stroke in Nov 2016 resulting in residual right spastic hemiparesis.  Rogelio was last seen on 11/29/18 at which time he received 400 units of botox at that time to his Right trunk and right arm. He's pleased with the response with particular good benefit in reduction of spasticity, ability to open his fingers and extend the elbow.  He actually does not believe the benefits of starting to drop off yet.  He definitely has tightness however.     He continues to be very consistent with the maintenance exercise progra stretching as well as functional electrical stimulation.    He has continued with some ongoing outpatient physical and occupational therapies.  He is feeling well today denying any significant interval medical issues.    Physical exam:  /81 (BP Location: Left arm, Patient Position: Sitting, Cuff Size: Adult Large)   Pulse 91   Temp 98.4  F (36.9  C) (Oral)   Resp 16   Ht 1.626 m (5' 4\")   Wt 82.6 kg (182 lb)   SpO2 97%   BMI 31.24 kg/m      Alert pleasant bright affect   Increased tone noted in RUE and along right pec and right trunk. AROM: shoulder abduction 90 degree, elbow extension approx 140, wrist remains in neutral, able to extend fingers, but occassionally uses left hand for assistance.  MAS: SA: 3/4, EE 3/4, WE: 3/4, FE: 3/4  right hemispastic gait noted    Assessment and recommendations:  1.  Plan to re-inject 400 units today into right chest and right arm.  2.  Continue with ROM and other maintenance exercises.  3.  Continue oral baclofen 20 20 30 for partial tone reduction in broader distribution.  4. " Follow up in 3 months.    Procedure:   Chemodenervation to right chest and RUE utilizing botulinum toxin.  Began with formal consent which he signed. Had formal time-out prior to procedure.  400 U of Botox lot  C3, expiration 08 2021 , Botox NDC 3630-7568-35 was utilized. Diluted with normal saline in a 100 U:1mL ratio, total of 4 mL.  All areas were cleansed with chloroprep prior to injecting. EMG guidance was utilized to identify most active motor units while avoiding surrounding structures.     The following muscles were then injected, two body areas     Right trunk:  1. Right Pectoralis Major: 100 units divided in 3 sites  2. Latissimus Dorsi 30 units 1 sites.     Right arm  1. Biceps 90 units divided 1 sites.  2. FDS 50 units  3. FDP 50 units  4. FCR 40 units  5. FCU 40 units    Procedure was tolerated well, he leaves clinic feeling well.    Again, thank you for allowing me to participate in the care of your patient.      Sincerely,    Chele Reno MD

## 2019-02-25 NOTE — PROGRESS NOTES
"PM&R Clinic & Procedure Note:    Luis Trinidad is 63-year-old male with a history of stroke in Nov 2016 resulting in residual right spastic hemiparesis.  Rogelio was last seen on 11/29/18 at which time he received 400 units of botox at that time to his Right trunk and right arm. He's pleased with the response with particular good benefit in reduction of spasticity, ability to open his fingers and extend the elbow.  He actually does not believe the benefits of starting to drop off yet.  He definitely has tightness however.     He continues to be very consistent with the maintenance exercise progra stretching as well as functional electrical stimulation.    He has continued with some ongoing outpatient physical and occupational therapies.  He is feeling well today denying any significant interval medical issues.    Physical exam:  /81 (BP Location: Left arm, Patient Position: Sitting, Cuff Size: Adult Large)   Pulse 91   Temp 98.4  F (36.9  C) (Oral)   Resp 16   Ht 1.626 m (5' 4\")   Wt 82.6 kg (182 lb)   SpO2 97%   BMI 31.24 kg/m     Alert pleasant bright affect   Increased tone noted in RUE and along right pec and right trunk. AROM: shoulder abduction 90 degree, elbow extension approx 140, wrist remains in neutral, able to extend fingers, but occassionally uses left hand for assistance.  MAS: SA: 3/4, EE 3/4, WE: 3/4, FE: 3/4  right hemispastic gait noted    Assessment and recommendations:  1.  Plan to re-inject 400 units today into right chest and right arm.  2.  Continue with ROM and other maintenance exercises.  3.  Continue oral baclofen 20 20 30 for partial tone reduction in broader distribution.  4. Follow up in 3 months.    Procedure:   Chemodenervation to right chest and RUE utilizing botulinum toxin.  Began with formal consent which he signed. Had formal time-out prior to procedure.  400 U of Botox lot  C3, expiration 08 2021 , Botox NDC 2506-3392-40 was utilized. Diluted with normal saline in " a 100 U:1mL ratio, total of 4 mL.  All areas were cleansed with chloroprep prior to injecting. EMG guidance was utilized to identify most active motor units while avoiding surrounding structures.     The following muscles were then injected, two body areas     Right trunk:  1. Right Pectoralis Major: 100 units divided in 3 sites  2. Latissimus Dorsi 30 units 1 sites.     Right arm  1. Biceps 90 units divided 1 sites.  2. FDS 50 units  3. FDP 50 units  4. FCR 40 units  5. FCU 40 units    Procedure was tolerated well, he leaves clinic feeling well.

## 2019-02-26 ENCOUNTER — HOSPITAL ENCOUNTER (OUTPATIENT)
Dept: OCCUPATIONAL THERAPY | Facility: CLINIC | Age: 64
Setting detail: THERAPIES SERIES
End: 2019-02-26
Attending: PHYSICAL MEDICINE & REHABILITATION
Payer: MEDICARE

## 2019-02-26 PROCEDURE — 97140 MANUAL THERAPY 1/> REGIONS: CPT | Mod: GO | Performed by: OCCUPATIONAL THERAPIST

## 2019-02-26 PROCEDURE — 97112 NEUROMUSCULAR REEDUCATION: CPT | Mod: GO | Performed by: OCCUPATIONAL THERAPIST

## 2019-02-28 ENCOUNTER — HOSPITAL ENCOUNTER (OUTPATIENT)
Dept: PHYSICAL THERAPY | Facility: CLINIC | Age: 64
Setting detail: THERAPIES SERIES
End: 2019-02-28
Attending: PHYSICAL MEDICINE & REHABILITATION
Payer: MEDICARE

## 2019-02-28 ENCOUNTER — HOSPITAL ENCOUNTER (OUTPATIENT)
Dept: OCCUPATIONAL THERAPY | Facility: CLINIC | Age: 64
Setting detail: THERAPIES SERIES
End: 2019-02-28
Attending: PHYSICAL MEDICINE & REHABILITATION
Payer: MEDICARE

## 2019-02-28 PROCEDURE — 97112 NEUROMUSCULAR REEDUCATION: CPT | Mod: GO | Performed by: OCCUPATIONAL THERAPIST

## 2019-02-28 PROCEDURE — 97112 NEUROMUSCULAR REEDUCATION: CPT | Mod: GP | Performed by: PHYSICAL THERAPIST

## 2019-02-28 PROCEDURE — 97110 THERAPEUTIC EXERCISES: CPT | Mod: GO | Performed by: OCCUPATIONAL THERAPIST

## 2019-02-28 PROCEDURE — 97110 THERAPEUTIC EXERCISES: CPT | Mod: GP | Performed by: PHYSICAL THERAPIST

## 2019-02-28 PROCEDURE — 97116 GAIT TRAINING THERAPY: CPT | Mod: GP | Performed by: PHYSICAL THERAPIST

## 2019-03-04 ENCOUNTER — HOSPITAL ENCOUNTER (OUTPATIENT)
Dept: OCCUPATIONAL THERAPY | Facility: CLINIC | Age: 64
Setting detail: THERAPIES SERIES
End: 2019-03-04
Attending: FAMILY MEDICINE
Payer: MEDICARE

## 2019-03-04 PROCEDURE — 97112 NEUROMUSCULAR REEDUCATION: CPT | Mod: GO | Performed by: OCCUPATIONAL THERAPIST

## 2019-03-04 PROCEDURE — 97140 MANUAL THERAPY 1/> REGIONS: CPT | Mod: GO | Performed by: OCCUPATIONAL THERAPIST

## 2019-03-04 NOTE — PROGRESS NOTES
OUTPATIENT OCCUPATIONAL THERAPY 10th NOTE--       03/04/19 1200   Signing Clinician's Name / Credentials   Signing clinician's name / credentials ARYAN Richard/margarito   Session Number   Session Number 10/ 10 (MEDICARE as of 5/1/18)   Additional Session Number 60   Progress/Recertification   Recertification Due 04/05/19   Recertification Completed 01/08/19   Adult OT Eval Goals   OT Eval Goals (Adult) 1;2;3   OT Goal 1   Goal Identifier R shoulder strength   Goal Description Patient to increase R shoulder flexiion AROM to 40 degrees for improved R UE function for ADL/IADLs (during dressing and grooming tasks, carrying items, putting groceries and dishes away).   Target Date 04/05/19   OT Goal 2   Goal Identifier R pinch strength   Goal Description Patient will demonstrate increased right hand pinch strength (both lateral and palmar) by 3# each for increased independence with ADL/IADLs such as opening containers, pull up pants, maintaining pinch to hold items.   Target Date 04/05/19   OT Goal 3   Goal Identifier R GM/FM coordination   Goal Description Patient to improve R GM/FM coordination skills for increased independence during ADL/IADL tasks (dressing, kitchen tasks, feeding self) by completing the Box and Blocks Test with R UE in standing with 10 blocks.   Target Date 04/05/19   OT Goal 4   Goal Identifier R UE as gross assist   Goal Description Patient to demonstrate functional tasks (feeding self, wiping the counter/table, washing dishes, etc.) with 20% use of R UE as gross stabilizer or gross assist for increased ADL/IADL independence and closer return to prior level of function.  (2/8 (eval date): using ~3% at home)   Target Date 04/05/19   OT Goal 5   Goal Identifier functional/normal movment patterns in R UE and trunk   Goal Description Patient to complete therapeutic task (simple kitchen tasks, laundry, etc.) with use of B UE's and no more than 2 cues in 5 minute time frame for correct body mechanics and for  decreasing compensatory movements at the trunk and upper body for encouragement of normal movement patterns, increased R UE function and increased ADL/IADL independence.   Target Date 04/05/19   OT Goal 6   Goal Identifier visual skills   Goal Description Patient will demonstrate WFLs visual skills (including reaction time and visual scanning skills) for safe independent community mobility (crossing the street, driving, grocery shopping) by scoring WNLs on all modes of the Dynavision, Scancourse and other visual screens.   Target Date 04/05/19   Subjective Report   Subjective Report Patient having increased tone at trunk and right shoulder after having no furnace over the weekend with subzero temperatures.   Objective Measure 1   Objective Measure R  strength    Details last tested 1/8/19-- R 25# L 106# (stable since 12/5/18)   Objective Measure 2   Objective Measure Pinch Strength   Details last tested 1/8/19-- Key pinch increased to 16 (from 15) and 3 pt is 8#.     Objective Measure 3   Objective Measure UE /trunk AROM   Details At start of session,trunk rotation/sitting is 42 then 50  L and 52  R. Rot'n and LF both pulling at R pec/ sternal margins/T8-10 lateral rib cage tolow axilla, mid/low trap/paraspinal t8-L3 rope like, anded across inferior angle of scapand  gritty into humeral latissimus insert, thick at upper 1/3 portions of QL and under R t10-11 ribs.    Objective Measure 4   Objective Measure R UE ROM and strength   Details MMT of biceps is 4+/5, combines with SH ABD and scap retraction; isolated 2+/5  limited from 90 to 120 dgrees arc, w MMT of 2+/5  EL EX  is 2+/5 but only able to extend from  end range of flexion   at130 degrees to 90 degrees, then stopped by strong tone.  Gets to lacking 45 degrees of EXT with NMES in place at triceps.   Objective Measure 6   Objective Measure Box and Blocks   Details last tested 1/8/19--R standing at tabletop 4 blocks, L 58 blocks (stable since 12/5/18)    Neuromuscular Re-education   Neuromuscular Re-ed Minutes (35810) 28   Skilled Intervention NMES with reaching  patterns   Patient Response pt able to strength R UE overhead easily after manual resistance and NMES combined.     Treatment Detail COnt using NMES at triceps in supine, 21 intensity black  fingers below  axilla and  red three fingers above olecranon.  ( W Charge--Combined with AROM of SH FLEXIOn to HABD during EL EX. CHARGE off-- H ADD with EL FL, harder to contract into flexion per tone and motor planning alternating motions)  Pt able to replicate 45 degrees more of EL EX with pads off following (in supine with mod support under the elbow) Completed x 16 minutes with 24-26 intensity (program 11). Combined with long arc Manual resistance EXnito Biceps flexion/HADD then assisted with end range SH H ABD and EL EX with COntraction charge going.     Manual Therapy   Manual Therapy Minutes (21721) 18   Skilled Intervention Prolonged stretches, MFR for R UE funcatiaonl use patterns   Patient Response pt moving more symmertically after IASTM.    Treatment Detail OTR completes MFR w  prolonged dynamic stretching for scapular rotation (OTR applied). Pt has thickness and tissue texture changes at the T8 - L3 paraspinal, latissimus/scap to axilla to humerus/insert and upper QL/R ribs t0-12 to diaphragm. OTR cont w instrument assisted soft tissue mobilization (IASTM) using  tissue mobilization tool (Graston type and home based tool with min to moderate pressure in longitudinal patterns at origin, insert and muscle bellies, then cross fiber in fanning patterns  from distal to proximal and proximal to distal patterns, and around radius of bony prominences. OTR stabilizes surrounding skin/removing tissue slack to improve effectiveness.  OTR completes IASTM for broader thickened gritty areas surrounding the areas above, having pt add flossing motions with active trunk rotation during IASTM, for more functional layered  releases.   Pt rates stiffness durng trunk rotation at 6/10 before and 3/10 following IASTM, easier to move between sit to supine and in/out of car.     Plan   Homework rht to help ADL log-ADL list- work towards: Holding a spoon/knife, stabilizing plate on table with some, moving a coffee mug with both hands from chest to chin/mouth donning socks with 2 hands, carrying a plate with 2 hands managing light switches with elbow flexed greater than 90 , and using a zipper from the bottom to midway/top.   Home program carry bag in R forearm, estim to R wrist/digit extensors, pulley system for R shoulder (flexion and abduction)   Plan for next session Try NMES to work on  EL FL, EX from -90 to end range.  Cont NMES per above/ black in mid  ex mm bulk, dorsal snuff box red combine with grasp/release tasks; , repeat/pinch.  NMR of TH ext/grasp/release, f/u on ball stretch, ex rotation while seated. hammer turns for supination, check tone/movement patterns to prep for 11/29 botox, f/u on pole stretch for trunk, IASTM SH and trunk/UE traction w pec release,,sidelying vs shoulder pulley kit, recheck /pinch post botox, NMES hand/wr ,  **find old splint vs order  new splint (he hasn't been able to locate at home as of 8/2); re-test R  ~8/10 to see effect of botox (had on 8/1); 2 handed lifting carrying, transferring tasks ( food containers, bags, larger dishes), Cont w his estim, grasp release patterns with unit in place, issue diagrams per pictures taken. practice setting up and take down of shoulder pulley; ~7/25/18: trial of SH IR/add w pulley, wt bearing at kitchen counter (prone/4 pt vs forerm WB on table w shin height reaching w forward reach), ,IADL box, have him bring in dowel for  HEP if he'd like it adapted with elastic handle ( instructions in chart),  stretches with pole, and table stretches/ADL log. do full review of his HEP (see previous course of OT for past HEP) and modify/upgrade (will especially need  upgrade for R hand with improved function/strength); neuro re-ed. with much focus on decreasing tone (trial prone on elbows, WB in 4 point, etc.); check splint - have him bring in (he hasn't been wearing, using a ball in hand at night);  R UE as gross stabilizer and gross assist; LATER: IADL eval and assist with adapted driving evaluation as appropriate; consider vocational rehab referral for return to work if appropriate or volunteer opportunities.  Consider driving/[employer] and [employer] ~ 10/24/18. *   Total Session Time   Timed Code Treatment Minutes 46   Total Treatment Time (sum of timed and untimed services) 46       Thank you for this thoughtful referral! If you have any questions, please call me [phone].  Nice to share in this patient's care with you.    Sincerely,   Danyelle Evans, OTR/l

## 2019-03-04 NOTE — ADDENDUM NOTE
Encounter addended by: Allyssa Martinez, OT on: 3/4/2019 6:14 AM   Actions taken: Episode edited, Flowsheet accepted, Charge Capture section accepted

## 2019-03-07 ENCOUNTER — HOSPITAL ENCOUNTER (OUTPATIENT)
Dept: OCCUPATIONAL THERAPY | Facility: CLINIC | Age: 64
Setting detail: THERAPIES SERIES
End: 2019-03-07
Attending: FAMILY MEDICINE
Payer: MEDICARE

## 2019-03-07 PROCEDURE — 97110 THERAPEUTIC EXERCISES: CPT | Mod: GO | Performed by: OCCUPATIONAL THERAPIST

## 2019-03-11 ENCOUNTER — HOSPITAL ENCOUNTER (OUTPATIENT)
Dept: OCCUPATIONAL THERAPY | Facility: CLINIC | Age: 64
Setting detail: THERAPIES SERIES
End: 2019-03-11
Attending: FAMILY MEDICINE
Payer: MEDICARE

## 2019-03-11 PROCEDURE — 97110 THERAPEUTIC EXERCISES: CPT | Mod: GO | Performed by: OCCUPATIONAL THERAPIST

## 2019-03-11 PROCEDURE — 97112 NEUROMUSCULAR REEDUCATION: CPT | Mod: GO | Performed by: OCCUPATIONAL THERAPIST

## 2019-03-21 ENCOUNTER — HOSPITAL ENCOUNTER (OUTPATIENT)
Dept: OCCUPATIONAL THERAPY | Facility: CLINIC | Age: 64
Setting detail: THERAPIES SERIES
End: 2019-03-21
Attending: FAMILY MEDICINE
Payer: MEDICARE

## 2019-03-21 PROCEDURE — 97110 THERAPEUTIC EXERCISES: CPT | Mod: GO | Performed by: OCCUPATIONAL THERAPIST

## 2019-03-25 ENCOUNTER — HOSPITAL ENCOUNTER (OUTPATIENT)
Dept: OCCUPATIONAL THERAPY | Facility: CLINIC | Age: 64
Setting detail: THERAPIES SERIES
End: 2019-03-25
Attending: FAMILY MEDICINE
Payer: MEDICARE

## 2019-03-25 PROCEDURE — 97140 MANUAL THERAPY 1/> REGIONS: CPT | Mod: GO | Performed by: OCCUPATIONAL THERAPIST

## 2019-03-25 PROCEDURE — 97112 NEUROMUSCULAR REEDUCATION: CPT | Mod: GO | Performed by: OCCUPATIONAL THERAPIST

## 2019-03-28 ENCOUNTER — HOSPITAL ENCOUNTER (OUTPATIENT)
Dept: OCCUPATIONAL THERAPY | Facility: CLINIC | Age: 64
Setting detail: THERAPIES SERIES
End: 2019-03-28
Attending: FAMILY MEDICINE
Payer: MEDICARE

## 2019-03-28 PROCEDURE — 97110 THERAPEUTIC EXERCISES: CPT | Mod: GO | Performed by: OCCUPATIONAL THERAPIST

## 2019-03-28 PROCEDURE — 97535 SELF CARE MNGMENT TRAINING: CPT | Mod: GO | Performed by: OCCUPATIONAL THERAPIST

## 2019-04-02 ENCOUNTER — HOSPITAL ENCOUNTER (OUTPATIENT)
Dept: OCCUPATIONAL THERAPY | Facility: CLINIC | Age: 64
Setting detail: THERAPIES SERIES
End: 2019-04-02
Attending: PHYSICAL MEDICINE & REHABILITATION
Payer: MEDICARE

## 2019-04-02 ENCOUNTER — HOSPITAL ENCOUNTER (OUTPATIENT)
Dept: PHYSICAL THERAPY | Facility: CLINIC | Age: 64
Setting detail: THERAPIES SERIES
End: 2019-04-02
Attending: PHYSICAL MEDICINE & REHABILITATION
Payer: MEDICARE

## 2019-04-02 PROCEDURE — 97140 MANUAL THERAPY 1/> REGIONS: CPT | Mod: GO | Performed by: OCCUPATIONAL THERAPIST

## 2019-04-02 PROCEDURE — 97140 MANUAL THERAPY 1/> REGIONS: CPT | Mod: GP | Performed by: PHYSICAL THERAPIST

## 2019-04-02 PROCEDURE — 97110 THERAPEUTIC EXERCISES: CPT | Mod: GO | Performed by: OCCUPATIONAL THERAPIST

## 2019-04-02 PROCEDURE — 97110 THERAPEUTIC EXERCISES: CPT | Mod: GP | Performed by: PHYSICAL THERAPIST

## 2019-04-02 PROCEDURE — 97116 GAIT TRAINING THERAPY: CPT | Mod: GP | Performed by: PHYSICAL THERAPIST

## 2019-04-02 PROCEDURE — 97112 NEUROMUSCULAR REEDUCATION: CPT | Mod: GO | Performed by: OCCUPATIONAL THERAPIST

## 2019-04-02 PROCEDURE — 97112 NEUROMUSCULAR REEDUCATION: CPT | Mod: GP | Performed by: PHYSICAL THERAPIST

## 2019-04-04 ENCOUNTER — HOSPITAL ENCOUNTER (OUTPATIENT)
Dept: OCCUPATIONAL THERAPY | Facility: CLINIC | Age: 64
Setting detail: THERAPIES SERIES
End: 2019-04-04
Attending: FAMILY MEDICINE
Payer: MEDICARE

## 2019-04-04 ENCOUNTER — HOSPITAL ENCOUNTER (OUTPATIENT)
Dept: PHYSICAL THERAPY | Facility: CLINIC | Age: 64
Setting detail: THERAPIES SERIES
End: 2019-04-04
Attending: FAMILY MEDICINE
Payer: MEDICARE

## 2019-04-04 PROCEDURE — 97110 THERAPEUTIC EXERCISES: CPT | Mod: GO | Performed by: OCCUPATIONAL THERAPIST

## 2019-04-04 PROCEDURE — 97140 MANUAL THERAPY 1/> REGIONS: CPT | Mod: GO | Performed by: OCCUPATIONAL THERAPIST

## 2019-04-04 PROCEDURE — 97112 NEUROMUSCULAR REEDUCATION: CPT | Mod: GP | Performed by: PHYSICAL THERAPIST

## 2019-04-04 PROCEDURE — 97110 THERAPEUTIC EXERCISES: CPT | Mod: GP | Performed by: PHYSICAL THERAPIST

## 2019-04-04 PROCEDURE — 97116 GAIT TRAINING THERAPY: CPT | Mod: GP | Performed by: PHYSICAL THERAPIST

## 2019-04-04 NOTE — PROGRESS NOTES
[facility]      OUTPATIENT OCCUPATIONAL THERAPY  PLAN OF TREATMENT FOR OUTPATIENT REHABILITATION    Patient's Last Name, First Name, M.I.                YOB: 1955  Luis Trinidad                        Provider's Name  [facility] Medical Record No.  [id number]                               Onset Date: 11/29/17   Start of Care Date: 2/8/2018   Type:     ___PT   _X_OT   ___SLP Medical Diagnosis: hemiparesis of R side as late effect of stroke                       OT Diagnosis: decreased ADL/IADLs      _________________________________________________________________________________  Plan of Treatment:  Continued emphasis on getting HEP program to manage his tone better ( via myofascial release/Instrument Assisted SOft Tissue Mobilization/IASTM Graston Technique, weightbearing, NMES, shoulder pulley kit, prolonged stretches)  and help refine movement patterns to reduce compenstory patterns that can cotribute to pain/ROM loss if not addressed. We are applying these patterns to repetive exercises and tranferring them to home based 2 handed functional tasks ( laundry, washing dishes, carrying/put away groceries).         Frequency/Duration: 2x/week for 12 weeks     Goals:    Goal Identifier R shoulder strength   Goal Description Patient to increase R shoulder flexiion AROM to 40 degrees for improved R UE function for ADL/IADLs (during dressing and grooming tasks, carrying items, putting groceries and dishes away).   Target Date 6/30/2019   Date Met      Progress:     Goal Identifier R pinch strength   Goal Description Patient will demonstrate increased right hand pinch strength (both lateral and palmar) by 3# each for increased independence with ADL/IADLs such as opening containers, pull up pants, maintaining pinch to hold items.   Target Date 6/30/2019   Date Met      Progress:      Goal Identifier R GM/FM coordination   Goal Description Patient to improve R GM/FM coordination skills for increased independence during ADL/IADL tasks (dressing, kitchen tasks, feeding self) by completing the Box and Blocks Test with R UE in standing with 10 blocks.   Target Date 6/30/2019   Date Met      Progress:     Goal Identifier R UE as gross assist   Goal Description Patient to demonstrate functional tasks (feeding self, wiping the counter/table, washing dishes, etc.) with 20% use of R UE as gross stabilizer or gross assist for increased ADL/IADL independence and closer return to prior level of function.(2/8 (eval date): using ~3% at home)   Target Date 6/30/2019   Date Met      Progress:     Goal Identifier functional/normal movment patterns in R UE and trunk   Goal Description Patient to complete therapeutic task (simple kitchen tasks, laundry, etc.) with use of B UE's and no more than 2 cues in 5 minute time frame for correct body mechanics and for decreasing compensatory movements at the trunk and upper body for encouragement of normal movement patterns, increased R UE function and increased ADL/IADL independence.   Target Date 6/30/2019   Date Met      Progress:     Goal Identifier visual skills   Goal Description Patient will demonstrate WFLs visual skills (including reaction time and visual scanning skills) for safe independent community mobility (crossing the street, driving, grocery shopping) by scoring WNLs on all modes of the Dynavision, Scancourse and other visual screens.   Target Date 6/30/2019   Date Met      Progress:       Progress Toward Goals:   See regularly filed progress notes    Certification date from 4/5/2019 to 6/27/2019  .    Allyssa Martinez OT          I CERTIFY THE NEED FOR THESE SERVICES FURNISHED UNDER        THIS PLAN OF TREATMENT AND WHILE UNDER MY CARE     (Physician co-signature of this document indicates review and certification of the therapy plan).                 Referring Provider: Chele Reno MD

## 2019-04-09 ENCOUNTER — HOSPITAL ENCOUNTER (OUTPATIENT)
Dept: OCCUPATIONAL THERAPY | Facility: CLINIC | Age: 64
Setting detail: THERAPIES SERIES
End: 2019-04-09
Attending: PHYSICAL MEDICINE & REHABILITATION
Payer: MEDICARE

## 2019-04-09 ENCOUNTER — HOSPITAL ENCOUNTER (OUTPATIENT)
Dept: PHYSICAL THERAPY | Facility: CLINIC | Age: 64
Setting detail: THERAPIES SERIES
End: 2019-04-09
Attending: PHYSICAL MEDICINE & REHABILITATION
Payer: MEDICARE

## 2019-04-09 PROCEDURE — 97110 THERAPEUTIC EXERCISES: CPT | Mod: GP | Performed by: PHYSICAL THERAPIST

## 2019-04-09 PROCEDURE — 97110 THERAPEUTIC EXERCISES: CPT | Mod: GO | Performed by: OCCUPATIONAL THERAPIST

## 2019-04-09 PROCEDURE — 97116 GAIT TRAINING THERAPY: CPT | Mod: GP | Performed by: PHYSICAL THERAPIST

## 2019-04-09 PROCEDURE — 97140 MANUAL THERAPY 1/> REGIONS: CPT | Mod: GO | Performed by: OCCUPATIONAL THERAPIST

## 2019-04-09 PROCEDURE — 97112 NEUROMUSCULAR REEDUCATION: CPT | Mod: GP | Performed by: PHYSICAL THERAPIST

## 2019-04-16 ENCOUNTER — HOSPITAL ENCOUNTER (OUTPATIENT)
Dept: PHYSICAL THERAPY | Facility: CLINIC | Age: 64
Setting detail: THERAPIES SERIES
End: 2019-04-16
Attending: PHYSICAL MEDICINE & REHABILITATION
Payer: MEDICARE

## 2019-04-16 ENCOUNTER — HOSPITAL ENCOUNTER (OUTPATIENT)
Dept: OCCUPATIONAL THERAPY | Facility: CLINIC | Age: 64
Setting detail: THERAPIES SERIES
End: 2019-04-16
Attending: PHYSICAL MEDICINE & REHABILITATION
Payer: MEDICARE

## 2019-04-16 PROCEDURE — 97110 THERAPEUTIC EXERCISES: CPT | Mod: GP | Performed by: PHYSICAL THERAPIST

## 2019-04-16 PROCEDURE — 97110 THERAPEUTIC EXERCISES: CPT | Mod: GO | Performed by: OCCUPATIONAL THERAPIST

## 2019-04-16 PROCEDURE — 97116 GAIT TRAINING THERAPY: CPT | Mod: GP | Performed by: PHYSICAL THERAPIST

## 2019-04-18 ENCOUNTER — HOSPITAL ENCOUNTER (OUTPATIENT)
Dept: OCCUPATIONAL THERAPY | Facility: CLINIC | Age: 64
Setting detail: THERAPIES SERIES
End: 2019-04-18
Attending: FAMILY MEDICINE
Payer: MEDICARE

## 2019-04-18 ENCOUNTER — HOSPITAL ENCOUNTER (OUTPATIENT)
Dept: PHYSICAL THERAPY | Facility: CLINIC | Age: 64
Setting detail: THERAPIES SERIES
End: 2019-04-18
Attending: FAMILY MEDICINE
Payer: MEDICARE

## 2019-04-18 PROCEDURE — 97110 THERAPEUTIC EXERCISES: CPT | Mod: GP | Performed by: PHYSICAL THERAPIST

## 2019-04-18 PROCEDURE — 97110 THERAPEUTIC EXERCISES: CPT | Mod: GO | Performed by: OCCUPATIONAL THERAPIST

## 2019-04-18 PROCEDURE — 97112 NEUROMUSCULAR REEDUCATION: CPT | Mod: GP | Performed by: PHYSICAL THERAPIST

## 2019-04-22 NOTE — ADDENDUM NOTE
Encounter addended by: Allyssa Martinez OT on: 4/22/2019 6:15 AM   Actions taken: Sign clinical note

## 2019-04-22 NOTE — ADDENDUM NOTE
Encounter addended by: Allyssa Martinez OT on: 4/22/2019 6:19 AM   Actions taken: Flowsheet accepted

## 2019-04-22 NOTE — PROGRESS NOTES
Outpatient Occupational Therapy Progress Note     Patient: Luis Trinidad  : 1955    Beginning/End Dates of Reporting Period:  3/4/2019 to 2019    Referring Provider: Chele Reno MD    Therapy Diagnosis: decreased ADLs/IADLs    Client Self Report: Pt reports tightness in R torso today.  Pt reports he was unable to come last week due to transportation issues.     Objective Measurements:     Objective Measure: R  strength    Details:  from R 24# L 106# (stable since 18, +/- 1#)   Objective Measure: Pinch Strength   Details:  from Key pinch 15  and 3 pt is 8#.  (Stable since 19)   Objective Measure:  UE recruitment /trunk AROM   Details: from -- Can  do palmar ABD in closed chain 0-60 , sliding  on side of finger.  and FL  at IP/MP  into key pinch but lacks TH ADD or EX at CMC/MP/IP but able to consciously relax.     Objective Measure: R UE ROM and strength   Details: from Tested in supine--MMT of H ABD, HAdd 5/5.  SH ABD 5/5.  ER has 1/3 arc AROM ( at ribside), 2/5 MMT.  IR is  5/5.   Biceps is 4+/5, combines with SH ABD and scap retraction; isolated in flexion plane, gets  2+/5  improved to larger arc, now only lacking 60 degrees extension, was lacking 90) and gets to 120 degrees flexion arc AROM. w MMT of 2+/5  EL EX.  Can gets to lacking 45 degrees of EXT with NMES in place at triceps.           Objective Measure: Box and Blocks   Details: R 7blcks L 71 blcks BNL on R WNL on L  increased by 3 on R and by 13 on L since last testing which was in standing position for the R.         Goals:     Goal Identifier R shoulder strength   Goal Description Patient to increase R shoulder flexiion AROM to 40 degrees for improved R UE function for ADL/IADLs (during dressing and grooming tasks, carrying items, putting groceries and dishes away).   Target Date 2019   Date Met      Progress: Pt making gains with Active shoulder flexion at a 45 degree incline. Pt demonstrating sh  flexion in gravity eliminated position.      Goal Identifier R pinch strength   Goal Description Patient will demonstrate increased right hand pinch strength (both lateral and palmar) by 3# each for increased independence with ADL/IADLs such as opening containers, pull up pants, maintaining pinch to hold items.   Target Date 6/30/2019   Date Met      Progress:Stable since 1/8.  See objective measures above, limited function with R due to tone in R hand.      Goal Identifier R GM/FM coordination   Goal Description Patient to improve R GM/FM coordination skills for increased independence during ADL/IADL tasks (dressing, kitchen tasks, feeding self) by completing the Box and Blocks Test with R UE in standing with 10 blocks.   Target Date 6/30/2019   Date Met     Progress: Pt able to transfer 7 blocks in sitting. See objective measures     Goal Identifier R UE as gross assist   Goal Description Patient to demonstrate functional tasks (feeding self, wiping the counter/table, washing dishes, etc.) with 20% use of R UE as gross stabilizer or gross assist for increased ADL/IADL independence and closer return to prior level of function.(2/8 (eval date): using ~3% at home)   Target Date 6/30/2019   Date Met      Progress:Pt continues to use R hand at home for more tasks and as gross stabalizer     Goal Identifier functional/normal movment patterns in R UE and trunk   Goal Description Patient to complete therapeutic task (simple kitchen tasks, laundry, etc.) with use of B UE's and no more than 2 cues in 5 minute time frame for correct body mechanics and for decreasing compensatory movements at the trunk and upper body for encouragement of normal movement patterns, increased R UE function and increased ADL/IADL independence.   Target Date 6/30/2019   Date Met      Progress:Ongoing, making gradual gains     Goal Identifier visual skills   Goal Description Patient will demonstrate WFLs visual skills (including reaction time and  visual scanning skills) for safe independent community mobility (crossing the street, driving, grocery shopping) by scoring WNLs on all modes of the Dynavision, Scancourse and other visual screens.   Target Date 6/30/2019   Date Met      Progress:  Not addressed this period due to focus on other areas       Progress Toward Goals:   Progress this reporting period: Continued emphasis on getting HEP program to manage his tone better ( via myofascial release/Instrument Assisted SOft Tissue Mobilization/IASTM Graston Technique, weightbearing, NMES, shoulder pulley kit, prolonged stretches)  and help refine movement patterns to reduce compenstory patterns that can cotribute to pain/ROM loss if not addressed. We are applying these patterns to repetive exercises and tranferring them to home based 2 handed functional tasks ( laundry, washing dishes, carrying/put away groceries). See objective measures above for further evidence of patient gains.            Plan:  Continue therapy per current plan of care.    Discharge  No

## 2019-04-23 ENCOUNTER — HOSPITAL ENCOUNTER (OUTPATIENT)
Dept: PHYSICAL THERAPY | Facility: CLINIC | Age: 64
Setting detail: THERAPIES SERIES
End: 2019-04-23
Attending: PHYSICAL MEDICINE & REHABILITATION
Payer: MEDICARE

## 2019-04-23 ENCOUNTER — HOSPITAL ENCOUNTER (OUTPATIENT)
Dept: OCCUPATIONAL THERAPY | Facility: CLINIC | Age: 64
Setting detail: THERAPIES SERIES
End: 2019-04-23
Attending: PHYSICAL MEDICINE & REHABILITATION
Payer: MEDICARE

## 2019-04-23 PROCEDURE — 97140 MANUAL THERAPY 1/> REGIONS: CPT | Mod: GO | Performed by: OCCUPATIONAL THERAPIST

## 2019-04-23 PROCEDURE — 97112 NEUROMUSCULAR REEDUCATION: CPT | Mod: GP | Performed by: PHYSICAL THERAPIST

## 2019-04-23 PROCEDURE — 97116 GAIT TRAINING THERAPY: CPT | Mod: GP | Performed by: PHYSICAL THERAPIST

## 2019-04-23 PROCEDURE — 97110 THERAPEUTIC EXERCISES: CPT | Mod: GP | Performed by: PHYSICAL THERAPIST

## 2019-04-25 ENCOUNTER — HOSPITAL ENCOUNTER (OUTPATIENT)
Dept: PHYSICAL THERAPY | Facility: CLINIC | Age: 64
Setting detail: THERAPIES SERIES
End: 2019-04-25
Attending: FAMILY MEDICINE
Payer: MEDICARE

## 2019-04-25 ENCOUNTER — HOSPITAL ENCOUNTER (OUTPATIENT)
Dept: OCCUPATIONAL THERAPY | Facility: CLINIC | Age: 64
Setting detail: THERAPIES SERIES
End: 2019-04-25
Attending: FAMILY MEDICINE
Payer: MEDICARE

## 2019-04-25 PROCEDURE — 97110 THERAPEUTIC EXERCISES: CPT | Mod: GP | Performed by: PHYSICAL THERAPIST

## 2019-04-25 PROCEDURE — 97112 NEUROMUSCULAR REEDUCATION: CPT | Mod: GP | Performed by: PHYSICAL THERAPIST

## 2019-04-25 PROCEDURE — 97110 THERAPEUTIC EXERCISES: CPT | Mod: GO | Performed by: OCCUPATIONAL THERAPIST

## 2019-04-30 ENCOUNTER — HOSPITAL ENCOUNTER (OUTPATIENT)
Dept: PHYSICAL THERAPY | Facility: CLINIC | Age: 64
Setting detail: THERAPIES SERIES
End: 2019-04-30
Attending: FAMILY MEDICINE
Payer: MEDICARE

## 2019-04-30 ENCOUNTER — HOSPITAL ENCOUNTER (OUTPATIENT)
Dept: OCCUPATIONAL THERAPY | Facility: CLINIC | Age: 64
Setting detail: THERAPIES SERIES
End: 2019-04-30
Attending: FAMILY MEDICINE
Payer: MEDICARE

## 2019-04-30 PROCEDURE — 97140 MANUAL THERAPY 1/> REGIONS: CPT | Mod: GO | Performed by: OCCUPATIONAL THERAPIST

## 2019-04-30 PROCEDURE — 97110 THERAPEUTIC EXERCISES: CPT | Mod: GP | Performed by: PHYSICAL THERAPIST

## 2019-04-30 PROCEDURE — 97110 THERAPEUTIC EXERCISES: CPT | Mod: GO | Performed by: OCCUPATIONAL THERAPIST

## 2019-04-30 PROCEDURE — 97116 GAIT TRAINING THERAPY: CPT | Mod: GP | Performed by: PHYSICAL THERAPIST

## 2019-04-30 PROCEDURE — 97112 NEUROMUSCULAR REEDUCATION: CPT | Mod: GP | Performed by: PHYSICAL THERAPIST

## 2019-05-02 ENCOUNTER — HOSPITAL ENCOUNTER (OUTPATIENT)
Dept: PHYSICAL THERAPY | Facility: CLINIC | Age: 64
Setting detail: THERAPIES SERIES
End: 2019-05-02
Attending: PHYSICAL MEDICINE & REHABILITATION
Payer: MEDICARE

## 2019-05-02 PROCEDURE — 97112 NEUROMUSCULAR REEDUCATION: CPT | Mod: GP,KX | Performed by: PHYSICAL THERAPIST

## 2019-05-02 PROCEDURE — 97110 THERAPEUTIC EXERCISES: CPT | Mod: GP,KX | Performed by: PHYSICAL THERAPIST

## 2019-05-07 ENCOUNTER — HOSPITAL ENCOUNTER (OUTPATIENT)
Dept: PHYSICAL THERAPY | Facility: CLINIC | Age: 64
Setting detail: THERAPIES SERIES
End: 2019-05-07
Attending: FAMILY MEDICINE
Payer: MEDICARE

## 2019-05-07 ENCOUNTER — HOSPITAL ENCOUNTER (OUTPATIENT)
Dept: OCCUPATIONAL THERAPY | Facility: CLINIC | Age: 64
Setting detail: THERAPIES SERIES
End: 2019-05-07
Attending: FAMILY MEDICINE
Payer: MEDICARE

## 2019-05-07 PROCEDURE — 97110 THERAPEUTIC EXERCISES: CPT | Mod: GP,KX | Performed by: PHYSICAL THERAPIST

## 2019-05-07 PROCEDURE — 97140 MANUAL THERAPY 1/> REGIONS: CPT | Mod: GO | Performed by: OCCUPATIONAL THERAPIST

## 2019-05-07 PROCEDURE — 97110 THERAPEUTIC EXERCISES: CPT | Mod: GO,KX | Performed by: OCCUPATIONAL THERAPIST

## 2019-05-07 PROCEDURE — 97116 GAIT TRAINING THERAPY: CPT | Mod: GP,KX | Performed by: PHYSICAL THERAPIST

## 2019-05-09 ENCOUNTER — HOSPITAL ENCOUNTER (OUTPATIENT)
Dept: PHYSICAL THERAPY | Facility: CLINIC | Age: 64
Setting detail: THERAPIES SERIES
End: 2019-05-09
Attending: FAMILY MEDICINE
Payer: MEDICARE

## 2019-05-09 ENCOUNTER — HOSPITAL ENCOUNTER (OUTPATIENT)
Dept: OCCUPATIONAL THERAPY | Facility: CLINIC | Age: 64
Setting detail: THERAPIES SERIES
End: 2019-05-09
Attending: FAMILY MEDICINE
Payer: MEDICARE

## 2019-05-09 PROCEDURE — 97110 THERAPEUTIC EXERCISES: CPT | Mod: GP | Performed by: PHYSICAL THERAPIST

## 2019-05-09 PROCEDURE — 97140 MANUAL THERAPY 1/> REGIONS: CPT | Mod: GP | Performed by: PHYSICAL THERAPIST

## 2019-05-09 PROCEDURE — 97112 NEUROMUSCULAR REEDUCATION: CPT | Mod: GP | Performed by: PHYSICAL THERAPIST

## 2019-05-09 PROCEDURE — 97140 MANUAL THERAPY 1/> REGIONS: CPT | Mod: GO,KX | Performed by: OCCUPATIONAL THERAPIST

## 2019-05-09 PROCEDURE — 97110 THERAPEUTIC EXERCISES: CPT | Mod: GO,KX | Performed by: OCCUPATIONAL THERAPIST

## 2019-05-10 NOTE — ADDENDUM NOTE
Encounter addended by: Yen Villanueva, PT on: 9/12/2018  3:01 PM<BR>     Actions taken: Flowsheet data copied forward, Flowsheet accepted
No joint pain, swelling or deformity; no limitation of movement

## 2019-05-14 ENCOUNTER — HOSPITAL ENCOUNTER (OUTPATIENT)
Dept: OCCUPATIONAL THERAPY | Facility: CLINIC | Age: 64
Setting detail: THERAPIES SERIES
End: 2019-05-14
Attending: FAMILY MEDICINE
Payer: MEDICARE

## 2019-05-14 PROCEDURE — 97140 MANUAL THERAPY 1/> REGIONS: CPT | Mod: GO,KX | Performed by: OCCUPATIONAL THERAPIST

## 2019-05-14 PROCEDURE — 97110 THERAPEUTIC EXERCISES: CPT | Mod: GO,KX | Performed by: OCCUPATIONAL THERAPIST

## 2019-05-16 ENCOUNTER — OFFICE VISIT (OUTPATIENT)
Dept: PHYSICAL MEDICINE AND REHAB | Facility: CLINIC | Age: 64
End: 2019-05-16
Payer: MEDICARE

## 2019-05-16 ENCOUNTER — HOSPITAL ENCOUNTER (OUTPATIENT)
Dept: PHYSICAL THERAPY | Facility: CLINIC | Age: 64
Setting detail: THERAPIES SERIES
End: 2019-05-16
Attending: FAMILY MEDICINE
Payer: MEDICARE

## 2019-05-16 ENCOUNTER — HOSPITAL ENCOUNTER (OUTPATIENT)
Dept: OCCUPATIONAL THERAPY | Facility: CLINIC | Age: 64
Setting detail: THERAPIES SERIES
End: 2019-05-16
Attending: FAMILY MEDICINE
Payer: MEDICARE

## 2019-05-16 VITALS
TEMPERATURE: 98.6 F | RESPIRATION RATE: 16 BRPM | DIASTOLIC BLOOD PRESSURE: 83 MMHG | OXYGEN SATURATION: 98 % | HEIGHT: 64 IN | WEIGHT: 184.1 LBS | HEART RATE: 97 BPM | SYSTOLIC BLOOD PRESSURE: 130 MMHG | BODY MASS INDEX: 31.43 KG/M2

## 2019-05-16 DIAGNOSIS — G81.11 RIGHT SPASTIC HEMIPARESIS (H): Primary | ICD-10-CM

## 2019-05-16 DIAGNOSIS — R25.2 SPASTICITY: ICD-10-CM

## 2019-05-16 PROCEDURE — 97110 THERAPEUTIC EXERCISES: CPT | Mod: GO,KX | Performed by: OCCUPATIONAL THERAPIST

## 2019-05-16 PROCEDURE — 97116 GAIT TRAINING THERAPY: CPT | Mod: GP,KX | Performed by: PHYSICAL THERAPIST

## 2019-05-16 PROCEDURE — 97140 MANUAL THERAPY 1/> REGIONS: CPT | Mod: GP,KX | Performed by: PHYSICAL THERAPIST

## 2019-05-16 PROCEDURE — 97112 NEUROMUSCULAR REEDUCATION: CPT | Mod: GP | Performed by: PHYSICAL THERAPIST

## 2019-05-16 PROCEDURE — 97110 THERAPEUTIC EXERCISES: CPT | Mod: GP,KX | Performed by: PHYSICAL THERAPIST

## 2019-05-16 RX ORDER — BACLOFEN 10 MG/1
30-40 TABLET ORAL 3 TIMES DAILY
Qty: 1080 TABLET | Refills: 3 | Status: SHIPPED | OUTPATIENT
Start: 2019-05-16 | End: 2021-09-22

## 2019-05-16 ASSESSMENT — MIFFLIN-ST. JEOR: SCORE: 1541.07

## 2019-05-16 ASSESSMENT — PAIN SCALES - GENERAL: PAINLEVEL: NO PAIN (0)

## 2019-05-16 NOTE — LETTER
"5/16/2019       RE: Luis Trinidad  [address] [facility] [address]     Dear Colleague,    Thank you for referring your patient, Luis Trinidad, to the [facility] PHYSICAL MEDICINE AND REHABILITATION at [facility]. Please see a copy of my visit note below.    PM&R Clinic & Procedure Note:    Luis Trinidad is 63-year-old male with a history of stroke in Nov 2016 resulting in residual right spastic hemiparesis.  Rogelio was last seen on 2/21/19 at which time he received 400 units of botox at that time to his Right trunk and right arm. He's pleased overall with the response with particular good benefit in reduction of spasticity, ability to open his fingers and extend the elbow.  He feels the peak benefit is about 6 weeks but subsequently more tight. Received comment by his physical therapist Danyelle Evans identifying some stiffness Rogelio is feeling in his back and right flank and that he feels there may be some extension from stiffness in these locations traveling into his arm and also leg impacting gait.  This was wondering if adding some botulinum toxin to the back and or trunk other muscles could be helpful. Rogelio is consistent with stretching and maintenance exercise program.      He has continued with some ongoing outpatient physical and occupational therapies.  He is feeling well today denying any significant interval medical issues.    Physical exam:  /83 (BP Location: Left arm, Patient Position: Sitting, Cuff Size: Adult Large)   Pulse 97   Temp 98.6  F (37  C) (Oral)   Resp 16   Ht 1.626 m (5' 4\")   Wt 83.5 kg (184 lb 1.6 oz)   SpO2 98%   BMI 31.60 kg/m      Alert pleasant bright affect   Increased tone noted in RUE and along right pec and right trunk.   There is slight fullness more prominence of the right thoracic and lumbar paraspinal muscles compared to left.  Slight palpation through this area.  No particular curvature nor rotation spine is noted.  Also some " tenderness to palpation in a broader distribution across the right flank external oblique.  Not so much tenderness with latissimus dorsi palpation.  AROM: shoulder abduction 90 degree, elbow extension approx 160, wrist remains in neutral, able to extend fingers, but occassionally uses left hand for assistance.   Isi: 3/4 right upper extremity flexor muscles, 1-2/4 extensor  right hemispastic gait noted    Assessment and recommendations:  1. Talked about adding the botulinum toxin her spinal muscles and right flank muscles that he is describing some tightness and pain.  Muscles can injected however are larger and more difficult to distribute effectively.  Also he is got considerable the other muscle we are injecting and already at the typical maximum 400 units dose therefore to add to the/flank, that would be reducing from other injected muscles.  Reviewed the pros and cons and ultimately we will trial with higher dose oral baclofen if we can get the correct distribution of tone reduction.  2. Increase baclofen from 20/20/30 to 30 mg 3 times daily then 40 mg 3 times daily as tolerated.  Monitor for somnolence or other side effects.  3. Plan to re-inject 400 units today into right chest and right arm.  4. Continue with ROM and other maintenance exercises.  5. Follow up in 3 months.    Procedure:   Chemodenervation to right chest and RUE utilizing botulinum toxin.  Began with formal consent which he signed. Had formal time-out prior to procedure.  400 U of Botox lot  C3, expiration 09 2021 , Botox NDC [id number] was utilized. Diluted with normal saline in a 100 U:1mL ratio, total of 4 mL.  All areas were cleansed with chloroprep prior to injecting. EMG guidance was utilized to identify most active motor units while avoiding surrounding structures.     The following muscles were then injected, two body areas     Right trunk:  1. Right Pectoralis Major: 110 units divided in 3 sites     Right arm  1. Biceps 90  units divided 1 sites.  2. FDS 50 units  3. FDP 50 units  4. FCR 50 units  5. FCU 50 units    Procedure was tolerated well, he leaves clinic feeling well.    Again, thank you for allowing me to participate in the care of your patient.      Sincerely,    Chele Reno MD

## 2019-05-16 NOTE — NURSING NOTE
Chief Complaint   Patient presents with     Botox     [facility] RETURN -ARM, RIGHT/PECTORALIS     Miranda Jorge

## 2019-05-17 NOTE — PROGRESS NOTES
"PM&R Clinic & Procedure Note:    Luis Trinidad is 63-year-old male with a history of stroke in Nov 2016 resulting in residual right spastic hemiparesis.  Rogelio was last seen on 2/21/19 at which time he received 400 units of botox at that time to his Right trunk and right arm. He's pleased overall with the response with particular good benefit in reduction of spasticity, ability to open his fingers and extend the elbow.  He feels the peak benefit is about 6 weeks but subsequently more tight. Received comment by his physical therapist Danyelle Evans identifying some stiffness Rogelio is feeling in his back and right flank and that he feels there may be some extension from stiffness in these locations traveling into his arm and also leg impacting gait.  This was wondering if adding some botulinum toxin to the back and or trunk other muscles could be helpful. Rogelio is consistent with stretching and maintenance exercise program.      He has continued with some ongoing outpatient physical and occupational therapies.  He is feeling well today denying any significant interval medical issues.    Physical exam:  /83 (BP Location: Left arm, Patient Position: Sitting, Cuff Size: Adult Large)   Pulse 97   Temp 98.6  F (37  C) (Oral)   Resp 16   Ht 1.626 m (5' 4\")   Wt 83.5 kg (184 lb 1.6 oz)   SpO2 98%   BMI 31.60 kg/m     Alert pleasant bright affect   Increased tone noted in RUE and along right pec and right trunk.   There is slight fullness more prominence of the right thoracic and lumbar paraspinal muscles compared to left.  Slight palpation through this area.  No particular curvature nor rotation spine is noted.  Also some tenderness to palpation in a broader distribution across the right flank external oblique.  Not so much tenderness with latissimus dorsi palpation.  AROM: shoulder abduction 90 degree, elbow extension approx 160, wrist remains in neutral, able to extend fingers, but occassionally uses left hand for " assistance.   Isi: 3/4 right upper extremity flexor muscles, 1-2/4 extensor  right hemispastic gait noted    Assessment and recommendations:  1. Talked about adding the botulinum toxin her spinal muscles and right flank muscles that he is describing some tightness and pain.  Muscles can injected however are larger and more difficult to distribute effectively.  Also he is got considerable the other muscle we are injecting and already at the typical maximum 400 units dose therefore to add to the/flank, that would be reducing from other injected muscles.  Reviewed the pros and cons and ultimately we will trial with higher dose oral baclofen if we can get the correct distribution of tone reduction.  2. Increase baclofen from 20/20/30 to 30 mg 3 times daily then 40 mg 3 times daily as tolerated.  Monitor for somnolence or other side effects.  3. Plan to re-inject 400 units today into right chest and right arm.  4. Continue with ROM and other maintenance exercises.  5. Follow up in 3 months.    Procedure:   Chemodenervation to right chest and RUE utilizing botulinum toxin.  Began with formal consent which he signed. Had formal time-out prior to procedure.  400 U of Botox lot  C3, expiration 09 2021 , Botox NDC 4427-7412-91 was utilized. Diluted with normal saline in a 100 U:1mL ratio, total of 4 mL.  All areas were cleansed with chloroprep prior to injecting. EMG guidance was utilized to identify most active motor units while avoiding surrounding structures.     The following muscles were then injected, two body areas     Right trunk:  1. Right Pectoralis Major: 110 units divided in 3 sites     Right arm  1. Biceps 90 units divided 1 sites.  2. FDS 50 units  3. FDP 50 units  4. FCR 50 units  5. FCU 50 units    Procedure was tolerated well, he leaves clinic feeling well.

## 2019-05-20 NOTE — ADDENDUM NOTE
Encounter addended by: Allyssa Martinez, OT on: 5/20/2019 11:40 AM   Actions taken: Flowsheet accepted, Charge Capture section accepted

## 2019-05-21 ENCOUNTER — HOSPITAL ENCOUNTER (OUTPATIENT)
Dept: PHYSICAL THERAPY | Facility: CLINIC | Age: 64
Setting detail: THERAPIES SERIES
End: 2019-05-21
Attending: FAMILY MEDICINE
Payer: MEDICARE

## 2019-05-21 ENCOUNTER — HOSPITAL ENCOUNTER (OUTPATIENT)
Dept: OCCUPATIONAL THERAPY | Facility: CLINIC | Age: 64
Setting detail: THERAPIES SERIES
End: 2019-05-21
Attending: FAMILY MEDICINE
Payer: MEDICARE

## 2019-05-21 PROCEDURE — 97110 THERAPEUTIC EXERCISES: CPT | Mod: GO | Performed by: OCCUPATIONAL THERAPIST

## 2019-05-21 PROCEDURE — 97140 MANUAL THERAPY 1/> REGIONS: CPT | Mod: GP,KX | Performed by: PHYSICAL THERAPIST

## 2019-05-21 PROCEDURE — 97110 THERAPEUTIC EXERCISES: CPT | Mod: GP,KX | Performed by: PHYSICAL THERAPIST

## 2019-05-21 PROCEDURE — 97112 NEUROMUSCULAR REEDUCATION: CPT | Mod: GP,KX | Performed by: PHYSICAL THERAPIST

## 2019-05-21 PROCEDURE — 97140 MANUAL THERAPY 1/> REGIONS: CPT | Mod: GO | Performed by: OCCUPATIONAL THERAPIST

## 2019-05-23 ENCOUNTER — HOSPITAL ENCOUNTER (OUTPATIENT)
Dept: PHYSICAL THERAPY | Facility: CLINIC | Age: 64
Setting detail: THERAPIES SERIES
End: 2019-05-23
Attending: FAMILY MEDICINE
Payer: MEDICARE

## 2019-05-23 ENCOUNTER — HOSPITAL ENCOUNTER (OUTPATIENT)
Dept: OCCUPATIONAL THERAPY | Facility: CLINIC | Age: 64
Setting detail: THERAPIES SERIES
End: 2019-05-23
Attending: FAMILY MEDICINE
Payer: MEDICARE

## 2019-05-23 PROCEDURE — 97116 GAIT TRAINING THERAPY: CPT | Mod: GP | Performed by: PHYSICAL THERAPIST

## 2019-05-23 PROCEDURE — 97110 THERAPEUTIC EXERCISES: CPT | Mod: GP,KX | Performed by: PHYSICAL THERAPIST

## 2019-05-23 PROCEDURE — 97140 MANUAL THERAPY 1/> REGIONS: CPT | Mod: GP,KX | Performed by: PHYSICAL THERAPIST

## 2019-05-23 PROCEDURE — 97110 THERAPEUTIC EXERCISES: CPT | Mod: GO,KX | Performed by: OCCUPATIONAL THERAPIST

## 2019-05-23 NOTE — PROGRESS NOTES
"Outpatient Occupational Therapy Progress Note     Patient: Luis Trinidad  : 1955    Beginning/End Dates of Reporting Period:  2019 to 2019    Referring Provider: Chele Reno MD    Therapy Diagnosis: Decreased ADLs/IADLs    Client Self Report: On   increased Baclofen to 4,4,3 ( from 2,2,2) to address trunk and UE tone.  Used same botox placeemtns as prior.  Started new doses last night, so far no new side effects.  Will be taking a break from therapy ~6/4/6/15 for wife to complete CNA training and start new job in [place], visit with sister. \" I am feeling looser on my right since my UE since my injection \"     Objective Measurements:     Objective Measure: R  strength    Details: from  R 25# L 106# (stable since 18, +/- 1#)   Objective Measure: Pinch Strength   Details: from  Key pinch 13  and 3 pt is 8#.  (key pinch fatigues quicker after botox; Stable since 19)   Objective Measure: from  UE recruitment /trunk AROM       Objective Measure: R UE ROM and strength   Details: from AROM Tested in supine-- At start, ER has 32 degrees ribside and 54 degrees at 90 degrees SH ABD. AROM HABD to  80 of 110.  After IASTM,  PROM improves to 75 degree arc of ER at 90 degrees and 42 degrees at ribside.   ABD, H ABD to 90/110 and SH FL to 156. Combined movements: W HABD to 75 degrees during cane stretch, gets to lacking 30 degrees of EL EX). AROM of SH ABD to 86 dgrees, SH FL to 14 degrees AROM.   (Botox completed 19, baclofen also increased to 4,4,3 from 2,2,2 3x/daily doses).            Objective Measure: Box and Blocks   Details: R 7 blcks L 60 blcks  stable since         Goals:     Goal Identifier R shoulder strength   Goal Description Patient to increase R shoulder flexiion AROM to 40 degrees for improved R UE function for ADL/IADLs (during dressing and grooming tasks, carrying items, putting groceries and dishes away).   Target Date 19   Date Met "      Progress: IN progress     Goal Identifier R pinch strength   Goal Description Patient will demonstrate increased right hand pinch strength (both lateral and palmar) by 3# each for increased independence with ADL/IADLs such as opening containers, pull up pants, maintaining pinch to hold items.   Target Date 06/30/19   Date Met      Progress: IN progress see objective measures above     Goal Identifier R GM/FM coordination   Goal Description Patient to improve R GM/FM coordination skills for increased independence during ADL/IADL tasks (dressing, kitchen tasks, feeding self) by completing the Box and Blocks Test with R UE in standing with 10 blocks.   Target Date 06/30/19   Date Met      Progress: In progress, see objective measrues above     Goal Identifier R UE as gross assist   Goal Description Patient to demonstrate functional tasks (feeding self, wiping the counter/table, washing dishes, etc.) with 20% use of R UE as gross stabilizer or gross assist for increased ADL/IADL independence and closer return to prior level of function.(2/8 (eval date): using ~3% at home)   Target Date 06/30/19   Date Met      Progress: Pt continues to make gains in this area by adding functional tasks to his routine     Goal Identifier functional/normal movment patterns in R UE and trunk   Goal Description Patient to complete therapeutic task (simple kitchen tasks, laundry, etc.) with use of B UE's and no more than 2 cues in 5 minute time frame for correct body mechanics and for decreasing compensatory movements at the trunk and upper body for encouragement of normal movement patterns, increased R UE function and increased ADL/IADL independence.   Target Date 06/30/19   Date Met      Progress: In progress     Goal Identifier visual skills   Goal Description Patient will demonstrate WFLs visual skills (including reaction time and visual scanning skills) for safe independent community mobility (crossing the street, driving, grocery  shopping) by scoring WNLs on all modes of the Dynavision, Scancourse and other visual screens.   Target Date 06/30/19   Date Met      Progress: Not addressed due to other goal areas taking precedent         Progress Toward Goals:   Progress this reporting period:   (from Danyelle KUMAR OT)  Patient presents with multiple deficits from diagnosis of L CVA (R UE high tone/spasticity, decreased R UE AROM/joint contractures, and limited motor patterns with compensatory movements for RUE, working toward incorporating functional use of R UE into daily tasks).  Patient was previously independent with all ADLs and IADLs. Even with strong compliance with daily stretchng HEP and medical intervention ( botox and baclofen), Rogelio continues to demonstrate difficulty with managing his tone spasticity, but has demonstrated motor recruitment improvements and atR hand/TH following botox.  With his numerous deficit areas and continued slow steady improvement, Rogelio continues to be appropriate for continued skilled occupational therapy services beyond the therapy cap to continue to increase his ADL/IADL independence for less dependence on others for all these tasks. Most recent botox 5/16/19.     Plan:  Continue therapy per current plan of care.    Discharge NO

## 2019-05-24 NOTE — ADDENDUM NOTE
Encounter addended by: Yen Villanueva, PT on: 5/24/2019 1:17 PM   Actions taken: Flowsheet data copied forward, Flowsheet accepted

## 2019-05-24 NOTE — PROGRESS NOTES
[facility]      OUTPATIENT PHYSICAL THERAPY  PLAN OF TREATMENT FOR OUTPATIENT REHABILITATION    Patient's Last Name, First Name, M.I.                YOB: 1955  Luis Trinidad                        Provider's Name  [facility] Medical Record No.  [id number]                               Onset Date: 11/4/2015   Start of Care Date: 5/1/2018   Type:     _X_PT   ___OT   ___SLP Medical Diagnosis: ischemic CVA                       PT Diagnosis: Impaired gait and mobility due to right hemiparesis and spasticity      _________________________________________________________________________________  Plan of Treatment:    Frequency/Duration: 2 x per week x 90 days     Goals:  Goal Identifier 1 - Gait pattern/safety   Goal Description Rogelio will be able to achieve proper midstance to terminal stance alignment on right LE and transition into pre and initial swing with proper clearance of right foot for greater efficiency and safety with gait at home and in the community.   Target Date 04/29/19   Date Met   in progress   Progress:  Rogelio continues to demonstrate gradual improvement in midstance alignment and stability but at times this can be more difficult based on his fatigue level and right trunk/UE tightness.  Swing phase clearance continues to be more impaired and Rogelio is noticing more catching of his right foot in swing phase as he has improved in his stance alignment due to using less circumduction to compensate.       Goal Identifier 2- Gait speed   Goal Description Rogelio will increase his gait speed on the 25 foot timed walk to 9 sec or less without use of an assistive device  to indicate reduced level of gait impairment and improved community mobility.   Target Date 04/29/19   Date Met   in progress.    Progress:  Continued improving gait speed without shoes, inconsistent with shoes  due to intermittent catching of right foot.     Goal Identifier 3- stairs   Goal Description Pt will be able to navigate up and down his stairs with one rail in a reciprocal pattern with minimal circumduction of right LE in order for more efficient and safe access to all levels of his home.   Target Date 04/29/19   Date Met   in progress, partially met   Progress:  Rogelio is able to descend reciprocally with one rail for support and proper alignment on right LE 90% of the time, only occasional cues/facil needed for right hip alignment to reduce right knee hyperextension.  Ascending without circumduction is more difficult due to limited knee flexor and ankle DF force production against gravity.  Same is showing increased knee flexor activation isometrically but antigravity strength is much more difficult to produce.         Goal Identifier 5 - home program   Goal Description Rogelio will be independent with a home activity program to address his impairments and self manage his recovery.    Target Date 04/29/19   Date Met   in progress   Progress: home activities continue to be progressed and modified     Objective Measures:    Measure: 25 foot walk  Details:  at begining of session - 16.7 sec, 18 steps 2nd trial - 14.8 sec, 18 steps.    At end of session -  10.5 sec, 17 steps.    Progress Toward Goals:   Progress this reporting period: See above.    Progress limited due to  spasticity and tightness of his right UE, trunk and LE as well as weakness of his ankle DF and HS which has been improving but slowly.        Pt will continue to benefit from skilled intervention 2 x per week to maximize functional mobility, movement patterns and safety and reduce risk of disuse atrophy and tissue shortening that lead to increased level of disability and falls risk.     Continue with goals above until 7/29/2019.         Certification date from 4/30/2019 to 7/29/2019.    Yen Villanueva, PT          I CERTIFY THE NEED FOR THESE SERVICES  FURNISHED UNDER        THIS PLAN OF TREATMENT AND WHILE UNDER MY CARE     (Physician co-signature of this document indicates review and certification of the therapy plan).                Referring Provider: Chele Reno MD

## 2019-05-24 NOTE — ADDENDUM NOTE
Encounter addended by: Yen Villanueva, PT on: 5/24/2019 1:18 PM   Actions taken: Flowsheet data copied forward, Flowsheet accepted

## 2019-05-24 NOTE — ADDENDUM NOTE
Encounter addended by: Yen Villanueva, PT on: 5/24/2019 1:15 PM   Actions taken: Pend clinical note, Document created, Document edited, Flowsheet accepted, Sign clinical note

## 2019-05-24 NOTE — ADDENDUM NOTE
Encounter addended by: Yen Villanueva, PT on: 5/24/2019 1:19 PM   Actions taken: Flowsheet data copied forward, Flowsheet accepted

## 2019-05-24 NOTE — ADDENDUM NOTE
Encounter addended by: Yen Villanueva, PT on: 5/24/2019 1:20 PM   Actions taken: Flowsheet data copied forward, Flowsheet accepted

## 2019-05-24 NOTE — ADDENDUM NOTE
Encounter addended by: Yen Villanueva, PT on: 5/24/2019 10:34 AM   Actions taken: Flowsheet data copied forward, Flowsheet accepted, Pend clinical note

## 2019-05-28 ENCOUNTER — HOSPITAL ENCOUNTER (OUTPATIENT)
Dept: PHYSICAL THERAPY | Facility: CLINIC | Age: 64
Setting detail: THERAPIES SERIES
End: 2019-05-28
Attending: FAMILY MEDICINE
Payer: MEDICARE

## 2019-05-28 ENCOUNTER — HOSPITAL ENCOUNTER (OUTPATIENT)
Dept: OCCUPATIONAL THERAPY | Facility: CLINIC | Age: 64
Setting detail: THERAPIES SERIES
End: 2019-05-28
Attending: FAMILY MEDICINE
Payer: MEDICARE

## 2019-05-28 PROCEDURE — 97116 GAIT TRAINING THERAPY: CPT | Mod: GP,KX | Performed by: PHYSICAL THERAPIST

## 2019-05-28 PROCEDURE — 97110 THERAPEUTIC EXERCISES: CPT | Mod: GO,KX | Performed by: OCCUPATIONAL THERAPIST

## 2019-05-28 PROCEDURE — 97110 THERAPEUTIC EXERCISES: CPT | Mod: GP | Performed by: PHYSICAL THERAPIST

## 2019-05-28 PROCEDURE — 97140 MANUAL THERAPY 1/> REGIONS: CPT | Mod: GO,KX | Performed by: OCCUPATIONAL THERAPIST

## 2019-05-28 PROCEDURE — 97140 MANUAL THERAPY 1/> REGIONS: CPT | Mod: GP,KX | Performed by: PHYSICAL THERAPIST

## 2019-05-30 ENCOUNTER — HOSPITAL ENCOUNTER (OUTPATIENT)
Dept: OCCUPATIONAL THERAPY | Facility: CLINIC | Age: 64
Setting detail: THERAPIES SERIES
End: 2019-05-30
Attending: FAMILY MEDICINE
Payer: MEDICARE

## 2019-05-30 ENCOUNTER — HOSPITAL ENCOUNTER (OUTPATIENT)
Dept: PHYSICAL THERAPY | Facility: CLINIC | Age: 64
Setting detail: THERAPIES SERIES
End: 2019-05-30
Attending: FAMILY MEDICINE
Payer: MEDICARE

## 2019-05-30 PROCEDURE — 97110 THERAPEUTIC EXERCISES: CPT | Mod: GO | Performed by: OCCUPATIONAL THERAPIST

## 2019-05-30 PROCEDURE — 97116 GAIT TRAINING THERAPY: CPT | Mod: GP,KX | Performed by: PHYSICAL THERAPIST

## 2019-05-30 PROCEDURE — 97110 THERAPEUTIC EXERCISES: CPT | Mod: GP,KX | Performed by: PHYSICAL THERAPIST

## 2019-06-06 ENCOUNTER — HOSPITAL ENCOUNTER (OUTPATIENT)
Dept: OCCUPATIONAL THERAPY | Facility: CLINIC | Age: 64
Setting detail: THERAPIES SERIES
End: 2019-06-06
Attending: PHYSICAL MEDICINE & REHABILITATION
Payer: MEDICARE

## 2019-06-06 PROCEDURE — 97110 THERAPEUTIC EXERCISES: CPT | Mod: GO,KX | Performed by: OCCUPATIONAL THERAPIST

## 2019-06-06 PROCEDURE — 97112 NEUROMUSCULAR REEDUCATION: CPT | Mod: GO,KX | Performed by: OCCUPATIONAL THERAPIST

## 2019-06-18 ENCOUNTER — HOSPITAL ENCOUNTER (OUTPATIENT)
Dept: OCCUPATIONAL THERAPY | Facility: CLINIC | Age: 64
Setting detail: THERAPIES SERIES
End: 2019-06-18
Attending: PHYSICAL MEDICINE & REHABILITATION
Payer: MEDICARE

## 2019-06-18 PROCEDURE — 97110 THERAPEUTIC EXERCISES: CPT | Mod: GO,KX | Performed by: OCCUPATIONAL THERAPIST

## 2019-06-18 PROCEDURE — 97140 MANUAL THERAPY 1/> REGIONS: CPT | Mod: GO | Performed by: OCCUPATIONAL THERAPIST

## 2019-06-20 ENCOUNTER — HOSPITAL ENCOUNTER (OUTPATIENT)
Dept: OCCUPATIONAL THERAPY | Facility: CLINIC | Age: 64
Setting detail: THERAPIES SERIES
End: 2019-06-20
Attending: PHYSICAL MEDICINE & REHABILITATION
Payer: MEDICARE

## 2019-06-20 PROCEDURE — 97112 NEUROMUSCULAR REEDUCATION: CPT | Mod: GO,KX | Performed by: OCCUPATIONAL THERAPIST

## 2019-06-20 PROCEDURE — 97110 THERAPEUTIC EXERCISES: CPT | Mod: GO,KX | Performed by: OCCUPATIONAL THERAPIST

## 2019-06-20 NOTE — PROGRESS NOTES
The Attending Physicians Statement of Disability was completed by Dr Reno and faxed back to [facility] .[phone].   No

## 2019-06-25 ENCOUNTER — HOSPITAL ENCOUNTER (OUTPATIENT)
Dept: OCCUPATIONAL THERAPY | Facility: CLINIC | Age: 64
Setting detail: THERAPIES SERIES
End: 2019-06-25
Attending: PHYSICAL MEDICINE & REHABILITATION
Payer: MEDICARE

## 2019-06-25 NOTE — PROGRESS NOTES
"Outpatient Occupational Therapy Progress Note     Patient: Luis Trinidad  : 1955    Beginning/End Dates of Reporting Period:  19  to 2019    Referring Provider: Chele Reno MD (changing soon, possibly to Dr. Argueta)    Therapy Diagnosis: Decreased ADLs/IADLs    Client Self Report: \"I was super stiff last night, made it hard to fall asleep, so I took Advil PM and it worked well, hand is much looser today.\"  (OTR checked in and he has had this approved by MD. ) Working on getting set up w new PMR.      Objective Measurements:     Objective Measure: R  strength    Details:  R 25# when allowed to pronate/IR at SH. ( 20# if in neutral.   over 3 trials.) L 106# . Pt contonues with his elastic gripper ex >5 days/week.       Objective Measure: Pinch Strength   Details:  Key pinch 17 # ( improved from 13)  and 3 pt is 6# (down by 2# since 19).      Objective Measure:  UE recruitment /trunk AROM   Details: TH-- Can  do palmar ABD in closed chain 0-60 , sliding  on side of finger and AROM  to 35 of 60.  TH FL  at IP/MP  into key pinch but lacks active TH ADD or EX at CMC/MP/IP, but is able to consciously relax to help with release.      Objective Measure: R UE ROM and strength   Details: Combined movements: SH HABD to 90 of 110 degrees during PROM, gets to lacking 35 degrees of EL EX to lacking 10, but strong effort needed during PROM).SH ER at rib 45 and 90 to 42 today.  SH FL to 148 of 156 degrees.             Objective Measure: Box and Blocks   Details: R 4 blocks (down by 3 blocks, not moving TH as well as he was before.  Tends to use  scapular movemetns/SH ABD to position/reposition R UE in a tossing like motion, then uses TH add to side of RF to grasp, unable to extend WR during reaching motion).  L 60 blocks      Goals:     Goal Identifier R shoulder strength   Goal Description Patient to increase R shoulder flexiion AROM to 40 degrees for improved R UE function for ADL/IADLs " (during dressing and grooming tasks, carrying items, putting groceries and dishes away).   Target Date 06/30/19   Date Met      Progress: Gets to 90 degrees SH ABD 44 degrees [place], 20 degrees SH FL.       Goal Identifier R pinch strength   Goal Description Patient will demonstrate increased right hand pinch strength (both lateral and palmar) by 3# each for increased independence with ADL/IADLs such as opening containers, pull up pants, maintaining pinch to hold items.   Target Date 06/30/19   Date Met      Progress: improved per above.       Goal Identifier R GM/FM coordination   Goal Description Patient to improve R GM/FM coordination skills for increased independence during ADL/IADL tasks (dressing, kitchen tasks, feeding self) by completing the Box and Blocks Test with R UE in standing with 10 blocks.   Target Date 06/30/19   Date Met      Progress: See box and blocks,FM progress has receded while working more on scapular humeral control.       Goal Identifier R UE as gross assist   Goal Description Patient to demonstrate functional tasks (feeding self, wiping the counter/table, washing dishes, etc.) with 20% use of R UE as gross stabilizer or gross assist for increased ADL/IADL independence and closer return to prior level of function.(2/8 (eval date): using ~3% at home)   Target Date 06/30/19   Date Met      Progress: Rogelio does well to stabilize objects with his R UE, carries his bag in/out of therapy.  Holds bag open with R hand as L hand loads it.       Goal Identifier functional/normal movment patterns in R UE and trunk   Goal Description Patient to complete therapeutic task (simple kitchen tasks, laundry, etc.) with use of B UE's and no more than 2 cues in 5 minute time frame for correct body mechanics and for decreasing compensatory movements at the trunk and upper body for encouragement of normal movement patterns, increased R UE function and increased ADL/IADL independence.   Target Date 06/30/19    Date Met      Progress: Pt can steady his balance with R hand as L hand does dressing, retrieves items from the floor.       Goal Identifier visual skills   Goal Description Patient will demonstrate WFLs visual skills (including reaction time and visual scanning skills) for safe independent community mobility (crossing the street, driving, grocery shopping) by scoring WNLs on all modes of the Dynavision, Scancourse and other visual screens.   Target Date 06/30/19   Date Met      Progress: Not tested recently; pt less focused on return to driving.         Progress Toward Goals:   Progress this reporting period: Rogelio continues to befit from seeing OT for addressing above goals, 2x/week and plans to continue for the next 12 weeks.  He presents with multiple deficits from diagnosis of L CVA (R UE high tone/spasticity, decreased R UE AROM/joint contractures, and limited motor patterns with compensatory movements for RUE, working toward incorporating functional use of R UE into daily tasks).  Patient was previously independent with all ADLs and IADLs. Even with strong compliance with daily stretchng HEP and medical intervention ( botox and baclofen), Rogelio continues to demonstrate difficulty with managing his tone spasticity, but has demonstrated motor recruitment improvements and at R hand/TH following botox.  With his numerous deficit areas and continued slow steady improvement, Rogelio continues to be appropriate for continued skilled occupational therapy services beyond the therapy cap to continue to increase his ADL/IADL independence for less dependence on others for all these tasks.         Plan:  Continue therapy per current plan of care.    Discharge:  Not at this time.  Still appropriate for skilled OT, planning for 20+ weeks during skilled neuromuscular reeducation process.    Thank you for this thoughtful referral! If you have any questions, please call me [phone].  Nice to share in this patient's care with  you.    Sincerely,   Danyelle Evans, OTR/l

## 2019-06-25 NOTE — PROGRESS NOTES
[facility]      OUTPATIENT OCCUPATIONAL THERAPY  PLAN OF TREATMENT FOR OUTPATIENT REHABILITATION    Patient's Last Name, First Name, M.I.                YOB: 1955  Luis Trinidad                        Provider's Name  [facility] Medical Record No.  [id number]                               Onset Date: 11/29/17   Start of Care Date:2/8/2018   Type:     ___PT   _X_OT   ___SLP Medical Diagnosis:  hemiparesis of R side as late effect of stroke                       OT Diagnosis: decreased ADL/IADLs   # visits: 86   _________________________________________________________________________________  Plan of Treatment:  Continued emphasis on getting HEP program to manage his tone better ( via myofascial release/Instrument Assisted SOft Tissue Mobilization/IASTM Graston Technique, weightbearing, NMES, shoulder pulley kit, prolonged strches)  and help refine movement patterns to reduce compenstory patterns that can cotribute to pain/ROM loss if not addressed. We are applying these patterns to repetive exercises and tranferring them to home based 2 handed functional tasks ( laundry, washing dishes, carrying/put away groceries).          Frequency/Duration: 2x/week for 12 weeksGoals:    Goal Identifier R shoulder strength   Goal Description Patient to increase R shoulder flexiion AROM to 40 degrees for improved R UE function for ADL/IADLs (during dressing and grooming tasks, carrying items, putting groceries and dishes away).   Target Date 09/23/19   Date Met      Progress:     Goal Identifier R pinch strength   Goal Description Patient will demonstrate increased right hand pinch strength (both lateral and palmar) by 3# each for increased independence with ADL/IADLs such as opening containers, pull up pants, maintaining pinch to hold items.   Target Date 09/23/19   Date Met       Progress:     Goal Identifier R GM/FM coordination   Goal Description Patient to improve R GM/FM coordination skills for increased independence during ADL/IADL tasks (dressing, kitchen tasks, feeding self) by completing the Box and Blocks Test with R UE in standing with 10 blocks.   Target Date 09/23/19   Date Met      Progress:     Goal Identifier R UE as gross assist   Goal Description Patient to demonstrate functional tasks (feeding self, wiping the counter/table, washing dishes, etc.) with 20% use of R UE as gross stabilizer or gross assist for increased ADL/IADL independence and closer return to prior level of function.(2/8 (eval date): using ~3% at home)   Target Date 09/23/19   Date Met      Progress:     Goal Identifier functional/normal movment patterns in R UE and trunk   Goal Description Patient to complete therapeutic task (simple kitchen tasks, laundry, etc.) with use of B UE's and no more than 2 cues in 5 minute time frame for correct body mechanics and for decreasing compensatory movements at the trunk and upper body for encouragement of normal movement patterns, increased R UE function and increased ADL/IADL independence.   Target Date 09/23/19   Date Met      Progress:     Goal Identifier visual skills   Goal Description Patient will demonstrate WFLs visual skills (including reaction time and visual scanning skills) for safe independent community mobility (crossing the street, driving, grocery shopping) by scoring WNLs on all modes of the Dynavision, Scancourse and other visual screens.   Target Date 09/23/19   Date Met      Progress:       Progress Toward Goals:   Progress this reporting period: See attached progress note    Certification date from 6/25/19  to 9/23/19    ARYAN Mathis          I CERTIFY THE NEED FOR THESE SERVICES FURNISHED UNDER        THIS PLAN OF TREATMENT AND WHILE UNDER MY CARE     (Physician co-signature of this document indicates review and certification of the therapy  plan).                Referring Provider: Chele Reno MD

## 2019-06-27 ENCOUNTER — HOSPITAL ENCOUNTER (OUTPATIENT)
Dept: OCCUPATIONAL THERAPY | Facility: CLINIC | Age: 64
Setting detail: THERAPIES SERIES
End: 2019-06-27
Attending: PHYSICAL MEDICINE & REHABILITATION
Payer: MEDICARE

## 2019-06-27 PROCEDURE — 97110 THERAPEUTIC EXERCISES: CPT | Mod: GO,KX | Performed by: OCCUPATIONAL THERAPIST

## 2019-07-09 ENCOUNTER — HOSPITAL ENCOUNTER (OUTPATIENT)
Dept: PHYSICAL THERAPY | Facility: CLINIC | Age: 64
Setting detail: THERAPIES SERIES
End: 2019-07-09
Attending: PHYSICAL MEDICINE & REHABILITATION
Payer: MEDICARE

## 2019-07-09 ENCOUNTER — HOSPITAL ENCOUNTER (OUTPATIENT)
Dept: OCCUPATIONAL THERAPY | Facility: CLINIC | Age: 64
Setting detail: THERAPIES SERIES
End: 2019-07-09
Attending: PHYSICAL MEDICINE & REHABILITATION
Payer: MEDICARE

## 2019-07-09 PROCEDURE — 97110 THERAPEUTIC EXERCISES: CPT | Mod: GP,KX | Performed by: PHYSICAL THERAPIST

## 2019-07-09 PROCEDURE — 97110 THERAPEUTIC EXERCISES: CPT | Mod: GO,KX | Performed by: OCCUPATIONAL THERAPIST

## 2019-07-09 PROCEDURE — 97112 NEUROMUSCULAR REEDUCATION: CPT | Mod: GP,KX | Performed by: PHYSICAL THERAPIST

## 2019-07-09 PROCEDURE — 97140 MANUAL THERAPY 1/> REGIONS: CPT | Mod: GO,KX | Performed by: OCCUPATIONAL THERAPIST

## 2019-07-09 PROCEDURE — 97116 GAIT TRAINING THERAPY: CPT | Mod: GP,KX | Performed by: PHYSICAL THERAPIST

## 2019-07-09 PROCEDURE — 97112 NEUROMUSCULAR REEDUCATION: CPT | Mod: GO,KX | Performed by: OCCUPATIONAL THERAPIST

## 2019-07-11 ENCOUNTER — HOSPITAL ENCOUNTER (OUTPATIENT)
Dept: OCCUPATIONAL THERAPY | Facility: CLINIC | Age: 64
Setting detail: THERAPIES SERIES
End: 2019-07-11
Attending: PHYSICAL MEDICINE & REHABILITATION
Payer: MEDICARE

## 2019-07-11 PROCEDURE — 97535 SELF CARE MNGMENT TRAINING: CPT | Mod: GO,KX | Performed by: OCCUPATIONAL THERAPIST

## 2019-07-11 PROCEDURE — 97110 THERAPEUTIC EXERCISES: CPT | Mod: GO | Performed by: OCCUPATIONAL THERAPIST

## 2019-07-11 PROCEDURE — 97112 NEUROMUSCULAR REEDUCATION: CPT | Mod: GO | Performed by: OCCUPATIONAL THERAPIST

## 2019-07-16 ENCOUNTER — HOSPITAL ENCOUNTER (OUTPATIENT)
Dept: OCCUPATIONAL THERAPY | Facility: CLINIC | Age: 64
Setting detail: THERAPIES SERIES
End: 2019-07-16
Attending: PHYSICAL MEDICINE & REHABILITATION
Payer: MEDICARE

## 2019-07-16 PROCEDURE — 97140 MANUAL THERAPY 1/> REGIONS: CPT | Mod: GO,KX | Performed by: OCCUPATIONAL THERAPIST

## 2019-07-16 PROCEDURE — 97110 THERAPEUTIC EXERCISES: CPT | Mod: GO | Performed by: OCCUPATIONAL THERAPIST

## 2019-07-16 PROCEDURE — 97535 SELF CARE MNGMENT TRAINING: CPT | Mod: GO,KX | Performed by: OCCUPATIONAL THERAPIST

## 2019-07-18 ENCOUNTER — HOSPITAL ENCOUNTER (OUTPATIENT)
Dept: PHYSICAL THERAPY | Facility: CLINIC | Age: 64
Setting detail: THERAPIES SERIES
End: 2019-07-18
Attending: PHYSICAL MEDICINE & REHABILITATION
Payer: MEDICARE

## 2019-07-18 PROCEDURE — 97110 THERAPEUTIC EXERCISES: CPT | Mod: GP,KX | Performed by: PHYSICAL THERAPIST

## 2019-07-18 PROCEDURE — 97116 GAIT TRAINING THERAPY: CPT | Mod: GP,KX | Performed by: PHYSICAL THERAPIST

## 2019-07-23 ENCOUNTER — HOSPITAL ENCOUNTER (OUTPATIENT)
Dept: PHYSICAL THERAPY | Facility: CLINIC | Age: 64
Setting detail: THERAPIES SERIES
End: 2019-07-23
Attending: PHYSICAL MEDICINE & REHABILITATION
Payer: MEDICARE

## 2019-07-23 PROCEDURE — 97110 THERAPEUTIC EXERCISES: CPT | Mod: GP | Performed by: PHYSICAL THERAPIST

## 2019-07-23 PROCEDURE — 97116 GAIT TRAINING THERAPY: CPT | Mod: GP | Performed by: PHYSICAL THERAPIST

## 2019-07-23 NOTE — ADDENDUM NOTE
Encounter addended by: Yen Villanueva, PT on: 7/23/2019 11:34 AM   Actions taken: Pend clinical note, Sign clinical note, Flowsheet accepted

## 2019-07-25 ENCOUNTER — HOSPITAL ENCOUNTER (OUTPATIENT)
Dept: PHYSICAL THERAPY | Facility: CLINIC | Age: 64
Setting detail: THERAPIES SERIES
End: 2019-07-25
Attending: PHYSICAL MEDICINE & REHABILITATION
Payer: MEDICARE

## 2019-07-25 ENCOUNTER — HOSPITAL ENCOUNTER (OUTPATIENT)
Dept: OCCUPATIONAL THERAPY | Facility: CLINIC | Age: 64
Setting detail: THERAPIES SERIES
End: 2019-07-25
Attending: PHYSICAL MEDICINE & REHABILITATION
Payer: MEDICARE

## 2019-07-25 PROCEDURE — 97110 THERAPEUTIC EXERCISES: CPT | Mod: GO,KX | Performed by: OCCUPATIONAL THERAPIST

## 2019-07-25 PROCEDURE — 97110 THERAPEUTIC EXERCISES: CPT | Mod: GP,KX | Performed by: PHYSICAL THERAPIST

## 2019-07-25 PROCEDURE — 97535 SELF CARE MNGMENT TRAINING: CPT | Mod: GO | Performed by: OCCUPATIONAL THERAPIST

## 2019-07-25 PROCEDURE — 97112 NEUROMUSCULAR REEDUCATION: CPT | Mod: GP,KX | Performed by: PHYSICAL THERAPIST

## 2019-07-30 ENCOUNTER — HOSPITAL ENCOUNTER (OUTPATIENT)
Dept: PHYSICAL THERAPY | Facility: CLINIC | Age: 64
Setting detail: THERAPIES SERIES
End: 2019-07-30
Attending: PHYSICAL MEDICINE & REHABILITATION
Payer: MEDICARE

## 2019-07-30 ENCOUNTER — HOSPITAL ENCOUNTER (OUTPATIENT)
Dept: OCCUPATIONAL THERAPY | Facility: CLINIC | Age: 64
Setting detail: THERAPIES SERIES
End: 2019-07-30
Attending: PHYSICAL MEDICINE & REHABILITATION
Payer: MEDICARE

## 2019-07-30 PROCEDURE — 97140 MANUAL THERAPY 1/> REGIONS: CPT | Mod: GO,KX | Performed by: OCCUPATIONAL THERAPIST

## 2019-07-30 PROCEDURE — 97110 THERAPEUTIC EXERCISES: CPT | Mod: GO,KX | Performed by: OCCUPATIONAL THERAPIST

## 2019-07-30 PROCEDURE — 97110 THERAPEUTIC EXERCISES: CPT | Mod: GP,KX | Performed by: PHYSICAL THERAPIST

## 2019-07-30 PROCEDURE — 97112 NEUROMUSCULAR REEDUCATION: CPT | Mod: GO,KX | Performed by: OCCUPATIONAL THERAPIST

## 2019-07-30 PROCEDURE — 97112 NEUROMUSCULAR REEDUCATION: CPT | Mod: GP,KX | Performed by: PHYSICAL THERAPIST

## 2019-07-30 PROCEDURE — 97116 GAIT TRAINING THERAPY: CPT | Mod: GP,KX | Performed by: PHYSICAL THERAPIST

## 2019-08-01 ENCOUNTER — HOSPITAL ENCOUNTER (OUTPATIENT)
Dept: OCCUPATIONAL THERAPY | Facility: CLINIC | Age: 64
Setting detail: THERAPIES SERIES
End: 2019-08-01
Attending: PHYSICAL MEDICINE & REHABILITATION
Payer: MEDICARE

## 2019-08-01 ENCOUNTER — HOSPITAL ENCOUNTER (OUTPATIENT)
Dept: PHYSICAL THERAPY | Facility: CLINIC | Age: 64
Setting detail: THERAPIES SERIES
End: 2019-08-01
Attending: PHYSICAL MEDICINE & REHABILITATION
Payer: MEDICARE

## 2019-08-01 PROCEDURE — 97110 THERAPEUTIC EXERCISES: CPT | Mod: GO,KX | Performed by: OCCUPATIONAL THERAPIST

## 2019-08-01 PROCEDURE — 97110 THERAPEUTIC EXERCISES: CPT | Mod: GP,KX | Performed by: PHYSICAL THERAPIST

## 2019-08-01 PROCEDURE — 97112 NEUROMUSCULAR REEDUCATION: CPT | Mod: GP,KX | Performed by: PHYSICAL THERAPIST

## 2019-08-01 PROCEDURE — 97535 SELF CARE MNGMENT TRAINING: CPT | Mod: GO | Performed by: OCCUPATIONAL THERAPIST

## 2019-08-06 ENCOUNTER — HOSPITAL ENCOUNTER (OUTPATIENT)
Dept: PHYSICAL THERAPY | Facility: CLINIC | Age: 64
Setting detail: THERAPIES SERIES
End: 2019-08-06
Attending: PHYSICAL MEDICINE & REHABILITATION
Payer: MEDICARE

## 2019-08-06 ENCOUNTER — HOSPITAL ENCOUNTER (OUTPATIENT)
Dept: OCCUPATIONAL THERAPY | Facility: CLINIC | Age: 64
Setting detail: THERAPIES SERIES
End: 2019-08-06
Attending: PHYSICAL MEDICINE & REHABILITATION
Payer: MEDICARE

## 2019-08-06 PROCEDURE — 97110 THERAPEUTIC EXERCISES: CPT | Mod: GP,KX | Performed by: PHYSICAL THERAPIST

## 2019-08-06 PROCEDURE — 97140 MANUAL THERAPY 1/> REGIONS: CPT | Mod: GO,KX | Performed by: OCCUPATIONAL THERAPIST

## 2019-08-06 PROCEDURE — 97112 NEUROMUSCULAR REEDUCATION: CPT | Mod: GO,KX | Performed by: OCCUPATIONAL THERAPIST

## 2019-08-06 PROCEDURE — 97110 THERAPEUTIC EXERCISES: CPT | Mod: GO,KX | Performed by: OCCUPATIONAL THERAPIST

## 2019-08-06 PROCEDURE — 97112 NEUROMUSCULAR REEDUCATION: CPT | Mod: GP,KX | Performed by: PHYSICAL THERAPIST

## 2019-08-08 ENCOUNTER — HOSPITAL ENCOUNTER (OUTPATIENT)
Dept: OCCUPATIONAL THERAPY | Facility: CLINIC | Age: 64
Setting detail: THERAPIES SERIES
End: 2019-08-08
Attending: PHYSICAL MEDICINE & REHABILITATION
Payer: MEDICARE

## 2019-08-08 ENCOUNTER — HOSPITAL ENCOUNTER (OUTPATIENT)
Dept: PHYSICAL THERAPY | Facility: CLINIC | Age: 64
Setting detail: THERAPIES SERIES
End: 2019-08-08
Attending: PHYSICAL MEDICINE & REHABILITATION
Payer: MEDICARE

## 2019-08-08 PROCEDURE — 97535 SELF CARE MNGMENT TRAINING: CPT | Mod: GO,KX | Performed by: OCCUPATIONAL THERAPIST

## 2019-08-08 PROCEDURE — 97110 THERAPEUTIC EXERCISES: CPT | Mod: GO,KX | Performed by: OCCUPATIONAL THERAPIST

## 2019-08-08 PROCEDURE — 97110 THERAPEUTIC EXERCISES: CPT | Mod: GP,KX | Performed by: PHYSICAL THERAPIST

## 2019-08-08 PROCEDURE — 97112 NEUROMUSCULAR REEDUCATION: CPT | Mod: GP,KX | Performed by: PHYSICAL THERAPIST

## 2019-08-13 ENCOUNTER — HOSPITAL ENCOUNTER (OUTPATIENT)
Dept: PHYSICAL THERAPY | Facility: CLINIC | Age: 64
Setting detail: THERAPIES SERIES
End: 2019-08-13
Attending: PHYSICAL MEDICINE & REHABILITATION
Payer: MEDICARE

## 2019-08-13 ENCOUNTER — HOSPITAL ENCOUNTER (OUTPATIENT)
Dept: OCCUPATIONAL THERAPY | Facility: CLINIC | Age: 64
Setting detail: THERAPIES SERIES
End: 2019-08-13
Attending: PHYSICAL MEDICINE & REHABILITATION
Payer: MEDICARE

## 2019-08-13 PROCEDURE — 97116 GAIT TRAINING THERAPY: CPT | Mod: GP,KX | Performed by: PHYSICAL THERAPIST

## 2019-08-13 PROCEDURE — 97112 NEUROMUSCULAR REEDUCATION: CPT | Mod: GP,KX | Performed by: PHYSICAL THERAPIST

## 2019-08-13 PROCEDURE — 97112 NEUROMUSCULAR REEDUCATION: CPT | Mod: GO,KX | Performed by: OCCUPATIONAL THERAPIST

## 2019-08-13 PROCEDURE — 97140 MANUAL THERAPY 1/> REGIONS: CPT | Mod: GO,KX | Performed by: OCCUPATIONAL THERAPIST

## 2019-08-13 PROCEDURE — 97110 THERAPEUTIC EXERCISES: CPT | Mod: GO,KX | Performed by: OCCUPATIONAL THERAPIST

## 2019-08-13 PROCEDURE — 97110 THERAPEUTIC EXERCISES: CPT | Mod: GP,KX | Performed by: PHYSICAL THERAPIST

## 2019-08-15 ENCOUNTER — HOSPITAL ENCOUNTER (OUTPATIENT)
Dept: PHYSICAL THERAPY | Facility: CLINIC | Age: 64
Setting detail: THERAPIES SERIES
End: 2019-08-15
Attending: PHYSICAL MEDICINE & REHABILITATION
Payer: MEDICARE

## 2019-08-15 PROCEDURE — 97116 GAIT TRAINING THERAPY: CPT | Mod: GP,KX | Performed by: PHYSICAL THERAPIST

## 2019-08-15 PROCEDURE — 97112 NEUROMUSCULAR REEDUCATION: CPT | Mod: GP,KX | Performed by: PHYSICAL THERAPIST

## 2019-08-15 PROCEDURE — 97110 THERAPEUTIC EXERCISES: CPT | Mod: GP,KX | Performed by: PHYSICAL THERAPIST

## 2019-08-20 ENCOUNTER — HOSPITAL ENCOUNTER (OUTPATIENT)
Dept: OCCUPATIONAL THERAPY | Facility: CLINIC | Age: 64
Setting detail: THERAPIES SERIES
End: 2019-08-20
Attending: PHYSICAL MEDICINE & REHABILITATION
Payer: MEDICARE

## 2019-08-20 ENCOUNTER — HOSPITAL ENCOUNTER (OUTPATIENT)
Dept: PHYSICAL THERAPY | Facility: CLINIC | Age: 64
Setting detail: THERAPIES SERIES
End: 2019-08-20
Attending: PHYSICAL MEDICINE & REHABILITATION
Payer: MEDICARE

## 2019-08-20 PROCEDURE — 97140 MANUAL THERAPY 1/> REGIONS: CPT | Mod: GO,KX | Performed by: OCCUPATIONAL THERAPIST

## 2019-08-20 PROCEDURE — 97112 NEUROMUSCULAR REEDUCATION: CPT | Mod: GP,KX | Performed by: PHYSICAL THERAPIST

## 2019-08-20 PROCEDURE — 97110 THERAPEUTIC EXERCISES: CPT | Mod: GP,KX | Performed by: PHYSICAL THERAPIST

## 2019-08-20 PROCEDURE — 97110 THERAPEUTIC EXERCISES: CPT | Mod: GO,KX | Performed by: OCCUPATIONAL THERAPIST

## 2019-08-20 NOTE — ADDENDUM NOTE
Encounter addended by: Yen Villanueva, PT on: 8/20/2019 12:50 PM   Actions taken: Charge Capture section accepted

## 2019-08-20 NOTE — PROGRESS NOTES
--OUTPATIENT OCCUPATIONAL THERAPY 10th NOTE--       08/20/19 1200   Signing Clinician's Name / Credentials   Signing clinician's name / credentials ARYAN Mathis/margarito   Session Number   Session Number 10/ 10 (MEDICARE as of 5/1/18)   Additional Session Number 96   Progress/Recertification   Recertification Due 09/23/19   Recertification Completed 06/25/19   Adult OT Eval Goals   OT Eval Goals (Adult) 1;2;3    OT Goal 1   Goal Identifier R shoulder strength   Goal Description Patient to increase R shoulder flexiion AROM to 40 degrees for improved R UE function for ADL/IADLs (during dressing and grooming tasks, carrying items, putting groceries and dishes away).   Target Date 09/23/19    OT Goal 2   Goal Identifier R pinch strength   Goal Description Patient will demonstrate increased right hand pinch strength (both lateral and palmar) by 3# each for increased independence with ADL/IADLs such as opening containers, pull up pants, maintaining pinch to hold items.   Target Date 09/23/19    OT Goal 3   Goal Identifier R GM/FM coordination   Goal Description Patient to improve R GM/FM coordination skills for increased independence during ADL/IADL tasks (dressing, kitchen tasks, feeding self) by completing the Box and Blocks Test with R UE in standing with 10 blocks.   Target Date 09/23/19   OT Goal 4   Goal Identifier R UE as gross assist   Goal Description Patient to demonstrate functional tasks (feeding self, wiping the counter/table, washing dishes, etc.) with 20% use of R UE as gross stabilizer or gross assist for increased ADL/IADL independence and closer return to prior level of function.  (2/8 (eval date): using ~3% at home)   Target Date 09/23/19   OT Goal 5   Goal Identifier functional/normal movment patterns in R UE and trunk   Goal Description Patient to complete therapeutic task (simple kitchen tasks, laundry, etc.) with use of B UE's and no more than 2 cues in 5 minute time frame for correct body mechanics  and for decreasing compensatory movements at the trunk and upper body for encouragement of normal movement patterns, increased R UE function and increased ADL/IADL independence.   Target Date 09/23/19    OT Goal 6   Goal Identifier visual skills   Goal Description Patient will demonstrate WFLs visual skills (including reaction time and visual scanning skills) for safe independent community mobility (crossing the street, driving, grocery shopping) by scoring WNLs on all modes of the Dynavision, Scancourse and other visual screens.   Target Date 09/23/19   Subjective Report   Subjective Report Pt's rib tension is 7/10 today, spasms at side trunk and R foot during trantions between supine/prone to sit.     Objective Measure 1   Objective Measure R  strength    Objective Measure 2   Objective Measure Pinch Strength   Objective Measure 3   Objective Measure  UE recruitment /trunk AROM   Details ABD of R thumb from 10- to 20 dgrees, allowing release of weighted spice jar at midline.     Objective Measure 4   Objective Measure R UE ROM and strength   Details  3 pt pinch w TH to IF/MF as OTR supports planes manually, then able to consciously relax to help some with release; able to release using tenodesis, self pressure of WR FL and pronation movements (in in forward fold) . SH ABD to Add in gravity reduced /supine to 70 degrees, 50 in sitting.. AAROM into SH FL at 90, then SH EX to table with moderate assist to block SH IR/EL EX.  When in prone and cued with scap retraction, then able to combine EL  degrees during SH EX from 90 degrees FL TO table/45 degrees ( in sitting gets to 0 degrees SH EX from SH FL at 90 dgrees).   Objective Measure 6   Objective Measure Box and Blocks   Therapeutic Procedure/Exercise   Therapeutic Procedure: strength, endurance, ROM, flexibillity minutes (15753) 28   Skilled Intervention R UE AROM/AAROM, B UE stretch   Patient Response bicep tone limits EL EX.     Treatment Detail  Pt  guided through R UE and trunk stretches to loosen tightness in R UE prior to AROM exercises,  2-3 sets of 10-30 sec holds each.   1) trunk rotations L and R ( rowing circles, 2)   A/P tilt glides, side bends  3)SH FLexion, clock circles 4) forearm supination, 6) wrist extension w IF/MF, then MF/SF finger extension.  7) deltoid stretch, 8) SH ER applied by OTR, gets to 45 of 45 with 3 long holds, per tone/9) SH IR/EX 5 sec lifts x 2 sets of 5  10) Butterfly B SH ER at 90/hands clasped x 4/5 reps of 5 sec holds each.  11) Prone on elbows  with lateral and a/p weightshifting/weightbearing on B forearms 2 sets of 10 reps w 5 sec holds at each position., feels looser after. 12)  Prone scap retraction with 45 dgrees SH EX and EL FL  to 120 dgrees, 5 sec holds, 2 sets of 10 reps.  (Requires mod A to stretch EL into last 1/4 of EL EX.)   Manual Therapy   Manual Therapy Minutes (32834) 14   Skilled Intervention Prolonged stretches, IASTM, MFR for R UE funcational use patterns   Patient Response able to  roll up from prone, move legs of table (I)  after IASTM   Treatment Detail Per mm tone and fascial thickness, Rogelio has difficulty getting R UE  SH FL, ER, EL EX to help with swing through of gait, repositioning during dressing.  Has dense myofascial  thickness and hypertonic at paraspinals T4-T8, much less at T 8-10 rib.  Tone/thickness at pec minor, posterior capsule/lat deltoid, biceps (mod springy, lacking 45 degrees.  OTR cont w instrument assisted soft tissue mobilization (IASTM) using  tissue mobilization tool (larger bone Graston type  tool with min to moderate pressure in longitudinal patterns at origin, insert and muscle bellies, then cross fiber in fanning patterns  from distal to proximal and proximal to distal patterns, and around radius of bony prominences at R flank, ribs 8-10, diaphragmatic plane with active flossing ex completed by pt during IASTM. OTR stabilizes surrounding skin/removing tissue slack to  improve effectiveness.     Plan   Homework rht to help ADL log-ADL list- work towards: Holding a spoon/knife, stabilizing plate on table with some, moving a coffee mug with both hands from chest to chin/mouth donning socks with 2 hands, carrying a plate with 2 hands managing light switches with elbow flexed greater than 90 , and using a zipper from the bottom to midway/top.   Home program  H ABD stretch  w towel at elbow/supine to help increase EL EX, carry bag in R forearm, estim to R wrist/digit extensors, pulley system for R shoulder (flexion and abduction)   Plan for next session  Prone Scap retraction, then repeat in sitting for NMR carry over. Check SH  ER at rib and 90 SH ABD stretch w cane and IASTM to pec minor, scap to support better position w walking (try belt with forearm loop?), NMR with vibration of EL FL/EX in supine after IASTM, Circular TH pathways with grasp/release, Cont NMES to work on  EL FL, EX from -90 to end range w push/pull, H ABD/ HADD planes.  Cont NMES per above/ black in mid  ex mm bulk, dorsal snuff box red combine with grasp/release tasks; , repeat/pinch.  NMR of TH ext/grasp/release, f/u on ball stretch, ex rotation while seated. hammer turns for supination, check tone/movement patterns to prep for 11/29 botox, f/u on pole stretch for trunk, IASTM SH and trunk/UE traction w pec release,,sidelying vs shoulder pulley kit, recheck /pinch post botox, NMES hand/wr ,  **find old splint vs order  new splint (he hasn't been able to locate at home as of 8/2); re-test R  ~8/10 to see effect of botox (had on 8/1); 2 handed lifting carrying, transferring tasks ( food containers, bags, larger dishes), Cont w his estim, grasp release patterns with unit in place, issue diagrams per pictures taken. practice setting up and take down of shoulder pulley; ~7/25/18: trial of SH IR/add w pulley, wt bearing at kitchen counter (prone/4 pt vs forerm WB on table w shin height reaching w forward  reach), ,IADL box, have him bring in dowel for  HEP if he'd like it adapted with elastic handle ( instructions in chart),  stretches with pole, and table stretches/ADL log. do full review of his HEP (see previous course of OT for past HEP) and modify/upgrade (will especially need upgrade for R hand with improved function/strength); neuro re-ed. with much focus on decreasing tone (trial prone on elbows, WB in 4 point, etc.); check splint - have him bring in (he hasn't been wearing, using a ball in hand at night);  R UE as gross stabilizer and gross assist; LATER: IADL eval and assist with adapted driving evaluation as appropriate; consider vocational rehab referral for return to work if appropriate or volunteer opportunities.  Consider driving/[employer] and [employer] ~ 10/24/18. *   Comments   Comments KX--Patient presents with multiple deficits from diagnosis of L CVA (R UE high tone/spasticity, decreased R UE AROM/joint contractures, and limited motor patterns with compensatory movements for RUE, working toward incorporating functional use of R UE into daily tasks).  Patient was previously independent with all ADLs and IADLs. Even with strong compliance with daily stretchng HEP and medical intervention ( botox and baclofen), Rogelio continues to demonstrate ifficulty with managing his tone spasticity, but has demonstrated motor recruitment improvements and atR hand/TH following botox.  With his numerous deficit areas and continued slow steady improvement, Rogelio continues to be appropriate for continued skilled occupational therapy services beyond the therapy cap to continue to increase his ADL/IADL independence for less dependence on others for all these tasks. Most recent botox 5/16/19.    Total Session Time   Timed Code Treatment Minutes 42   Total Treatment Time (sum of timed and untimed services) 42     Thank you for this thoughtful referral! If you have any questions, please call me [phone].  Nice to share in  this patient's care with you.    Sincerely,   Danyelle Evans, OTR/l

## 2019-08-22 ENCOUNTER — HOSPITAL ENCOUNTER (OUTPATIENT)
Dept: PHYSICAL THERAPY | Facility: CLINIC | Age: 64
Setting detail: THERAPIES SERIES
End: 2019-08-22
Attending: PHYSICAL MEDICINE & REHABILITATION
Payer: MEDICARE

## 2019-08-22 ENCOUNTER — HOSPITAL ENCOUNTER (OUTPATIENT)
Dept: OCCUPATIONAL THERAPY | Facility: CLINIC | Age: 64
Setting detail: THERAPIES SERIES
End: 2019-08-22
Attending: PHYSICAL MEDICINE & REHABILITATION
Payer: MEDICARE

## 2019-08-22 PROCEDURE — 97110 THERAPEUTIC EXERCISES: CPT | Mod: GO,KX | Performed by: OCCUPATIONAL THERAPIST

## 2019-08-22 PROCEDURE — 97112 NEUROMUSCULAR REEDUCATION: CPT | Mod: GP,KX | Performed by: PHYSICAL THERAPIST

## 2019-08-22 PROCEDURE — 97140 MANUAL THERAPY 1/> REGIONS: CPT | Mod: GP,KX | Performed by: PHYSICAL THERAPIST

## 2019-08-22 PROCEDURE — 97110 THERAPEUTIC EXERCISES: CPT | Mod: GP,KX | Performed by: PHYSICAL THERAPIST

## 2019-08-27 ENCOUNTER — HOSPITAL ENCOUNTER (OUTPATIENT)
Dept: PHYSICAL THERAPY | Facility: CLINIC | Age: 64
Setting detail: THERAPIES SERIES
End: 2019-08-27
Attending: PHYSICAL MEDICINE & REHABILITATION
Payer: MEDICARE

## 2019-08-27 ENCOUNTER — HOSPITAL ENCOUNTER (OUTPATIENT)
Dept: OCCUPATIONAL THERAPY | Facility: CLINIC | Age: 64
Setting detail: THERAPIES SERIES
End: 2019-08-27
Attending: PHYSICAL MEDICINE & REHABILITATION
Payer: MEDICARE

## 2019-08-27 PROCEDURE — 97140 MANUAL THERAPY 1/> REGIONS: CPT | Mod: GO,KX | Performed by: OCCUPATIONAL THERAPIST

## 2019-08-27 PROCEDURE — 97140 MANUAL THERAPY 1/> REGIONS: CPT | Mod: GP,KX | Performed by: PHYSICAL THERAPIST

## 2019-08-27 PROCEDURE — 97112 NEUROMUSCULAR REEDUCATION: CPT | Mod: GP,KX | Performed by: PHYSICAL THERAPIST

## 2019-08-27 PROCEDURE — 97110 THERAPEUTIC EXERCISES: CPT | Mod: GP,KX | Performed by: PHYSICAL THERAPIST

## 2019-08-27 PROCEDURE — 97110 THERAPEUTIC EXERCISES: CPT | Mod: GO,KX | Performed by: OCCUPATIONAL THERAPIST

## 2019-08-29 ENCOUNTER — HOSPITAL ENCOUNTER (OUTPATIENT)
Dept: PHYSICAL THERAPY | Facility: CLINIC | Age: 64
Setting detail: THERAPIES SERIES
End: 2019-08-29
Attending: PHYSICAL MEDICINE & REHABILITATION
Payer: MEDICARE

## 2019-08-29 PROCEDURE — 97140 MANUAL THERAPY 1/> REGIONS: CPT | Mod: GP,KX | Performed by: PHYSICAL THERAPIST

## 2019-08-29 PROCEDURE — 97112 NEUROMUSCULAR REEDUCATION: CPT | Mod: GP,KX | Performed by: PHYSICAL THERAPIST

## 2019-08-29 PROCEDURE — 97110 THERAPEUTIC EXERCISES: CPT | Mod: GP,KX | Performed by: PHYSICAL THERAPIST

## 2019-08-29 NOTE — ADDENDUM NOTE
Encounter addended by: Yen Villanueva, PT on: 8/29/2019 11:33 AM   Actions taken: Flowsheet data copied forward, Sign clinical note, Flowsheet accepted

## 2019-08-29 NOTE — ADDENDUM NOTE
Encounter addended by: Yen Villanueva, PT on: 8/29/2019 11:36 AM   Actions taken: Flowsheet data copied forward, Flowsheet accepted

## 2019-08-29 NOTE — ADDENDUM NOTE
Encounter addended by: Yen Villanueva, PT on: 8/29/2019 11:35 AM   Actions taken: Flowsheet data copied forward, Flowsheet accepted

## 2019-08-29 NOTE — PROGRESS NOTES
[facility]      OUTPATIENT PHYSICAL THERAPY  PLAN OF TREATMENT FOR OUTPATIENT REHABILITATION    Patient's Last Name, First Name, M.I.                YOB: 1955  Luis Trinidad                        Provider's Name  [facility] Medical Record No.  [id number]                               Onset Date: 11/4/2015   Start of Care Date: 5/1/2018   Type:     _X_PT   ___OT   ___SLP Medical Diagnosis: ischemic CVA                       PT Diagnosis: Impaired gait and mobility due to right hemiparesis and spasticity      _________________________________________________________________________________  Plan of Treatment:    Frequency/Duration: 2 x per week x 90 days     Goals:  Goal Identifier 1 - Gait pattern/safety   Goal Description Rogelio will be able to achieve proper midstance to terminal stance alignment on right LE and transition into pre and initial swing with proper clearance of right foot for greater efficiency and safety with gait at home and in the community.   Target Date 07/29/19   Date Met   in progress   Progress: improved mid-termstance.  DF/HS weakness limits swing phase     Goal Identifier 2- Gait speed   Goal Description Rogelio will increase his gait speed on the 25 foot timed walk to 9 sec or less without use of an assistive device  to indicate reduced level of gait impairment and improved community mobility.   Target Date 07/29/19   Date Met   in progress   Progress: limited by clearance of right foot. fearful of falling when toe catches     Goal Identifier 3- stairs   Goal Description Pt will be able to navigate up and down his stairs with one rail in a reciprocal pattern with minimal circumduction of right LE in order for more efficient and safe access to all levels of his home.   Target Date 07/29/19   Date Met   in progress/partially met   Progress: Met descending.   going up decreased knee flex to clear step     Goal Identifier 5 - home program   Goal Description Rogelio will be independent with a home activity program to address his impairments and self manage his recovery.    Target Date 07/29/19   Date Met   in progress   Progress: modified and progressed on ongoing basis.      Progress Toward Goals:   See progress note dated 7/18/2019.  Continue with goals above until 10/29/2019.     Certification date from 7/30/2019 to 10/29/2019.    Yen Villanueva, PT          I CERTIFY THE NEED FOR THESE SERVICES FURNISHED UNDER        THIS PLAN OF TREATMENT AND WHILE UNDER MY CARE     (Physician co-signature of this document indicates review and certification of the therapy plan).                Referring Provider: Brigida Briseno MD

## 2019-08-29 NOTE — ADDENDUM NOTE
Encounter addended by: Yen Villanueva, PT on: 8/29/2019 11:33 AM   Actions taken: Flowsheet data copied forward, Flowsheet accepted

## 2019-08-29 NOTE — ADDENDUM NOTE
Encounter addended by: Yen Villanueva, PT on: 8/29/2019 11:34 AM   Actions taken: Flowsheet data copied forward, Flowsheet accepted

## 2019-09-04 ENCOUNTER — HOSPITAL ENCOUNTER (OUTPATIENT)
Dept: PHYSICAL THERAPY | Facility: CLINIC | Age: 64
Setting detail: THERAPIES SERIES
End: 2019-09-04
Attending: PHYSICAL MEDICINE & REHABILITATION
Payer: MEDICARE

## 2019-09-04 PROCEDURE — 97140 MANUAL THERAPY 1/> REGIONS: CPT | Mod: GP,KX | Performed by: PHYSICAL THERAPIST

## 2019-09-04 PROCEDURE — 97110 THERAPEUTIC EXERCISES: CPT | Mod: GP,KX | Performed by: PHYSICAL THERAPIST

## 2019-09-04 PROCEDURE — 97112 NEUROMUSCULAR REEDUCATION: CPT | Mod: GP,KX | Performed by: PHYSICAL THERAPIST

## 2019-09-09 NOTE — PROGRESS NOTES
10th visit note     09/04/19 0800   Signing Clinician's Name / Credentials   Signing clinician's name / credentials Danyellemaggi Vidal, PT   Session Number   Session Number 10/10    Authorization status KX modifier needed - Pt requires continued PT beyond the therapy cap to maximize safety and reduce risk for falls due to significant spasticity and impaired motor patterns related to his right hemiparesis.    Progress Note/Recertification   Recertification Due Date 10/29/19   Goal 1   Goal Description Rogelio will be able to achieve proper midstance to terminal stance alignment on right LE and transition into pre and initial swing with proper clearance of right foot for greater efficiency and safety with gait at home and in the community.   Target Date 10/29/19   Goal Identifier 1 - Gait pattern/safety   Goal 2   Goal Description Rogelio will increase his gait speed on the 25 foot timed walk to 9 sec or less without use of an assistive device  to indicate reduced level of gait impairment and improved community mobility.   Target Date 10/29/19   Goal Identifier 2- Gait speed   Goal 3   Goal Description Pt will be able to navigate up and down his stairs with one rail in a reciprocal pattern with minimal circumduction of right LE in order for more efficient and safe access to all levels of his home.   Target Date 10/29/19   Goal Identifier 3- stairs   Goal 5   Goal Description Rogelio will be independent with a home activity program to address his impairments and self manage his recovery.    Target Date 10/29/19   Goal Identifier 5 - home program   Subjective Report   Subjective Report States that his trunk is tight.   He does the stairs at his house, railing only on the L side so brings hand across body to the railing with going down the stairs.  States that sometimes the R Knee doesn't want to bend to allow him to do alternating stepping.    Objective Measure 1   Objective Measure stairs    Details up alternating step circumduction on  the R , due to decreased knee release.  Down single step to alternating stepping both with railing L going up and bringing L hand across body to use the railing.   Decreased motor planning, timing and coordination movements of the R side.    Objective Measure 2   Objective Measure 25 foot walk    Details No bootie on shoe 13.28 sec 18 steps,  13.25 sec 18 steps     Therapeutic Procedure/exercise   Therapeutic Procedures: strength, endurance, ROM, flexibillity minutes (30114) 9   Skilled Intervention passive stretches.    Patient Response tolerated without issued.    Treatment Detail supine stretches to ankle dorsiflexion 3 x 45 sec hold, hip flexor stretch and quad stretch with leg off the edge of the mat.   SLR x 5 for terminal knee extension strength and assessment.   Hip IR/ER R LE stretch.    Neuromuscular Re-education   Neuromuscular re-ed of mvmt, balance, coord, kinesthetic sense, posture, proprioception minutes (47086) 11   Skilled Intervention assist with proper movement R hip and knee with stairs and with ascending faciliation of forward COM , gluteal activation /hip and knee extension timing with stepping up R LE.    Patient Response overuse of mid back for fulcrum of movement and control  Resistive of forward COM past RK.    Treatment Detail Stairs for neuro re -ed ascending forwards alternating step and down backwards working on improved hip extension, eccentric strengthening hamstrings stepping back with the R LE, 1 rail on the L  Performed at start and end of session.   Neuro r-ed on alignment and decreased thoraco lumbar extension and improved acetabular femoral alignment /forward pelvis in the sagital plane.    Progress performed better after manual work and stretches.    Gait Training   Gait Training Minutes, includes stair climbing (76092) 7   Skilled Intervention facilaition, cues, demo   Patient Response challenged with faster gait speed.    Treatment Detail gait with shoes on B , faciliation  faster paced gait, gluteals stance stability R and cues for continuation forward mid to end stance on the L .  Distances  feet x 2.    25 foot walk.    Progress gait goal - improving gait pattern , lacks adequate mid to end stance R and mildly on L as well,  decreased timing and seuqencing/motor coordination of sequencing movement R LE in open chain. Overuse thoraco lumbar area for movement rather than femoral acetabular .  Improved after manual and neuro re-ed activities however.    Manual Therapy   Manual Therapy: Mobilization, MFR, MLD, friction massage minutes (01957) 20   Skilled Intervention manual skills.    Patient Response some tmies of discomfort but states he is good.    Treatment Detail Manual work including trigger point release, fascial work addressing hip flexors, peroneals R , ITB R, pectorals R, lat and serratous R, long arm traction with positional release working into shoulder abd/ER and elbow extension while working on fascial release to pectorals and upper trap.  Work to rib cage for general mobility R primarily,  posterior tibial glide for improved knee extension, posterior subtalar glide and passive forefoot mobility to imrove ankle dorsiflexion.   Fascial work to R lower, mid and upper  rib cage incorporating breathing    Education   Learner Patient   Readiness Acceptance   Method Explanation   Response Verbalizes Understanding  (would like to continue with PT)   Education Comments as above, continuation of PT, progress towards goals .     Plan   Plan for next session monitor vitals. gait with saebo brace and with band/resistance.  Stairs with emphasis on right LE hip/knee flex in open chain.  right quad/ham coactivation with ecc/conc demands.   right term stance stability. quad / hip flexor stretching. continue stepper. functional stretching of trunk , UE and LE, right LE open chain/modified chain activities for timing/coordination. cardiovascular conditioning.    Comments   Comments  PN covers dates from 7/23/19 to 9/4/19.  Rogelio continues to make slow and steady progress with skilled PT intervention. He has greater tightness and overactivation recently due to not being able to get into [facility] for [facility] due to change in medical providers.  He remains appropriate for skilled PT intervention, is motivated and works hard.  Barriers to progress include the significance of his muscle overactivitiy/spasticity.  This writer has not seen pt for quite some time and I notice significant differences in his gait pattern, ability to sustain loading onto the R LE with all activities, less flexor response on the R to loading.     Please refer to daily notes for specifics.     Total Session Time   Timed Code Treatment Minutes 47   Total Treatment Time (sum of timed and untimed services) 47

## 2019-09-10 ENCOUNTER — HOSPITAL ENCOUNTER (OUTPATIENT)
Dept: OCCUPATIONAL THERAPY | Facility: CLINIC | Age: 64
Setting detail: THERAPIES SERIES
End: 2019-09-10
Attending: PHYSICAL MEDICINE & REHABILITATION
Payer: MEDICARE

## 2019-09-10 PROCEDURE — 97530 THERAPEUTIC ACTIVITIES: CPT | Mod: GO,KX | Performed by: OCCUPATIONAL THERAPIST

## 2019-09-10 PROCEDURE — 97110 THERAPEUTIC EXERCISES: CPT | Mod: GO,KX | Performed by: OCCUPATIONAL THERAPIST

## 2019-09-12 ENCOUNTER — OFFICE VISIT (OUTPATIENT)
Dept: PHYSICAL MEDICINE AND REHAB | Facility: CLINIC | Age: 64
End: 2019-09-12
Payer: MEDICARE

## 2019-09-12 VITALS
OXYGEN SATURATION: 97 % | BODY MASS INDEX: 31.15 KG/M2 | WEIGHT: 181.5 LBS | RESPIRATION RATE: 16 BRPM | SYSTOLIC BLOOD PRESSURE: 126 MMHG | DIASTOLIC BLOOD PRESSURE: 81 MMHG | HEART RATE: 92 BPM | TEMPERATURE: 98.9 F

## 2019-09-12 DIAGNOSIS — G81.11 RIGHT SPASTIC HEMIPARESIS (H): ICD-10-CM

## 2019-09-12 DIAGNOSIS — I63.81 LACUNAR INFARCT, ACUTE (H): ICD-10-CM

## 2019-09-12 DIAGNOSIS — R06.83 SNORING: Primary | ICD-10-CM

## 2019-09-12 ASSESSMENT — PAIN SCALES - GENERAL: PAINLEVEL: NO PAIN (0)

## 2019-09-12 NOTE — LETTER
9/12/2019       RE: Luis Trinidad  [address] [facility] [address]     Dear Colleague,    Thank you for referring your patient, Luis Trinidad, to the [facility] PHYSICAL MEDICINE AND REHABILITATION at [facility]. Please see a copy of my visit note below.    PM&R Clinic & Procedure Note:    Luis Trinidad is 63-year-old male with a history of stroke in Nov 2016 resulting in residual right spastic hemiparesis.  Rogelio was last seen on 5/16/19 at which time he received 400 units of botox at that time to his right trunk and right arm.     Today he reports good results with the previous series of injections; improvement of spasticity, function and ability to do his exercises. He noticed benefits about 7 days after the injection which lasted about 8-10 weeks with gradual decline over the past 3-4 weeks. Denies any side effects.     No hospitalization since last visit. No falls. Continues PT and OT x2/week as well as his HEP daily. Takes baclofen 30mg tid; had difficulty with drowsiness when he was on 40mg tid.    He noted that he has difficulty with falling and staying asleep, quality of his sleep at nigh time, fatigue in the morning and throughout the day. His brother also reported loud snoring.       Physical exam:  /81 (BP Location: Left arm, Patient Position: Chair, Cuff Size: Adult Large)   Pulse 92   Temp 98.9  F (37.2  C) (Oral)   Resp 16   Wt 82.3 kg (181 lb 8 oz)   SpO2 97%   BMI 31.15 kg/m        Mostly unchanged   Alert pleasant bright affect   Increased tone noted in RUE and along right pec and right trunk.   AROM: shoulder abduction 90 degree, elbow extension approx 160, wrist remains in neutral, able to extend fingers, but occassionally uses left hand for assistance.   Isi: 3/4 right upper extremity flexor muscles, 1-2/4 extensor  right hemispastic gait noted      Assessment and recommendations:    Ischemic stroke at the lateral left thalamus and the  posterior limb of the left internal capsule 11/2015    Residual right spastic hemiparesis     HTN     HLD    Questionable SAMMY      1. Work-up: none today  2. Therapy/equipment/braces: continue therapies and HEP regularly   3. Medications: no change in meds today; on baclofen 30 tid  4. Interventions: chemo-denervation today; procedure note below. No change in the total dose or pattern today   5. Referral / follow up with other providers: to sleep medicine clinic for more evaluation of possible SAMMY as possible risk factor for stroke   6. Follow up: 12 weeks     Procedure:   Chemodenervation to right chest and RUE utilizing botulinum toxin.  Began with formal consent which he signed. Had formal time-out prior to procedure.  400 U of Botox lot /C3, expiration 03/2022 , Botox NDC 7388-7955-34 was utilized. Diluted with normal saline in a 100 U:1mL ratio, total of 4 mL.  All areas were cleansed with chloroprep prior to injecting. EMG guidance was utilized to identify most active motor units while avoiding surrounding structures.     The following muscles were then injected, two body areas     Right trunk:  1. Right Pectoralis Major: 110 units divided in 3 sites     Right arm  1. Biceps 90 units divided 1 sites.  2. FDS 50 units  3. FDP 50 units  4. FCR 50 units  5. FCU 50 units    Procedure was tolerated well, he leaves clinic feeling well.    Brigida Briseno MD  Physical Medicine & Rehabilitation

## 2019-09-12 NOTE — PROGRESS NOTES
PM&R Clinic & Procedure Note:    Luis Trinidad is 63-year-old male with a history of stroke in Nov 2016 resulting in residual right spastic hemiparesis.  Rogelio was last seen on 5/16/19 at which time he received 400 units of botox at that time to his right trunk and right arm.     Today he reports good results with the previous series of injections; improvement of spasticity, function and ability to do his exercises. He noticed benefits about 7 days after the injection which lasted about 8-10 weeks with gradual decline over the past 3-4 weeks. Denies any side effects.     No hospitalization since last visit. No falls. Continues PT and OT x2/week as well as his HEP daily. Takes baclofen 30mg tid; had difficulty with drowsiness when he was on 40mg tid.    He noted that he has difficulty with falling and staying asleep, quality of his sleep at nigh time, fatigue in the morning and throughout the day. His brother also reported loud snoring.       Physical exam:  /81 (BP Location: Left arm, Patient Position: Chair, Cuff Size: Adult Large)   Pulse 92   Temp 98.9  F (37.2  C) (Oral)   Resp 16   Wt 82.3 kg (181 lb 8 oz)   SpO2 97%   BMI 31.15 kg/m       Mostly unchanged   Alert pleasant bright affect   Increased tone noted in RUE and along right pec and right trunk.   AROM: shoulder abduction 90 degree, elbow extension approx 160, wrist remains in neutral, able to extend fingers, but occassionally uses left hand for assistance.   Isi: 3/4 right upper extremity flexor muscles, 1-2/4 extensor  right hemispastic gait noted      Assessment and recommendations:    Ischemic stroke at the lateral left thalamus and the posterior limb of the left internal capsule 11/2015    Residual right spastic hemiparesis     HTN     HLD    Questionable SAMMY      1. Work-up: none today  2. Therapy/equipment/braces: continue therapies and HEP regularly   3. Medications: no change in meds today; on baclofen 30 tid  4. Interventions:  chemo-denervation today; procedure note below. No change in the total dose or pattern today   5. Referral / follow up with other providers: to sleep medicine clinic for more evaluation of possible SAMMY as possible risk factor for stroke   6. Follow up: 12 weeks     Procedure:   Chemodenervation to right chest and RUE utilizing botulinum toxin.  Began with formal consent which he signed. Had formal time-out prior to procedure.  400 U of Botox lot /C3, expiration 03/2022 , Botox NDC 6376-0117-48 was utilized. Diluted with normal saline in a 100 U:1mL ratio, total of 4 mL.  All areas were cleansed with chloroprep prior to injecting. EMG guidance was utilized to identify most active motor units while avoiding surrounding structures.     The following muscles were then injected, two body areas     Right trunk:  1. Right Pectoralis Major: 110 units divided in 3 sites     Right arm  1. Biceps 90 units divided 1 sites.  2. FDS 50 units  3. FDP 50 units  4. FCR 50 units  5. FCU 50 units    Procedure was tolerated well, he leaves clinic feeling well.    Brigida Briseno MD  Physical Medicine & Rehabilitation

## 2019-09-17 ENCOUNTER — HOSPITAL ENCOUNTER (OUTPATIENT)
Dept: OCCUPATIONAL THERAPY | Facility: CLINIC | Age: 64
Setting detail: THERAPIES SERIES
End: 2019-09-17
Attending: PHYSICAL MEDICINE & REHABILITATION
Payer: MEDICARE

## 2019-09-17 PROCEDURE — 97110 THERAPEUTIC EXERCISES: CPT | Mod: GO,KX | Performed by: OCCUPATIONAL THERAPIST

## 2019-09-17 PROCEDURE — 97140 MANUAL THERAPY 1/> REGIONS: CPT | Mod: GO,KX | Performed by: OCCUPATIONAL THERAPIST

## 2019-09-19 ENCOUNTER — HOSPITAL ENCOUNTER (OUTPATIENT)
Dept: OCCUPATIONAL THERAPY | Facility: CLINIC | Age: 64
Setting detail: THERAPIES SERIES
End: 2019-09-19
Attending: PHYSICAL MEDICINE & REHABILITATION
Payer: MEDICARE

## 2019-09-19 ENCOUNTER — HOSPITAL ENCOUNTER (OUTPATIENT)
Dept: PHYSICAL THERAPY | Facility: CLINIC | Age: 64
Setting detail: THERAPIES SERIES
End: 2019-09-19
Attending: PHYSICAL MEDICINE & REHABILITATION
Payer: MEDICARE

## 2019-09-19 PROCEDURE — 97112 NEUROMUSCULAR REEDUCATION: CPT | Mod: GP,KX | Performed by: PHYSICAL THERAPIST

## 2019-09-19 PROCEDURE — 97110 THERAPEUTIC EXERCISES: CPT | Mod: GO | Performed by: OCCUPATIONAL THERAPIST

## 2019-09-19 PROCEDURE — 97110 THERAPEUTIC EXERCISES: CPT | Mod: GP,KX | Performed by: PHYSICAL THERAPIST

## 2019-09-24 ENCOUNTER — HOSPITAL ENCOUNTER (OUTPATIENT)
Dept: OCCUPATIONAL THERAPY | Facility: CLINIC | Age: 64
Setting detail: THERAPIES SERIES
End: 2019-09-24
Attending: PHYSICAL MEDICINE & REHABILITATION
Payer: MEDICARE

## 2019-09-24 PROCEDURE — 97140 MANUAL THERAPY 1/> REGIONS: CPT | Mod: GO,KX | Performed by: OCCUPATIONAL THERAPIST

## 2019-09-24 PROCEDURE — 97110 THERAPEUTIC EXERCISES: CPT | Mod: GO,KX | Performed by: OCCUPATIONAL THERAPIST

## 2019-09-25 NOTE — PROGRESS NOTES
[facility]      OUTPATIENT OCCUPATIONAL THERAPY  PLAN OF TREATMENT FOR OUTPATIENT REHABILITATION    Patient's Last Name, First Name, M.I.                YOB: 1955  Luis Trinidad                        Provider's Name  [facility] Medical Record No.  [id number]                               Onset Date: 11/29/2017   Start of Care Date: 2/8/2018   Type:     ___PT   _X_OT   ___SLP Medical Diagnosis: Hemiparesis of R side as late effect of stroke                       OT Diagnosis: decreased ADLs/IADLs      _________________________________________________________________________________  Plan of Treatment:    Frequency/Duration: 2x/week for 12 weeks     Goals:    Goal Identifier R shoulder strength   Goal Description Patient to increase R shoulder flexiion AROM to 40 degrees for improved R UE function for ADL/IADLs (during dressing and grooming tasks, carrying items, putting groceries and dishes away).   Target Date 09/23/19   Date Met      Progress:In progress     Goal Identifier R pinch strength   Goal Description Patient will demonstrate increased right hand pinch strength (both lateral and palmar) by 3# each for increased independence with ADL/IADLs such as opening containers, pull up pants, maintaining pinch to hold items.   Target Date 09/23/19   Date Met      Progress:In progress     Goal Identifier R GM/FM coordination   Goal Description Patient to improve R GM/FM coordination skills for increased independence during ADL/IADL tasks (dressing, kitchen tasks, feeding self) by completing the Box and Blocks Test with R UE in standing with 10 blocks.   Target Date 09/23/19   Date Met      Progress:  In progress     Goal Identifier R UE as gross assist   Goal Description Patient to demonstrate functional tasks (feeding self, wiping the counter/table, washing dishes, etc.) with  20% use of R UE as gross stabilizer or gross assist for increased ADL/IADL independence and closer return to prior level of function.(2/8 (eval date): using ~3% at home)   Target Date 09/23/19   Date Met      Progress:In progress     Goal Identifier functional/normal movment patterns in R UE and trunk   Goal Description Patient to complete therapeutic task (simple kitchen tasks, laundry, etc.) with use of B UE's and no more than 2 cues in 5 minute time frame for correct body mechanics and for decreasing compensatory movements at the trunk and upper body for encouragement of normal movement patterns, increased R UE function and increased ADL/IADL independence.   Target Date 09/23/19   Date Met      Progress:In progress     Goal Identifier visual skills   Goal Description Patient will demonstrate WFLs visual skills (including reaction time and visual scanning skills) for safe independent community mobility (crossing the street, driving, grocery shopping) by scoring WNLs on all modes of the [employer], [employer]anc[employer] and other visual screens.   Target Date 09/23/19   Date Met      Progress:Not addressed due to focus on other goals         Progress Toward Goals:   Progress this reporting period:   R UE ROM and strength    Details  3 pt pinch w TH to IF/MF as OTR supports planes manually, then able to consciously relax to help some with release; able to release using tenodesis, self pressure of WR FL and pronation movements (in in forward fold) . SH ABD to Add in gravity reduced /supine to 70 degrees, 50 in sitting.. AAROM into SH FL at 90, then SH EX to table with moderate assist to block SH IR/EL EX.  When in prone and cued with scap retraction, then able to combine EL  degrees during SH EX from 90 degrees FL TO table/45 degrees ( in sitting gets to 0 degrees SH EX from SH FL at 90 dgrees).           Certification date from 9/23/2019  to 12/23/2019    RAFAT Laguerre CERTIFY THE NEED FOR THESE SERVICES  FURNISHED UNDER        THIS PLAN OF TREATMENT AND WHILE UNDER MY CARE     (Physician co-signature of this document indicates review and certification of the therapy plan).                Referring Provider: Brigida Briseno MD

## 2019-09-26 ENCOUNTER — HOSPITAL ENCOUNTER (OUTPATIENT)
Dept: OCCUPATIONAL THERAPY | Facility: CLINIC | Age: 64
Setting detail: THERAPIES SERIES
End: 2019-09-26
Attending: PHYSICAL MEDICINE & REHABILITATION
Payer: MEDICARE

## 2019-09-26 ENCOUNTER — HOSPITAL ENCOUNTER (OUTPATIENT)
Dept: PHYSICAL THERAPY | Facility: CLINIC | Age: 64
Setting detail: THERAPIES SERIES
End: 2019-09-26
Attending: PHYSICAL MEDICINE & REHABILITATION
Payer: MEDICARE

## 2019-09-26 PROCEDURE — 97535 SELF CARE MNGMENT TRAINING: CPT | Mod: GO | Performed by: OCCUPATIONAL THERAPIST

## 2019-09-26 PROCEDURE — 97110 THERAPEUTIC EXERCISES: CPT | Mod: GP,KX | Performed by: PHYSICAL THERAPIST

## 2019-09-26 PROCEDURE — 97112 NEUROMUSCULAR REEDUCATION: CPT | Mod: GP,KX | Performed by: PHYSICAL THERAPIST

## 2019-09-26 PROCEDURE — 97112 NEUROMUSCULAR REEDUCATION: CPT | Mod: GO,KX | Performed by: OCCUPATIONAL THERAPIST

## 2019-09-26 PROCEDURE — 97110 THERAPEUTIC EXERCISES: CPT | Mod: GO | Performed by: OCCUPATIONAL THERAPIST

## 2019-10-01 ENCOUNTER — HOSPITAL ENCOUNTER (OUTPATIENT)
Dept: OCCUPATIONAL THERAPY | Facility: CLINIC | Age: 64
Setting detail: THERAPIES SERIES
End: 2019-10-01
Attending: PHYSICAL MEDICINE & REHABILITATION
Payer: MEDICARE

## 2019-10-01 ENCOUNTER — HOSPITAL ENCOUNTER (OUTPATIENT)
Dept: PHYSICAL THERAPY | Facility: CLINIC | Age: 64
Setting detail: THERAPIES SERIES
End: 2019-10-01
Attending: PHYSICAL MEDICINE & REHABILITATION
Payer: MEDICARE

## 2019-10-01 PROCEDURE — 97112 NEUROMUSCULAR REEDUCATION: CPT | Mod: GP,KX | Performed by: PHYSICAL THERAPIST

## 2019-10-01 PROCEDURE — 97112 NEUROMUSCULAR REEDUCATION: CPT | Mod: GO,KX | Performed by: OCCUPATIONAL THERAPIST

## 2019-10-01 PROCEDURE — 97110 THERAPEUTIC EXERCISES: CPT | Mod: GP,KX | Performed by: PHYSICAL THERAPIST

## 2019-10-03 ENCOUNTER — HOSPITAL ENCOUNTER (OUTPATIENT)
Dept: PHYSICAL THERAPY | Facility: CLINIC | Age: 64
Setting detail: THERAPIES SERIES
End: 2019-10-03
Attending: PHYSICAL MEDICINE & REHABILITATION
Payer: MEDICARE

## 2019-10-03 ENCOUNTER — HOSPITAL ENCOUNTER (OUTPATIENT)
Dept: OCCUPATIONAL THERAPY | Facility: CLINIC | Age: 64
Setting detail: THERAPIES SERIES
End: 2019-10-03
Attending: PHYSICAL MEDICINE & REHABILITATION
Payer: MEDICARE

## 2019-10-03 PROCEDURE — 97110 THERAPEUTIC EXERCISES: CPT | Mod: GO | Performed by: OCCUPATIONAL THERAPIST

## 2019-10-03 PROCEDURE — 97112 NEUROMUSCULAR REEDUCATION: CPT | Mod: GP,KX | Performed by: PHYSICAL THERAPIST

## 2019-10-03 PROCEDURE — 97116 GAIT TRAINING THERAPY: CPT | Mod: GP,KX | Performed by: PHYSICAL THERAPIST

## 2019-10-03 PROCEDURE — 97110 THERAPEUTIC EXERCISES: CPT | Mod: GP,KX | Performed by: PHYSICAL THERAPIST

## 2019-10-08 ENCOUNTER — HOSPITAL ENCOUNTER (OUTPATIENT)
Dept: PHYSICAL THERAPY | Facility: CLINIC | Age: 64
Setting detail: THERAPIES SERIES
End: 2019-10-08
Attending: PHYSICAL MEDICINE & REHABILITATION
Payer: MEDICARE

## 2019-10-08 ENCOUNTER — HOSPITAL ENCOUNTER (OUTPATIENT)
Dept: OCCUPATIONAL THERAPY | Facility: CLINIC | Age: 64
Setting detail: THERAPIES SERIES
End: 2019-10-08
Attending: PHYSICAL MEDICINE & REHABILITATION
Payer: MEDICARE

## 2019-10-08 PROCEDURE — 97112 NEUROMUSCULAR REEDUCATION: CPT | Mod: GP,KX | Performed by: PHYSICAL THERAPIST

## 2019-10-08 PROCEDURE — 97110 THERAPEUTIC EXERCISES: CPT | Mod: GP,KX | Performed by: PHYSICAL THERAPIST

## 2019-10-08 PROCEDURE — 97140 MANUAL THERAPY 1/> REGIONS: CPT | Mod: GO,KX | Performed by: OCCUPATIONAL THERAPIST

## 2019-10-08 PROCEDURE — 97110 THERAPEUTIC EXERCISES: CPT | Mod: GO,KX | Performed by: OCCUPATIONAL THERAPIST

## 2019-10-08 NOTE — PROGRESS NOTES
--OUTPATIENT OCCUPATIONAL THERAPY PROGRESS; 10th Visit--     10/08/19 1200   Signing Clinician's Name / Credentials   Signing clinician's name / credentials ARYAN Mathis/margarito   Session Number   Session Number 10/ 10 (MEDICARE as of 5/1/18)   Additional Session Number 105   Progress/Recertification   Recertification Due 12/18/19   Recertification Completed 09/19/19   Adult OT Eval Goals   OT Eval Goals (Adult) 1;2;3    OT Goal 1   Goal Identifier R shoulder strength   Goal Description Patient to increase R shoulder flexiion AROM to 40 degrees for improved R UE function for ADL/IADLs (during dressing and grooming tasks, carrying items, putting groceries and dishes away).   Target Date 09/23/19    OT Goal 2   Goal Identifier R pinch strength   Goal Description Patient will demonstrate increased right hand pinch strength (both lateral and palmar) by 3# each for increased independence with ADL/IADLs such as opening containers, pull up pants, maintaining pinch to hold items.   Target Date 09/23/19    OT Goal 3   Goal Identifier R GM/FM coordination   Goal Description Patient to improve R GM/FM coordination skills for increased independence during ADL/IADL tasks (dressing, kitchen tasks, feeding self) by completing the Box and Blocks Test with R UE in standing with 10 blocks.   Target Date 09/23/19   OT Goal 4   Goal Identifier R UE as gross assist   Goal Description Patient to demonstrate functional tasks (feeding self, wiping the counter/table, washing dishes, etc.) with 20% use of R UE as gross stabilizer or gross assist for increased ADL/IADL independence and closer return to prior level of function.  (2/8 (eval date): using ~3% at home)   Target Date 09/23/19   OT Goal 5   Goal Identifier functional/normal movment patterns in R UE and trunk   Goal Description Patient to complete therapeutic task (simple kitchen tasks, laundry, etc.) with use of B UE's and no more than 2 cues in 5 minute time frame for correct body  mechanics and for decreasing compensatory movements at the trunk and upper body for encouragement of normal movement patterns, increased R UE function and increased ADL/IADL independence.   Target Date 09/23/19    OT Goal 6   Goal Identifier visual skills   Goal Description Patient will demonstrate WFLs visual skills (including reaction time and visual scanning skills) for safe independent community mobility (crossing the street, driving, grocery shopping) by scoring WNLs on all modes of the [employer], [employer] and other visual screens.   Target Date 09/23/19   Subjective Report   Subjective Report He is able to open his hand up better after botox injections.  Biceps tone is still pretty challnging, but using shoulder pulleys 2x/day.     Objective Measure 1   Objective Measure R  strength    Objective Measure 2   Objective Measure Pinch Strength   Objective Measure 3   Objective Measure  UE recruitment /trunk AROM   Details Opened TH to IF space between 1/2 in to  1 inch distances consistently through 5 sets of 10 grasping on thigh and releaseing between knees/bin on floor.     Objective Measure 4   Objective Measure R UE ROM and strength   Objective Measure 6   Objective Measure Box and Blocks   Therapeutic Procedure/Exercise   Therapeutic Procedure: strength, endurance, ROM, flexibillity minutes (03955) 26   Skilled Intervention R UE AROM/AAROM/stretch   Patient Response Pt pleased to be working on R EL and TH control, good focus.    Treatment Detail Pt guided through R UE and trunk stretches to loosen tightness in R UE prior to AROM exercises,  2-3 sets of 10-30 sec holds each.   1) trunk rotations L and R ( rowing circles, 2)   A/P tilt glides, side bends  3)SH FLexion, clock circles 4) forearm supination, 6) wrist extension w IF/MF, then MF/SF finger extension.  7) deltoid stretch, 8) SH ER applied by OTR, gets to 45 of 45 with 3 long holds, per tone/9) SH IR/EX 5 sec lifts x 2 sets of 5  10) Butterfly B  SH ER at 90/hands clasped x 5/5 reps of 5 sec holds each.  11) NMR of combined SH HADD (mod manual resistance) with biceps flexion ( usnig gravity to assist) and h ABD (mod manual resistance) to EL EX ( moves from 60 degrees EX to 100 EX AROM, with vibration to facilitate can get to 150/180, OTR has pt hold end range x 5 sec holds before return to bic FL/HAdd.12)  Grasp/release patterns with object transfer-- 18  reps total Opened TH to IF space between 1/2 in to  1 inch distances consistently through 5 sets of 10 grasping on thigh and releaeing between knees/bin on floor.      Manual Therapy   Manual Therapy Minutes (71816) 14   Skilled Intervention Prolonged stretches, IASTM, MFR for R UE funcational use patterns   Patient Response  trunk roation noted as he walked out.     Treatment Detail Per mm tone and fascial thickness, Rogelio has difficulty getting R UE  SH FL, ER, EL EX to help with swing through of gait, repositioning during dressing.  Has dense myofascial  thickness and hypertonic at anterior ribs T4-T8, much less at T 8-10 rib and paraspinals.  Tone/thickness at levator, UT,pec minor, posterior capsule/lat deltoid, biceps (mod springy, lacking 45 degrees. Lower trap is restircted in a long position due to UT/levator tightness.  OTR cont w instrument assisted soft tissue mobilization (IASTM) using  tissue mobilization tool (larger bone Graston type  tool with min to moderate pressure in longitudinal patterns at origin, insert and muscle bellies, then cross fiber in fanning patterns  from distal to proximal and proximal to distal patterns, and around radius of bony prominences at  R UT, levator, R flank, ant ribs4-8, diaphragmatic plane with active flossing ex completed by pt during IASTM. Passive stretched prolonged at levaotr spaula. OTR stabilizes surrounding skin/removing tissue slack to improve effectiveness.  Applied AAROM prolonged stretch to  pelvis rotation , while stabilizing ribs.     Equipment    Equipment Last botox ~9/11/19   Plan   Homework rht to help ADL log-ADL list- work towards: Holding a spoon/knife, stabilizing plate on table with some, moving a coffee mug with both hands from chest to chin/mouth donning socks with 2 hands, carrying a plate with 2 hands managing light switches with elbow flexed greater than 90 , and using a zipper from the bottom to midway/top.   Home program  H ABD stretch  w towel at elbow/supine to help increase EL EX, carry bag in R forearm, estim to R wrist/digit extensors, pulley system for R shoulder (flexion and abduction)   Plan for next session TRcieps with vibratio, TH grasp/release,  Prone Scap retraction, then repeat in sitting for NMR carry over. Check SH  ER at rib and 90 SH ABD stretch w cane and IASTM to pec minor, scap to support better position w walking (try belt with forearm loop?), NMR with vibration of EL FL/EX in supine after IASTM, Circular TH pathways with grasp/release, Cont NMES to work on  EL FL, EX from -90 to end range w push/pull, H ABD/ HADD planes.  Cont NMES per above/ black in mid  ex mm bulk, dorsal snuff box red combine with grasp/release tasks; , repeat/pinch.  NMR of TH ext/grasp/release, f/u on ball stretch, ex rotation while seated. hammer turns for supination, check tone/movement patterns to prep for 11/29 botox, f/u on pole stretch for trunk, IASTM SH and trunk/UE traction w pec release,,sidelying vs shoulder pulley kit, recheck /pinch post botox, NMES hand/wr ,  **find old splint vs order  new splint (he hasn't been able to locate at home as of 8/2); re-test R  ~8/10 to see effect of botox (had on 8/1); 2 handed lifting carrying, transferring tasks ( food containers, bags, larger dishes), Cont w his estim, grasp release patterns with unit in place, issue diagrams per pictures taken. practice setting up and take down of shoulder pulley; ~7/25/18: trial of SH IR/add w pulley, wt bearing at kitchen counter (prone/4 pt vs forerm WB  on table w shin height reaching w forward reach), ,IADL box, have him bring in dowel for  HEP if he'd like it adapted with elastic handle ( instructions in chart),  stretches with pole, and table stretches/ADL log. do full review of his HEP (see previous course of OT for past HEP) and modify/upgrade (will especially need upgrade for R hand with improved function/strength); neuro re-ed. with much focus on decreasing tone (trial prone on elbows, WB in 4 point, etc.); check splint - have him bring in (he hasn't been wearing, using a ball in hand at night);  R UE as gross stabilizer and gross assist; LATER: IADL eval and assist with adapted driving evaluation as appropriate; consider vocational rehab referral for return to work if appropriate or volunteer opportunities.  Consider driving/[employer] and [employer] ~ 10/24/18. *   Comments   Comments KX--Patient presents with multiple deficits from diagnosis of L CVA (R UE high tone/spasticity, decreased R UE AROM/joint contractures, and limited motor patterns with compensatory movements for RUE, working toward incorporating functional use of R UE into daily tasks).  Patient was previously independent with all ADLs and IADLs. Even with strong compliance with daily stretchng HEP and medical intervention ( botox and baclofen), Rogelio continues to demonstrate ifficulty with managing his tone spasticity, but has demonstrated motor recruitment improvements and atR hand/TH following botox.  With his numerous deficit areas and continued slow steady improvement, Rogelio continues to be appropriate for continued skilled occupational therapy services beyond the therapy cap to continue to increase his ADL/IADL independence for less dependence on others for all these tasks. Most recent botox 5/16/19.    Total Session Time   Timed Code Treatment Minutes 40   Total Treatment Time (sum of timed and untimed services) 40     Thank you for this thoughtful referral! If you have any questions,  please call me [phone].  Nice to share in this patient's care with you.    Sincerely,   Danyelle Evans, OTR/l

## 2019-10-10 ENCOUNTER — HOSPITAL ENCOUNTER (OUTPATIENT)
Dept: OCCUPATIONAL THERAPY | Facility: CLINIC | Age: 64
Setting detail: THERAPIES SERIES
End: 2019-10-10
Attending: PHYSICAL MEDICINE & REHABILITATION
Payer: MEDICARE

## 2019-10-10 ENCOUNTER — HOSPITAL ENCOUNTER (OUTPATIENT)
Dept: PHYSICAL THERAPY | Facility: CLINIC | Age: 64
Setting detail: THERAPIES SERIES
End: 2019-10-10
Attending: PHYSICAL MEDICINE & REHABILITATION
Payer: MEDICARE

## 2019-10-10 PROCEDURE — 97535 SELF CARE MNGMENT TRAINING: CPT | Mod: GO | Performed by: OCCUPATIONAL THERAPIST

## 2019-10-10 PROCEDURE — 97110 THERAPEUTIC EXERCISES: CPT | Mod: GP,KX | Performed by: PHYSICAL THERAPIST

## 2019-10-10 PROCEDURE — 97112 NEUROMUSCULAR REEDUCATION: CPT | Mod: GP,KX | Performed by: PHYSICAL THERAPIST

## 2019-10-10 PROCEDURE — 97140 MANUAL THERAPY 1/> REGIONS: CPT | Mod: GP,KX | Performed by: PHYSICAL THERAPIST

## 2019-10-10 PROCEDURE — 97116 GAIT TRAINING THERAPY: CPT | Mod: GP,KX | Performed by: PHYSICAL THERAPIST

## 2019-10-10 PROCEDURE — 97110 THERAPEUTIC EXERCISES: CPT | Mod: GO | Performed by: OCCUPATIONAL THERAPIST

## 2019-10-15 ENCOUNTER — HOSPITAL ENCOUNTER (OUTPATIENT)
Dept: PHYSICAL THERAPY | Facility: CLINIC | Age: 64
Setting detail: THERAPIES SERIES
End: 2019-10-15
Attending: PHYSICAL MEDICINE & REHABILITATION
Payer: MEDICARE

## 2019-10-15 ENCOUNTER — HOSPITAL ENCOUNTER (OUTPATIENT)
Dept: OCCUPATIONAL THERAPY | Facility: CLINIC | Age: 64
Setting detail: THERAPIES SERIES
End: 2019-10-15
Attending: PHYSICAL MEDICINE & REHABILITATION
Payer: MEDICARE

## 2019-10-15 PROCEDURE — 97110 THERAPEUTIC EXERCISES: CPT | Mod: GP,KX | Performed by: PHYSICAL THERAPIST

## 2019-10-15 PROCEDURE — 97112 NEUROMUSCULAR REEDUCATION: CPT | Mod: GP,KX | Performed by: PHYSICAL THERAPIST

## 2019-10-15 PROCEDURE — 97140 MANUAL THERAPY 1/> REGIONS: CPT | Mod: GO,KX | Performed by: OCCUPATIONAL THERAPIST

## 2019-10-15 PROCEDURE — 97116 GAIT TRAINING THERAPY: CPT | Mod: GP,KX | Performed by: PHYSICAL THERAPIST

## 2019-10-15 PROCEDURE — 97110 THERAPEUTIC EXERCISES: CPT | Mod: GO,KX | Performed by: OCCUPATIONAL THERAPIST

## 2019-10-17 ENCOUNTER — HOSPITAL ENCOUNTER (OUTPATIENT)
Dept: OCCUPATIONAL THERAPY | Facility: CLINIC | Age: 64
Setting detail: THERAPIES SERIES
End: 2019-10-17
Attending: PHYSICAL MEDICINE & REHABILITATION
Payer: MEDICARE

## 2019-10-17 ENCOUNTER — HOSPITAL ENCOUNTER (OUTPATIENT)
Dept: PHYSICAL THERAPY | Facility: CLINIC | Age: 64
Setting detail: THERAPIES SERIES
End: 2019-10-17
Attending: PHYSICAL MEDICINE & REHABILITATION
Payer: MEDICARE

## 2019-10-17 PROCEDURE — 97110 THERAPEUTIC EXERCISES: CPT | Mod: GO | Performed by: OCCUPATIONAL THERAPIST

## 2019-10-17 PROCEDURE — 97535 SELF CARE MNGMENT TRAINING: CPT | Mod: GO,KX | Performed by: OCCUPATIONAL THERAPIST

## 2019-10-17 PROCEDURE — 97110 THERAPEUTIC EXERCISES: CPT | Mod: GP | Performed by: PHYSICAL THERAPIST

## 2019-10-17 PROCEDURE — 97112 NEUROMUSCULAR REEDUCATION: CPT | Mod: GP,KX | Performed by: PHYSICAL THERAPIST

## 2019-10-22 ENCOUNTER — HOSPITAL ENCOUNTER (OUTPATIENT)
Dept: OCCUPATIONAL THERAPY | Facility: CLINIC | Age: 64
Setting detail: THERAPIES SERIES
End: 2019-10-22
Attending: PHYSICAL MEDICINE & REHABILITATION
Payer: MEDICARE

## 2019-10-22 ENCOUNTER — HOSPITAL ENCOUNTER (OUTPATIENT)
Dept: PHYSICAL THERAPY | Facility: CLINIC | Age: 64
Setting detail: THERAPIES SERIES
End: 2019-10-22
Attending: PHYSICAL MEDICINE & REHABILITATION
Payer: MEDICARE

## 2019-10-22 PROCEDURE — 97112 NEUROMUSCULAR REEDUCATION: CPT | Mod: GO,KX | Performed by: OCCUPATIONAL THERAPIST

## 2019-10-22 PROCEDURE — 97140 MANUAL THERAPY 1/> REGIONS: CPT | Mod: GO | Performed by: OCCUPATIONAL THERAPIST

## 2019-10-22 PROCEDURE — 97110 THERAPEUTIC EXERCISES: CPT | Mod: GO | Performed by: OCCUPATIONAL THERAPIST

## 2019-10-22 PROCEDURE — 97110 THERAPEUTIC EXERCISES: CPT | Mod: GP | Performed by: PHYSICAL THERAPIST

## 2019-10-22 PROCEDURE — 97112 NEUROMUSCULAR REEDUCATION: CPT | Mod: GP | Performed by: PHYSICAL THERAPIST

## 2019-10-24 ENCOUNTER — HOSPITAL ENCOUNTER (OUTPATIENT)
Dept: PHYSICAL THERAPY | Facility: CLINIC | Age: 64
Setting detail: THERAPIES SERIES
End: 2019-10-24
Attending: PHYSICAL MEDICINE & REHABILITATION
Payer: MEDICARE

## 2019-10-24 PROCEDURE — 97112 NEUROMUSCULAR REEDUCATION: CPT | Mod: GP,KX | Performed by: PHYSICAL THERAPIST

## 2019-10-24 PROCEDURE — 97116 GAIT TRAINING THERAPY: CPT | Mod: GP,KX | Performed by: PHYSICAL THERAPIST

## 2019-10-24 PROCEDURE — 97110 THERAPEUTIC EXERCISES: CPT | Mod: GP,KX | Performed by: PHYSICAL THERAPIST

## 2019-10-24 NOTE — PROGRESS NOTES
Medicare 10th Visit Progress Note    MD: Brigida Briseno MD  Dates: 9/4/2019 - 10/24/2019       10/24/19 1000   Signing Clinician's Name / Credentials   Signing clinician's name / credentials Yen Villanueva PT   Session Number   Session Number 10/10 medicare   Authorization status KX modifier needed - Pt requires continued PT beyond the therapy cap to maximize safety and reduce risk for falls due to significant spasticity and impaired motor patterns related to his right hemiparesis.    Progress Note/Recertification   Recertification Due Date 10/29/19   Goal 1   Goal Identifier 1 - Gait pattern/safety   Goal Description Rogelio will be able to achieve proper midstance to terminal stance alignment on right LE and transition into pre and initial swing with proper clearance of right foot for greater efficiency and safety with gait at home and in the community.   Target Date 10/29/19  (in progress)   Date Met   (improving transition to midstance, allowing better L IC.  )   Goal 2   Goal Identifier 2- Gait speed   Goal Description Rogelio will increase his gait speed on the 25 foot timed walk to 9 sec or less without use of an assistive device  to indicate reduced level of gait impairment and improved community mobility.   Target Date 10/29/19  (in progress)   Date Met   (10.9 sec with improved gait pattern quality / symmetry)   Goal 3   Goal Identifier 3- stairs   Goal Description Pt will be able to navigate up and down his stairs with one rail in a reciprocal pattern with minimal circumduction of right LE in order for more efficient and safe access to all levels of his home.   Target Date 10/29/19  (in progess)   Date Met   (met descending.  without assist circumducts ascending)   Goal 5   Goal Identifier 5 - home program   Goal Description Rogelio will be independent with a home activity program to address his impairments and self manage his recovery.    Target Date 10/29/19  (in progress)   Date Met   (continue progression,  "working hard on activities at home. )   Subjective Report   Subjective Report Doing fine today.  Too windy to walk yesterday but planning on it this afternoon.     Objective Measure 2   Objective Measure 25 foot walk   Details 14.3 sec, 18 steps,  12.5 sec, 18 steps,  12.2 sec, 17 steps.  cued for faster pace - 10.8 sec, 16 steps,  10.9 sec,  16 steps.     Vitals Signs   Heart Rate 106   Blood Pressure 116/68   Vital Signs Comments L UE manual    Therapeutic Procedure/exercise   Therapeutic Procedures: strength, endurance, ROM, flexibillity minutes (24576) 25   Skilled Intervention monitoring, assist, cues for alignment, manual and dynamic stretching techniques   Patient Response no c/o   Treatment Detail supine stretching with gentle overpressure for right hip flexors with right LE off side of mat and left knee bent in hooklying, then in KTC position.  right lateral trunk LTR with gentle pressure, DKTC with assist right, independent hold on left.  right latis stretching in supine with shoulder flex working into neurtal rotation , gradual increase in flexion ROM with prolonged holds.  right pec stertch with ovepressure to ant shoulder 3 x 30\".     Neuromuscular Re-education   Neuromuscular re-ed of mvmt, balance, coord, kinesthetic sense, posture, proprioception minutes (59485) 15   Skilled Intervention facil for trunk/ LE alginment, right wt shift and upright coactive  trunk.   Patient Response feels pull in right trunk   Treatment Detail facing mat 2 UE closed chain, activities/challenges for loading right LE/UE specifically for hip/knee ext and wrist/elbow ext.     Gait Training   Gait Training Minutes, includes stair climbing (61082) 20   Skilled Intervention facil , cues   Patient Response no c/o.    Treatment Detail timed gait - see objective measures.   stair negotiation reciprocally - up with facil for right LE alignment and knee flexion to reduce circumduction.  Down with cues for incresed speed for " fluidity of movement.     Progress improved quality of gait with walking , even at increased pace.  longer stancetime into term stance right to allow proper heelstrike left.    Plan   Updates to plan of care continue per current certification, 2 x per week.     Plan for next session monitor vitals. right term stance stability. quad / hip flexor stretching. continue stepper. functional stretching of trunk , UE and LE, right LE open chain/modified chain activities for timing/coordination. cardiovascular conditioning.    Total Session Time   Timed Code Treatment Minutes 60   Total Treatment Time (sum of timed and untimed services) 60

## 2019-10-29 ENCOUNTER — HOSPITAL ENCOUNTER (OUTPATIENT)
Dept: OCCUPATIONAL THERAPY | Facility: CLINIC | Age: 64
Setting detail: THERAPIES SERIES
End: 2019-10-29
Attending: PHYSICAL MEDICINE & REHABILITATION
Payer: MEDICARE

## 2019-10-29 PROCEDURE — 97110 THERAPEUTIC EXERCISES: CPT | Mod: GO,KX | Performed by: OCCUPATIONAL THERAPIST

## 2019-10-29 PROCEDURE — 97140 MANUAL THERAPY 1/> REGIONS: CPT | Mod: GO,KX | Performed by: OCCUPATIONAL THERAPIST

## 2019-10-29 PROCEDURE — 97112 NEUROMUSCULAR REEDUCATION: CPT | Mod: GO,KX | Performed by: OCCUPATIONAL THERAPIST

## 2019-10-31 ENCOUNTER — HOSPITAL ENCOUNTER (OUTPATIENT)
Dept: PHYSICAL THERAPY | Facility: CLINIC | Age: 64
Setting detail: THERAPIES SERIES
End: 2019-10-31
Attending: PHYSICAL MEDICINE & REHABILITATION
Payer: MEDICARE

## 2019-10-31 ENCOUNTER — HOSPITAL ENCOUNTER (OUTPATIENT)
Dept: OCCUPATIONAL THERAPY | Facility: CLINIC | Age: 64
Setting detail: THERAPIES SERIES
End: 2019-10-31
Attending: PHYSICAL MEDICINE & REHABILITATION
Payer: MEDICARE

## 2019-10-31 PROCEDURE — 97110 THERAPEUTIC EXERCISES: CPT | Mod: GO,KX | Performed by: OCCUPATIONAL THERAPIST

## 2019-10-31 PROCEDURE — 97116 GAIT TRAINING THERAPY: CPT | Mod: GP,KX | Performed by: PHYSICAL THERAPIST

## 2019-10-31 PROCEDURE — 97110 THERAPEUTIC EXERCISES: CPT | Mod: GP,KX | Performed by: PHYSICAL THERAPIST

## 2019-10-31 PROCEDURE — 97112 NEUROMUSCULAR REEDUCATION: CPT | Mod: GP,KX | Performed by: PHYSICAL THERAPIST

## 2019-11-07 ENCOUNTER — HOSPITAL ENCOUNTER (OUTPATIENT)
Dept: OCCUPATIONAL THERAPY | Facility: CLINIC | Age: 64
Setting detail: THERAPIES SERIES
End: 2019-11-07
Attending: PHYSICAL MEDICINE & REHABILITATION
Payer: MEDICARE

## 2019-11-07 PROCEDURE — 97110 THERAPEUTIC EXERCISES: CPT | Mod: GO | Performed by: OCCUPATIONAL THERAPIST

## 2019-11-07 PROCEDURE — 97535 SELF CARE MNGMENT TRAINING: CPT | Mod: GO | Performed by: OCCUPATIONAL THERAPIST

## 2019-11-12 ENCOUNTER — HOSPITAL ENCOUNTER (OUTPATIENT)
Dept: PHYSICAL THERAPY | Facility: CLINIC | Age: 64
Setting detail: THERAPIES SERIES
End: 2019-11-12
Attending: PHYSICAL MEDICINE & REHABILITATION
Payer: MEDICARE

## 2019-11-12 ENCOUNTER — HOSPITAL ENCOUNTER (OUTPATIENT)
Dept: OCCUPATIONAL THERAPY | Facility: CLINIC | Age: 64
Setting detail: THERAPIES SERIES
End: 2019-11-12
Attending: PHYSICAL MEDICINE & REHABILITATION
Payer: MEDICARE

## 2019-11-12 PROCEDURE — 97110 THERAPEUTIC EXERCISES: CPT | Mod: GO,KX | Performed by: OCCUPATIONAL THERAPIST

## 2019-11-12 PROCEDURE — 97140 MANUAL THERAPY 1/> REGIONS: CPT | Mod: GO,KX | Performed by: OCCUPATIONAL THERAPIST

## 2019-11-12 PROCEDURE — 97112 NEUROMUSCULAR REEDUCATION: CPT | Mod: GP | Performed by: PHYSICAL THERAPIST

## 2019-11-12 PROCEDURE — 97110 THERAPEUTIC EXERCISES: CPT | Mod: GP | Performed by: PHYSICAL THERAPIST

## 2019-11-12 PROCEDURE — 97112 NEUROMUSCULAR REEDUCATION: CPT | Mod: GO,KX | Performed by: OCCUPATIONAL THERAPIST

## 2019-11-12 NOTE — PROGRESS NOTES
[facility]      OUTPATIENT PHYSICAL THERAPY  PLAN OF TREATMENT FOR OUTPATIENT REHABILITATION    Patient's Last Name, First Name, M.I.                YOB: 1955  Luis Trinidad                        Provider's Name  [facility] Medical Record No.  [id number]                               Onset Date: 11/4/2015   Start of Care Date: 5/1/2018   Type:     _X_PT   ___OT   ___SLP Medical Diagnosis: ischemic CVA                       PT Diagnosis: Impaired gait and mobility due to right hemiparesis and spasticity      _________________________________________________________________________________  Plan of Treatment:    Frequency/Duration: 2 x per week x 90 days.      Goals:  Goal Identifier 1 - Gait pattern/safety   Goal Description Rogelio will be able to achieve proper midstance to terminal stance alignment on right LE and transition into pre and initial swing with proper clearance of right foot for greater efficiency and safety with gait at home and in the community.   Target Date 10/29/19(in progress)   Date Met  (improving transition to midstance, allowing better L IC.  )   Progress:     Goal Identifier 2- Gait speed   Goal Description Rogelio will increase his gait speed on the 25 foot timed walk to 9 sec or less without use of an assistive device  to indicate reduced level of gait impairment and improved community mobility.   Target Date 10/29/19(in progress)   Date Met  (10.9 sec with improved gait pattern quality / symmetry)   Progress:     Goal Identifier 3- stairs   Goal Description Pt will be able to navigate up and down his stairs with one rail in a reciprocal pattern with minimal circumduction of right LE in order for more efficient and safe access to all levels of his home.   Target Date 10/29/19(in progess)   Date Met  (met descending.  without assist circumducts ascending)    Progress:     Goal Identifier 5 - home program   Goal Description Rogelio will be independent with a home activity program to address his impairments and self manage his recovery.    Target Date 10/29/19(in progress)   Date Met  (continue progression, working hard on activities at home. )   Progress:     Progress Toward Goals:   See progress noted dated 10/24/2019.    Progress this reporting period: Rogelio is showing iimprovements in forward progression over right LE in stance phase which has allowed better control and alignment with left LE placement at initial contact.  He is able to perform this more consistently and will improved postural control.  Clearance in swng continues to be the biggest limitation and challenge due to right HS and ankle DF weakness.  Progression of speed is limited due to catching of toes.  This has been helped partially with application of moleskin to sole of shoe near toes on right side to allow for reduced friction in right swing.    Progress limited due to significant spasticity and tightness of his right UE, trunk and LE as well as weakness of his ankle DF and HS.     Continue with goals 1, 2 and 5 until 1/28/2020.      Modified goal 3:  Goal Identifier 3- stairs   Goal Description Pt will be able to navigate up his stairs with one rail in a reciprocal pattern with minimal circumduction of right LE in order for more efficient and safe access to all levels of his home.   Target Date 1/28/2020           Certification date from 10/30/2019 to 1/28/2020.    Yen Villanueva, PT          I CERTIFY THE NEED FOR THESE SERVICES FURNISHED UNDER        THIS PLAN OF TREATMENT AND WHILE UNDER MY CARE     (Physician co-signature of this document indicates review and certification of the therapy plan).                Referring Provider: Brigida Briseno MD

## 2019-11-19 ENCOUNTER — HOSPITAL ENCOUNTER (OUTPATIENT)
Dept: PHYSICAL THERAPY | Facility: CLINIC | Age: 64
Setting detail: THERAPIES SERIES
End: 2019-11-19
Attending: PHYSICAL MEDICINE & REHABILITATION
Payer: MEDICARE

## 2019-11-19 PROCEDURE — 97112 NEUROMUSCULAR REEDUCATION: CPT | Mod: GP,KX | Performed by: PHYSICAL THERAPIST

## 2019-11-19 PROCEDURE — 97110 THERAPEUTIC EXERCISES: CPT | Mod: GP | Performed by: PHYSICAL THERAPIST

## 2019-11-21 ENCOUNTER — HOSPITAL ENCOUNTER (OUTPATIENT)
Dept: PHYSICAL THERAPY | Facility: CLINIC | Age: 64
Setting detail: THERAPIES SERIES
End: 2019-11-21
Attending: PHYSICAL MEDICINE & REHABILITATION
Payer: MEDICARE

## 2019-11-21 ENCOUNTER — HOSPITAL ENCOUNTER (OUTPATIENT)
Dept: OCCUPATIONAL THERAPY | Facility: CLINIC | Age: 64
Setting detail: THERAPIES SERIES
End: 2019-11-21
Attending: PHYSICAL MEDICINE & REHABILITATION
Payer: MEDICARE

## 2019-11-21 PROCEDURE — 97112 NEUROMUSCULAR REEDUCATION: CPT | Mod: GO,KX | Performed by: OCCUPATIONAL THERAPIST

## 2019-11-21 PROCEDURE — 97110 THERAPEUTIC EXERCISES: CPT | Mod: GO,KX | Performed by: OCCUPATIONAL THERAPIST

## 2019-11-21 PROCEDURE — 97110 THERAPEUTIC EXERCISES: CPT | Mod: GP,KX | Performed by: PHYSICAL THERAPIST

## 2019-11-21 PROCEDURE — 97112 NEUROMUSCULAR REEDUCATION: CPT | Mod: GP,KX | Performed by: PHYSICAL THERAPIST

## 2019-11-26 ENCOUNTER — HOSPITAL ENCOUNTER (OUTPATIENT)
Dept: PHYSICAL THERAPY | Facility: CLINIC | Age: 64
Setting detail: THERAPIES SERIES
End: 2019-11-26
Attending: PHYSICAL MEDICINE & REHABILITATION
Payer: MEDICARE

## 2019-11-26 PROCEDURE — 97110 THERAPEUTIC EXERCISES: CPT | Mod: GP,KX | Performed by: PHYSICAL THERAPIST

## 2019-11-26 PROCEDURE — 97112 NEUROMUSCULAR REEDUCATION: CPT | Mod: GP,KX | Performed by: PHYSICAL THERAPIST

## 2019-12-03 ENCOUNTER — HOSPITAL ENCOUNTER (OUTPATIENT)
Dept: OCCUPATIONAL THERAPY | Facility: CLINIC | Age: 64
Setting detail: THERAPIES SERIES
End: 2019-12-03
Attending: PHYSICAL MEDICINE & REHABILITATION
Payer: MEDICARE

## 2019-12-03 ENCOUNTER — HOSPITAL ENCOUNTER (OUTPATIENT)
Dept: PHYSICAL THERAPY | Facility: CLINIC | Age: 64
Setting detail: THERAPIES SERIES
End: 2019-12-03
Attending: PHYSICAL MEDICINE & REHABILITATION
Payer: MEDICARE

## 2019-12-03 PROCEDURE — 97140 MANUAL THERAPY 1/> REGIONS: CPT | Mod: GO,KX | Performed by: OCCUPATIONAL THERAPIST

## 2019-12-03 PROCEDURE — 97110 THERAPEUTIC EXERCISES: CPT | Mod: GP,KX | Performed by: PHYSICAL THERAPIST

## 2019-12-03 PROCEDURE — 97140 MANUAL THERAPY 1/> REGIONS: CPT | Mod: GP,KX | Performed by: PHYSICAL THERAPIST

## 2019-12-03 PROCEDURE — 97112 NEUROMUSCULAR REEDUCATION: CPT | Mod: GP,KX | Performed by: PHYSICAL THERAPIST

## 2019-12-03 NOTE — PROGRESS NOTES
Outpatient Occupational Therapy Progress Note     Patient: Luis Trinidad  : 1955    Beginning/End Dates of Reporting Period:  19 to 12/3/2019    Referring Provider: Brigida Briseno MD    Therapy Diagnosis: Decreased ADLs/IADLs due to right hemiparesis and spasticity, ongoing sequelae  related to CVA with onset of 2015    Client Self Report: WIth the colder stromy weather pt w increased hypertonicity of R levator scapula, radiating down into pec minor and sub deltoid. AROM limited w EL FL to 100 and EL EX lacking 70 degrees, maximal resistance of OTR for PROM  to  lacking 10 degrees EL EX.  Had episode of neck spasming with pain 7/10 over the weekend, did not allow him to go walking in the community as he normally would on the weekend w his wife.  Pt has not had soft tissue mobilization with OT since 19 and has regressed (was having difficulty getting back into scheduling).      Objective Measurements:       Objective Measure:  UE recruitment /trunk AROM   Details: Opened TH to IF space between 1/2 in to  1 inch distances consistently through 5 sets of 10 grasping on thigh and releaseing between knees/bin on floor.       Objective Measure: R UE ROM and strength   Details: SH ABD improved to 110 AAROM  after botox, in supine, no catching after 3-4 reps AAROM.    WIth vibration in suine, 3/4 AROM triceps, (final 1/4 takes mild resistance, vibration to relelase by OTR. Moderate without vibration.  In sitting, takes maximal resistance to get 7/8 of arc.)           Objective Measure: Box and Blocks   Details: R 3 blocks using scapular motions and fingers tire of grasp and release after third block and unable to open to grasp the next block.  L 60.  Pt completed R in standing position.        Goals:     Goal Identifier R shoulder strength   Goal Description Patient to increase R shoulder flexiion AROM to 40 degrees for improved R UE function for ADL/IADLs (during dressing and grooming tasks,  carrying items, putting groceries and dishes away).   Target Date 12/18/19   Date Met      Progress: Pt is able to carry his tote bag , jacket with R UE.  Uses R hand to help pull open bag.   Gets to 90 degrees SH ABD 44 degrees [place], 20 degrees SH FL.       Goal Identifier R pinch strength   Goal Description Patient will demonstrate increased right hand pinch strength (both lateral and palmar) by 3# each for increased independence with ADL/IADLs such as opening containers, pull up pants, maintaining pinch to hold items.   Target Date 12/18/19   Date Met      Progress: Can   1/2 in to 2 inch objects to stabilize as he opens food containers and grooming supplies.       Goal Identifier R GM/FM coordination   Goal Description Patient to improve R GM/FM coordination skills for increased independence during ADL/IADL tasks (dressing, kitchen tasks, feeding self) by completing the Box and Blocks Test with R UE in standing with 10 blocks.   Target Date 12/18/19   Date Met      Progress: Imporivng slowly per gains made on Box and Blocks.     Goal Identifier R UE as gross assist   Goal Description Patient to demonstrate functional tasks (feeding self, wiping the counter/table, washing dishes, etc.) with 20% use of R UE as gross stabilizer or gross assist for increased ADL/IADL independence and closer return to prior level of function.(2/8 (eval date): using ~3% at home)   Target Date 12/18/19   Date Met      Progress: Steady  Progress per above.     Goal Identifier functional/normal movment patterns in R UE and trunk   Goal Description Patient to complete therapeutic task (simple kitchen tasks, laundry, etc.) with use of B UE's and no more than 2 cues in 5 minute time frame for correct body mechanics and for decreasing compensatory movements at the trunk and upper body for encouragement of normal movement patterns, increased R UE function and increased ADL/IADL independence.   Target Date 12/18/19   Date Met       Progress: Pt can steady his balance with R hand as L hand does dressing, retrieves items from the floor.       Goal Identifier visual skills   Goal Description Patient will demonstrate WFLs visual skills (including reaction time and visual scanning skills) for safe independent community mobility (crossing the street, driving, grocery shopping) by scoring WNLs on all modes of the Dynavision, Scancourse and other visual screens.   Target Date 12/18/19   Date Met      Progress: Driving is not main focus of therapy at this time, per complexity.  Pt mostly wanting to address his spasticity and regaining movement patterns.       Progress Toward Goals:   Progress this reporting period: Rogelio continues to benefit from seeing OT for addressing above goals, 2x/week and plans to continue for the next 12 weeks.  He presents with multiple deficits from diagnosis of L CVA (R UE high tone/spasticity, decreased R UE AROM/joint contractures, and limited motor patterns with compensatory movements for RUE, working toward incorporating functional use of R UE into daily tasks).  Patient was previously independent with all ADLs and IADLs. Even with strong compliance with daily stretchng HEP and medical intervention ( botox and baclofen), Rogelio continues to demonstrate difficulty with managing his tone spasticity, but has demonstrated motor recruitment improvements and at R hand/TH following botox.  With his numerous deficit areas and continued slow steady improvement, Rogelio continues to be appropriate for continued skilled occupational therapy services beyond the therapy cap to continue to increase his ADL/IADL independence for less dependence on others for all these tasks.        Plan: Continue therapy per current plan of care.    Discharge:   Not at this time.  Still appropriate for skilled OT, planning for 20+ weeks during skilled neuromuscular reeducation process.    Thank you for this thoughtful referral! If you have any questions,  please call me at  [phone].  Nice to share in this patient's care with you.     Sincerely,   Danyelle Evans, OTR/l

## 2019-12-03 NOTE — PROGRESS NOTES
[facility]      OUTPATIENT OCCUPATIONAL THERAPY  PLAN OF TREATMENT FOR OUTPATIENT REHABILITATION    Patient's Last Name, First Name, M.I.                YOB: 1955  Luis Trinidad                        Provider's Name  [facility] Medical Record No.  [id number]                                Onset Date: 11/29/17    Start of Care Date:2/8/2018   Type:     ___PT   _X_OT   ___SLP Medical Diagnosis:  hemiparesis of R side as late effect of stroke                       OT Diagnosis: decreased ADL/IADLs    # visits: 114   _________________________________________________________________________________  Plan of Treatment:  Continued emphasis on getting HEP program to manage his tone better ( via myofascial release/Instrument Assisted SOft Tissue Mobilization/IASTM Graston Technique, weightbearing, NMES, shoulder pulley kit, prolonged strches)  and help refine movement patterns to reduce compenstory patterns that can cotribute to pain/ROM loss if not addressed. We are applying these patterns to repetive exercises and tranferring them to home based 2 handed functional tasks ( laundry, washing dishes, carrying/put away groceries).          Frequency/Duration: 2x/week for 12 weeksGoals:        Goals:    Goal Identifier R shoulder strength   Goal Description Patient to increase R shoulder flexiion AROM to 40 degrees for improved R UE function for ADL/IADLs (during dressing and grooming tasks, carrying items, putting groceries and dishes away).   Target Date 03/01/20   Date Met      Progress:     Goal Identifier R pinch strength   Goal Description Patient will demonstrate increased right hand pinch strength (both lateral and palmar) by 3# each for increased independence with ADL/IADLs such as opening containers, pull up pants, maintaining pinch to hold items.   Target Date 03/01/20   Date  Met      Progress:     Goal Identifier R GM/FM coordination   Goal Description Patient to improve R GM/FM coordination skills for increased independence during ADL/IADL tasks (dressing, kitchen tasks, feeding self) by completing the Box and Blocks Test with R UE in standing with 10 blocks.   Target Date 03/01/20   Date Met      Progress:     Goal Identifier R UE as gross assist   Goal Description Patient to demonstrate functional tasks (feeding self, wiping the counter/table, washing dishes, etc.) with 20% use of R UE as gross stabilizer or gross assist for increased ADL/IADL independence and closer return to prior level of function.(2/8 (eval date): using ~3% at home)   Target Date 03/01/20   Date Met      Progress:     Goal Identifier functional/normal movment patterns in R UE and trunk   Goal Description Patient to complete therapeutic task (simple kitchen tasks, laundry, etc.) with use of B UE's and no more than 2 cues in 5 minute time frame for correct body mechanics and for decreasing compensatory movements at the trunk and upper body for encouragement of normal movement patterns, increased R UE function and increased ADL/IADL independence.   Target Date 03/01/20   Date Met      Progress:     Goal Identifier visual skills   Goal Description Patient will demonstrate WFLs visual skills (including reaction time and visual scanning skills) for safe independent community mobility (crossing the street, driving, grocery shopping) by scoring WNLs on all modes of the Dynavision, Scancourse and other visual screens.   Target Date 03/01/20   Date Met      Progress:       Progress Toward Goals:   Progress this reporting period: See attached progress note.  Certification date from 12/3/19 to 3/1/2020.    ARYAN Mathis          I CERTIFY THE NEED FOR THESE SERVICES FURNISHED UNDER        THIS PLAN OF TREATMENT AND WHILE UNDER MY CARE     (Physician co-signature of this document indicates review and certification of the  therapy plan).                Referring Provider: Brigida Briseno MD

## 2019-12-05 ENCOUNTER — HOSPITAL ENCOUNTER (OUTPATIENT)
Dept: PHYSICAL THERAPY | Facility: CLINIC | Age: 64
Setting detail: THERAPIES SERIES
End: 2019-12-05
Attending: PHYSICAL MEDICINE & REHABILITATION
Payer: MEDICARE

## 2019-12-05 ENCOUNTER — HOSPITAL ENCOUNTER (OUTPATIENT)
Dept: OCCUPATIONAL THERAPY | Facility: CLINIC | Age: 64
Setting detail: THERAPIES SERIES
End: 2019-12-05
Attending: PHYSICAL MEDICINE & REHABILITATION
Payer: MEDICARE

## 2019-12-05 PROCEDURE — 97112 NEUROMUSCULAR REEDUCATION: CPT | Mod: GP,KX | Performed by: PHYSICAL THERAPIST

## 2019-12-05 PROCEDURE — 97110 THERAPEUTIC EXERCISES: CPT | Mod: GO,KX | Performed by: OCCUPATIONAL THERAPIST

## 2019-12-05 PROCEDURE — 97535 SELF CARE MNGMENT TRAINING: CPT | Mod: GO,KX | Performed by: OCCUPATIONAL THERAPIST

## 2019-12-05 PROCEDURE — 97140 MANUAL THERAPY 1/> REGIONS: CPT | Mod: GP,KX | Performed by: PHYSICAL THERAPIST

## 2019-12-05 PROCEDURE — 97110 THERAPEUTIC EXERCISES: CPT | Mod: GP,KX | Performed by: PHYSICAL THERAPIST

## 2019-12-10 ENCOUNTER — HOSPITAL ENCOUNTER (OUTPATIENT)
Dept: PHYSICAL THERAPY | Facility: CLINIC | Age: 64
Setting detail: THERAPIES SERIES
End: 2019-12-10
Attending: PHYSICAL MEDICINE & REHABILITATION
Payer: MEDICARE

## 2019-12-10 ENCOUNTER — HOSPITAL ENCOUNTER (OUTPATIENT)
Dept: OCCUPATIONAL THERAPY | Facility: CLINIC | Age: 64
Setting detail: THERAPIES SERIES
End: 2019-12-10
Attending: PHYSICAL MEDICINE & REHABILITATION
Payer: MEDICARE

## 2019-12-10 PROCEDURE — 97110 THERAPEUTIC EXERCISES: CPT | Mod: GP,KX | Performed by: PHYSICAL THERAPIST

## 2019-12-10 PROCEDURE — 97110 THERAPEUTIC EXERCISES: CPT | Mod: GO,KX | Performed by: OCCUPATIONAL THERAPIST

## 2019-12-10 PROCEDURE — 97140 MANUAL THERAPY 1/> REGIONS: CPT | Mod: GO,KX | Performed by: OCCUPATIONAL THERAPIST

## 2019-12-10 PROCEDURE — 97112 NEUROMUSCULAR REEDUCATION: CPT | Mod: GP,KX | Performed by: PHYSICAL THERAPIST

## 2019-12-12 ENCOUNTER — HOSPITAL ENCOUNTER (OUTPATIENT)
Dept: PHYSICAL THERAPY | Facility: CLINIC | Age: 64
Setting detail: THERAPIES SERIES
End: 2019-12-12
Attending: PHYSICAL MEDICINE & REHABILITATION
Payer: MEDICARE

## 2019-12-12 ENCOUNTER — HOSPITAL ENCOUNTER (OUTPATIENT)
Dept: OCCUPATIONAL THERAPY | Facility: CLINIC | Age: 64
Setting detail: THERAPIES SERIES
End: 2019-12-12
Attending: PHYSICAL MEDICINE & REHABILITATION
Payer: MEDICARE

## 2019-12-12 PROCEDURE — 97140 MANUAL THERAPY 1/> REGIONS: CPT | Mod: GP,KX | Performed by: PHYSICAL THERAPIST

## 2019-12-12 PROCEDURE — 97116 GAIT TRAINING THERAPY: CPT | Mod: GP,KX | Performed by: PHYSICAL THERAPIST

## 2019-12-12 PROCEDURE — 97110 THERAPEUTIC EXERCISES: CPT | Mod: GP,KX | Performed by: PHYSICAL THERAPIST

## 2019-12-12 PROCEDURE — 97112 NEUROMUSCULAR REEDUCATION: CPT | Mod: GP,KX | Performed by: PHYSICAL THERAPIST

## 2019-12-12 PROCEDURE — 97110 THERAPEUTIC EXERCISES: CPT | Mod: GO,KX | Performed by: OCCUPATIONAL THERAPIST

## 2019-12-17 ENCOUNTER — HOSPITAL ENCOUNTER (OUTPATIENT)
Dept: OCCUPATIONAL THERAPY | Facility: CLINIC | Age: 64
Setting detail: THERAPIES SERIES
End: 2019-12-17
Attending: PHYSICAL MEDICINE & REHABILITATION
Payer: MEDICARE

## 2019-12-17 ENCOUNTER — HOSPITAL ENCOUNTER (OUTPATIENT)
Dept: PHYSICAL THERAPY | Facility: CLINIC | Age: 64
Setting detail: THERAPIES SERIES
End: 2019-12-17
Attending: PHYSICAL MEDICINE & REHABILITATION
Payer: MEDICARE

## 2019-12-17 PROCEDURE — 97140 MANUAL THERAPY 1/> REGIONS: CPT | Mod: GO,KX | Performed by: OCCUPATIONAL THERAPIST

## 2019-12-17 PROCEDURE — 97116 GAIT TRAINING THERAPY: CPT | Mod: GP,KX | Performed by: PHYSICAL THERAPIST

## 2019-12-17 PROCEDURE — 97112 NEUROMUSCULAR REEDUCATION: CPT | Mod: GP,KX | Performed by: PHYSICAL THERAPIST

## 2019-12-17 PROCEDURE — 97112 NEUROMUSCULAR REEDUCATION: CPT | Mod: GO,KX | Performed by: OCCUPATIONAL THERAPIST

## 2019-12-17 PROCEDURE — 97110 THERAPEUTIC EXERCISES: CPT | Mod: GO,KX | Performed by: OCCUPATIONAL THERAPIST

## 2019-12-17 NOTE — PROGRESS NOTES
10th visit note     12/17/19 0800   Signing Clinician's Name / Credentials   Signing clinician's name / credentials Danyelle Young, PT   Session Number   Session Number 10/10   Authorization status KX modifier needed - Pt requires continued PT beyond the therapy cap to maximize safety and reduce risk for falls due to significant spasticity and impaired motor patterns related to his right hemiparesis.    Progress Note/Recertification   Recertification Due Date 01/28/20   Goal 1   Goal Identifier 1 - Gait pattern/safety   Goal Description Rogelio will be able to achieve proper midstance to terminal stance alignment on right LE and transition into pre and initial swing with proper clearance of right foot for greater efficiency and safety with gait at home and in the community.   Target Date 01/28/20   Goal 2   Goal Identifier 2- Gait speed   Goal Description Rogelio will increase his gait speed on the 25 foot timed walk to 9 sec or less without use of an assistive device  to indicate reduced level of gait impairment and improved community mobility.   Target Date 01/28/20   Goal 3   Goal Identifier 3- stairs   Goal Description Pt will be able to navigate up his stairs with one rail in a reciprocal pattern with minimal circumduction of right LE in order for more efficient and safe access to all levels of his home.   Target Date 01/28/20   Goal 5   Goal Identifier 5 - home program   Goal Description Rogelio will be independent with a home activity program to address his impairments and self manage his recovery.    Target Date 01/28/20   Subjective Report   Subjective Report States he was sore after last session and indicates to the trunk. He feels that the trunk is loosened up some.    Objective Measure 2   Objective Measure 25 foot walk   Details 13.84 no device, 17 steps  no cues given for faster paced gait.    Vitals Signs   Heart Rate 100   Blood Pressure 118/76   Vital Signs Comments L UE larger manual cuff   Therapeutic  Procedure/exercise   Therapeutic Procedures: strength, endurance, ROM, flexibillity minutes (31165) 6   Skilled Intervention assessment   Patient Response demo proper performance.      Treatment Detail demonstrated the trunk stretches for carry over from last session  able to demonstrate correctly slowing down, R hand on forearm wtih stretch to shira L for greater stretch   Neuromuscular Re-education   Neuromuscular re-ed of mvmt, balance, coord, kinesthetic sense, posture, proprioception minutes (34629) 30   Skilled Intervention facilitation, demonstration.  Cues for relaxing , dampening activation to allow a more fluid movement.    Patient Response excess thoracic extension with movement of COM forward and with R hand on the rail limited by overactive anterior shoulder/pectorals limiting forward motion upper trunk.    Treatment Detail stance stabiliy at stairs L foot on second 4 inch stap UE support R on rail, forward movement through femoral acetabular/forward pelvis rather than fulcrum through thoracic spine.  Progress to continue forward with L hip and knee extension (initial component of stepping up stairs) not working about the R leg and keeping trunk going forward but not activated --working on mid to end stance L LE.    Progress to swing phase on the R with advanding up step therapist assist and faciliation continued forward lean COM over the L LE and then assist hip flexion facilitation light compression femur into acetabulum and relaxed swing Repeated x 10 without full step back onto the floor.    STAIRS - up and down 6 inch stairs with 1-2 handrail support descending difficulty keeping hand contact with the rail on the R side , circumduction on the R to advance the R up the step as difficulty with knee release/eccentric control of quads to allow knee flexion.   UP and down the stairs forward going up , backwards going down working on proper sequencing and movement tunk, pelvis and LE's  Standing timing and  sequencing of proper movement and pt performing hip flex/ext with knee flexed and on therapist thigh, assist for proper movement and gradually not needing assist with hip extension.   Knee flex and ext working on eccentric control/dampening quad activaiton and activation hamstrings with mod to min assist needed for proper movement.    Progress as session progressed pt with less overactive trunk and R side, better movement pattern    Gait Training   Gait Training Minutes, includes stair climbing (79089) 8   Skilled Intervention facilitation upright trunk R and forward acetabulum over femur (hand placement upper ilium and forward down pressure) with stance on the L and faciliation faster stance phase L   Patient Response trunk a little more guarded/overactive with trunk extension with faster paced gait   Treatment Detail gait without an AD working on faster paced gait and continuation of mid to end stance on the L.    150 feet.   25 foot walk completed.    Progress progressing wtih gait pattern, needing repeated cues for not stopping mid stance on the L   Manual Therapy   Manual Therapy: Mobilization, MFR, MLD, friction massage minutes (73256) 5   Treatment Detail manual work to pectorals, scapula , R upper quadrant and cues for relaxation /dampening activation in this area.    Education   Learner Patient   Readiness Acceptance   Method Explanation;Demonstration   Response Verbalizes Understanding;Demonstrates Understanding;Needs Reinforcement   Education Comments gait pattern, movement   Plan   Plan for next session monitor vitals. right term stance stability. quad / hip flexor stretching. continue stepper. functional stretching of trunk , UE and LE, right LE open chain/modified chain activities for timing/coordination. cardiovascular conditioning.    Comments   Comments PN covers dates 9/5/19 to 12/17/19 see daily notes for specifics.  Rogelio continues to demonstrate improvement in his motor function and control and  improved gait pattern.  He has not been seen by this writer for quite some time and I have noticed great improvements. He continues to be limited by proper timing and seuqencing of activation/dampening of muscle activation.  In the last two sessions he has demonstrated improved movement pattern  and mobility in his trunk, improved sequencing hip and knee motion with cues and assist.  Barriers include decreased motor coordinatio and overfirring hip flexors, quads and decreased firing hamstrings, overactive pectorals.   He is appropraite for continued skilled PT intervention and I anticipate he will continue to make improvement with skilled Intervention from Neuro PT.    Total Session Time   Timed Code Treatment Minutes 49   Total Treatment Time (sum of timed and untimed services) 49

## 2019-12-19 ENCOUNTER — OFFICE VISIT (OUTPATIENT)
Dept: PHYSICAL MEDICINE AND REHAB | Facility: CLINIC | Age: 64
End: 2019-12-19
Payer: MEDICARE

## 2019-12-19 ENCOUNTER — HOSPITAL ENCOUNTER (OUTPATIENT)
Dept: OCCUPATIONAL THERAPY | Facility: CLINIC | Age: 64
Setting detail: THERAPIES SERIES
End: 2019-12-19
Attending: PHYSICAL MEDICINE & REHABILITATION
Payer: MEDICARE

## 2019-12-19 ENCOUNTER — HOSPITAL ENCOUNTER (OUTPATIENT)
Dept: PHYSICAL THERAPY | Facility: CLINIC | Age: 64
Setting detail: THERAPIES SERIES
End: 2019-12-19
Attending: PHYSICAL MEDICINE & REHABILITATION
Payer: MEDICARE

## 2019-12-19 DIAGNOSIS — R25.2 SPASTICITY: ICD-10-CM

## 2019-12-19 DIAGNOSIS — I63.81 LACUNAR INFARCT, ACUTE (H): ICD-10-CM

## 2019-12-19 DIAGNOSIS — G81.11 RIGHT SPASTIC HEMIPARESIS (H): Primary | ICD-10-CM

## 2019-12-19 PROCEDURE — 97140 MANUAL THERAPY 1/> REGIONS: CPT | Mod: GP | Performed by: PHYSICAL THERAPIST

## 2019-12-19 PROCEDURE — 97112 NEUROMUSCULAR REEDUCATION: CPT | Mod: GP,KX | Performed by: PHYSICAL THERAPIST

## 2019-12-19 PROCEDURE — 97110 THERAPEUTIC EXERCISES: CPT | Mod: GO,KX | Performed by: OCCUPATIONAL THERAPIST

## 2019-12-19 PROCEDURE — 97112 NEUROMUSCULAR REEDUCATION: CPT | Mod: GO,KX | Performed by: OCCUPATIONAL THERAPIST

## 2019-12-19 PROCEDURE — 97110 THERAPEUTIC EXERCISES: CPT | Mod: GP | Performed by: PHYSICAL THERAPIST

## 2019-12-19 NOTE — PROGRESS NOTES
PM&R Clinic & Procedure Note:    Luis Trinidad is 64-year-old male with a history of stroke in Nov 2016 resulting in residual right spastic hemiparesis.  Rogelio was last seen on 9/12/19 at which time he received 400 units of botox at that time to his right trunk and right arm.     Today he reports good results with the previous series of injections; improvement of spasticity, function and ability to do his exercises. He noticed benefits about 7 days after the injection which lasted about 11 weeks (was 8-10 weeks last time) with gradual decline over the past 3-4 weeks. Denies any side effects.       No hospitalization since last visit. No falls. Continues PT and OT x2/week as well as his HEP daily. Takes baclofen 30mg tid; had difficulty with drowsiness when he was on 40mg tid.    He noted that he has difficulty with falling and staying asleep, quality of his sleep at nigh time, fatigue in the morning and throughout the day. His brother also reported loud snoring. Sent a referral to sleep medicine clinic at last visit which is pending.       Physical exam:  There were no vitals taken for this visit. Patient was 25 minutes late for his appointment and no vitals were done.     Mostly unchanged   Alert pleasant bright affect   Increased tone noted in RUE and along right pec and right trunk.   AROM: shoulder abduction 90 degree, elbow extension approx 160, wrist remains in neutral, able to extend fingers, but occassionally uses left hand for assistance.   Isi: 3/4 right upper extremity flexor muscles, 1-2/4 extensor  right hemispastic gait noted      Assessment and recommendations:    Ischemic stroke at the lateral left thalamus and the posterior limb of the left internal capsule 11/2015    Residual right spastic hemiparesis     HTN     HLD    Questionable SAMMY      1. Work-up: none today  2. Therapy/equipment/braces: continue therapies and HEP regularly   3. Medications: no change in meds today; on baclofen 30  tid  4. Interventions: chemo-denervation today; procedure note below. No change in the total dose, but changed the sites of injections.   5. Referral / follow up with other providers: to sleep medicine clinic for more evaluation of possible SAMMY as possible risk factor for stroke   6. Follow up: 12 weeks     Procedure:   Chemodenervation to right chest and RUE utilizing botulinum toxin.  Began with formal consent which he signed. Had formal time-out prior to procedure.  400 U of Botox lot /C3, expiration 05/2022 , Botox NDC 6822-6264-85 was utilized. Diluted with normal saline in a 100 U:1mL ratio, total of 4 mL.  All areas were cleansed with chloroprep prior to injecting. EMG guidance was utilized to identify most active motor units while avoiding surrounding structures.     The following muscles were then injected, two body areas     Right trunk:  1. Right Pectoralis Major: 50 units divided in 1 site     Right arm  1. Biceps 100 units  2. FDS 50 units  3. FDP 50 units  4. FCR 50 units  5. FCU 50 units  6. Pronator Teres 50 units    Procedure was tolerated well, he leaves clinic feeling well.    Brigida Briseno MD  Physical Medicine & Rehabilitation

## 2019-12-19 NOTE — LETTER
12/19/2019       RE: Luis Trinidad  [id number] [facility] [address]     Dear Colleague,    Thank you for referring your patient, Luis Trinidad, to the [facility] PHYSICAL MEDICINE AND REHABILITATION at [facility]. Please see a copy of my visit note below.    PM&R Clinic & Procedure Note:    Luis Trinidad is 64-year-old male with a history of stroke in Nov 2016 resulting in residual right spastic hemiparesis.  Rogelio was last seen on 9/12/19 at which time he received 400 units of botox at that time to his right trunk and right arm.     Today he reports good results with the previous series of injections; improvement of spasticity, function and ability to do his exercises. He noticed benefits about 7 days after the injection which lasted about 11 weeks (was 8-10 weeks last time) with gradual decline over the past 3-4 weeks. Denies any side effects.       No hospitalization since last visit. No falls. Continues PT and OT x2/week as well as his HEP daily. Takes baclofen 30mg tid; had difficulty with drowsiness when he was on 40mg tid.    He noted that he has difficulty with falling and staying asleep, quality of his sleep at nigh time, fatigue in the morning and throughout the day. His brother also reported loud snoring. Sent a referral to sleep medicine clinic at last visit which is pending.       Physical exam:  There were no vitals taken for this visit. Patient was 25 minutes late for his appointment and no vitals were done.     Mostly unchanged   Alert pleasant bright affect   Increased tone noted in RUE and along right pec and right trunk.   AROM: shoulder abduction 90 degree, elbow extension approx 160, wrist remains in neutral, able to extend fingers, but occassionally uses left hand for assistance.   Isi: 3/4 right upper extremity flexor muscles, 1-2/4 extensor  right hemispastic gait noted    Assessment and recommendations:    Ischemic stroke at the lateral left  thalamus and the posterior limb of the left internal capsule 11/2015    Residual right spastic hemiparesis     HTN     HLD    Questionable SAMMY      1. Work-up: none today  2. Therapy/equipment/braces: continue therapies and HEP regularly   3. Medications: no change in meds today; on baclofen 30 tid  4. Interventions: chemo-denervation today; procedure note below. No change in the total dose, but changed the sites of injections.   5. Referral / follow up with other providers: to sleep medicine clinic for more evaluation of possible SAMMY as possible risk factor for stroke   6. Follow up: 12 weeks     Procedure:   Chemodenervation to right chest and RUE utilizing botulinum toxin.  Began with formal consent which he signed. Had formal time-out prior to procedure.  400 U of Botox lot /C3, expiration 05/2022 , Botox NDC 9155-6354-58 was utilized. Diluted with normal saline in a 100 U:1mL ratio, total of 4 mL.  All areas were cleansed with chloroprep prior to injecting. EMG guidance was utilized to identify most active motor units while avoiding surrounding structures.     The following muscles were then injected, two body areas     Right trunk:  1. Right Pectoralis Major: 50 units divided in 1 site     Right arm  1. Biceps 100 units  2. FDS 50 units  3. FDP 50 units  4. FCR 50 units  5. FCU 50 units  6. Pronator Teres 50 units    Procedure was tolerated well, he leaves clinic feeling well.    Brigida Briseno MD  Physical Medicine & Rehabilitation

## 2019-12-24 ENCOUNTER — HOSPITAL ENCOUNTER (OUTPATIENT)
Dept: OCCUPATIONAL THERAPY | Facility: CLINIC | Age: 64
Setting detail: THERAPIES SERIES
End: 2019-12-24
Attending: PHYSICAL MEDICINE & REHABILITATION
Payer: MEDICARE

## 2019-12-24 PROCEDURE — 97140 MANUAL THERAPY 1/> REGIONS: CPT | Mod: GO,KX | Performed by: OCCUPATIONAL THERAPIST

## 2019-12-24 PROCEDURE — 97110 THERAPEUTIC EXERCISES: CPT | Mod: GO,KX | Performed by: OCCUPATIONAL THERAPIST

## 2019-12-24 PROCEDURE — 97112 NEUROMUSCULAR REEDUCATION: CPT | Mod: GO,KX | Performed by: OCCUPATIONAL THERAPIST

## 2019-12-26 ENCOUNTER — HOSPITAL ENCOUNTER (OUTPATIENT)
Dept: OCCUPATIONAL THERAPY | Facility: CLINIC | Age: 64
Setting detail: THERAPIES SERIES
End: 2019-12-26
Attending: PHYSICAL MEDICINE & REHABILITATION
Payer: MEDICARE

## 2019-12-26 ENCOUNTER — HOSPITAL ENCOUNTER (OUTPATIENT)
Dept: PHYSICAL THERAPY | Facility: CLINIC | Age: 64
Setting detail: THERAPIES SERIES
End: 2019-12-26
Attending: PHYSICAL MEDICINE & REHABILITATION
Payer: MEDICARE

## 2019-12-26 PROCEDURE — 97112 NEUROMUSCULAR REEDUCATION: CPT | Mod: GO,KX | Performed by: OCCUPATIONAL THERAPIST

## 2019-12-26 PROCEDURE — 97110 THERAPEUTIC EXERCISES: CPT | Mod: GO | Performed by: OCCUPATIONAL THERAPIST

## 2019-12-26 PROCEDURE — 97116 GAIT TRAINING THERAPY: CPT | Mod: GP,KX | Performed by: PHYSICAL THERAPIST

## 2019-12-26 PROCEDURE — 97112 NEUROMUSCULAR REEDUCATION: CPT | Mod: GP,KX | Performed by: PHYSICAL THERAPIST

## 2019-12-26 PROCEDURE — 97110 THERAPEUTIC EXERCISES: CPT | Mod: GP,KX | Performed by: PHYSICAL THERAPIST

## 2020-01-02 ENCOUNTER — HOSPITAL ENCOUNTER (OUTPATIENT)
Dept: PHYSICAL THERAPY | Facility: CLINIC | Age: 65
Setting detail: THERAPIES SERIES
End: 2020-01-02
Attending: PHYSICAL MEDICINE & REHABILITATION
Payer: MEDICARE

## 2020-01-02 PROCEDURE — 97110 THERAPEUTIC EXERCISES: CPT | Mod: GP | Performed by: PHYSICAL THERAPIST

## 2020-01-02 PROCEDURE — 97112 NEUROMUSCULAR REEDUCATION: CPT | Mod: GP | Performed by: PHYSICAL THERAPIST

## 2020-01-07 ENCOUNTER — HOSPITAL ENCOUNTER (OUTPATIENT)
Dept: PHYSICAL THERAPY | Facility: CLINIC | Age: 65
Setting detail: THERAPIES SERIES
End: 2020-01-07
Attending: PHYSICAL MEDICINE & REHABILITATION
Payer: MEDICARE

## 2020-01-07 ENCOUNTER — HOSPITAL ENCOUNTER (OUTPATIENT)
Dept: OCCUPATIONAL THERAPY | Facility: CLINIC | Age: 65
Setting detail: THERAPIES SERIES
End: 2020-01-07
Attending: PHYSICAL MEDICINE & REHABILITATION
Payer: MEDICARE

## 2020-01-07 PROCEDURE — 97140 MANUAL THERAPY 1/> REGIONS: CPT | Mod: GO | Performed by: OCCUPATIONAL THERAPIST

## 2020-01-07 PROCEDURE — 97112 NEUROMUSCULAR REEDUCATION: CPT | Mod: GP | Performed by: PHYSICAL THERAPIST

## 2020-01-07 PROCEDURE — 97116 GAIT TRAINING THERAPY: CPT | Mod: GP | Performed by: PHYSICAL THERAPIST

## 2020-01-07 PROCEDURE — 97110 THERAPEUTIC EXERCISES: CPT | Mod: GO | Performed by: OCCUPATIONAL THERAPIST

## 2020-01-07 PROCEDURE — 97112 NEUROMUSCULAR REEDUCATION: CPT | Mod: GO | Performed by: OCCUPATIONAL THERAPIST

## 2020-01-09 ENCOUNTER — HOSPITAL ENCOUNTER (OUTPATIENT)
Dept: PHYSICAL THERAPY | Facility: CLINIC | Age: 65
Setting detail: THERAPIES SERIES
End: 2020-01-09
Attending: PHYSICAL MEDICINE & REHABILITATION
Payer: MEDICARE

## 2020-01-09 ENCOUNTER — HOSPITAL ENCOUNTER (OUTPATIENT)
Dept: OCCUPATIONAL THERAPY | Facility: CLINIC | Age: 65
Setting detail: THERAPIES SERIES
End: 2020-01-09
Attending: PHYSICAL MEDICINE & REHABILITATION
Payer: MEDICARE

## 2020-01-09 PROCEDURE — 97112 NEUROMUSCULAR REEDUCATION: CPT | Mod: GP | Performed by: PHYSICAL THERAPIST

## 2020-01-09 PROCEDURE — 97110 THERAPEUTIC EXERCISES: CPT | Mod: GO | Performed by: OCCUPATIONAL THERAPIST

## 2020-01-09 PROCEDURE — 97110 THERAPEUTIC EXERCISES: CPT | Mod: GP | Performed by: PHYSICAL THERAPIST

## 2020-01-21 ENCOUNTER — HOSPITAL ENCOUNTER (OUTPATIENT)
Dept: OCCUPATIONAL THERAPY | Facility: CLINIC | Age: 65
Setting detail: THERAPIES SERIES
End: 2020-01-21
Attending: PHYSICAL MEDICINE & REHABILITATION
Payer: MEDICARE

## 2020-01-21 ENCOUNTER — HOSPITAL ENCOUNTER (OUTPATIENT)
Dept: PHYSICAL THERAPY | Facility: CLINIC | Age: 65
Setting detail: THERAPIES SERIES
End: 2020-01-21
Attending: PHYSICAL MEDICINE & REHABILITATION
Payer: MEDICARE

## 2020-01-21 PROCEDURE — 97140 MANUAL THERAPY 1/> REGIONS: CPT | Mod: GO | Performed by: OCCUPATIONAL THERAPIST

## 2020-01-21 PROCEDURE — 97112 NEUROMUSCULAR REEDUCATION: CPT | Mod: GO | Performed by: OCCUPATIONAL THERAPIST

## 2020-01-21 PROCEDURE — 97116 GAIT TRAINING THERAPY: CPT | Mod: GP | Performed by: PHYSICAL THERAPIST

## 2020-01-21 PROCEDURE — 97112 NEUROMUSCULAR REEDUCATION: CPT | Mod: GP | Performed by: PHYSICAL THERAPIST

## 2020-01-21 PROCEDURE — 97110 THERAPEUTIC EXERCISES: CPT | Mod: GO | Performed by: OCCUPATIONAL THERAPIST

## 2020-01-23 ENCOUNTER — HOSPITAL ENCOUNTER (OUTPATIENT)
Dept: PHYSICAL THERAPY | Facility: CLINIC | Age: 65
Setting detail: THERAPIES SERIES
End: 2020-01-23
Attending: PHYSICAL MEDICINE & REHABILITATION
Payer: MEDICARE

## 2020-01-23 ENCOUNTER — TELEPHONE (OUTPATIENT)
Dept: PHYSICAL MEDICINE AND REHAB | Facility: CLINIC | Age: 65
End: 2020-01-23

## 2020-01-23 ENCOUNTER — HOSPITAL ENCOUNTER (OUTPATIENT)
Dept: OCCUPATIONAL THERAPY | Facility: CLINIC | Age: 65
Setting detail: THERAPIES SERIES
End: 2020-01-23
Attending: PHYSICAL MEDICINE & REHABILITATION
Payer: MEDICARE

## 2020-01-23 PROCEDURE — 97112 NEUROMUSCULAR REEDUCATION: CPT | Mod: GP | Performed by: PHYSICAL THERAPIST

## 2020-01-23 PROCEDURE — 97110 THERAPEUTIC EXERCISES: CPT | Mod: GP | Performed by: PHYSICAL THERAPIST

## 2020-01-23 PROCEDURE — 97116 GAIT TRAINING THERAPY: CPT | Mod: GP | Performed by: PHYSICAL THERAPIST

## 2020-01-23 PROCEDURE — 97110 THERAPEUTIC EXERCISES: CPT | Mod: GO | Performed by: OCCUPATIONAL THERAPIST

## 2020-01-28 ENCOUNTER — HOSPITAL ENCOUNTER (OUTPATIENT)
Dept: PHYSICAL THERAPY | Facility: CLINIC | Age: 65
Setting detail: THERAPIES SERIES
End: 2020-01-28
Attending: PHYSICAL MEDICINE & REHABILITATION
Payer: MEDICARE

## 2020-01-28 ENCOUNTER — HOSPITAL ENCOUNTER (OUTPATIENT)
Dept: OCCUPATIONAL THERAPY | Facility: CLINIC | Age: 65
Setting detail: THERAPIES SERIES
End: 2020-01-28
Attending: PHYSICAL MEDICINE & REHABILITATION
Payer: MEDICARE

## 2020-01-28 PROCEDURE — 97110 THERAPEUTIC EXERCISES: CPT | Mod: GP | Performed by: PHYSICAL THERAPIST

## 2020-01-28 PROCEDURE — 97116 GAIT TRAINING THERAPY: CPT | Mod: GP | Performed by: PHYSICAL THERAPIST

## 2020-01-28 PROCEDURE — 97112 NEUROMUSCULAR REEDUCATION: CPT | Mod: GP | Performed by: PHYSICAL THERAPIST

## 2020-01-28 PROCEDURE — 97112 NEUROMUSCULAR REEDUCATION: CPT | Mod: GO | Performed by: OCCUPATIONAL THERAPIST

## 2020-01-28 PROCEDURE — 97140 MANUAL THERAPY 1/> REGIONS: CPT | Mod: GO | Performed by: OCCUPATIONAL THERAPIST

## 2020-01-28 NOTE — PROGRESS NOTES
--OCCUPATIONAL THERAPY OP PROGRESS NOTE, 10th VISIT--       01/28/20 1600   Signing Clinician's Name / Credentials   Signing clinician's name / credentials ARYAN Mathis/margarito   Session Number   Session Number 10/ 10 (MEDICARE as of 5/1/18)   Additional Session Number 122   Progress/Recertification   Recertification Due 03/01/20   Adult OT Eval Goals   OT Eval Goals (Adult) 1;2;3    OT Goal 1   Goal Identifier R shoulder strength   Goal Description Patient to increase R shoulder flexiion AROM to 40 degrees for improved R UE function for ADL/IADLs (during dressing and grooming tasks, carrying items, putting groceries and dishes away).   Target Date 03/01/20    OT Goal 2   Goal Identifier R pinch strength   Goal Description Patient will demonstrate increased right hand pinch strength (both lateral and palmar) by 3# each for increased independence with ADL/IADLs such as opening containers, pull up pants, maintaining pinch to hold items.   Target Date 03/01/20    OT Goal 3   Goal Identifier R GM/FM coordination   Goal Description Patient to improve R GM/FM coordination skills for increased independence during ADL/IADL tasks (dressing, kitchen tasks, feeding self) by completing the Box and Blocks Test with R UE in standing with 10 blocks.   Target Date 03/01/20   OT Goal 4   Goal Identifier R UE as gross assist   Goal Description Patient to demonstrate functional tasks (feeding self, wiping the counter/table, washing dishes, etc.) with 20% use of R UE as gross stabilizer or gross assist for increased ADL/IADL independence and closer return to prior level of function.  (2/8 (eval date): using ~3% at home)   Target Date 03/01/20   OT Goal 5   Goal Identifier functional/normal movment patterns in R UE and trunk   Goal Description Patient to complete therapeutic task (simple kitchen tasks, laundry, etc.) with use of B UE's and no more than 2 cues in 5 minute time frame for correct body mechanics and for decreasing  compensatory movements at the trunk and upper body for encouragement of normal movement patterns, increased R UE function and increased ADL/IADL independence.   Target Date 03/01/20    OT Goal 6   Goal Identifier visual skills   Goal Description Patient will demonstrate WFLs visual skills (including reaction time and visual scanning skills) for safe independent community mobility (crossing the street, driving, grocery shopping) by scoring WNLs on all modes of the Dynavision, Scancourse and other visual screens.   Target Date 03/01/20   Subjective Report   Subjective Report Pt reports some R leg soreness on the Right due to excessive walking while on his trip.  Pt reports he and his wife missed their first two flights [facility]. Pt likes how the warm weather made his joints feel looser.    Objective Measure 1   Objective Measure  Strength   Details R 32# L101#  WNL on L, impaired on R compared to norms for age and gender but improved on R since last testing   Objective Measure 2   Objective Measure Pinch Strength    Details Key pinch R 15# L 28 #  Og pinch R 9# L 19 #   Objective Measure 3   Objective Measure  UE recruitment /trunk AROM   Details While in supine, SH FL to 90, EL EX to 165, pt able to motor plan  opening hand to100%, incuding TH to  80 EX, with repetions x 12, lessens to  60%.  Also able to do with SH ABD to 45 dgrees, pornated forearm and WR FL to 60 degrees    Objective Measure 4   Objective Measure R UE ROM and strength   Objective Measure 6   Objective Measure Box and Blocks   Neuromuscular Re-education   Neuromuscular Re-ed Minutes (17405) 25   Skilled Intervention NMR of RU E movments to support functional UE use durnig ADL.     Patient Response Newly able to open hand while in supine.   Treatment Detail   NMR in alternating arc patterns-- In supine, x 12-15 reps each to fatigue.  While in supine, 1) SH FL to 90, EL EX to 165 with mod support of OTR under EL) . Pt able  to motor plan  opening hand to 100%, incuding TH to  80 EX, with repetitons x 12, lessens to  60%.  Alternated with grasp/pull/EL FL/Scap Retraction to 30 degrees SH FL with moderate resistance.  2)Also able to do hand opening with SH ABD to 45 dgrees, pronated forearm and WR FL to 60 degrees   Able to touch to  collar bone with mod support of OTR under forearm .  )   Therapeutic Procedure/Exercise   Therapeutic Procedure: strength, endurance, ROM, flexibillity minutes (85485) 5   Skilled Intervention stretching   Treatment Detail inital warm up stretches of SH FL, ABD, ER, EL EX, forearm supination, WR EX with finger EX.   Manual Therapy   Manual Therapy Minutes (58333) 12   Skilled Intervention Prolonged stretches, IASTM, MFR for R UE funcational use patterns   Patient Response Pt better able to rotate trunk while walking, reaches with reater ease overhead.     Treatment Detail Per mm tone and fascial thickness, Rogelio has difficulty getting R UE  SH FL, ER, EL EX to help with swing through of gait, repositioning during dressing.  Has dense myofascial  thickness and hypertonic at anterior ribs T4-T8, much less at T 8-10 rib and paraspinals.  Tone/thickness at levator, UT,pec minor, posterior capsule/lat deltoid, biceps (mod springy, lacking 45 degrees. Lower trap and infraspintus, teres minor are restricted in a long position due to UT/levator tightness, medial rotation of humerus.  OTR cont w instrument assisted soft tissue mobilization (IASTM) using  tissue mobilization tool (larger bone Graston type  tool with min to moderate pressure in longitudinal patterns at origin, insert and muscle bellies, then cross fiber in fanning patterns  from distal to proximal and proximal to distal patterns, and around radius of bony prominences at  R  paraspinal 6-10 R flank, ant ribs4-8, diaphragmatic plane with active flossing ex completed by pt during IASTM. Also applied to L paraspinal, lower trap. Passive prolonged stretch at  levator spapula. OTR stabilizes surrounding skin/removing tissue slack to improve effectiveness.  Applied AAROM prolonged stretch to  pelvis rotation , while stabilizing ribs.     Plan   Homework rht to help ADL log-ADL list- work towards: Holding a spoon/knife, stabilizing plate on table with some, moving a coffee mug with both hands from chest to chin/mouth donning socks with 2 hands, carrying a plate with 2 hands managing light switches with elbow flexed greater than 90 , and using a zipper from the bottom to midway/top.   Home program  H ABD stretch  w towel at elbow/supine to help increase EL EX, carry bag in R forearm, estim to R wrist/digit extensors, pulley system for R shoulder (flexion and abduction)   Plan for next session Triceps with vibratio, TH grasp/release,  Prone Scap retraction, then repeat in sitting for NMR carry over. Check SH  ER at rib and 90 SH ABD stretch w cane and IASTM to pec minor, scap to support better position w walking (try belt with forearm loop?), NMR with vibration of EL FL/EX in supine after IASTM, Circular TH pathways with grasp/release, Cont NMES to work on  EL FL, EX from -90 to end range w push/pull, H ABD/ HADD planes.  Cont NMES per above/ black in mid  ex mm bulk, dorsal snuff box red combine with grasp/release tasks; , repeat/pinch.  NMR of TH ext/grasp/release, f/u on ball stretch, ex rotation while seated. hammer turns for supination, check tone/movement patterns to prep for 11/29 botox, f/u on pole stretch for trunk, IASTM SH and trunk/UE traction w pec release,,sidelying vs shoulder pulley kit, recheck /pinch post botox, NMES hand/wr ,  **find old splint vs order  new splint (he hasn't been able to locate at home as of 8/2); re-test R  ~8/10 to see effect of botox (had on 8/1); 2 handed lifting carrying, transferring tasks ( food containers, bags, larger dishes), Cont w his estim, grasp release patterns with unit in place, issue diagrams per pictures taken.  practice setting up and take down of shoulder pulley; ~7/25/18: trial of SH IR/add w pulley, wt bearing at kitchen counter (prone/4 pt vs forerm WB on table w shin height reaching w forward reach), ,IADL box, have him bring in dowel for  HEP if he'd like it adapted with elastic handle ( instructions in chart),  stretches with pole, and table stretches/ADL log. do full review of his HEP (see previous course of OT for past HEP) and modify/upgrade (will especially need upgrade for R hand with improved function/strength); neuro re-ed. with much focus on decreasing tone (trial prone on elbows, WB in 4 point, etc.); check splint - have him bring in (he hasn't been wearing, using a ball in hand at night);  R UE as gross stabilizer and gross assist; LATER: IADL eval and assist with adapted driving evaluation as appropriate; consider vocational rehab referral for return to work if appropriate or volunteer opportunities.  Consider driving/[employer] and [employer] ~ 10/24/18. *   Total Session Time   Timed Code Treatment Minutes 42   Total Treatment Time (sum of timed and untimed services) 42       Thank you for this thoughtful referral! If you have any questions, please call me at  [phone].  Nice to share in this patient's care with you.    Sincerely,   Danyelle Evans, OTR/l

## 2020-01-30 ENCOUNTER — HOSPITAL ENCOUNTER (OUTPATIENT)
Dept: OCCUPATIONAL THERAPY | Facility: CLINIC | Age: 65
Setting detail: THERAPIES SERIES
End: 2020-01-30
Attending: PHYSICAL MEDICINE & REHABILITATION
Payer: MEDICARE

## 2020-01-30 ENCOUNTER — HOSPITAL ENCOUNTER (OUTPATIENT)
Dept: PHYSICAL THERAPY | Facility: CLINIC | Age: 65
Setting detail: THERAPIES SERIES
End: 2020-01-30
Attending: PHYSICAL MEDICINE & REHABILITATION
Payer: MEDICARE

## 2020-01-30 PROCEDURE — 97116 GAIT TRAINING THERAPY: CPT | Mod: GP | Performed by: PHYSICAL THERAPIST

## 2020-01-30 PROCEDURE — 97112 NEUROMUSCULAR REEDUCATION: CPT | Mod: GP | Performed by: PHYSICAL THERAPIST

## 2020-01-30 PROCEDURE — 97110 THERAPEUTIC EXERCISES: CPT | Mod: GO | Performed by: OCCUPATIONAL THERAPIST

## 2020-01-30 PROCEDURE — 97110 THERAPEUTIC EXERCISES: CPT | Mod: GP | Performed by: PHYSICAL THERAPIST

## 2020-02-04 ENCOUNTER — HOSPITAL ENCOUNTER (OUTPATIENT)
Dept: PHYSICAL THERAPY | Facility: CLINIC | Age: 65
Setting detail: THERAPIES SERIES
End: 2020-02-04
Attending: PHYSICAL MEDICINE & REHABILITATION
Payer: MEDICARE

## 2020-02-04 ENCOUNTER — HOSPITAL ENCOUNTER (OUTPATIENT)
Dept: OCCUPATIONAL THERAPY | Facility: CLINIC | Age: 65
Setting detail: THERAPIES SERIES
End: 2020-02-04
Attending: PHYSICAL MEDICINE & REHABILITATION
Payer: MEDICARE

## 2020-02-04 PROCEDURE — 97140 MANUAL THERAPY 1/> REGIONS: CPT | Mod: GO | Performed by: OCCUPATIONAL THERAPIST

## 2020-02-04 PROCEDURE — 97116 GAIT TRAINING THERAPY: CPT | Mod: GP | Performed by: PHYSICAL THERAPIST

## 2020-02-04 PROCEDURE — 97112 NEUROMUSCULAR REEDUCATION: CPT | Mod: GP | Performed by: PHYSICAL THERAPIST

## 2020-02-04 PROCEDURE — 97112 NEUROMUSCULAR REEDUCATION: CPT | Mod: GO | Performed by: OCCUPATIONAL THERAPIST

## 2020-02-04 PROCEDURE — 97110 THERAPEUTIC EXERCISES: CPT | Mod: GP | Performed by: PHYSICAL THERAPIST

## 2020-02-06 ENCOUNTER — HOSPITAL ENCOUNTER (OUTPATIENT)
Dept: OCCUPATIONAL THERAPY | Facility: CLINIC | Age: 65
Setting detail: THERAPIES SERIES
End: 2020-02-06
Attending: PHYSICAL MEDICINE & REHABILITATION
Payer: MEDICARE

## 2020-02-06 ENCOUNTER — HOSPITAL ENCOUNTER (OUTPATIENT)
Dept: PHYSICAL THERAPY | Facility: CLINIC | Age: 65
Setting detail: THERAPIES SERIES
End: 2020-02-06
Attending: PHYSICAL MEDICINE & REHABILITATION
Payer: MEDICARE

## 2020-02-06 PROCEDURE — 97112 NEUROMUSCULAR REEDUCATION: CPT | Mod: GO | Performed by: OCCUPATIONAL THERAPIST

## 2020-02-06 PROCEDURE — 97112 NEUROMUSCULAR REEDUCATION: CPT | Mod: GP | Performed by: PHYSICAL THERAPIST

## 2020-02-06 PROCEDURE — 97110 THERAPEUTIC EXERCISES: CPT | Mod: GP | Performed by: PHYSICAL THERAPIST

## 2020-02-06 PROCEDURE — 97110 THERAPEUTIC EXERCISES: CPT | Mod: GO | Performed by: OCCUPATIONAL THERAPIST

## 2020-02-11 ENCOUNTER — HOSPITAL ENCOUNTER (OUTPATIENT)
Dept: PHYSICAL THERAPY | Facility: CLINIC | Age: 65
Setting detail: THERAPIES SERIES
End: 2020-02-11
Attending: PHYSICAL MEDICINE & REHABILITATION
Payer: MEDICARE

## 2020-02-11 ENCOUNTER — HOSPITAL ENCOUNTER (OUTPATIENT)
Dept: OCCUPATIONAL THERAPY | Facility: CLINIC | Age: 65
Setting detail: THERAPIES SERIES
End: 2020-02-11
Attending: PHYSICAL MEDICINE & REHABILITATION
Payer: MEDICARE

## 2020-02-11 PROCEDURE — 97112 NEUROMUSCULAR REEDUCATION: CPT | Mod: GP | Performed by: PHYSICAL THERAPIST

## 2020-02-11 PROCEDURE — 97140 MANUAL THERAPY 1/> REGIONS: CPT | Mod: GO | Performed by: OCCUPATIONAL THERAPIST

## 2020-02-11 PROCEDURE — 97110 THERAPEUTIC EXERCISES: CPT | Mod: GP | Performed by: PHYSICAL THERAPIST

## 2020-02-11 PROCEDURE — 97112 NEUROMUSCULAR REEDUCATION: CPT | Mod: GO | Performed by: OCCUPATIONAL THERAPIST

## 2020-02-13 ENCOUNTER — HOSPITAL ENCOUNTER (OUTPATIENT)
Dept: PHYSICAL THERAPY | Facility: CLINIC | Age: 65
Setting detail: THERAPIES SERIES
End: 2020-02-13
Attending: PHYSICAL MEDICINE & REHABILITATION
Payer: MEDICARE

## 2020-02-13 PROCEDURE — 97110 THERAPEUTIC EXERCISES: CPT | Mod: GP | Performed by: PHYSICAL THERAPIST

## 2020-02-13 PROCEDURE — 97112 NEUROMUSCULAR REEDUCATION: CPT | Mod: GP | Performed by: PHYSICAL THERAPIST

## 2020-02-18 ENCOUNTER — HOSPITAL ENCOUNTER (OUTPATIENT)
Dept: PHYSICAL THERAPY | Facility: CLINIC | Age: 65
Setting detail: THERAPIES SERIES
End: 2020-02-18
Attending: PHYSICAL MEDICINE & REHABILITATION
Payer: MEDICARE

## 2020-02-18 ENCOUNTER — HOSPITAL ENCOUNTER (OUTPATIENT)
Dept: OCCUPATIONAL THERAPY | Facility: CLINIC | Age: 65
Setting detail: THERAPIES SERIES
End: 2020-02-18
Attending: PHYSICAL MEDICINE & REHABILITATION
Payer: MEDICARE

## 2020-02-18 PROCEDURE — 97110 THERAPEUTIC EXERCISES: CPT | Mod: GP | Performed by: PHYSICAL THERAPIST

## 2020-02-18 PROCEDURE — 97140 MANUAL THERAPY 1/> REGIONS: CPT | Mod: GO | Performed by: OCCUPATIONAL THERAPIST

## 2020-02-18 PROCEDURE — 97110 THERAPEUTIC EXERCISES: CPT | Mod: GO | Performed by: OCCUPATIONAL THERAPIST

## 2020-02-18 PROCEDURE — 97112 NEUROMUSCULAR REEDUCATION: CPT | Mod: GP | Performed by: PHYSICAL THERAPIST

## 2020-02-20 ENCOUNTER — HOSPITAL ENCOUNTER (OUTPATIENT)
Dept: PHYSICAL THERAPY | Facility: CLINIC | Age: 65
Setting detail: THERAPIES SERIES
End: 2020-02-20
Attending: PHYSICAL MEDICINE & REHABILITATION
Payer: MEDICARE

## 2020-02-20 PROCEDURE — 97110 THERAPEUTIC EXERCISES: CPT | Mod: GP | Performed by: PHYSICAL THERAPIST

## 2020-02-20 PROCEDURE — 97112 NEUROMUSCULAR REEDUCATION: CPT | Mod: GP | Performed by: PHYSICAL THERAPIST

## 2020-02-25 ENCOUNTER — HOSPITAL ENCOUNTER (OUTPATIENT)
Dept: PHYSICAL THERAPY | Facility: CLINIC | Age: 65
Setting detail: THERAPIES SERIES
End: 2020-02-25
Attending: PHYSICAL MEDICINE & REHABILITATION
Payer: MEDICARE

## 2020-02-25 ENCOUNTER — HOSPITAL ENCOUNTER (OUTPATIENT)
Dept: OCCUPATIONAL THERAPY | Facility: CLINIC | Age: 65
Setting detail: THERAPIES SERIES
End: 2020-02-25
Attending: PHYSICAL MEDICINE & REHABILITATION
Payer: MEDICARE

## 2020-02-25 PROCEDURE — 97112 NEUROMUSCULAR REEDUCATION: CPT | Mod: GP | Performed by: PHYSICAL THERAPIST

## 2020-02-25 PROCEDURE — 97110 THERAPEUTIC EXERCISES: CPT | Mod: GO | Performed by: OCCUPATIONAL THERAPIST

## 2020-02-25 PROCEDURE — 97110 THERAPEUTIC EXERCISES: CPT | Mod: GP | Performed by: PHYSICAL THERAPIST

## 2020-02-27 ENCOUNTER — HOSPITAL ENCOUNTER (OUTPATIENT)
Dept: PHYSICAL THERAPY | Facility: CLINIC | Age: 65
Setting detail: THERAPIES SERIES
End: 2020-02-27
Attending: PHYSICAL MEDICINE & REHABILITATION
Payer: MEDICARE

## 2020-02-27 ENCOUNTER — HOSPITAL ENCOUNTER (OUTPATIENT)
Dept: OCCUPATIONAL THERAPY | Facility: CLINIC | Age: 65
Setting detail: THERAPIES SERIES
End: 2020-02-27
Attending: PHYSICAL MEDICINE & REHABILITATION
Payer: MEDICARE

## 2020-02-27 PROCEDURE — 97110 THERAPEUTIC EXERCISES: CPT | Mod: GO | Performed by: OCCUPATIONAL THERAPIST

## 2020-02-27 PROCEDURE — 97112 NEUROMUSCULAR REEDUCATION: CPT | Mod: GP | Performed by: PHYSICAL THERAPIST

## 2020-02-27 PROCEDURE — 97110 THERAPEUTIC EXERCISES: CPT | Mod: GP | Performed by: PHYSICAL THERAPIST

## 2020-02-27 NOTE — PROGRESS NOTES
[facility]      OUTPATIENT PHYSICAL THERAPY  PLAN OF TREATMENT FOR OUTPATIENT REHABILITATION    Patient's Last Name, First Name, M.I.                YOB: 1955  Luis Trinidad                        Provider's Name  [facility] Medical Record No.  [id number]                               Onset Date: 11/4/2015   Start of Care Date: 5/1/2018   Type:     _X_PT   ___OT   ___SLP Medical Diagnosis: ischemic CVA                       PT Diagnosis: Impaired gait and mobility due to right hemiparesis and spasticity      _________________________________________________________________________________  Plan of Treatment:    Frequency/Duration: 2 x per week x 90 days      Goals:  Goal Identifier 1 - Gait pattern/safety   Goal Description Rogelio will be able to achieve proper midstance to terminal stance alignment on right LE and transition into pre and initial swing with proper clearance of right foot for greater efficiency and safety with gait at home and in the community.   Target Date 04/28/20   Date Met      Progress:     Goal Identifier 2- Gait speed   Goal Description Rogelio will increase his gait speed on the 25 foot timed walk to 9 sec or less without use of an assistive device  to indicate reduced level of gait impairment and improved community mobility.   Target Date 04/28/20   Date Met      Progress:     Goal Identifier 3- stairs   Goal Description Pt will be able to navigate up his stairs with one rail in a reciprocal pattern with minimal circumduction of right LE in order for more efficient and safe access to all levels of his home.   Target Date 04/28/20   Date Met      Progress:     Progress Toward Goals:   See progress note from 1/28/2020    Certification date from 1/29/2020 to 4/28/2020.    Yen Villanueva, PT          I CERTIFY THE NEED FOR THESE SERVICES FURNISHED UNDER         THIS PLAN OF TREATMENT AND WHILE UNDER MY CARE     (Physician co-signature of this document indicates review and certification of the therapy plan).                Referring Provider: Brigida Briseno MD

## 2020-02-27 NOTE — PROGRESS NOTES
Outpatient Physical Therapy Progress Note     Patient: Luis Trinidad  : 1955    Beginning/End Dates of Reporting Period:  2019 to 2020    Referring Provider: Brigida Briseno MD    Therapy Diagnosis: Impaired gait and mobility due to right hemiparesis and spasticity     Client Self Report: Had to go to a  on Saturday, required a lot of walking/standing.  Was tired on .     Objective Measurements:        Objective Measure: 25 foot walk (from )  Details: start of session 14.25 sec, 18 steps,   end of the session 12.9 sec 16 steps              Goals:  Goal Identifier 1 - Gait pattern/safety   Goal Description Rogelio will be able to achieve proper midstance to terminal stance alignment on right LE and transition into pre and initial swing with proper clearance of right foot for greater efficiency and safety with gait at home and in the community.   Target Date 20   Date Met   in progress   Progress:  Rogelio is improving with his transition into right mid to terminal stance phases and is demonstrating improved R hip extension in preswing phase, however he continues to be limited in clearance in midswing due to weakness at HS and ankle DF.       Goal Identifier 2- Gait speed   Goal Description Rogelio will increase his gait speed on the 25 foot timed walk to 9 sec or less without use of an assistive device  to indicate reduced level of gait impairment and improved community mobility.   Target Date 20   Date Met   in progress   Progress:  Slightly slower than last testing but speed depends on his spasticity and stiffness.  Winter months are usually worse due to Rogelio not being able to get out and walk 2 x per day as he does in the spring, summer and fall.  Still Rogelio is improving in his gait quality during gait, demonstrating improved left heel contact and step length and improved right stance stability into mid/terminal stance.       Goal Identifier 3- stairs   Goal Description Pt will  be able to navigate up his stairs with one rail in a reciprocal pattern with minimal circumduction of right LE in order for more efficient and safe access to all levels of his home.   Target Date 01/28/20   Date Met   in progress   Progress:  Working on right knee/ankle DF for improved clearance up steps with right LE.  Is showing improving isometric contraction and gravity eliminated concentric contraction of right knee flexors but needing to develop more antigravity strength for clearance on steps, as well as coordination of knee flexors/extensors.       Progress Toward Goals:   Progress this reporting period: Rogelio is showing  Ongoing iimprovements in forward progression over right LE in stance phase and improved sustained right hip ext into terminal stance phase,  which has allowed better control and alignment with left LE placement at initial contact.  He is able to perform this more consistently and will improved postural control.  Clearance in swng continues to be the biggest limitation and challenge due to right knee flexor and ankle DF weakness.  Progression of speed is limited due to catching of toes, although intermittently.  This has been helped partially with application of moleskin to sole of shoe near toes on right side to allow for reduced friction in right swing.      Progress limited due to significant spasticity and tightness of his right UE, trunk and LE as well as weakness of his ankle DF and HS.    Plan:  Continue therapy per current plan of care.  Changes to goals: cont toward above goals until 4/28/2020    Discharge:  No

## 2020-02-28 NOTE — PROGRESS NOTES
[facility]      OUTPATIENT OCCUPATIONAL THERAPY  PLAN OF TREATMENT FOR OUTPATIENT REHABILITATION    Patient's Last Name, First Name, M.I.                YOB: 1955  Luis Trinidad                        Provider's Name  [facility] Medical Record No.  [id number]                               Onset Date: 11/29/2017   Start of Care Date: 2/8/2018   Type:     ___PT   _X_OT   ___SLP Medical Diagnosis:  hemiparesis of R side as late effect of stroke                       OT Diagnosis: decreased ADL/IADLs      _________________________________________________________________________________  Plan of Treatment:    Frequency/Duration: 2x/week for 12 weeks     Goals:    Goal Identifier R shoulder strength   Goal Description Patient to increase R shoulder flexiion AROM to 40 degrees for improved R UE function for ADL/IADLs (during dressing and grooming tasks, carrying items, putting groceries and dishes away).   Target Date 05/30/20   Date Met      Progress:     Goal Identifier R pinch strength   Goal Description Patient will demonstrate increased right hand pinch strength (both lateral and palmar) by 3# each for increased independence with ADL/IADLs such as opening containers, pull up pants, maintaining pinch to hold items.   Target Date 05/30/20   Date Met      Progress:     Goal Identifier R GM/FM coordination   Goal Description Patient to improve R GM/FM coordination skills for increased independence during ADL/IADL tasks (dressing, kitchen tasks, feeding self) by completing the Box and Blocks Test with R UE in standing with 10 blocks.   Target Date 05/30/20   Date Met      Progress:     Goal Identifier R UE as gross assist   Goal Description Patient to demonstrate functional tasks (feeding self, wiping the counter/table, washing dishes, etc.) with 20% use of R UE as gross stabilizer  or gross assist for increased ADL/IADL independence and closer return to prior level of function.   Target Date 05/30/20   Date Met      Progress:     Goal Identifier functional/normal movment patterns in R UE and trunk   Goal Description Patient to complete therapeutic task (simple kitchen tasks, laundry, etc.) with use of B UE's and no more than 2 cues in 5 minute time frame for correct body mechanics and for decreasing compensatory movements at the trunk and upper body for encouragement of normal movement patterns, increased R UE function and increased ADL/IADL independence.   Target Date 05/30/20   Date Met      Progress:     Goal Identifier visual skills   Goal Description Patient will demonstrate WFLs visual skills (including reaction time and visual scanning skills) for safe independent community mobility (crossing the street, driving, grocery shopping) by scoring WNLs on all modes of the Dynavision, Scancourse and other visual screens.   Target Date 05/30/20   Date Met      Progress:           Certification date from 3/1/2020 to 5/30/2020.    Allyssa Martinez OT          I CERTIFY THE NEED FOR THESE SERVICES FURNISHED UNDER        THIS PLAN OF TREATMENT AND WHILE UNDER MY CARE     (Physician co-signature of this document indicates review and certification of the therapy plan).                Referring Provider: Brigida Briseno MD

## 2020-03-03 ENCOUNTER — HOSPITAL ENCOUNTER (OUTPATIENT)
Dept: PHYSICAL THERAPY | Facility: CLINIC | Age: 65
Setting detail: THERAPIES SERIES
End: 2020-03-03
Attending: PHYSICAL MEDICINE & REHABILITATION
Payer: MEDICARE

## 2020-03-03 ENCOUNTER — HOSPITAL ENCOUNTER (OUTPATIENT)
Dept: OCCUPATIONAL THERAPY | Facility: CLINIC | Age: 65
Setting detail: THERAPIES SERIES
End: 2020-03-03
Attending: PHYSICAL MEDICINE & REHABILITATION
Payer: MEDICARE

## 2020-03-03 PROCEDURE — 97140 MANUAL THERAPY 1/> REGIONS: CPT | Mod: GO | Performed by: OCCUPATIONAL THERAPIST

## 2020-03-03 PROCEDURE — 97110 THERAPEUTIC EXERCISES: CPT | Mod: GO | Performed by: OCCUPATIONAL THERAPIST

## 2020-03-05 ENCOUNTER — HOSPITAL ENCOUNTER (OUTPATIENT)
Dept: OCCUPATIONAL THERAPY | Facility: CLINIC | Age: 65
Setting detail: THERAPIES SERIES
End: 2020-03-05
Attending: PHYSICAL MEDICINE & REHABILITATION
Payer: MEDICARE

## 2020-03-05 ENCOUNTER — HOSPITAL ENCOUNTER (OUTPATIENT)
Dept: PHYSICAL THERAPY | Facility: CLINIC | Age: 65
Setting detail: THERAPIES SERIES
End: 2020-03-05
Attending: PHYSICAL MEDICINE & REHABILITATION
Payer: MEDICARE

## 2020-03-05 PROCEDURE — 97110 THERAPEUTIC EXERCISES: CPT | Mod: GP | Performed by: PHYSICAL THERAPIST

## 2020-03-05 PROCEDURE — 97110 THERAPEUTIC EXERCISES: CPT | Mod: GO | Performed by: OCCUPATIONAL THERAPIST

## 2020-03-05 PROCEDURE — 97112 NEUROMUSCULAR REEDUCATION: CPT | Mod: GP | Performed by: PHYSICAL THERAPIST

## 2020-03-10 ENCOUNTER — HOSPITAL ENCOUNTER (OUTPATIENT)
Dept: OCCUPATIONAL THERAPY | Facility: CLINIC | Age: 65
Setting detail: THERAPIES SERIES
End: 2020-03-10
Attending: PHYSICAL MEDICINE & REHABILITATION
Payer: MEDICARE

## 2020-03-10 PROCEDURE — 97140 MANUAL THERAPY 1/> REGIONS: CPT | Mod: GO | Performed by: OCCUPATIONAL THERAPIST

## 2020-03-10 PROCEDURE — 97110 THERAPEUTIC EXERCISES: CPT | Mod: GO | Performed by: OCCUPATIONAL THERAPIST

## 2020-03-10 NOTE — PROGRESS NOTES
--10th VISIT PROGRESS NOTE for OCCUPATIONAL THERAPY--       03/10/20 0900   Signing Clinician's Name / Credentials   Signing clinician's name / credentials ARYAN Mathis/margarito   Session Number   Session Number 10/10 (MEDICARE as of 5/1/18)   Additional Session Number 141   Progress/Recertification   Recertification Due 05/30/20   Recertification Completed 02/27/20    OT Goal 1   Goal Identifier R shoulder strength   Goal Description Patient to increase R shoulder flexiion AROM to 40 degrees for improved R UE function for ADL/IADLs (during dressing and grooming tasks, carrying items, putting groceries and dishes away).   Target Date 05/30/20    OT Goal 2   Goal Identifier R pinch strength   Goal Description Patient will demonstrate increased right hand pinch strength (both lateral and palmar) by 3# each for increased independence with ADL/IADLs such as opening containers, pull up pants, maintaining pinch to hold items.   Target Date 05/30/20    OT Goal 3   Goal Identifier R GM/FM coordination   Goal Description Patient to improve R GM/FM coordination skills for increased independence during ADL/IADL tasks (dressing, kitchen tasks, feeding self) by completing the Box and Blocks Test with R UE in standing with 10 blocks.   Target Date 05/30/20   OT Goal 4   Goal Identifier R UE as gross assist   Goal Description Patient to demonstrate functional tasks (feeding self, wiping the counter/table, washing dishes, etc.) with 20% use of R UE as gross stabilizer or gross assist for increased ADL/IADL independence and closer return to prior level of function.   Target Date 05/30/20   OT Goal 5   Goal Identifier functional/normal movment patterns in R UE and trunk   Goal Description Patient to complete therapeutic task (simple kitchen tasks, laundry, etc.) with use of B UE's and no more than 2 cues in 5 minute time frame for correct body mechanics and for decreasing compensatory movements at the trunk and upper body for  encouragement of normal movement patterns, increased R UE function and increased ADL/IADL independence.   Target Date 05/30/20    OT Goal 6   Goal Identifier visual skills   Goal Description Patient will demonstrate WFLs visual skills (including reaction time and visual scanning skills) for safe independent community mobility (crossing the street, driving, grocery shopping) by scoring WNLs on all modes of the [employer], [employer]anc[employer] and other visual screens.   Target Date 05/30/20   Subjective Report   Subjective Report Pt has been having more neck stiffness in the past weeks.  Still able to  OPP/ADD  his TH to 1/2 in from palm, can stabilize  1/2 in sweatshirt hem w some success, 1 in items easily. He is working on release with his foam block.   Objective Measure 1   Objective Measure  strength   Details R 30# on first trial,  L 105# stable scores: 2 #s less on R and 4 #s ;more on the Left   Objective Measure 2   Objective Measure Pinch strength   Details Key pinch R 16# L 28 #  Og pinch R 8 # L 20#   Objective Measure 3   Objective Measure 9HPT   Details R hand able to  1 peg 2x but unable to place them.    Vitals Signs   Heart Rate 86   Blood Pressure 140/100   Vital Signs Comments L UE Manual   Therapeutic Procedure/Exercise   Therapeutic Procedure: strength, endurance, ROM, flexibillity minutes (99197) 12   Skilled Intervention Facilitated improved RUE AROM and strength for increased independence in I/ADLs.    Patient Response has wrist tone  more on radial side than ulnar side.   Treatment Detail Inital warm up stretches of SH FL, ABD, ER, EL EX, forearm supination, WR EX solo, finger EX solo, then combined WR EX and DE, per higher tone in wrist/hand, elbow.     Manual Therapy   Manual Therapy Minutes (68641) 34   Skilled Intervention Prolonged stretches, IASTM, MFR for R UE funcational use patterns   Patient Response Improved cervical extension after 1st rib mobilizations   Treatment Detail Per  mm tone and fascial thickness, Rogelio has difficulty getting R UE  SH FL, ER, EL EX to help with swing through of gait, repositioning during dressing.  Has dense myofascial  thickness and hypertonic at anterior ribs T4-T8, much less at T 8-10 rib and paraspinals.  Tone/thickness at levator, UT,pec minor, posterior capsule/lat deltoid, biceps (mod springy, lacking 45 degrees. Lower trap and infraspintus, teres minor are restricted in a long position due to UT/levator tightness, medial rotation of humerus.  OTR cont w instrument assisted soft tissue mobilization (IASTM) using  tissue mobilization tool (larger bone Graston type  tool with min to moderate pressure in longitudinal patterns at origin, insert and muscle bellies, then cross fiber in fanning patterns  from distal to proximal and proximal to distal patterns, and around radius of bony prominences at  R  paraspinal 6-10 R flank, diaphragmatic plane with active flossing ex completed by pt during IASTM. OTR completes soft tissue mobilization at vertical border to sup angle with levator streches.  Pre check with Helio's (cervical rotation to opposite side + Cervical forward bending) to check 1st rib mobility reveals redcued arc to getting to 30 of 70 L and 30 of 70 R pre mobilization and getting to 45 of 70 L and 50 of 70 R post.  Cervical extension limited to 30 with mouth closed and open.  Improves to 50 dgrees open and closed after rib mobilizations. Mobilizations started in supine with gentle soft tissue to osseous mobilization at  side body of C7 x 50 pulses and  then  cross caudal oscillations at ant 1 st rib segment x 50 pulses with sustained hold/releases end end range.      Plan   Homework rht to help ADL log-ADL list- work towards: Holding a spoon/knife, stabilizing plate on table with some, moving a coffee mug with both hands from chest to chin/mouth donning socks with 2 hands, carrying a plate with 2 hands managing light switches with elbow flexed  greater than 90 , and using a zipper from the bottom to midway/top.   Home program  H ABD stretch  w towel at elbow/supine to help increase EL EX, carry bag in R forearm, estim to R wrist/digit extensors, pulley system for R shoulder (flexion and abduction)   Plan for next session Triceps with vibratio, TH grasp/release,  Prone Scap retraction, then repeat in sitting for NMR carry over. Check SH  ER at rib and 90 SH ABD stretch w cane and IASTM to pec minor, scap to support better position w walking (try belt with forearm loop?), NMR with vibration of EL FL/EX in supine after IASTM, Circular TH pathways with grasp/release, Cont NMES to work on  EL FL, EX from -90 to end range w push/pull, H ABD/ HADD planes.  Cont NMES per above/ black in mid  ex mm bulk, dorsal snuff box red combine with grasp/release tasks; , repeat/pinch.  NMR of TH ext/grasp/release, f/u on ball stretch, ex rotation while seated. hammer turns for supination, check tone/movement patterns to prep for 11/29 botox, f/u on pole stretch for trunk, IASTM SH and trunk/UE traction w pec release,,sidelying vs shoulder pulley kit, recheck /pinch post botox, NMES hand/wr ,  **find old splint vs order  new splint (he hasn't been able to locate at home as of 8/2); re-test R  ~8/10 to see effect of botox (had on 8/1); 2 handed lifting carrying, transferring tasks ( food containers, bags, larger dishes), Cont w his estim, grasp release patterns with unit in place, issue diagrams per pictures taken. practice setting up and take down of shoulder pulley; ~7/25/18: trial of SH IR/add w pulley, wt bearing at kitchen counter (prone/4 pt vs forerm WB on table w shin height reaching w forward reach), ,IADL box, have him bring in dowel for  HEP if he'd like it adapted with elastic handle ( instructions in chart),  stretches with pole, and table stretches/ADL log. do full review of his HEP (see previous course of OT for past HEP) and modify/upgrade (will  especially need upgrade for R hand with improved function/strength); neuro re-ed. with much focus on decreasing tone (trial prone on elbows, WB in 4 point, etc.); check splint - have him bring in (he hasn't been wearing, using a ball in hand at night);  R UE as gross stabilizer and gross assist; LATER: IADL eval and assist with adapted driving evaluation as appropriate; consider vocational rehab referral for return to work if appropriate or volunteer opportunities.  Consider driving/[employer] and [employer] ~ 10/24/18. *   Total Session Time   Timed Code Treatment Minutes 46   Total Treatment Time (sum of timed and untimed services) 46       Thank you for this thoughtful referral! If you have any questions, please call me at [phone].  Nice to share in this patient's care with you.    Sincerely,   Danyelle Evans, OTR/l

## 2020-03-12 ENCOUNTER — HOSPITAL ENCOUNTER (OUTPATIENT)
Dept: OCCUPATIONAL THERAPY | Facility: CLINIC | Age: 65
Setting detail: THERAPIES SERIES
End: 2020-03-12
Attending: PHYSICAL MEDICINE & REHABILITATION
Payer: MEDICARE

## 2020-03-12 ENCOUNTER — HOSPITAL ENCOUNTER (OUTPATIENT)
Dept: PHYSICAL THERAPY | Facility: CLINIC | Age: 65
Setting detail: THERAPIES SERIES
End: 2020-03-12
Attending: PHYSICAL MEDICINE & REHABILITATION
Payer: MEDICARE

## 2020-03-12 PROCEDURE — 97112 NEUROMUSCULAR REEDUCATION: CPT | Mod: GP | Performed by: PHYSICAL THERAPIST

## 2020-03-12 PROCEDURE — 97112 NEUROMUSCULAR REEDUCATION: CPT | Mod: GO | Performed by: OCCUPATIONAL THERAPIST

## 2020-03-12 PROCEDURE — 97110 THERAPEUTIC EXERCISES: CPT | Mod: GO | Performed by: OCCUPATIONAL THERAPIST

## 2020-03-12 PROCEDURE — 97110 THERAPEUTIC EXERCISES: CPT | Mod: GP | Performed by: PHYSICAL THERAPIST

## 2020-03-17 ENCOUNTER — HOSPITAL ENCOUNTER (OUTPATIENT)
Dept: OCCUPATIONAL THERAPY | Facility: CLINIC | Age: 65
Setting detail: THERAPIES SERIES
End: 2020-03-17
Attending: PHYSICAL MEDICINE & REHABILITATION
Payer: MEDICARE

## 2020-03-17 ENCOUNTER — HOSPITAL ENCOUNTER (OUTPATIENT)
Dept: PHYSICAL THERAPY | Facility: CLINIC | Age: 65
Setting detail: THERAPIES SERIES
End: 2020-03-17
Attending: PHYSICAL MEDICINE & REHABILITATION
Payer: MEDICARE

## 2020-03-17 PROCEDURE — 97110 THERAPEUTIC EXERCISES: CPT | Mod: GP | Performed by: PHYSICAL THERAPIST

## 2020-03-17 PROCEDURE — 97140 MANUAL THERAPY 1/> REGIONS: CPT | Mod: GO | Performed by: OCCUPATIONAL THERAPIST

## 2020-03-17 PROCEDURE — 97110 THERAPEUTIC EXERCISES: CPT | Mod: GO | Performed by: OCCUPATIONAL THERAPIST

## 2020-03-17 PROCEDURE — 97112 NEUROMUSCULAR REEDUCATION: CPT | Mod: GP | Performed by: PHYSICAL THERAPIST

## 2020-03-19 ENCOUNTER — HOSPITAL ENCOUNTER (OUTPATIENT)
Dept: OCCUPATIONAL THERAPY | Facility: CLINIC | Age: 65
Setting detail: THERAPIES SERIES
End: 2020-03-19
Attending: PHYSICAL MEDICINE & REHABILITATION
Payer: MEDICARE

## 2020-03-19 ENCOUNTER — HOSPITAL ENCOUNTER (OUTPATIENT)
Dept: PHYSICAL THERAPY | Facility: CLINIC | Age: 65
Setting detail: THERAPIES SERIES
End: 2020-03-19
Attending: PHYSICAL MEDICINE & REHABILITATION
Payer: MEDICARE

## 2020-03-19 PROCEDURE — 97112 NEUROMUSCULAR REEDUCATION: CPT | Mod: GP | Performed by: PHYSICAL THERAPIST

## 2020-03-19 PROCEDURE — 97110 THERAPEUTIC EXERCISES: CPT | Mod: GP | Performed by: PHYSICAL THERAPIST

## 2020-03-19 PROCEDURE — 97110 THERAPEUTIC EXERCISES: CPT | Mod: GO | Performed by: OCCUPATIONAL THERAPIST

## 2020-03-19 NOTE — PROGRESS NOTES
Physical Therapy 10th Visit Progress Note    MD:  Brigida Briseno  Dates:  1/28/2020 to 3/3/2020       03/03/20 0900   Signing Clinician's Name / Credentials   Signing clinician's name / credentials Yen Villanueva PT   Session Number   Session Number 10/10   Progress Note/Recertification   Recertification Due Date 04/28/20   Goal 1   Goal Identifier 1 - Gait pattern/safety   Goal Description Rogelio will be able to achieve proper midstance to terminal stance alignment on right LE and transition into pre and initial swing with proper clearance of right foot for greater efficiency and safety with gait at home and in the community.   Target Date 04/28/20   Date Met   (in progress)   Goal 2   Goal Identifier 2- Gait speed   Goal Description Rogelio will increase his gait speed on the 25 foot timed walk to 9 sec or less without use of an assistive device  to indicate reduced level of gait impairment and improved community mobility.   Target Date 04/28/20   Date Met   (in progress. see below )   Goal 3   Goal Identifier 3- stairs   Goal Description Pt will be able to navigate up his stairs with one rail in a reciprocal pattern with minimal circumduction of right LE in order for more efficient and safe access to all levels of his home.   Target Date 04/28/20   Date Met   (in progress see below)   Subjective Report   Subjective Report Doing fine.  Stiffness had been a little better   Objective Measure 1   Objective Measure stairs    Details up alternating but needs to circumduct to clear right LE due to decreased knee flexion.  descending decreased control and strength of Isometric HS as right LE intitiates step down.     Objective Measure 2   Objective Measure 25 foot walk   Details 17 steps 13.2 sec.  focusing on right stance stab and left heel contact.     Vitals Signs   Heart Rate 80   Blood Pressure 120/80   Vital Signs Comments L UE manual seated   Therapeutic Procedure/exercise   Therapeutic Procedures: strength, endurance,  "ROM, flexibillity minutes (29609) 20   Skilled Intervention manual stretches and right LE assist   Patient Response tight today, denys in  trunk.     Treatment Detail hip flexor stretch right 2 x 30\",  DKTC stretch 2 x 30\",  LTR with contract relax 2 x 30\" to left and manual overpressure at pelvis and shoulder to increase stretch.   Active hip/knee flexion and ankle DF against gravity with cues to pull heel back, mild resistance at ankle DF and knee flex.  10 x 2 sets, 2 x 5 at fast pace.  reduction in assist for knee flexion gradually with increased reps.  supine right HS stretch 2 x 30\".  standing HC stretchign for gastroc and soleus.     Neuromuscular Re-education   Neuromuscular re-ed of mvmt, balance, coord, kinesthetic sense, posture, proprioception minutes (51631) 12   Skilled Intervention facil for proper alignment and motor patterns as indicated.     Patient Response fatigue   Treatment Detail activities in right midstance and term stance alignments for work on sustained hip ext activation with upright coactive trunk. work on open chain in standing for active hip/knee flexion swing phase alignment and coordination on right, through small ROM progressing to larger ROM and faster speed.   isometric HS hold in partial knee flexion with slight hip extension.    Gait Training   Gait Training Minutes, includes stair climbing (03140) 15   Skilled Intervention facil for proper alignment and gait mechanics/sequencing.    Patient Response fatigue   Treatment Detail timed gait - see obj measures.  gait around clinic with emphasis on narrow RK, forward progression and sustained stnace time on right into termstance.  up/down 4\" and 6\" steps reciprocally, facil right LE up for proper hip/knee flex alignment in sagittal plane.  descending facil right LE for isometric HS activation when first bringing right foot off of step to reach to next step.  progression to  faster paces both  up and down.     Plan   Updates to plan of " care continue 2 x per week per current certification.  goals valid per current certification.   Plan for next session monitor vitals. right term stance stability. quad / hip flexor stretching. continue stepper. functional stretching of trunk , UE and LE, right LE open chain/modified chain activities for timing/coordination. cardiovascular conditioning.   HS stretching,  HS activation   Total Session Time   Timed Code Treatment Minutes 47   Total Treatment Time (sum of timed and untimed services) 47

## 2020-05-19 DIAGNOSIS — R25.2 SPASTICITY: ICD-10-CM

## 2020-05-21 RX ORDER — BACLOFEN 10 MG/1
TABLET ORAL
Qty: 1080 TABLET | OUTPATIENT
Start: 2020-05-21

## 2020-05-21 NOTE — TELEPHONE ENCOUNTER
baclofen (LIORESAL) 10 MG tablet  Last Written Prescription Date:  5/16/19  Last Fill Quantity:1080,   # refills: 3  Last Office Visit : 12/19/19  Future Office visit:  None ongoing PT/OT  Appts.      Routing refill request to provider for review/approval because: not on protocol

## 2020-08-06 NOTE — PROGRESS NOTES
PM&R Clinic & Procedure Note:    Luis Trinidad is 64-year-old male with a history of stroke in Nov 2016 resulting in residual right spastic hemiparesis.  Rogelio was last seen on 12/19/19 at which time he received 400 units of botox at that time to his right trunk and right arm.     He reports good results with last series of injections. Notes 80% improvement in spasticity, which lasted 3 months. Unable to get in until now due to COVID-19 pandemic. Denies any side effects. Denies any new medical issues. Continues on Baclofen 30 mg TID. Has not yet been able to get into sleep medicine, also 2/2 pandemic.     Physical exam:  There were no vitals taken for this visit.     Alert, pleasant affect.   Increased tone noted in RUE and along right pec and right trunk.    Modified Isi:   Mostly 3/4 RUE flexor muscles, though worse to biceps and wrist flexors (4/4).   right hemispastic gait noted      Assessment and recommendations:    Ischemic stroke at the lateral left thalamus and the posterior limb of the left internal capsule 11/2015    Residual right spastic hemiparesis     HTN     HLD    Questionable SAMMY      1. Work-up: none today  2. Therapy/equipment/braces: continue HEP regularly; consider PT as maintenance therapy when safe   3. Medications: no change in meds today; on baclofen 30 tid  4. Interventions: chemo-denervation today; procedure note below. No change in the total dose, but changed the sites of injections (added lumbricals and decreased FDS and FDP).   5. Referral / follow up with other providers: to sleep medicine clinic when able for more evaluation of possible SAMMY as possible risk factor for stroke   6. Follow up: 12 weeks             The attending provider was present for the entire procedure documented below.    Procedure:   Chemodenervation to right chest and RUE utilizing botulinum toxin.  Began with formal consent which he signed. Had formal time-out prior to procedure. 400 U of Botox lot [id number],  expiration 02/2023 was utilized. Diluted with normal saline in a 100 U:1mL ratio, total of 4 mL.  All areas were cleansed with chloroprep prior to injecting. EMG guidance was utilized to identify most active motor units while avoiding surrounding structures.     The following muscles were then injected, two body areas     Right trunk:  1. Right Pectoralis Major: 50 units divided in 1 site     Right arm  1. Biceps 100 units  2. FDS 50 units  3. FDP 50 units  4. FCR 40 units  5. FCU 40 units  6. Lumbricals 20 units (4 sites)  7. Pronator Teres 50 units    Procedure was tolerated well, he leaves clinic feeling well.    Michael Hayden MD  PM&R Resident      Physician Attestation   I spent a total of 25 minutes with the patient, personally observing as the resident/fellow performed the above procedure.     Key findings: spasticity was clearly worse as he missed his appointments due to COVID pandemic. Reviewed the plan as outlined above. No major changes in his function. He is at more than max dose of baclofen and max dose of Botox. Next step is to repeat physical therapy when safe. Adjusted the sites of injections based on her exam.     Brigida [facility]  Date of Service (when I saw the patient): 8/7/20

## 2020-08-07 ENCOUNTER — OFFICE VISIT (OUTPATIENT)
Dept: PHYSICAL MEDICINE AND REHAB | Facility: CLINIC | Age: 65
End: 2020-08-07
Payer: MEDICARE

## 2020-08-07 DIAGNOSIS — I63.81 LACUNAR INFARCT, ACUTE (H): ICD-10-CM

## 2020-08-07 DIAGNOSIS — G81.11 RIGHT SPASTIC HEMIPARESIS (H): Primary | ICD-10-CM

## 2020-08-07 NOTE — LETTER
8/7/2020    RE: Luis Trinidad  [id number] [facility] [id number]     Dear Colleague,    Thank you for referring your patient, Luis Trinidad, to the [facility] PHYSICAL MEDICINE AND REHABILITATION at [facility]. Please see a copy of my visit note below.    PM&R Clinic & Procedure Note:    Luis Trinidad is 64-year-old male with a history of stroke in Nov 2016 resulting in residual right spastic hemiparesis.  Rogelio was last seen on 12/19/19 at which time he received 400 units of botox at that time to his right trunk and right arm.     He reports good results with last series of injections. Notes 80% improvement in spasticity, which lasted 3 months. Unable to get in until now due to COVID-19 pandemic. Denies any side effects. Denies any new medical issues. Continues on Baclofen 30 mg TID. Has not yet been able to get into sleep medicine, also 2/2 pandemic.     Physical exam:  There were no vitals taken for this visit.     Alert, pleasant affect.   Increased tone noted in RUE and along right pec and right trunk.    Modified Isi:   Mostly 3/4 RUE flexor muscles, though worse to biceps and wrist flexors (4/4).   right hemispastic gait noted    Assessment and recommendations:    Ischemic stroke at the lateral left thalamus and the posterior limb of the left internal capsule 11/2015    Residual right spastic hemiparesis     HTN     HLD    Questionable SAMMY      1. Work-up: none today  2. Therapy/equipment/braces: continue HEP regularly; consider PT as maintenance therapy when safe   3. Medications: no change in meds today; on baclofen 30 tid  4. Interventions: chemo-denervation today; procedure note below. No change in the total dose, but changed the sites of injections (added lumbricals and decreased FDS and FDP).   5. Referral / follow up with other providers: to sleep medicine clinic when able for more evaluation of possible SAMMY as possible risk factor for stroke    6. Follow up: 12 weeks             The attending provider was present for the entire procedure documented below.    Procedure:   Chemodenervation to right chest and RUE utilizing botulinum toxin.  Began with formal consent which he signed. Had formal time-out prior to procedure. 400 U of Botox lot [id number], expiration 02/2023 was utilized. Diluted with normal saline in a 100 U:1mL ratio, total of 4 mL.  All areas were cleansed with chloroprep prior to injecting. EMG guidance was utilized to identify most active motor units while avoiding surrounding structures.     The following muscles were then injected, two body areas     Right trunk:  1. Right Pectoralis Major: 50 units divided in 1 site     Right arm  1. Biceps 100 units  2. FDS 50 units  3. FDP 50 units  4. FCR 40 units  5. FCU 40 units  6. Lumbricals 20 units (4 sites)  7. Pronator Teres 50 units    Procedure was tolerated well, he leaves clinic feeling well.    Michael Hayden MD  PM&R Resident      Physician Attestation   I spent a total of 25 minutes with the patient, personally observing as the resident/fellow performed the above procedure.     Key findings: spasticity was clearly worse as he missed his appointments due to COVID pandemic. Reviewed the plan as outlined above. No major changes in his function. He is at more than max dose of baclofen and max dose of Botox. Next step is to repeat physical therapy when safe. Adjusted the sites of injections based on her exam.     Brigida Briseno  Date of Service (when I saw the patient): 8/7/20      Again, thank you for allowing me to participate in the care of your patient.      Sincerely,    Brigida Briseno MD

## 2020-10-21 NOTE — PROGRESS NOTES
Outpatient Physical Therapy Discharge Note     Patient: Luis Trinidad  : 1955    Beginning/End Dates of Reporting Period:  3/3/2020 to 3/19/2020    Referring Provider: Brigida Farrell Diagnosis: Impaired gait and mobility due to right hemiparesis and spasticity     Client Self Report: Doing fine.  right LE stiff but trunk and hand feeling better this week.       Goals:  Goal Identifier 1 - Gait pattern/safety   Goal Description Rogelio will be able to achieve proper midstance to terminal stance alignment on right LE and transition into pre and initial swing with proper clearance of right foot for greater efficiency and safety with gait at home and in the community.   Target Date 20   Date Met   not fully met   Progress:  Rogelio had been gradually progressing with his gait pattern and overtime had been able to significantly improve his symmetry with step length and stance time and was improving specifically with mid to terminal stance alignment on the right.      Goal Identifier 2- Gait speed   Goal Description Rogelio will increase his gait speed on the 25 foot timed walk to 9 sec or less without use of an assistive device  to indicate reduced level of gait impairment and improved community mobility.   Target Date 20   Date Met   not met    Progress: on last testin steps 13.2 sec.  focusing on right stance stab and left heel contact     Goal Identifier 3- stairs   Goal Description Pt will be able to navigate up his stairs with one rail in a reciprocal pattern with minimal circumduction of right LE in order for more efficient and safe access to all levels of his home.   Target Date 20   Date Met   not fully met   Progress:  up alternating but needs to circumduct to clear right LE due to decreased knee flexion.  descending decreased control and strength of Isometric HS as right LE intitiates step down.      Progress Toward Goals:   Progress this reporting period: Rogelio was showing  ongoing improvements in forward progression over right LE in stance phase and improved sustained right hip ext into terminal stance phase, which allowed better control and alignment with left LE placement at initial contact.  He was able to perform this more consistently and will improved postural control.  Clearance in swng continued to be the biggest limitation and challenge due to right knee flexor and ankle DF weakness.  Progression of speed limited due to catching of toes, although intermittently.  This has been helped partially with application of moleskin to sole of shoe near toes on right side to allow for reduced friction in right swing.      Progress limited due to significant spasticity and tightness of his right UE, trunk and LE as well as weakness of his ankle DF and HS.    Due to COVID-19 pandemic issues, in person physical therapy was significantly reduced.  Rogelio was offered telehealth visits for physical therapy but he declined.  In June when in person visits increased again, Rogelio was asked if he wanted to return to in person therapy but he declined due to continued concern of coronavirus exposure and his risk factors.  Therefore, Rogelio will be discharged from PT.  If at any point he wishes to return, he was encouraged to contact his physician for a new order and we will be happy to see him again for a new evaluation.        Plan:  Discharge from therapy.    Discharge:    Reason for Discharge: Patient chooses to discontinue therapy.    Equipment Issued: none    Discharge Plan: Patient to continue home program.

## 2020-11-02 ENCOUNTER — OFFICE VISIT (OUTPATIENT)
Dept: PHYSICAL MEDICINE AND REHAB | Facility: CLINIC | Age: 65
End: 2020-11-02
Payer: MEDICARE

## 2020-11-02 VITALS
SYSTOLIC BLOOD PRESSURE: 164 MMHG | RESPIRATION RATE: 16 BRPM | DIASTOLIC BLOOD PRESSURE: 94 MMHG | TEMPERATURE: 98 F | HEART RATE: 93 BPM | OXYGEN SATURATION: 97 %

## 2020-11-02 DIAGNOSIS — G81.11 RIGHT SPASTIC HEMIPARESIS (H): Primary | ICD-10-CM

## 2020-11-02 DIAGNOSIS — I63.81 LACUNAR INFARCT, ACUTE (H): ICD-10-CM

## 2020-11-02 DIAGNOSIS — R25.2 SPASTICITY: ICD-10-CM

## 2020-11-02 PROCEDURE — 64646 CHEMODENERV TRUNK MUSC 1-5: CPT | Performed by: PHYSICAL MEDICINE & REHABILITATION

## 2020-11-02 PROCEDURE — 64644 CHEMODENERV 1 EXTREM 5/> MUS: CPT | Performed by: PHYSICAL MEDICINE & REHABILITATION

## 2020-11-02 PROCEDURE — 95874 GUIDE NERV DESTR NEEDLE EMG: CPT | Performed by: PHYSICAL MEDICINE & REHABILITATION

## 2020-11-02 RX ORDER — BACLOFEN 10 MG/1
30 TABLET ORAL 3 TIMES DAILY
Qty: 810 TABLET | Refills: 1 | Status: SHIPPED | OUTPATIENT
Start: 2020-11-02 | End: 2021-05-01

## 2020-11-02 ASSESSMENT — PAIN SCALES - GENERAL: PAINLEVEL: NO PAIN (0)

## 2020-11-02 NOTE — PROGRESS NOTES
PM&R Clinic & Procedure Note:    Luis Trinidad is 65-year-old male with a history of stroke in Nov 2016 resulting in residual right spastic hemiparesis.  Rogelio was last seen on 8/7/20 at which time he received 400 units of botox at that time to his right trunk and right arm.     He reports good results with last series of injections. Notes 80-90% improvement in spasticity, which lasted 2.5 months.Denies any side effects. Denies any new medical issues. Continues on Baclofen 30 mg TID. Has not yet been able to get into sleep medicine, also 2/2 pandemic.     Physical exam:  BP (!) 164/94   Pulse 93   Temp 98  F (36.7  C)   Resp 16   SpO2 97%      Alert, pleasant affect.   Increased tone noted in RUE with shoulder abd, EF, pronation, WF and FF - 2-3/4 per MAS         Assessment and recommendations:    Ischemic stroke at the lateral left thalamus and the posterior limb of the left internal capsule 11/2015    Residual right spastic hemiparesis     HTN     HLD    Questionable SAMMY      1. Work-up: none today  2. Therapy/equipment/braces: continue HEP regularly; consider PT as maintenance therapy when safe with COVID pandemic   3. Medications: no change in meds today; on baclofen 30 tid (ordered refills today)  4. Interventions: chemo-denervation today; procedure note below. No change in the total dose or sites of injections today   5. Referral / follow up with other providers: to sleep medicine clinic when able for more evaluation of possible SAMMY as possible risk factor for stroke   6. Follow up: 12 weeks           Procedure:   Chemodenervation to right chest and RUE utilizing botulinum toxin.  Began with formal consent which he signed. Had formal time-out prior to procedure. 400 U of Botox lot [id number], expiration 08/2023 was utilized. Diluted with normal saline in a 100 U:1mL ratio, total of 4 mL.  All areas were cleansed with chloroprep prior to injecting. EMG guidance was utilized to identify most active motor units  while avoiding surrounding structures.     The following muscles were then injected, two body areas     Right trunk:  1. Right Pectoralis Major: 50 units divided in 1 site     Right arm  1. Biceps 100 units  2. FDS 50 units  3. FDP 50 units  4. FCR 40 units  5. FCU 40 units  6. Lumbricals 20 units (4 sites)  7. Pronator Teres 50 units      Brigida Briseno MD  Physical Medicine & Rehabilitation

## 2020-11-02 NOTE — NURSING NOTE
Chief Complaint   Patient presents with     Stroke     Botox     [facility] RETURN BOTOX       Avery Dasilva, EMT

## 2020-11-02 NOTE — LETTER
11/2/2020       RE: Luis Trinidad  [address] [facility] [id number]     Dear Colleague,    Thank you for referring your patient, Luis Trinidad, to the [facility] PHYSICAL MEDICINE AND REHABILITATION CLINIC [place] at [facility]. Please see a copy of my visit note below.    PM&R Clinic & Procedure Note:    Luis Trinidad is 65-year-old male with a history of stroke in Nov 2016 resulting in residual right spastic hemiparesis.  Rogelio was last seen on 8/7/20 at which time he received 400 units of botox at that time to his right trunk and right arm.     He reports good results with last series of injections. Notes 80-90% improvement in spasticity, which lasted 2.5 months.Denies any side effects. Denies any new medical issues. Continues on Baclofen 30 mg TID. Has not yet been able to get into sleep medicine, also 2/2 pandemic.     Physical exam:  BP (!) 164/94   Pulse 93   Temp 98  F (36.7  C)   Resp 16   SpO2 97%      Alert, pleasant affect.   Increased tone noted in RUE with shoulder abd, EF, pronation, WF and FF - 2-3/4 per MAS         Assessment and recommendations:    Ischemic stroke at the lateral left thalamus and the posterior limb of the left internal capsule 11/2015    Residual right spastic hemiparesis     HTN     HLD    Questionable SAMMY      1. Work-up: none today  2. Therapy/equipment/braces: continue HEP regularly; consider PT as maintenance therapy when safe with COVID pandemic   3. Medications: no change in meds today; on baclofen 30 tid (ordered refills today)  4. Interventions: chemo-denervation today; procedure note below. No change in the total dose or sites of injections today   5. Referral / follow up with other providers: to sleep medicine clinic when able for more evaluation of possible SAMMY as possible risk factor for stroke   6. Follow up: 12 weeks           Procedure:   Chemodenervation to right chest and RUE utilizing botulinum toxin.   Began with formal consent which he signed. Had formal time-out prior to procedure. 400 U of Botox lot [id number], expiration 08/2023 was utilized. Diluted with normal saline in a 100 U:1mL ratio, total of 4 mL.  All areas were cleansed with chloroprep prior to injecting. EMG guidance was utilized to identify most active motor units while avoiding surrounding structures.     The following muscles were then injected, two body areas     Right trunk:  1. Right Pectoralis Major: 50 units divided in 1 site     Right arm  1. Biceps 100 units  2. FDS 50 units  3. FDP 50 units  4. FCR 40 units  5. FCU 40 units  6. Lumbricals 20 units (4 sites)  7. Pronator Teres 50 units      Brigida Briseno MD  Physical Medicine & Rehabilitation       Again, thank you for allowing me to participate in the care of your patient.      Sincerely,    Brigida Briseno MD

## 2020-11-10 NOTE — PROGRESS NOTES
Physical Therapy 10th Visit Progress Note    MD:  Brigida Briseno MD  Dates: 5/2/2019 - 7/18/2019 07/18/19 1200   Signing Clinician's Name / Credentials   Signing clinician's name / credentials Yen Villanueva PT   Session Number   Session Number 10/10 medicare   Authorization status KX modifier needed - Pt requires continued PT beyond the therapy cap to maximize safety and reduce risk for falls due to significant spasticity and impaired motor patterns related to his right hemiparesis.    Progress Note/Recertification   Recertification Due Date 07/29/19   Goal 1   Goal Description Rogelio will be able to achieve proper midstance to terminal stance alignment on right LE and transition into pre and initial swing with proper clearance of right foot for greater efficiency and safety with gait at home and in the community.   Target Date 07/29/19  (in progress)   Date Met   (improved mid-termstance.  DF/HS weakness limits swing phase)   Goal Identifier 1 - Gait pattern/safety   Goal 2   Goal Description Rogelio will increase his gait speed on the 25 foot timed walk to 9 sec or less without use of an assistive device  to indicate reduced level of gait impairment and improved community mobility.   Target Date 07/29/19  (in progress)   Date Met   (limited by clearance of right foot. fearful of falling)   Goal Identifier 2- Gait speed   Goal 3   Goal Description Pt will be able to navigate up and down his stairs with one rail in a reciprocal pattern with minimal circumduction of right LE in order for more efficient and safe access to all levels of his home.   Target Date 07/29/19  (partially met.)   Date Met   (Met descending.  going up decreased knee flex to clear)   Goal Identifier 3- stairs   Goal 5   Goal Description Rogelio will be independent with a home activity program to address his impairments and self manage his recovery.    Target Date 07/29/19  (in progress)   Date Met   (progressed and modified ongoing)   Goal  "Identifier 5 - home program   Subjective Report   Subjective Report Feels like right LE is getting weaker.  Has been really tight over the past week   Objective Measure 2   Objective Measure 25 foot walk    Details bootie on rigth foot - 16.4 sec, cues for faster pace, 12 sec, 14 steps.  without bootie on right foot 12.6 sec, 14.5 sec, 17 steps.  but catches right foot / toes several times.     Vitals Signs   Heart Rate 97   Blood Pressure 121/82   Vital Signs Comments L UE electronic seated   Therapeutic Procedure/exercise   Therapeutic Procedures: strength, endurance, ROM, flexibillity minutes (43135) 20   Skilled Intervention monitoring, assist, cues for alignment, manual and dynamic stretching techniques.    Patient Response much tighter today than usual   Treatment Detail supine stretching R hip flexors, R LE off edge of mat, with gentle overpressure into knee flexion on right. initially with left LE extended, then with left knee flexed to chest.  3 x 30\" with contract/relax.   supine right shoulder/elbow flexor stretching. gentle overpressure and STM along mm belly.    Gait Training   Gait Training Minutes, includes stair climbing (12997) 30   Skilled Intervention facil for wt shift over right LE, cues for narrow RK.    Patient Response fatigue   Treatment Detail up/down 6\" steps reciprocally with facil for righ LE alignment going up and hip/knee flex coordination.  gait with shoes, blue tband around waist for resistance with fwd progression to promote greater wt shift fwd over right LE instaNCE and improved contact left heel at initial contact.  Timed gait trials see obj measures.    Plan   Updates to plan of care continue per current certification.  Rogelio is showing iimprovements in forward progression over right LE in stance phase which has allowed better control and alignment with left LE placement.  Clearance in swng continues to be the biggest limitation and challenge due to right HS and ankle DF " weakness.  Progression of speed is limited due to catching of toes.  Trialing various options for assisting clearance of foot in swing to increase safety and efficiency of community and household mobility.     Plan for next session monitor vitals. gait with saebo brace and with band/resistance.  Stairs with emphasis on right LE hip/knee flex in open chain.  right quad/ham coactivation with ecc/conc demands.   right term stance stability. quad / hip flexor stretching. continue stepper. functional stretching of trunk , UE and LE, right LE open chain/modified chain activities for timing/coordination. cardiovascular conditioning.    Total Session Time   Timed Code Treatment Minutes 50   Total Treatment Time (sum of timed and untimed services) 50      Tranexamic Acid Counseling:  Patient advised of the small risk of bleeding problems with tranexamic acid. They were also instructed to call if they developed any nausea, vomiting or diarrhea. All of the patient's questions and concerns were addressed.

## 2021-01-25 ENCOUNTER — OFFICE VISIT (OUTPATIENT)
Dept: PHYSICAL MEDICINE AND REHAB | Facility: CLINIC | Age: 66
End: 2021-01-25
Payer: COMMERCIAL

## 2021-01-25 VITALS
HEART RATE: 109 BPM | DIASTOLIC BLOOD PRESSURE: 88 MMHG | SYSTOLIC BLOOD PRESSURE: 131 MMHG | RESPIRATION RATE: 15 BRPM | OXYGEN SATURATION: 98 %

## 2021-01-25 DIAGNOSIS — R25.2 SPASTICITY: ICD-10-CM

## 2021-01-25 DIAGNOSIS — G81.11 RIGHT SPASTIC HEMIPARESIS (H): Primary | ICD-10-CM

## 2021-01-25 DIAGNOSIS — I63.81 LACUNAR INFARCT, ACUTE (H): ICD-10-CM

## 2021-01-25 PROCEDURE — 64644 CHEMODENERV 1 EXTREM 5/> MUS: CPT | Performed by: PHYSICAL MEDICINE & REHABILITATION

## 2021-01-25 PROCEDURE — 95874 GUIDE NERV DESTR NEEDLE EMG: CPT | Performed by: PHYSICAL MEDICINE & REHABILITATION

## 2021-01-25 PROCEDURE — 64646 CHEMODENERV TRUNK MUSC 1-5: CPT | Performed by: PHYSICAL MEDICINE & REHABILITATION

## 2021-01-25 NOTE — NURSING NOTE
Chief Complaint   Patient presents with     Neurologic Problem     Musculoskeletal Problem     [facility] RETURN BOTOX CVA SPASMS       Avery Dasilva, EMT

## 2021-01-25 NOTE — LETTER
1/25/2021       RE: Luis Trinidad  [address] [facility] [address]     Dear Colleague,    Thank you for referring your patient, Luis Trinidad, to the [facility] PHYSICAL MEDICINE AND REHABILITATION CLINIC [place] at [facility]. Please see a copy of my visit note below.    PM&R Clinic & Procedure Note:    Luis Trinidad is 65-year-old male with a history of stroke in Nov 2016 resulting in residual right spastic hemiparesis.  Rogelio was last seen on 11/2/20 at which time he received 400 units of botox at that time to his right trunk and right arm.     He reports good results with last series of injections. Notes 80-90% improvement in spasticity, which lasted 2.5 months - similar to previous series of injections. Denies any side effects. Denies any new medical issues. Continues on Baclofen 30 mg TID. Has not yet been able to get into sleep medicine, also 2/2 pandemic.     Physical exam:  /88   Pulse 109   Resp 15   SpO2 98%      Alert, pleasant affect.   Increased tone noted in RUE with shoulder abd, EF, pronation, WF and FF - 2-3/4 per MAS  Skin intact at the sites of injections      Assessment and recommendations:    Ischemic stroke at the lateral left thalamus and the posterior limb of the left internal capsule 11/2015    Residual right spastic hemiparesis     HTN     HLD    Questionable SAMMY    1. Work-up: none today  2. Therapy/equipment/braces: continue HEP regularly; ordered outpatient PT and OT to review HEP, help with stretching and trial of adaptive devices to help with his function    3. Medications: no change in meds today; on baclofen 30 tid (ordered refills today)  4. Interventions: chemo-denervation today; procedure note below. No change in the total dose but adjusted the sites of injections as below (no injections at lumbricals and flexor digitorum profundus - increased the dose at biceps, PT and FCR/FCU).   5. Referral / follow up with  other providers: to sleep medicine clinic when able for more evaluation of possible SAMMY as possible risk factor for stroke   6. Follow up: 12 weeks     Procedure:   Chemodenervation to right chest and RUE utilizing botulinum toxin.  Began with formal consent which he signed. Had formal time-out prior to procedure. 400 U of Botox lot [id number], expiration 10/2023 was utilized. Diluted with normal saline in a 100 U:1mL ratio, total of 4 mL.  All areas were cleansed with chloroprep prior to injecting. EMG guidance was utilized to identify most active motor units while avoiding surrounding structures.     The following muscles were then injected, two body areas     Right trunk:  1. Right Pectoralis Major: 50 units divided in 1 site    Right arm  1. Biceps 130 units  2. FDS 50 units  4. FCR 50 units  5. FCU 50 units  7. Pronator Teres 70 units    Brigida Briseno MD  Physical Medicine & Rehabilitation

## 2021-01-25 NOTE — PROGRESS NOTES
PM&R Clinic & Procedure Note:    Luis Trinidad is 65-year-old male with a history of stroke in Nov 2016 resulting in residual right spastic hemiparesis.  Rogelio was last seen on 11/2/20 at which time he received 400 units of botox at that time to his right trunk and right arm.     He reports good results with last series of injections. Notes 80-90% improvement in spasticity, which lasted 2.5 months - similar to previous series of injections. Denies any side effects. Denies any new medical issues. Continues on Baclofen 30 mg TID. Has not yet been able to get into sleep medicine, also 2/2 pandemic.     Physical exam:  /88   Pulse 109   Resp 15   SpO2 98%      Alert, pleasant affect.   Increased tone noted in RUE with shoulder abd, EF, pronation, WF and FF - 2-3/4 per MAS  Skin intact at the sites of injections          Assessment and recommendations:    Ischemic stroke at the lateral left thalamus and the posterior limb of the left internal capsule 11/2015    Residual right spastic hemiparesis     HTN     HLD    Questionable SAMMY      1. Work-up: none today  2. Therapy/equipment/braces: continue HEP regularly; ordered outpatient PT and OT to review HEP, help with stretching and trial of adaptive devices to help with his function    3. Medications: no change in meds today; on baclofen 30 tid (ordered refills today)  4. Interventions: chemo-denervation today; procedure note below. No change in the total dose but adjusted the sites of injections as below (no injections at lumbricals and flexor digitorum profundus - increased the dose at biceps, PT and FCR/FCU).   5. Referral / follow up with other providers: to sleep medicine clinic when able for more evaluation of possible SAMMY as possible risk factor for stroke   6. Follow up: 12 weeks           Procedure:   Chemodenervation to right chest and RUE utilizing botulinum toxin.  Began with formal consent which he signed. Had formal time-out prior to procedure. 400 U of  Botox lot [id number], expiration 10/2023 was utilized. Diluted with normal saline in a 100 U:1mL ratio, total of 4 mL.  All areas were cleansed with chloroprep prior to injecting. EMG guidance was utilized to identify most active motor units while avoiding surrounding structures.     The following muscles were then injected, two body areas     Right trunk:  1. Right Pectoralis Major: 50 units divided in 1 site       Right arm  1. Biceps 130 units  2. FDS 50 units  4. FCR 50 units  5. FCU 50 units  7. Pronator Teres 70 units      Brigida Briseno MD  Physical Medicine & Rehabilitation

## 2021-03-23 ENCOUNTER — HOSPITAL ENCOUNTER (OUTPATIENT)
Dept: PHYSICAL THERAPY | Facility: CLINIC | Age: 66
Setting detail: THERAPIES SERIES
End: 2021-03-23
Attending: PHYSICAL MEDICINE & REHABILITATION
Payer: COMMERCIAL

## 2021-03-23 PROCEDURE — 97161 PT EVAL LOW COMPLEX 20 MIN: CPT | Mod: GP | Performed by: PHYSICAL THERAPIST

## 2021-03-25 ENCOUNTER — HOSPITAL ENCOUNTER (OUTPATIENT)
Dept: OCCUPATIONAL THERAPY | Facility: CLINIC | Age: 66
Setting detail: THERAPIES SERIES
End: 2021-03-25
Attending: PHYSICAL MEDICINE & REHABILITATION
Payer: COMMERCIAL

## 2021-03-25 PROCEDURE — 97166 OT EVAL MOD COMPLEX 45 MIN: CPT | Mod: GO | Performed by: OCCUPATIONAL THERAPIST

## 2021-03-25 PROCEDURE — 97110 THERAPEUTIC EXERCISES: CPT | Mod: GO | Performed by: OCCUPATIONAL THERAPIST

## 2021-03-29 ENCOUNTER — HOSPITAL ENCOUNTER (OUTPATIENT)
Dept: PHYSICAL THERAPY | Facility: CLINIC | Age: 66
Setting detail: THERAPIES SERIES
End: 2021-03-29
Attending: PHYSICAL MEDICINE & REHABILITATION
Payer: COMMERCIAL

## 2021-03-29 PROCEDURE — 97112 NEUROMUSCULAR REEDUCATION: CPT | Mod: GP | Performed by: PHYSICAL THERAPIST

## 2021-03-29 PROCEDURE — 97116 GAIT TRAINING THERAPY: CPT | Mod: GP | Performed by: PHYSICAL THERAPIST

## 2021-03-29 NOTE — PROGRESS NOTES
03/25/21 1200   Quick Adds   Quick Adds Certification   Type of Visit Outpatient Occupational Therapy Re-Evaluation       Present No   General Information   Start Of Care Date 03/25/21   Referring Physician Dr. Brigida Briseno    Orders Evaluate and treat as indicated   Orders Date 01/25/21   Medical Diagnosis Right spastic hemiparesis (H) G81.11   Onset of Illness/Injury or Date of Surgery 11/29/17   Precautions/Limitations Fall precautions   Surgical/Medical History Reviewed Yes   Additional Occupational Profile Info/Pertinent History of Current Problem Patient returning for care after a year hiatus as a result of the COVID-19 pandemic.  Sustained a stroke in November of 2015.  Patient has been participating in his stretching home programming regularly, however reports that aggressive manual therapy provided at this clinic has been helpful for ROM and pain.  Most recently received Botox injections to various muscle groups through R forearm, bicep and pectorals musculature in January with a 3 month schedule ongoing (next injections scheduled for April).    Role/Living Environment   Current Community Support Family/friend caregiver   Patient role/Employment history Disabled   Community/Avocational Activities Hobbies: be with family and friends, go to movies, exercise/be active   Current Living Environment [facility]   Number of Stairs to Enter Home 15-20   Number of Stairs Within Home ~16 stairs to upper level   Primary Bathroom Location/Comments uses walk-in shower on upper level; no grab bars; has shower chair but doesn't use   Primary Bathroom Set Up/Equipment Tub/Shower combo;Tub grab bar;Shower/tub chair   Home/Community Accessibility Comments washer/dryer, bedroom and master bathroom on upper level; 1/2 bath on main   Prior Level - Transfers Independent   Prior Level - Ambulation Independent   Prior Level - ADLS Independent   Prior Responsibilities - IADL Medication management;Driving;Work    Prior Level Comments Patient was completely independent with all ADL/IADLs prior to CVA in 2015.   Current Assistive Devices - Mobility Standard cane  (predominantly used for community mobility )   Patient/family Goals Statement Return to some form of employment, continue improving use of R UE.     Pain   Patient currently in pain No   Fall Risk Screen   Fall screen completed by PT   Have you fallen 2 or more times in the past year? No   Have you fallen and had an injury in the past year? No   Is patient a fall risk? Yes   Fall screen comments per clinical observation of impairments and functional limitations   Abuse Screen (yes response referral indicated)   Feels Unsafe at Home or Work/School no   Feels Threatened by Someone no   Does Anyone Try to Keep You From Having Contact with Others or Doing Things Outside Your Home? no   Physical Signs of Abuse Present no   Cognitive Status Examination   Orientation Orientation to person, place and time   Level of Consciousness Alert   Follows Commands and Answers Questions Able to follow single-step instructions;100% of the time   Personal Safety and Judgment Intact   Memory Intact   Memory Comments Patient with good recall of all home programming recommendations    Attention No deficits were identified   Visual Perception   Visual Perception No deficits were identified   Sensation   Sensation Comments Indicates intermittent numbness to R hand, temperature awareness is slightly diminished    Range of Motion (ROM)   ROM Comments L UE WNLs; R UE: AROM Scapular Elevation: 25-50%, scapular retraction % (mild to mod weakness/winging); in standing: shoulder abduction 50 degrees with mod compensation, 20 degrees shoulder flexion (movement moreso in scaption, stopped at 20 degrees as significant increased compensatory movements after this), shoulder extension 20 degrees; elbow: flexion (using shoulder moderately) to 85 degrees (starting at 50 degrees), extension to 65  degrees; wrist flexion 5 degrees, wrist extension 0 degrees   Strength   Strength Comments L UE WNLs; R UE: scapular elevation 4+ within available range; scapular retraction 4/5; shoulder 2+ (see above for specific measurements); elbow flexion: 4 to 4+ within available range; elbow extension 4+ to 5; pronation 2-; supination 0/5, wrist flexion 1+, wrist extension 0/5; digit extension 1+/5 for all digits though greater AROM 2nd and 3rd digits    Hand Strength   Hand Dominance Right   Left Hand  (pounds) 90 pounds   Right Hand  (pounds) 10 pounds   Left Lateral Pinch (pounds) 25 pounds   Right Lateral Pinch (pounds) 16 pounds   Left Three Point Pinch (pounds) 19 pounds   Right Three Point Pinch (pounds) 4 pounds  (with left hand support )   Muscle Tone   Muscle Tone RUE   Muscle Tone Assessment - RUE moderately increased tone   Coordination   Upper Extremity Coordination Right UE impaired   Right Hand, Nine Hole Peg Test (seconds) 1   Transfer Skill   Level of Hunt: Transfers independent   Bathing   Level of Hunt - Bathing independent   Eating/Self-Feeding   Level of Hunt: Eating independent   Planned Therapy Interventions   Planned Therapy Interventions ADL training;IADL training;Coordination training;Manual therapy;Neuromuscular re-education;Orthotic fitting/training;ROM;Self care/Home management;Strengthening;Stretching;Therapeutic activities;Visual perception   Planned Modalities Electrical stimulation;Ultrasound  (prn )    OT Goal 1   Goal Identifier R shoulder strength   Goal Description Patient to increase R shoulder flexion AROM to 30 degrees for improved R UE function for ADL/IADLs (during dressing and grooming tasks, carrying items, putting groceries and dishes away).   Target Date 06/21/21    OT Goal 2   Goal Identifier R pinch strength   Goal Description Patient will demonstrate increased right hand pinch strength (both lateral and palmar) by 3# each for increased  independence with ADL/IADLs such as opening containers, pull up pants, maintaining pinch to hold items.   Target Date 06/21/21    OT Goal 3   Goal Identifier R GM/FM coordination   Goal Description Patient to improve R GM/FM coordination skills for increased independence during ADL/IADL tasks (dressing, kitchen tasks, feeding self) by completing the Box and Blocks Test with R UE in standing with 5 blocks    Target Date 06/21/21   OT Goal 4   Goal Identifier R UE as Gross Assist   Goal Description Patient to demonstrate functional tasks (feeding self, wiping the counter/table, washing dishes, etc.) with 20% use of R UE as gross stabilizer or gross assist for increased ADL/IADL independence and closer return to prior level of function.   Target Date 06/21/21   Clinical Impression   Criteria for Skilled Therapeutic Interventions Met Yes, treatment indicated   OT Diagnosis decreased ADL/IADL independence   Influenced by the following impairments decreased R UE function (strength, tone, coordination, ROM); decreased functional mobility; questionable visual perceptual skills   Assessment of Occupational Performance 3-5 Performance Deficits   Identified Performance Deficits decreased independence with all ADLs (increased effort and time), inability to work and drive and complete household tasks   Clinical Decision Making (Complexity) Moderate complexity   Therapy Frequency 1-2x/week as indicated by Botox injection schedule    Predicted Duration of Therapy Intervention (days/wks) 12 weeks   Risks and Benefits of Treatment have been explained. Yes   Patient, Family & other staff in agreement with plan of care Yes   Clinical Impression Comments  Luis is appropriate for an additional course of occupational therapy to address R UE with potential to continue to improve function and ADL/IADL independence.   Education Assessment   Barriers To Learning Language;Physical;Cultural  (English as second language )   Preferred Learning  Style Listening;Pictures/video;Demonstration   Therapy Certification   Certification date from 03/25/21   Certification date to 06/21/21   Certification I certify the need for these services furnished under this plan of treatment and while under my care.  (Physician co-signature of this document indicates review and certification of the therapy plan)   Total Evaluation Time   OT Eval, Moderate Complexity Minutes (66081) 20       Thank you for referring Luis to [facility] Therapy[facility]AL[facility]

## 2021-03-29 NOTE — PROGRESS NOTES
[unfilled]                                                                               [facility]          OUTPATIENT OCCUPATIONAL THERAPY  EVALUATION  PLAN OF TREATMENT FOR OUTPATIENT REHABILITATION  (COMPLETE FOR INITIAL CLAIMS ONLY)  Patient's Last Name, First Name, M.I.  YOB: 1955  Luis Trinidad                        Provider's Name  [facility] Medical Record No.  [id number]                               Onset Date:     11/29/17   Start of Care Date:     03/25/21   Type:     ___PT   _X_OT   ___SLP Medical Diagnosis:     Right spastic hemiparesis (H) G81.11                          OT Diagnosis:     decreased ADL/IADL independence Visits from SOC:  1   _________________________________________________________________________________  Plan of Treatment/Functional Goals:  ADL training, IADL training, Coordination training, Manual therapy, Neuromuscular re-education, Orthotic fitting/training, ROM, Self care/Home management, Strengthening, Stretching, Therapeutic activities, Visual perception  Electrical stimulation, Ultrasound(prn )                 Goals  Goal Identifier: R shoulder strength  Goal Description: Patient to increase R shoulder flexion AROM to 30 degrees for improved R UE function for ADL/IADLs (during dressing and grooming tasks, carrying items, putting groceries and dishes away).  Target Date: 06/21/21     Goal Identifier: R pinch strength  Goal Description: Patient will demonstrate increased right hand pinch strength (both lateral and palmar) by 3# each for increased independence with ADL/IADLs such as opening containers, pull up pants, maintaining pinch to hold items.  Target Date: 06/21/21     Goal Identifier: R GM/FM coordination  Goal Description: Patient to improve R GM/FM coordination skills for increased independence during ADL/IADL tasks (dressing, kitchen tasks, feeding self) by completing the Box and  Blocks Test with R UE in standing with 5 blocks   Target Date: 06/21/21     Goal Identifier: R UE as Gross Assist  Goal Description: Patient to demonstrate functional tasks (feeding self, wiping the counter/table, washing dishes, etc.) with 20% use of R UE as gross stabilizer or gross assist for increased ADL/IADL independence and closer return to prior level of function.  Target Date: 06/21/21                               Therapy Frequency: 1-2x/week as indicated by Botox injection schedule      Predicted Duration of Therapy Intervention (days/wks): 12 weeks  Mirian Storey OT          I CERTIFY THE NEED FOR THESE SERVICES FURNISHED UNDER        THIS PLAN OF TREATMENT AND WHILE UNDER MY CARE     (Physician co-signature of this document indicates review and certification of the therapy plan).                 ,    Certification date from: 03/25/21, Certification date to: 06/21/21               Referring Physician: Dr. Brigida Briseno      Initial Assessment        See Epic Evaluation      Start Of Care Date: 03/25/21

## 2021-04-02 ENCOUNTER — HOSPITAL ENCOUNTER (OUTPATIENT)
Dept: PHYSICAL THERAPY | Facility: CLINIC | Age: 66
Setting detail: THERAPIES SERIES
End: 2021-04-02
Attending: PHYSICAL MEDICINE & REHABILITATION
Payer: COMMERCIAL

## 2021-04-02 PROCEDURE — 97110 THERAPEUTIC EXERCISES: CPT | Mod: GP | Performed by: PHYSICAL THERAPIST

## 2021-04-02 PROCEDURE — 97112 NEUROMUSCULAR REEDUCATION: CPT | Mod: GP | Performed by: PHYSICAL THERAPIST

## 2021-04-05 ENCOUNTER — HOSPITAL ENCOUNTER (OUTPATIENT)
Dept: OCCUPATIONAL THERAPY | Facility: CLINIC | Age: 66
Setting detail: THERAPIES SERIES
End: 2021-04-05
Attending: FAMILY MEDICINE
Payer: COMMERCIAL

## 2021-04-05 ENCOUNTER — HOSPITAL ENCOUNTER (OUTPATIENT)
Dept: PHYSICAL THERAPY | Facility: CLINIC | Age: 66
Setting detail: THERAPIES SERIES
End: 2021-04-05
Attending: PHYSICAL MEDICINE & REHABILITATION
Payer: COMMERCIAL

## 2021-04-05 PROCEDURE — 97140 MANUAL THERAPY 1/> REGIONS: CPT | Mod: GO | Performed by: OCCUPATIONAL THERAPIST

## 2021-04-05 PROCEDURE — 97112 NEUROMUSCULAR REEDUCATION: CPT | Mod: GP | Performed by: PHYSICAL THERAPIST

## 2021-04-05 PROCEDURE — 97140 MANUAL THERAPY 1/> REGIONS: CPT | Mod: GP | Performed by: PHYSICAL THERAPIST

## 2021-04-05 PROCEDURE — 97110 THERAPEUTIC EXERCISES: CPT | Mod: GO | Performed by: OCCUPATIONAL THERAPIST

## 2021-04-09 ENCOUNTER — HOSPITAL ENCOUNTER (OUTPATIENT)
Dept: PHYSICAL THERAPY | Facility: CLINIC | Age: 66
Setting detail: THERAPIES SERIES
End: 2021-04-09
Attending: PHYSICAL MEDICINE & REHABILITATION
Payer: COMMERCIAL

## 2021-04-09 PROCEDURE — 97116 GAIT TRAINING THERAPY: CPT | Mod: GP | Performed by: PHYSICAL THERAPIST

## 2021-04-09 PROCEDURE — 97110 THERAPEUTIC EXERCISES: CPT | Mod: GP | Performed by: PHYSICAL THERAPIST

## 2021-04-09 PROCEDURE — 97112 NEUROMUSCULAR REEDUCATION: CPT | Mod: GP | Performed by: PHYSICAL THERAPIST

## 2021-04-12 ENCOUNTER — HOSPITAL ENCOUNTER (OUTPATIENT)
Dept: OCCUPATIONAL THERAPY | Facility: CLINIC | Age: 66
Setting detail: THERAPIES SERIES
End: 2021-04-12
Attending: PHYSICAL MEDICINE & REHABILITATION
Payer: COMMERCIAL

## 2021-04-12 PROCEDURE — 97110 THERAPEUTIC EXERCISES: CPT | Mod: GO | Performed by: OCCUPATIONAL THERAPIST

## 2021-04-12 PROCEDURE — 97140 MANUAL THERAPY 1/> REGIONS: CPT | Mod: GO | Performed by: OCCUPATIONAL THERAPIST

## 2021-04-16 ENCOUNTER — HOSPITAL ENCOUNTER (OUTPATIENT)
Dept: OCCUPATIONAL THERAPY | Facility: CLINIC | Age: 66
Setting detail: THERAPIES SERIES
End: 2021-04-16
Attending: PHYSICAL MEDICINE & REHABILITATION
Payer: COMMERCIAL

## 2021-04-16 ENCOUNTER — HOSPITAL ENCOUNTER (OUTPATIENT)
Dept: PHYSICAL THERAPY | Facility: CLINIC | Age: 66
Setting detail: THERAPIES SERIES
End: 2021-04-16
Attending: PHYSICAL MEDICINE & REHABILITATION
Payer: COMMERCIAL

## 2021-04-16 PROCEDURE — 97110 THERAPEUTIC EXERCISES: CPT | Mod: GO | Performed by: OCCUPATIONAL THERAPIST

## 2021-04-16 PROCEDURE — 97110 THERAPEUTIC EXERCISES: CPT | Mod: GP | Performed by: PHYSICAL THERAPIST

## 2021-04-16 PROCEDURE — 97112 NEUROMUSCULAR REEDUCATION: CPT | Mod: GP | Performed by: PHYSICAL THERAPIST

## 2021-04-16 PROCEDURE — 97140 MANUAL THERAPY 1/> REGIONS: CPT | Mod: GO | Performed by: OCCUPATIONAL THERAPIST

## 2021-04-17 NOTE — PROGRESS NOTES
03/23/21 1100   Quick Adds   Quick Adds Certification   Type of Visit Initial OP PT Evaluation   General Information   Start of Care Date 03/23/21   Referring Physician Brigida Briseno   Orders Evaluate and Treat as Indicated   Additional Orders OT   Order Date 01/25/21   Medical Diagnosis CVA   Onset of illness/injury or Date of Surgery   (Decline in function/gait over past year due to covid)   Precautions/Limitations fall precautions   Surgical/Medical history reviewed Yes   Pertinent history of current problem (include personal factors and/or comorbidities that impact the POC) Rogelio is a very pleasant man known to me from previous episodes of care for funtional movement impairments related to his stroke in 2015.  Pt has been unable to attend any therapy for the past year due to covid pandemic and has been experiencing a decline in his gait abilities, functional endurance and muscle tighness affecting his efficiency and ease of movement.       Prior level of function comment Prior to stroke pt was fully independent and active.  working full time as a  for [employer].  He has not been able to return to work since his stroke.  He is independent with ADLs with modifications/compensatory strategies.  He is able to walk in home without an AD.  He had progressed to walking 1-2 x per day outdoors in nice weather but now had not been walking due to pain on lateral right foot and increased fatigue.     Previous/Current Treatment Injection  (Botox right UE 1/25/21)   Current Community Support Family/friend caregiver  (wife Joy)   Patient role/Employment history Disabled   Living environment House/[facility]   Home/Community Accessibility Comments 14 steps to basement   Current Assistive Devices Standard Cane   ADL Devices Raised Toilet Seat   Assistive Devices Comments not using cane very often any longer   Patient/Family Goals Statement Increase walking tolerance to return to daily walking,  increase walking  speed, decreased stiffness for greater ease with ADLs.     Fall Risk Screen   Fall screen completed by PT   Have you fallen 2 or more times in the past year? No   Have you fallen and had an injury in the past year? No   Timed Up and Go score (seconds) NT   Is patient a fall risk? Yes   Fall screen comments per clinical observation of impairments and functional limitations   Abuse Screen (yes response referral indicated)   Feels Unsafe at Home or Work/School no   Feels Threatened by Someone no   Does Anyone Try to Keep You From Having Contact with Others or Doing Things Outside Your Home? no   Physical Signs of Abuse Present no   Pain   Patient currently in pain No   Vitals Signs   Heart Rate 107   SpO2 97   Blood Pressure 128/72   Cognitive Status Examination   Orientation orientation to person, place and time   Level of Consciousness alert   Follows Commands and Answers Questions 100% of the time;able to follow multistep instructions   Personal Safety and Judgment intact   Memory intact   Cognitive Comment appears intact   Integumentary   Integumentary No deficits were identified   Posture   Posture Comments right shoulder protraction     Range of Motion (ROM)   ROM Comment decreased length R biceps, R knee flexors,  R plantarflexors, right lateral trunk, bilateral pecs R>L.     Strength   Strength Comments r hip flex 3-/5,  knee flex 2-/5,  knee ext 3-/5,  ankle DF 3-/5, decreased everter strength (1+/5).  Decreased core strength, decreased right UE stretnght (see OT for details).    Bed Mobility   Bed Mobility Comments independent   Transfer Skills   Transfer Comments decreaseed wt shift/loading R LE.     Gait   Gait Comments slow gait speed with decreased hip ext in mid to term stance, decreased HS and ankle DF/Eversion activation in swing phase, decreased clearance of foot, lacks heel contact at IC on right, initial contact with lateral border of foot.  decreased step length left.    Gait Special Tests   Gait  Special Tests 25 FOOT TIMED WALK   Gait Special Tests 25 Foot Timed Walk   Seconds 20.2   Steps 22 Steps   Comments no AD   Balance   Balance Comments challenged with static and dynamic activities with NBOS.    Sensory Examination   Sensory Perception Comments mild decreased light touch sensation right extremities and trunk   Coordination   Coordination Comments impaired right LE timing / grading during functional tasks   Muscle Tone   Muscle Tone Comments hypertonicity R UE/LE   Modality Interventions   Planned Modality Interventions Comments per PT   Planned Therapy Interventions   Planned Therapy Interventions gait training;neuromuscular re-education;strengthening;stretching;transfer training;manual therapy;motor coordination training   Clinical Impression   Criteria for Skilled Therapeutic Interventions Met yes, treatment indicated   PT Diagnosis impaired participation in life roles and functional mobility due to R hemiparesis and sequelae of chronic CVA   Influenced by the following impairments impaired force production and sustainability of right hip ext, knee extensors, knee flexors, ankle DF/Everters,  right obliques, impaired force production right scapular adduction/depression,decreased length R elbow flexors, shoulder flexors,  ankle PF on R, hip flexors/knee extensors on right, pain right lateral foot at 5th digit and metatarsal.    Functional limitations due to impairments impaired postural stability, impaired gait mechanics and efficiency, deconditioning, increased fall risk, unable to work or drive   Clinical Presentation Stable/Uncomplicated   Clinical Presentation Rationale needs adequate amount of repetition and progression of motor challenges in appropriate motor pattersn and alignments  to make gains in strength, functional movement and stability for greater independence and participation in household and community activities.    Clinical Decision Making (Complexity) Low complexity   Therapy  Frequency 2 times/Week   Predicted Duration of Therapy Intervention (days/wks) 90 days   Risk & Benefits of therapy have been explained Yes   Patient, Family & other staff in agreement with plan of care Yes   GOALS   PT Eval Goals 1;2;3;4   Goal 1   Goal Identifier stairs   Goal Description Rogelio will be able to navigate up and down his stairs reciprocally with one rail with minimal circumduction of right LE in order for more efficient and safe access to all levels of his home as well as navigation of stairs in community areas.    Target Date 06/21/21   Goal 2   Goal Identifier Gait speed   Goal Description Rogelio will be able to ambulate 25 feet in 10 sec or less without and assistive device to demonstrate improved gait speed and efficiency for household and community mobility   Target Date 06/21/21   Goal 3   Goal Identifier gait pattern/safety   Goal Description Rogelio will demonstrate initial contact on right with heel contact and decreased supination/inversion at ankle/foot determined by clinical observation and pt report of no foot pain during gait for return to daily walks outside.     Target Date 06/21/21   Goal 4   Goal Identifier HEP   Goal Description Rogelio will be independent in a home  ex and activity program to address his impairments and maintain functional gains made in physical therapy.     Target Date 06/21/21   Total Evaluation Time   PT Eval, Low Complexity Minutes (16844) 55   Therapy Certification   Certification date from 03/23/21   Certification date to 06/21/21   Medical Diagnosis CVA

## 2021-04-17 NOTE — PROGRESS NOTES
[facility]        OUTPATIENT PHYSICAL THERAPY FUNCTIONAL EVALUATION  PLAN OF TREATMENT FOR OUTPATIENT REHABILITATION  (COMPLETE FOR INITIAL CLAIMS ONLY)  Patient's Last Name, First Name, M.I.  YOB: 1955  Luis Trinidad     Provider's Name   [facility]   Medical Record No.  [id number]     Start of Care Date:  03/23/21   Onset Date:  (Decline in function/gait over past year due to covid)   Type:     _X__PT   ____OT  ____SLP Medical Diagnosis:  CVA     PT Diagnosis:  impaired participation in life roles and functional mobility due to R hemiparesis and sequelae of chronic CVA Visits from SOC:  1                              __________________________________________________________________________________  Plan of Treatment/Functional Goals:  gait training, neuromuscular re-education, strengthening, stretching, transfer training, manual therapy, motor coordination training           GOALS  stairs  Rogelio will be able to navigate up and down his stairs reciprocally with one rail with minimal circumduction of right LE in order for more efficient and safe access to all levels of his home as well as navigation of stairs in community areas.   06/21/21    Gait speed  Rogelio will be able to ambulate 25 feet in 10 sec or less without and assistive device to demonstrate improved gait speed and efficiency for household and community mobility  06/21/21    gait pattern/safety  Rogelio will demonstrate initial contact on right with heel contact and decreased supination/inversion at ankle/foot determined by clinical observation and pt report of no foot pain during gait for return to daily walks outside.    06/21/21    HEP  Rogelio will be independent in a home  ex and activity program to address his impairments and maintain functional gains made in physical therapy.    06/21/21                                                 Therapy Frequency:  2 times/Week   Predicted Duration of Therapy Intervention:  90 days    Yen Villanueva, PT                                    I CERTIFY THE NEED FOR THESE SERVICES FURNISHED UNDER        THIS PLAN OF TREATMENT AND WHILE UNDER MY CARE     (Physician co-signature of this document indicates review and certification of the therapy plan).                Certification Date From:  03/23/21   Certification Date To:  06/21/21    Referring Provider:  Brigida Briseno    Initial Assessment  See Epic Evaluation- Start of Care Date: 03/23/21

## 2021-04-19 ENCOUNTER — HOSPITAL ENCOUNTER (OUTPATIENT)
Dept: OCCUPATIONAL THERAPY | Facility: CLINIC | Age: 66
Setting detail: THERAPIES SERIES
End: 2021-04-19
Attending: PHYSICAL MEDICINE & REHABILITATION
Payer: COMMERCIAL

## 2021-04-19 PROCEDURE — 97140 MANUAL THERAPY 1/> REGIONS: CPT | Mod: GO | Performed by: OCCUPATIONAL THERAPIST

## 2021-04-19 PROCEDURE — 97110 THERAPEUTIC EXERCISES: CPT | Mod: GO | Performed by: OCCUPATIONAL THERAPIST

## 2021-04-23 ENCOUNTER — HOSPITAL ENCOUNTER (OUTPATIENT)
Dept: PHYSICAL THERAPY | Facility: CLINIC | Age: 66
Setting detail: THERAPIES SERIES
End: 2021-04-23
Attending: PHYSICAL MEDICINE & REHABILITATION
Payer: COMMERCIAL

## 2021-04-23 PROCEDURE — 97112 NEUROMUSCULAR REEDUCATION: CPT | Mod: GP | Performed by: PHYSICAL THERAPIST

## 2021-04-23 PROCEDURE — 97110 THERAPEUTIC EXERCISES: CPT | Mod: GP | Performed by: PHYSICAL THERAPIST

## 2021-04-26 ENCOUNTER — HOSPITAL ENCOUNTER (OUTPATIENT)
Dept: PHYSICAL THERAPY | Facility: CLINIC | Age: 66
Setting detail: THERAPIES SERIES
End: 2021-04-26
Attending: PHYSICAL MEDICINE & REHABILITATION
Payer: COMMERCIAL

## 2021-04-26 ENCOUNTER — HOSPITAL ENCOUNTER (OUTPATIENT)
Dept: OCCUPATIONAL THERAPY | Facility: CLINIC | Age: 66
Setting detail: THERAPIES SERIES
End: 2021-04-26
Attending: PHYSICAL MEDICINE & REHABILITATION
Payer: COMMERCIAL

## 2021-04-26 PROCEDURE — 97110 THERAPEUTIC EXERCISES: CPT | Mod: GO | Performed by: OCCUPATIONAL THERAPIST

## 2021-04-26 PROCEDURE — 97116 GAIT TRAINING THERAPY: CPT | Mod: GP | Performed by: PHYSICAL THERAPIST

## 2021-04-26 PROCEDURE — 97140 MANUAL THERAPY 1/> REGIONS: CPT | Mod: GO | Performed by: OCCUPATIONAL THERAPIST

## 2021-04-26 PROCEDURE — 97112 NEUROMUSCULAR REEDUCATION: CPT | Mod: GP | Performed by: PHYSICAL THERAPIST

## 2021-04-30 ENCOUNTER — HOSPITAL ENCOUNTER (OUTPATIENT)
Dept: PHYSICAL THERAPY | Facility: CLINIC | Age: 66
Setting detail: THERAPIES SERIES
End: 2021-04-30
Attending: PHYSICAL MEDICINE & REHABILITATION
Payer: COMMERCIAL

## 2021-04-30 PROCEDURE — 97112 NEUROMUSCULAR REEDUCATION: CPT | Mod: GP | Performed by: PHYSICAL THERAPIST

## 2021-04-30 PROCEDURE — 97110 THERAPEUTIC EXERCISES: CPT | Mod: GP | Performed by: PHYSICAL THERAPIST

## 2021-05-03 ENCOUNTER — HOSPITAL ENCOUNTER (OUTPATIENT)
Dept: PHYSICAL THERAPY | Facility: CLINIC | Age: 66
Setting detail: THERAPIES SERIES
End: 2021-05-03
Attending: PHYSICAL MEDICINE & REHABILITATION
Payer: COMMERCIAL

## 2021-05-03 ENCOUNTER — HOSPITAL ENCOUNTER (OUTPATIENT)
Dept: OCCUPATIONAL THERAPY | Facility: CLINIC | Age: 66
Setting detail: THERAPIES SERIES
End: 2021-05-03
Attending: PHYSICAL MEDICINE & REHABILITATION
Payer: COMMERCIAL

## 2021-05-03 PROCEDURE — 97112 NEUROMUSCULAR REEDUCATION: CPT | Mod: GP | Performed by: PHYSICAL THERAPIST

## 2021-05-03 PROCEDURE — 97140 MANUAL THERAPY 1/> REGIONS: CPT | Mod: GO | Performed by: OCCUPATIONAL THERAPIST

## 2021-05-03 PROCEDURE — 97110 THERAPEUTIC EXERCISES: CPT | Mod: GO | Performed by: OCCUPATIONAL THERAPIST

## 2021-05-04 NOTE — PROGRESS NOTES
Outpatient Physical Therapy Progress Note     Patient: Luis Trinidad  : 1955    Beginning/End Dates of Reporting Period:  3/23/2021 to 5/3/2021    Referring Provider: Brigida Farrell Diagnosis: impaired participation in life roles and functional mobility due to R hemiparesis and sequelae of chronic CVA     Client Self Report: Walked a block over weekend which was limited by pain in R pinky toe. Continues to have that pain today. Pt frustrated by how this pain is limiting ambulation.    Goals:  Goal Identifier Stairs   Goal Description Rogelio will be able to navigate up and down his stairs reciprocally with one rail with minimal circumduction of right LE in order for more efficient and safe access to all levels of his home as well as navigation of stairs in community areas. (5/3/21 step to gait pattern due to incr pain in R pinky)   Target Date 21   Date Met   in progress   Progress: Patient is able to navigate stairs with one rail and reciprocal pattern, but chooses to navigate stairs with step-to gait secondary to ongoing pain in R lateral foot.      Goal Identifier Gait speed   Goal Description Rogelio will be able to ambulate 25 feet in 10 sec or less without and assistive device to demonstrate improved gait speed and efficiency for household and community mobility(5/3/21 35.69 sec, 24 steps)   Target Date 21   Date Met   in progress   Progress: Patient completed 25 foot walk test in 35.69 seconds and in 24 steps. This is slower than initial evaluation (20.2 seconds) likely due to current hesitation to movement with R lateral foot pain. Will reassess at future visits.      Goal Identifier gait pattern/safety   Goal Description Rogelio will demonstrate initial contact on right with heel contact and decreased supination/inversion at ankle/foot determined by clinical observation and pt report of no foot pain during gait for return to daily walks outside.  (5/3/21 Initial contact at lateral foot)    Target Date 06/21/21   Date Met   in progress   Progress: Patient displays occasional heel contact for initial contact on R but frequently makes contact at R lateral foot. Pt experiences ongoing stiffness in R ankle in combination with weakness which has prolonged recovery.      Goal Identifier HEP   Goal Description Rogelio will be independent in a home ex and activity program to address his impairments and maintain functional gains made in physical therapy.     Target Date 06/21/21   Date Met      Progress: Continues to build on current HEP as indicated and displays ability to complete home exercises correctly.     Progress Toward Goals:   Patient displays slower progress towards goals with contributing factors of decreased availability of normal activities which limits exercise that could be aiding in recovery. Progress this period limited due to ongoing lateral foot pain that worsens with gait due to lateral foot contact at initial contact. Hip and ankle joint stiffness and muscle spacticity and ongoing movement patterns during ambulation are contributing factors to this limitation. Additional time is needed to allow for adequate changes in muscle lengthening along with strengthening and retraining of movement patterns to allow for sufficient progress towards goals. Patient continues to display a large skilled therapy need and potential to progress in therapy but prolonged time will be needed due to stated factors.     Plan:  Plan to continue with skilled therapy 1x/week.

## 2021-05-07 ENCOUNTER — HOSPITAL ENCOUNTER (OUTPATIENT)
Dept: OCCUPATIONAL THERAPY | Facility: CLINIC | Age: 66
Setting detail: THERAPIES SERIES
End: 2021-05-07
Attending: PHYSICAL MEDICINE & REHABILITATION
Payer: COMMERCIAL

## 2021-05-07 ENCOUNTER — HOSPITAL ENCOUNTER (OUTPATIENT)
Dept: PHYSICAL THERAPY | Facility: CLINIC | Age: 66
Setting detail: THERAPIES SERIES
End: 2021-05-07
Attending: PHYSICAL MEDICINE & REHABILITATION
Payer: COMMERCIAL

## 2021-05-07 PROCEDURE — 97140 MANUAL THERAPY 1/> REGIONS: CPT | Mod: GP | Performed by: PHYSICAL THERAPIST

## 2021-05-07 PROCEDURE — 97112 NEUROMUSCULAR REEDUCATION: CPT | Mod: GP | Performed by: PHYSICAL THERAPIST

## 2021-05-07 PROCEDURE — 97140 MANUAL THERAPY 1/> REGIONS: CPT | Mod: GO | Performed by: OCCUPATIONAL THERAPIST

## 2021-05-07 PROCEDURE — 97110 THERAPEUTIC EXERCISES: CPT | Mod: GO | Performed by: OCCUPATIONAL THERAPIST

## 2021-05-10 ENCOUNTER — HOSPITAL ENCOUNTER (OUTPATIENT)
Dept: OCCUPATIONAL THERAPY | Facility: CLINIC | Age: 66
Setting detail: THERAPIES SERIES
End: 2021-05-10
Attending: PHYSICAL MEDICINE & REHABILITATION
Payer: COMMERCIAL

## 2021-05-10 ENCOUNTER — HOSPITAL ENCOUNTER (OUTPATIENT)
Dept: PHYSICAL THERAPY | Facility: CLINIC | Age: 66
Setting detail: THERAPIES SERIES
End: 2021-05-10
Attending: PHYSICAL MEDICINE & REHABILITATION
Payer: COMMERCIAL

## 2021-05-10 PROCEDURE — 97110 THERAPEUTIC EXERCISES: CPT | Mod: GO | Performed by: OCCUPATIONAL THERAPIST

## 2021-05-10 PROCEDURE — 97140 MANUAL THERAPY 1/> REGIONS: CPT | Mod: GO | Performed by: OCCUPATIONAL THERAPIST

## 2021-05-10 PROCEDURE — 97140 MANUAL THERAPY 1/> REGIONS: CPT | Mod: GP | Performed by: PHYSICAL THERAPIST

## 2021-05-10 PROCEDURE — 97116 GAIT TRAINING THERAPY: CPT | Mod: GP | Performed by: PHYSICAL THERAPIST

## 2021-05-14 ENCOUNTER — HOSPITAL ENCOUNTER (OUTPATIENT)
Dept: OCCUPATIONAL THERAPY | Facility: CLINIC | Age: 66
Setting detail: THERAPIES SERIES
End: 2021-05-14
Attending: PHYSICAL MEDICINE & REHABILITATION
Payer: COMMERCIAL

## 2021-05-14 ENCOUNTER — HOSPITAL ENCOUNTER (OUTPATIENT)
Dept: PHYSICAL THERAPY | Facility: CLINIC | Age: 66
Setting detail: THERAPIES SERIES
End: 2021-05-14
Attending: PHYSICAL MEDICINE & REHABILITATION
Payer: COMMERCIAL

## 2021-05-14 PROCEDURE — 97140 MANUAL THERAPY 1/> REGIONS: CPT | Mod: GP | Performed by: PHYSICAL THERAPIST

## 2021-05-14 PROCEDURE — 97112 NEUROMUSCULAR REEDUCATION: CPT | Mod: GP | Performed by: PHYSICAL THERAPIST

## 2021-05-14 PROCEDURE — 97110 THERAPEUTIC EXERCISES: CPT | Mod: GP | Performed by: PHYSICAL THERAPIST

## 2021-05-14 PROCEDURE — 97140 MANUAL THERAPY 1/> REGIONS: CPT | Mod: GO | Performed by: OCCUPATIONAL THERAPIST

## 2021-05-14 PROCEDURE — 97112 NEUROMUSCULAR REEDUCATION: CPT | Mod: GO | Performed by: OCCUPATIONAL THERAPIST

## 2021-05-14 PROCEDURE — 97116 GAIT TRAINING THERAPY: CPT | Mod: GP | Performed by: PHYSICAL THERAPIST

## 2021-05-17 ENCOUNTER — HOSPITAL ENCOUNTER (OUTPATIENT)
Dept: PHYSICAL THERAPY | Facility: CLINIC | Age: 66
Setting detail: THERAPIES SERIES
End: 2021-05-17
Attending: PHYSICAL MEDICINE & REHABILITATION
Payer: COMMERCIAL

## 2021-05-17 ENCOUNTER — HOSPITAL ENCOUNTER (OUTPATIENT)
Dept: OCCUPATIONAL THERAPY | Facility: CLINIC | Age: 66
Setting detail: THERAPIES SERIES
End: 2021-05-17
Attending: PHYSICAL MEDICINE & REHABILITATION
Payer: COMMERCIAL

## 2021-05-17 PROCEDURE — 97140 MANUAL THERAPY 1/> REGIONS: CPT | Mod: GO | Performed by: OCCUPATIONAL THERAPIST

## 2021-05-17 PROCEDURE — 97110 THERAPEUTIC EXERCISES: CPT | Mod: GO | Performed by: OCCUPATIONAL THERAPIST

## 2021-05-17 PROCEDURE — 97140 MANUAL THERAPY 1/> REGIONS: CPT | Mod: GP | Performed by: PHYSICAL THERAPIST

## 2021-05-17 PROCEDURE — 97112 NEUROMUSCULAR REEDUCATION: CPT | Mod: GP | Performed by: PHYSICAL THERAPIST

## 2021-05-21 ENCOUNTER — HOSPITAL ENCOUNTER (OUTPATIENT)
Dept: OCCUPATIONAL THERAPY | Facility: CLINIC | Age: 66
Setting detail: THERAPIES SERIES
End: 2021-05-21
Attending: PHYSICAL MEDICINE & REHABILITATION
Payer: COMMERCIAL

## 2021-05-21 ENCOUNTER — HOSPITAL ENCOUNTER (OUTPATIENT)
Dept: PHYSICAL THERAPY | Facility: CLINIC | Age: 66
Setting detail: THERAPIES SERIES
End: 2021-05-21
Attending: PHYSICAL MEDICINE & REHABILITATION
Payer: COMMERCIAL

## 2021-05-21 PROCEDURE — 97140 MANUAL THERAPY 1/> REGIONS: CPT | Mod: GO | Performed by: OCCUPATIONAL THERAPIST

## 2021-05-21 PROCEDURE — 97110 THERAPEUTIC EXERCISES: CPT | Mod: GO | Performed by: OCCUPATIONAL THERAPIST

## 2021-05-21 PROCEDURE — 97140 MANUAL THERAPY 1/> REGIONS: CPT | Mod: GP | Performed by: PHYSICAL THERAPIST

## 2021-05-21 PROCEDURE — 97116 GAIT TRAINING THERAPY: CPT | Mod: GP | Performed by: PHYSICAL THERAPIST

## 2021-05-21 PROCEDURE — 97110 THERAPEUTIC EXERCISES: CPT | Mod: GP | Performed by: PHYSICAL THERAPIST

## 2021-05-22 NOTE — PROGRESS NOTES
"PM&R Clinic & Procedure Note:    Luis Trinidad is 65-year-old male with a history of stroke in Nov 2016 resulting in residual right spastic hemiparesis.  Rogelio was last seen on 1/25/2021 at which time he received 400 units of botox at that time to his right trunk and right arm.     He reports good results with last series of injections. Notes 80-90% improvement in spasticity, which lasted 2.5 months - similar to previous series of injections. Denies any side effects. Denies any new medical issues. Continues on Baclofen 30 mg TID.     We sent new PT and OT referrals at last visit.  Updates from Danyelle Evans, OTR/I  Hypertonicity limits:   ~Trunk rotation to L limited by 30 degrees per tone at lateral costals of T8-10 ( would latissmus injection help? This area really catches when he walks/rotates trunk)       ~  Upward rotation of scap gets to 40 of 60,  limiting shoulder flexion to 140 fof 180 ( R Upper Trap, pec minor/major).  SH HABD to 80 of 110 ( pec minor/major).     ~EL EX, lacks 60 degrees ( Max external resistance needed to assist stretch though biceps tone to lacking final 15 degrees of PROM).       ~Unable to combine digit EXT with Wrist extension PROM with wrist only getting to neutral of 70 degrees (wrist flexor tone).     She has plan to see Rogelio for soft tissue/instrument assisted mobilization 2x/week for 2 weeks, starting 1 week after the botox, and  then cont 1-2 x/month until the next injection, to help things stay open and functional.      Rogelio noted that he has had some new pain at right 5th toe for the past 2-3 months. No falls or injury. Pain is present with standing and walking; no pain at rest. No associated swelling, erythema or fever/chills. PT has tried shoe insert, and kinesio taping with some benefits. He has some \"tightness\" at right ankle.         Physical exam:  /77   Pulse 93   Temp 97.8  F (36.6  C)   Resp 16   SpO2 99%      Alert, pleasant affect.   Increased tone noted in RUE " with shoulder abd, EF, pronation, WF and FF - 2-3/4 per MAS  RLE exam also showed increased tone with HF, KF, PF and eversion.   Skin intact at the sites of injections          Assessment and recommendations:    Ischemic stroke at the lateral left thalamus and the posterior limb of the left internal capsule 11/2015    Residual right spastic hemiparesis     HTN     HLD    Questionable SAMMY      1. Work-up: none today  2. Therapy/equipment/braces: continue HEP regularly; continue OT and PT - sent a message to his therapists regarding right foot pain and new Botox injections   3. Medications: no change in meds today; on baclofen 30 tid.  4. Interventions: chemo-denervation today; procedure note below. No change in the total dose but adjusted the sites of injections with new injections at RLE.   5. Referral / follow up with other providers: to sleep medicine clinic when able for more evaluation of possible SAMMY as possible risk factor for stroke   6. Follow up: 12 weeks           Procedure:   Chemodenervation to right chest and RUE utilizing botulinum toxin.  Began with formal consent which he signed. Had formal time-out prior to procedure. 400 U of  Botox  Lot # [id number] with Expiration Date:  10/2023    was utilized. Diluted with normal saline in a 100 U:1mL ratio, total of 4 mL.  All areas were cleansed with chloroprep prior to injecting. EMG guidance was utilized to identify most active motor units while avoiding surrounding structures.     The following muscles were then injected, two body areas     Right trunk:  1. Right Pectoralis Major: 25 units  2. Right Pectoralis Minor: 15 units   3. Right Latissimus Dorsi: 10 units         Right arm  1. Biceps 60 units  2. Brachioradialis 40 units   3. FDS 50 units  4. FCR 50 units  5. FCU 50 units  7. Pronator Teres 70 units    Right leg  1. Peroneus Longus 30 units      Brigida Briseno MD  Physical Medicine & Rehabilitation

## 2021-05-24 ENCOUNTER — OFFICE VISIT (OUTPATIENT)
Dept: PHYSICAL MEDICINE AND REHAB | Facility: CLINIC | Age: 66
End: 2021-05-24
Payer: COMMERCIAL

## 2021-05-24 VITALS
OXYGEN SATURATION: 99 % | RESPIRATION RATE: 16 BRPM | TEMPERATURE: 97.8 F | HEART RATE: 93 BPM | SYSTOLIC BLOOD PRESSURE: 118 MMHG | DIASTOLIC BLOOD PRESSURE: 77 MMHG

## 2021-05-24 DIAGNOSIS — G81.11 RIGHT SPASTIC HEMIPARESIS (H): Primary | ICD-10-CM

## 2021-05-24 DIAGNOSIS — I63.81 LACUNAR INFARCT, ACUTE (H): ICD-10-CM

## 2021-05-24 DIAGNOSIS — R25.2 SPASTICITY: ICD-10-CM

## 2021-05-24 PROCEDURE — 64644 CHEMODENERV 1 EXTREM 5/> MUS: CPT | Performed by: PHYSICAL MEDICINE & REHABILITATION

## 2021-05-24 PROCEDURE — 95874 GUIDE NERV DESTR NEEDLE EMG: CPT | Performed by: PHYSICAL MEDICINE & REHABILITATION

## 2021-05-24 PROCEDURE — 64646 CHEMODENERV TRUNK MUSC 1-5: CPT | Performed by: PHYSICAL MEDICINE & REHABILITATION

## 2021-05-24 PROCEDURE — 64643 CHEMODENERV 1 EXTREM 1-4 EA: CPT | Performed by: PHYSICAL MEDICINE & REHABILITATION

## 2021-05-24 PROCEDURE — 99212 OFFICE O/P EST SF 10 MIN: CPT | Mod: 24 | Performed by: PHYSICAL MEDICINE & REHABILITATION

## 2021-05-24 ASSESSMENT — PAIN SCALES - GENERAL: PAINLEVEL: NO PAIN (0)

## 2021-05-24 NOTE — LETTER
5/24/2021       RE: Luis Trinidad  [id number] [facility] [address]     Dear Colleague,    Thank you for referring your patient, Luis Trinidad, to the [facility] PHYSICAL MEDICINE AND REHABILITATION CLINIC [place] at [facility]. Please see a copy of my visit note below.    PM&R Clinic & Procedure Note:    Luis Trinidad is 65-year-old male with a history of stroke in Nov 2016 resulting in residual right spastic hemiparesis.  Rogelio was last seen on 1/25/2021 at which time he received 400 units of botox at that time to his right trunk and right arm.     He reports good results with last series of injections. Notes 80-90% improvement in spasticity, which lasted 2.5 months - similar to previous series of injections. Denies any side effects. Denies any new medical issues. Continues on Baclofen 30 mg TID.     We sent new PT and OT referrals at last visit.  Updates from Danyelle Evans, OTR/I  Hypertonicity limits:   ~Trunk rotation to L limited by 30 degrees per tone at lateral costals of T8-10 ( would latissmus injection help? This area really catches when he walks/rotates trunk)       ~  Upward rotation of scap gets to 40 of 60,  limiting shoulder flexion to 140 fof 180 ( R Upper Trap, pec minor/major).  SH HABD to 80 of 110 ( pec minor/major).     ~EL EX, lacks 60 degrees ( Max external resistance needed to assist stretch though biceps tone to lacking final 15 degrees of PROM).       ~Unable to combine digit EXT with Wrist extension PROM with wrist only getting to neutral of 70 degrees (wrist flexor tone).     She has plan to see Rogelio for soft tissue/instrument assisted mobilization 2x/week for 2 weeks, starting 1 week after the botox, and  then cont 1-2 x/month until the next injection, to help things stay open and functional.      Rogelio noted that he has had some new pain at right 5th toe for the past 2-3 months. No falls or injury. Pain is present with  "standing and walking; no pain at rest. No associated swelling, erythema or fever/chills. PT has tried shoe insert, and kinesio taping with some benefits. He has some \"tightness\" at right ankle.         Physical exam:  /77   Pulse 93   Temp 97.8  F (36.6  C)   Resp 16   SpO2 99%      Alert, pleasant affect.   Increased tone noted in RUE with shoulder abd, EF, pronation, WF and FF - 2-3/4 per MAS  RLE exam also showed increased tone with HF, KF, PF and eversion.   Skin intact at the sites of injections          Assessment and recommendations:    Ischemic stroke at the lateral left thalamus and the posterior limb of the left internal capsule 11/2015    Residual right spastic hemiparesis     HTN     HLD    Questionable SAMMY      1. Work-up: none today  2. Therapy/equipment/braces: continue HEP regularly; continue OT and PT - sent a message to his therapists regarding right foot pain and new Botox injections   3. Medications: no change in meds today; on baclofen 30 tid.  4. Interventions: chemo-denervation today; procedure note below. No change in the total dose but adjusted the sites of injections with new injections at RLE.   5. Referral / follow up with other providers: to sleep medicine clinic when able for more evaluation of possible SAMMY as possible risk factor for stroke   6. Follow up: 12 weeks           Procedure:   Chemodenervation to right chest and RUE utilizing botulinum toxin.  Began with formal consent which he signed. Had formal time-out prior to procedure. 400 U of  Botox  Lot # [id number] with Expiration Date:  10/2023    was utilized. Diluted with normal saline in a 100 U:1mL ratio, total of 4 mL.  All areas were cleansed with chloroprep prior to injecting. EMG guidance was utilized to identify most active motor units while avoiding surrounding structures.     The following muscles were then injected, two body areas     Right trunk:  1. Right Pectoralis Major: 25 units  2. Right Pectoralis Minor: " 15 units   3. Right Latissimus Dorsi: 10 units         Right arm  1. Biceps 60 units  2. Brachioradialis 40 units   3. FDS 50 units  4. FCR 50 units  5. FCU 50 units  7. Pronator Teres 70 units    Right leg  1. Peroneus Longus 30 units    Brigida Briseno MD  Physical Medicine & Rehabilitation

## 2021-05-28 ENCOUNTER — HOSPITAL ENCOUNTER (OUTPATIENT)
Dept: PHYSICAL THERAPY | Facility: CLINIC | Age: 66
Setting detail: THERAPIES SERIES
End: 2021-05-28
Attending: PHYSICAL MEDICINE & REHABILITATION
Payer: COMMERCIAL

## 2021-05-28 ENCOUNTER — HOSPITAL ENCOUNTER (OUTPATIENT)
Dept: OCCUPATIONAL THERAPY | Facility: CLINIC | Age: 66
Setting detail: THERAPIES SERIES
End: 2021-05-28
Attending: PHYSICAL MEDICINE & REHABILITATION
Payer: COMMERCIAL

## 2021-05-28 PROCEDURE — 97112 NEUROMUSCULAR REEDUCATION: CPT | Mod: GP | Performed by: PHYSICAL THERAPIST

## 2021-05-28 PROCEDURE — 97140 MANUAL THERAPY 1/> REGIONS: CPT | Mod: GO | Performed by: OCCUPATIONAL THERAPIST

## 2021-05-28 PROCEDURE — 97110 THERAPEUTIC EXERCISES: CPT | Mod: GP | Performed by: PHYSICAL THERAPIST

## 2021-05-28 PROCEDURE — 97140 MANUAL THERAPY 1/> REGIONS: CPT | Mod: GP | Performed by: PHYSICAL THERAPIST

## 2021-06-04 ENCOUNTER — HOSPITAL ENCOUNTER (OUTPATIENT)
Dept: OCCUPATIONAL THERAPY | Facility: CLINIC | Age: 66
Setting detail: THERAPIES SERIES
End: 2021-06-04
Attending: PHYSICAL MEDICINE & REHABILITATION
Payer: COMMERCIAL

## 2021-06-04 ENCOUNTER — HOSPITAL ENCOUNTER (OUTPATIENT)
Dept: PHYSICAL THERAPY | Facility: CLINIC | Age: 66
Setting detail: THERAPIES SERIES
End: 2021-06-04
Attending: PHYSICAL MEDICINE & REHABILITATION
Payer: COMMERCIAL

## 2021-06-04 PROCEDURE — 97110 THERAPEUTIC EXERCISES: CPT | Mod: GO | Performed by: OCCUPATIONAL THERAPIST

## 2021-06-04 PROCEDURE — 97116 GAIT TRAINING THERAPY: CPT | Mod: GP | Performed by: PHYSICAL THERAPIST

## 2021-06-04 PROCEDURE — 97140 MANUAL THERAPY 1/> REGIONS: CPT | Mod: GO | Performed by: OCCUPATIONAL THERAPIST

## 2021-06-04 PROCEDURE — 97110 THERAPEUTIC EXERCISES: CPT | Mod: GP | Performed by: PHYSICAL THERAPIST

## 2021-06-07 ENCOUNTER — HOSPITAL ENCOUNTER (OUTPATIENT)
Dept: PHYSICAL THERAPY | Facility: CLINIC | Age: 66
Setting detail: THERAPIES SERIES
End: 2021-06-07
Attending: PHYSICAL MEDICINE & REHABILITATION
Payer: COMMERCIAL

## 2021-06-07 ENCOUNTER — HOSPITAL ENCOUNTER (OUTPATIENT)
Dept: OCCUPATIONAL THERAPY | Facility: CLINIC | Age: 66
Setting detail: THERAPIES SERIES
End: 2021-06-07
Attending: PHYSICAL MEDICINE & REHABILITATION
Payer: COMMERCIAL

## 2021-06-07 PROCEDURE — 97140 MANUAL THERAPY 1/> REGIONS: CPT | Mod: GO | Performed by: OCCUPATIONAL THERAPIST

## 2021-06-07 PROCEDURE — 97140 MANUAL THERAPY 1/> REGIONS: CPT | Mod: GP | Performed by: PHYSICAL THERAPIST

## 2021-06-07 PROCEDURE — 97110 THERAPEUTIC EXERCISES: CPT | Mod: GO | Performed by: OCCUPATIONAL THERAPIST

## 2021-06-07 PROCEDURE — 97110 THERAPEUTIC EXERCISES: CPT | Mod: GP | Performed by: PHYSICAL THERAPIST

## 2021-06-11 ENCOUNTER — HOSPITAL ENCOUNTER (OUTPATIENT)
Dept: OCCUPATIONAL THERAPY | Facility: CLINIC | Age: 66
Setting detail: THERAPIES SERIES
End: 2021-06-11
Attending: PHYSICAL MEDICINE & REHABILITATION
Payer: COMMERCIAL

## 2021-06-11 ENCOUNTER — HOSPITAL ENCOUNTER (OUTPATIENT)
Dept: PHYSICAL THERAPY | Facility: CLINIC | Age: 66
Setting detail: THERAPIES SERIES
End: 2021-06-11
Attending: PHYSICAL MEDICINE & REHABILITATION
Payer: COMMERCIAL

## 2021-06-11 PROCEDURE — 97140 MANUAL THERAPY 1/> REGIONS: CPT | Mod: GO | Performed by: OCCUPATIONAL THERAPIST

## 2021-06-11 PROCEDURE — 97112 NEUROMUSCULAR REEDUCATION: CPT | Mod: GP | Performed by: PHYSICAL THERAPIST

## 2021-06-11 PROCEDURE — 97140 MANUAL THERAPY 1/> REGIONS: CPT | Mod: GP | Performed by: PHYSICAL THERAPIST

## 2021-06-11 PROCEDURE — 97110 THERAPEUTIC EXERCISES: CPT | Mod: GO | Performed by: OCCUPATIONAL THERAPIST

## 2021-06-11 PROCEDURE — 97116 GAIT TRAINING THERAPY: CPT | Mod: GP | Performed by: PHYSICAL THERAPIST

## 2021-06-14 ENCOUNTER — HOSPITAL ENCOUNTER (OUTPATIENT)
Dept: OCCUPATIONAL THERAPY | Facility: CLINIC | Age: 66
Setting detail: THERAPIES SERIES
End: 2021-06-14
Attending: PHYSICAL MEDICINE & REHABILITATION
Payer: COMMERCIAL

## 2021-06-14 ENCOUNTER — HOSPITAL ENCOUNTER (OUTPATIENT)
Dept: PHYSICAL THERAPY | Facility: CLINIC | Age: 66
Setting detail: THERAPIES SERIES
End: 2021-06-14
Attending: PHYSICAL MEDICINE & REHABILITATION
Payer: COMMERCIAL

## 2021-06-14 PROCEDURE — 97110 THERAPEUTIC EXERCISES: CPT | Mod: GO | Performed by: OCCUPATIONAL THERAPIST

## 2021-06-14 PROCEDURE — 97116 GAIT TRAINING THERAPY: CPT | Mod: GP | Performed by: PHYSICAL THERAPIST

## 2021-06-14 PROCEDURE — 97112 NEUROMUSCULAR REEDUCATION: CPT | Mod: GP | Performed by: PHYSICAL THERAPIST

## 2021-06-14 PROCEDURE — 97140 MANUAL THERAPY 1/> REGIONS: CPT | Mod: GP | Performed by: PHYSICAL THERAPIST

## 2021-06-14 PROCEDURE — 97140 MANUAL THERAPY 1/> REGIONS: CPT | Mod: GO | Performed by: OCCUPATIONAL THERAPIST

## 2021-06-18 ENCOUNTER — HOSPITAL ENCOUNTER (OUTPATIENT)
Dept: PHYSICAL THERAPY | Facility: CLINIC | Age: 66
Setting detail: THERAPIES SERIES
End: 2021-06-18
Attending: PHYSICAL MEDICINE & REHABILITATION
Payer: COMMERCIAL

## 2021-06-18 ENCOUNTER — HOSPITAL ENCOUNTER (OUTPATIENT)
Dept: OCCUPATIONAL THERAPY | Facility: CLINIC | Age: 66
Setting detail: THERAPIES SERIES
End: 2021-06-18
Attending: PHYSICAL MEDICINE & REHABILITATION
Payer: COMMERCIAL

## 2021-06-18 PROCEDURE — 97110 THERAPEUTIC EXERCISES: CPT | Mod: GO | Performed by: OCCUPATIONAL THERAPIST

## 2021-06-18 PROCEDURE — 97140 MANUAL THERAPY 1/> REGIONS: CPT | Mod: GO | Performed by: OCCUPATIONAL THERAPIST

## 2021-06-18 PROCEDURE — 97116 GAIT TRAINING THERAPY: CPT | Mod: GP | Performed by: PHYSICAL THERAPIST

## 2021-06-18 PROCEDURE — 97112 NEUROMUSCULAR REEDUCATION: CPT | Mod: GP | Performed by: PHYSICAL THERAPIST

## 2021-06-18 NOTE — PROGRESS NOTES
[facility]      OUTPATIENT OCCUPATIONAL THERAPY  PLAN OF TREATMENT FOR OUTPATIENT REHABILITATION    Patient's Last Name, First Name, M.I.                YOB: 1955  Luis Trinidad                        Provider's Name  [facility] Medical Record No.  [id number]                               Onset Date: 11/29/2017   Start of Care Date: 3/25/21   Type:     ___PT   _X_OT   ___SLP Medical Diagnosis: Right spastic hemiparesis (H) G81.11                       OT Diagnosis: decreased ADL/IADL independence      _________________________________________________________________________________  Plan of Treatment:    Frequency/Duration: 2x/week x 1 month after botox injections, then every other week 1x/week for remaining 8 weeks between injections.       ADL training, IADL training, Coordination training, Manual therapy, Neuromuscular re-education, Orthotic fitting/training, ROM, Self care/Home management, Strengthening, Stretching, Therapeutic activities, Visual perception  Electrical stimulation, Ultrasound(prn )    Goals:    Goal Identifier R shoulder strength   Goal Description Patient to increase R shoulder flexion AROM to 30 degrees for improved R UE function for ADL/IADLs (during dressing and grooming tasks, carrying items, putting groceries and dishes away).   Target Date 09/16/21   Date Met      Progress:     Goal Identifier R pinch strength   Goal Description Patient will demonstrate increased right hand pinch strength (both lateral and palmar) by 3# each for increased independence with ADL/IADLs such as opening containers, pull up pants, maintaining pinch to hold items.   Target Date 09/16/21   Date Met      Progress:     Goal Identifier R GM/FM coordination   Goal Description Patient to improve R GM/FM coordination skills for increased independence during ADL/IADL tasks  (dressing, kitchen tasks, feeding self) by completing the Box and Blocks Test with R UE in standing with 5 blocks    Target Date 09/16/21   Date Met      Progress:     Goal Identifier R UE as Gross Assist   Goal Description Patient to demonstrate functional tasks (feeding self, wiping the counter/table, washing dishes, etc.) with 20% use of R UE as gross stabilizer or gross assist for increased ADL/IADL independence and closer return to prior level of function.   Target Date 09/16/21   Date Met      Progress:       Progress Toward Goals:   Progress this reporting period: see attached progress note.    Certification date from 6/18/21  to 9/16/21.    Danyelle Evans OTR          I CERTIFY THE NEED FOR THESE SERVICES FURNISHED UNDER        THIS PLAN OF TREATMENT AND WHILE UNDER MY CARE     (Physician co-signature of this document indicates review and certification of the therapy plan).                Referring Provider: Brigida Briseno MD

## 2021-06-18 NOTE — PROGRESS NOTES
--OUTPATIENT OCCUPATIONAL THERAPY 10th NOTE--       05/17/21 1700   Signing Clinician's Name / Credentials   Signing clinician's name / credentials [email or web address] [email or web address]   Session Number   Session Number 10/10    Authorization status [employer], Medicare    Progress/Recertification   Recertification Due 06/21/21    OT Goal 1   Goal Identifier R shoulder strength   Goal Description Patient to increase R shoulder flexion AROM to 30 degrees for improved R UE function for ADL/IADLs (during dressing and grooming tasks, carrying items, putting groceries and dishes away).   Target Date 06/21/21    OT Goal 2   Goal Identifier R pinch strength   Goal Description Patient will demonstrate increased right hand pinch strength (both lateral and palmar) by 3# each for increased independence with ADL/IADLs such as opening containers, pull up pants, maintaining pinch to hold items.   Target Date 06/21/21    OT Goal 3   Goal Identifier R GM/FM coordination   Goal Description Patient to improve R GM/FM coordination skills for increased independence during ADL/IADL tasks (dressing, kitchen tasks, feeding self) by completing the Box and Blocks Test with R UE in standing with 5 blocks    Target Date 06/21/21   OT Goal 4   Goal Identifier R UE as Gross Assist   Goal Description Patient to demonstrate functional tasks (feeding self, wiping the counter/table, washing dishes, etc.) with 20% use of R UE as gross stabilizer or gross assist for increased ADL/IADL independence and closer return to prior level of function.   Target Date 06/21/21   Subjective Report   Subjective Report Pt ambulated much better after getting his foot taped in PT, has a painful lateral foot callus.  Walked 50 yd this weekend but then the pain increased.  R upper trap is tight today from walking with foot pain.     Objective Measure 1   Objective Measure  strength   Objective Measure 3   Objective Measure AROM   Details Trunk flexion 45 of 45 degrees w SH FL  135 forward folding w R UE on tall crutch handle.  Trunk rotation to 60 /60R and L 33/60 ( limited by tone, myofascial pulling at T8-T11 Paraspinals/ropy, upper trap, below anterir ribs 10-12), improves to 48 dgrees L trunk rotation after session/IASTM.   Objective Measure 6   Objective Measure Box and Blocks   Therapeutic Interventions   Therapeutic Interventions Therapeutic Procedure/Exercise   Therapeutic Procedure/Exercise   Therapeutic Procedure: strength, endurance, ROM, flexibillity minutes (37985) 17   Skilled Intervention ROM of R UE for increased ADL/IADL independenced   Patient Response helping to stretch every session due to high tone.     Treatment Detail Trunk warm ups in sitting-- Ant/Post tilts in sitting ( mod verbal and tactile cues to use breathout/flexion to gain ab hollowing/lumbar lordosis, 5 sec holds x  5 reps), then side bends w weightbearing into palm (prepped w finger pull/wr EX  backs).  B trunk rotations and combined trunk flexion with SH FL /side bend stretch on  outside of L and R knees (very helpful, cont as HEP).  B SH ER/ABD butterfly strcetch /sssisted by OTR to mobilize taut UT,, levator and post RTC.   Trunk flexion and diagonal reaches x 5 sec holds x 5 reps each to mobilze ribs.     Manual Therapy   Manual Therapy Minutes (46978) 28   Skilled Intervention Prolonged stretches, STM, MFR for R UE funcational use patterns   Patient Response shoulders move level after session, increased triunk rotation while walking.     Treatment Detail Per mm tone and fascial thickness, Rogelio has difficulty with reaching to the left to randy his sweatshirt, to the floor w tight R t8-11 paraspinals for donning socks.  Has dense myofascial  thickness and hypertonic at upper/ lower trap ( limiting upper 1/3 of scap rotation). Tone/thickness at pec minor, posterior capsule/lat deltoid, biceps (mod springy, lacking 60 degrees EL EX. Lower trap and infraspinatus, teres minor are restricted in a long  position due to UT/levator tightness, medial rotation of humerus.  OTR cont w myofascial release/instrument assisted soft tissue mobilization ( MFR/ IASTM at levaotor, Upper trap, R rhomboids, Lateral ribs 6-8, QL and iliac margin) with min to moderate pressure in longitudinal patterns at origin, insert and muscle bellies, then cross fiber in fanning patterns  from distal to proximal and proximal to distal patterns, and around radius of bony prominences. Easier to  bend down ti side and pick items off of floor, turn his body with moving supine to sit.     Education   Learner Patient   Readiness Acceptance   Method Explanation;Demonstration   Response Verbalizes understanding   Education Notes ROM gains, tone limitations   Plan   Homework clothespin/key pinch, gripper.  w springs.     Home program Stretching protocol    Updates to plan of care 1-2x/week    Plan for next session Compare trunk flexion + SH FL pre/ post MFR, compare mod cl chain/crutch to SH pulley kit SH FL. Work on TH HEP/pinch to relase in mult positions , adjust shoulder pulley kit, TH EX to opppsition pinch/release per botox.   Manual therapy with guasha, UE arm bike.    Total Session Time   Timed Code Treatment Minutes 45   Total Treatment Time (sum of timed and untimed services) 45   AMBULATORY CLINICS ONLY-MEDICAL AND TREATMENT DIAGNOSIS   Medical Diagnosis Right spastic hemiparesis (H) G81.11   OT Diagnosis decreased ADL/IADL independence       Thank you for this thoughtful referral! If you have any questions, please call me at [phone].  Nice to share in this patient's care with you.    Sincerely,   Danyelle Evans, OTR/margarito

## 2021-06-18 NOTE — PROGRESS NOTES
Outpatient Occupational Therapy Progress Note     Patient: Luis Trinidad  : 1955    Beginning/End Dates of Reporting Period:  3/25/21 to 2021    Referring Provider: Brigida Briseno MD  Therapy Diagnosis: Decreased ADL/IADL    Client Self Report: (P) Pt has no fracture in R small toe, soft tissue pulling into deformity., toe crossing over. Pt continues to do well with his home program, using shoulder pulley kit and higher level adjustable spring gripper, doing pinching exercises, usin his R hand  to carry his bag, clothes between rooms.      Objective Measurements:     Objective Measure: (P)  strength   Details: (P) R=32 ( stable) L= 100.     Objective Measure: (P) Pinch strength   Details: (P) Key pinch R 15# L 23 #   (down 1 # R and 5# L) Og pinch R 8 # L 20# (stable)     Objective Measure: (P) P/AROM   Details: (P) Improved shoulder flexion PROM to 157 of 180 ( limited by  pec minor/major/biceps line).  SH HABD to 90 of 110 ( pec minor/major, biceps, denys when combining EL EX and H ABD). EL EX, lacks 60 degreesPROM ( Max external resistance needed to assist stretch though biceps tone to lacking final 10 degrees of PROM).  ~While combining digit EXT with Wrist extension PROM with wrist only getting to 40 of 70 degrees (wrist flexor tone). AROM-- After IASTM flexes R EL  and EL EX  (gained 15 dgree movement arc during session.  of 65 dgrees at end of session.     Objective Measure: (P) R UE strength   Details: (P) Applies mild moderate manual resistance withrough limtied 1/3 of plane for H Add, H Abd and SH IR.   Trace with SH ER, SH FL           Objective Measure: (P) Box and Blocks   Details: (P) Improved to 6 blocks per minute on 3rd trail. Trial one  2, trial 2 = 3.  Improving with isolating movemetns of  [facility] box/blocks, but not able to  flex elbow to carry load over  12in barrier no EL EX to relase, using body to substitute.  (only has 30 dgree movement arc, without a load,  loses this when combined with gripping.      Goals:     Goal Identifier (P) R shoulder strength   Goal Description (P) Patient to increase R shoulder flexion AROM to 30 degrees for improved R UE function for ADL/IADLs (during dressing and grooming tasks, carrying items, putting groceries and dishes away).   Target Date (P) 09/16/21   Date Met      Progress: Goal continued.     Goal Identifier (P) R pinch strength   Goal Description (P) Patient will demonstrate increased right hand pinch strength (both lateral and palmar) by 3# each for increased independence with ADL/IADLs such as opening containers, pull up pants, maintaining pinch to hold items.   Target Date (P) 09/16/21   Date Met      Progress: Goal continued.      Goal Identifier (P) R GM/FM coordination   Goal Description (P) Patient to improve R GM/FM coordination skills for increased independence during ADL/IADL tasks (dressing, kitchen tasks, feeding self) by completing the Box and Blocks Test with R UE in standing with 5 blocks    Target Date (P) 09/16/21   Date Met      Progress: Greatest improvement! (was 1 block in one minute, now 6).     Goal Identifier (P) R UE as Gross Assist   Goal Description (P) Patient to demonstrate functional tasks (feeding self, wiping the counter/table, washing dishes, etc.) with 20% use of R UE as gross stabilizer or gross assist for increased ADL/IADL independence and closer return to prior level of function.   Target Date (P) 09/16/21   Date Met      Progress: Goal continued per complexity.       Progress Toward Goals:   Progress this reporting period: Pt's greatest strength is his compliance with HEP and motviation to use his R UE better wtih daily tasks. His greatest barrier is the  strong amount of tone he has in the RUE and trunk  that limit the arc of active movement he has. He has tonal shifts that affect using  and pinch while using his elbow and shoulder.  We are working on  applying TH  stability/pinching/gripping into daily routines and isolating EL FL to EL EX into a larger movement arc as his tone improves.  He receives botox injections every 3 months.  The first week there is limited effect. 2-5 weeks after botox is a good time for working on  regainig motor control and  reducing  soft tissue restrctions, so it is appropraite to see him 2x/week for this portion, then have a home program up date and see him every other week unitl the next botox injection (11 visits every 12 weeks)    Plan:  Continue therapy per current plan of care.    Discharge:  Not yet, anticipate for OT to keep following him through his botox injections for > 20 weeks    Thank you for sharing in [facility]'s care!    Sincerely, Danyelle Evans, OTR/margarito

## 2021-06-21 ENCOUNTER — HOSPITAL ENCOUNTER (OUTPATIENT)
Dept: PHYSICAL THERAPY | Facility: CLINIC | Age: 66
Setting detail: THERAPIES SERIES
End: 2021-06-21
Attending: PHYSICAL MEDICINE & REHABILITATION
Payer: COMMERCIAL

## 2021-06-21 ENCOUNTER — HOSPITAL ENCOUNTER (OUTPATIENT)
Dept: OCCUPATIONAL THERAPY | Facility: CLINIC | Age: 66
Setting detail: THERAPIES SERIES
End: 2021-06-21
Attending: PHYSICAL MEDICINE & REHABILITATION
Payer: COMMERCIAL

## 2021-06-21 PROCEDURE — 97110 THERAPEUTIC EXERCISES: CPT | Mod: GO

## 2021-06-21 PROCEDURE — 97116 GAIT TRAINING THERAPY: CPT | Mod: GP | Performed by: PHYSICAL THERAPIST

## 2021-06-21 PROCEDURE — 97112 NEUROMUSCULAR REEDUCATION: CPT | Mod: GP | Performed by: PHYSICAL THERAPIST

## 2021-06-25 ENCOUNTER — HOSPITAL ENCOUNTER (OUTPATIENT)
Dept: PHYSICAL THERAPY | Facility: CLINIC | Age: 66
Setting detail: THERAPIES SERIES
End: 2021-06-25
Attending: PHYSICAL MEDICINE & REHABILITATION
Payer: COMMERCIAL

## 2021-06-25 ENCOUNTER — HOSPITAL ENCOUNTER (OUTPATIENT)
Dept: OCCUPATIONAL THERAPY | Facility: CLINIC | Age: 66
Setting detail: THERAPIES SERIES
End: 2021-06-25
Attending: PHYSICAL MEDICINE & REHABILITATION
Payer: COMMERCIAL

## 2021-06-25 PROCEDURE — 97116 GAIT TRAINING THERAPY: CPT | Mod: GP | Performed by: PHYSICAL THERAPIST

## 2021-06-25 PROCEDURE — 97112 NEUROMUSCULAR REEDUCATION: CPT | Mod: GP | Performed by: PHYSICAL THERAPIST

## 2021-06-25 PROCEDURE — 97110 THERAPEUTIC EXERCISES: CPT | Mod: GO

## 2021-06-28 ENCOUNTER — HOSPITAL ENCOUNTER (OUTPATIENT)
Dept: PHYSICAL THERAPY | Facility: CLINIC | Age: 66
Setting detail: THERAPIES SERIES
End: 2021-06-28
Attending: PHYSICAL MEDICINE & REHABILITATION
Payer: COMMERCIAL

## 2021-06-28 ENCOUNTER — HOSPITAL ENCOUNTER (OUTPATIENT)
Dept: OCCUPATIONAL THERAPY | Facility: CLINIC | Age: 66
Setting detail: THERAPIES SERIES
End: 2021-06-28
Attending: PHYSICAL MEDICINE & REHABILITATION
Payer: COMMERCIAL

## 2021-06-28 PROCEDURE — 97112 NEUROMUSCULAR REEDUCATION: CPT | Mod: GP | Performed by: PHYSICAL THERAPIST

## 2021-06-28 PROCEDURE — 97116 GAIT TRAINING THERAPY: CPT | Mod: GP | Performed by: PHYSICAL THERAPIST

## 2021-06-28 PROCEDURE — 97140 MANUAL THERAPY 1/> REGIONS: CPT | Mod: GO | Performed by: OCCUPATIONAL THERAPIST

## 2021-07-02 ENCOUNTER — HOSPITAL ENCOUNTER (OUTPATIENT)
Dept: PHYSICAL THERAPY | Facility: CLINIC | Age: 66
Setting detail: THERAPIES SERIES
End: 2021-07-02
Attending: PHYSICAL MEDICINE & REHABILITATION
Payer: COMMERCIAL

## 2021-07-02 ENCOUNTER — HOSPITAL ENCOUNTER (OUTPATIENT)
Dept: OCCUPATIONAL THERAPY | Facility: CLINIC | Age: 66
Setting detail: THERAPIES SERIES
End: 2021-07-02
Attending: PHYSICAL MEDICINE & REHABILITATION
Payer: COMMERCIAL

## 2021-07-02 PROCEDURE — 97110 THERAPEUTIC EXERCISES: CPT | Mod: GP | Performed by: PHYSICAL THERAPIST

## 2021-07-02 PROCEDURE — 97116 GAIT TRAINING THERAPY: CPT | Mod: GP | Performed by: PHYSICAL THERAPIST

## 2021-07-02 PROCEDURE — 97140 MANUAL THERAPY 1/> REGIONS: CPT | Mod: GO | Performed by: OCCUPATIONAL THERAPIST

## 2021-07-02 PROCEDURE — 97535 SELF CARE MNGMENT TRAINING: CPT | Mod: GO | Performed by: OCCUPATIONAL THERAPIST

## 2021-07-05 ENCOUNTER — HOSPITAL ENCOUNTER (OUTPATIENT)
Dept: OCCUPATIONAL THERAPY | Facility: CLINIC | Age: 66
Setting detail: THERAPIES SERIES
End: 2021-07-05
Attending: PHYSICAL MEDICINE & REHABILITATION
Payer: COMMERCIAL

## 2021-07-05 ENCOUNTER — HOSPITAL ENCOUNTER (OUTPATIENT)
Dept: PHYSICAL THERAPY | Facility: CLINIC | Age: 66
Setting detail: THERAPIES SERIES
End: 2021-07-05
Attending: PHYSICAL MEDICINE & REHABILITATION
Payer: COMMERCIAL

## 2021-07-05 PROCEDURE — 97112 NEUROMUSCULAR REEDUCATION: CPT | Mod: GP | Performed by: PHYSICAL THERAPIST

## 2021-07-05 PROCEDURE — 97116 GAIT TRAINING THERAPY: CPT | Mod: GP | Performed by: PHYSICAL THERAPIST

## 2021-07-05 PROCEDURE — 97140 MANUAL THERAPY 1/> REGIONS: CPT | Mod: GO | Performed by: OCCUPATIONAL THERAPIST

## 2021-07-05 PROCEDURE — 97110 THERAPEUTIC EXERCISES: CPT | Mod: GO | Performed by: OCCUPATIONAL THERAPIST

## 2021-07-05 PROCEDURE — 97110 THERAPEUTIC EXERCISES: CPT | Mod: GP | Performed by: PHYSICAL THERAPIST

## 2021-07-09 ENCOUNTER — HOSPITAL ENCOUNTER (OUTPATIENT)
Dept: OCCUPATIONAL THERAPY | Facility: CLINIC | Age: 66
Setting detail: THERAPIES SERIES
End: 2021-07-09
Attending: PHYSICAL MEDICINE & REHABILITATION
Payer: COMMERCIAL

## 2021-07-09 ENCOUNTER — HOSPITAL ENCOUNTER (OUTPATIENT)
Dept: PHYSICAL THERAPY | Facility: CLINIC | Age: 66
Setting detail: THERAPIES SERIES
End: 2021-07-09
Attending: PHYSICAL MEDICINE & REHABILITATION
Payer: COMMERCIAL

## 2021-07-09 PROCEDURE — 97112 NEUROMUSCULAR REEDUCATION: CPT | Mod: GP | Performed by: PHYSICAL THERAPIST

## 2021-07-09 PROCEDURE — 97535 SELF CARE MNGMENT TRAINING: CPT | Mod: GO,KX | Performed by: OCCUPATIONAL THERAPIST

## 2021-07-09 PROCEDURE — 97110 THERAPEUTIC EXERCISES: CPT | Mod: GP | Performed by: PHYSICAL THERAPIST

## 2021-07-09 PROCEDURE — 97140 MANUAL THERAPY 1/> REGIONS: CPT | Mod: GO,KX | Performed by: OCCUPATIONAL THERAPIST

## 2021-07-12 ENCOUNTER — HOSPITAL ENCOUNTER (OUTPATIENT)
Dept: PHYSICAL THERAPY | Facility: CLINIC | Age: 66
Setting detail: THERAPIES SERIES
End: 2021-07-12
Attending: PHYSICAL MEDICINE & REHABILITATION
Payer: COMMERCIAL

## 2021-07-12 ENCOUNTER — HOSPITAL ENCOUNTER (OUTPATIENT)
Dept: OCCUPATIONAL THERAPY | Facility: CLINIC | Age: 66
Setting detail: THERAPIES SERIES
End: 2021-07-12
Attending: PHYSICAL MEDICINE & REHABILITATION
Payer: COMMERCIAL

## 2021-07-12 PROCEDURE — 97110 THERAPEUTIC EXERCISES: CPT | Mod: GO | Performed by: OCCUPATIONAL THERAPIST

## 2021-07-12 PROCEDURE — 97110 THERAPEUTIC EXERCISES: CPT | Mod: GP | Performed by: PHYSICAL THERAPIST

## 2021-07-16 ENCOUNTER — HOSPITAL ENCOUNTER (OUTPATIENT)
Dept: PHYSICAL THERAPY | Facility: CLINIC | Age: 66
Setting detail: THERAPIES SERIES
End: 2021-07-16
Attending: PHYSICAL MEDICINE & REHABILITATION
Payer: COMMERCIAL

## 2021-07-16 DIAGNOSIS — S99.921S INJURY OF TOE ON RIGHT FOOT, SEQUELA: Primary | ICD-10-CM

## 2021-07-16 PROCEDURE — 97112 NEUROMUSCULAR REEDUCATION: CPT | Mod: GP | Performed by: PHYSICAL THERAPIST

## 2021-07-16 PROCEDURE — 97535 SELF CARE MNGMENT TRAINING: CPT | Mod: GP | Performed by: PHYSICAL THERAPIST

## 2021-07-19 ENCOUNTER — TELEPHONE (OUTPATIENT)
Dept: WOUND CARE | Facility: CLINIC | Age: 66
End: 2021-07-19

## 2021-07-19 ENCOUNTER — HOSPITAL ENCOUNTER (OUTPATIENT)
Dept: PHYSICAL THERAPY | Facility: CLINIC | Age: 66
Setting detail: THERAPIES SERIES
End: 2021-07-19
Attending: PHYSICAL MEDICINE & REHABILITATION
Payer: COMMERCIAL

## 2021-07-19 ENCOUNTER — HOSPITAL ENCOUNTER (OUTPATIENT)
Dept: OCCUPATIONAL THERAPY | Facility: CLINIC | Age: 66
Setting detail: THERAPIES SERIES
End: 2021-07-19
Attending: PHYSICAL MEDICINE & REHABILITATION
Payer: COMMERCIAL

## 2021-07-19 PROCEDURE — 97140 MANUAL THERAPY 1/> REGIONS: CPT | Mod: GO,KX | Performed by: OCCUPATIONAL THERAPIST

## 2021-07-19 PROCEDURE — 97112 NEUROMUSCULAR REEDUCATION: CPT | Mod: GP | Performed by: PHYSICAL THERAPIST

## 2021-07-19 NOTE — DISCHARGE INSTRUCTIONS
You do not have a signed order for wound care yet. Once it is signed, they should call you. If you need to call for scheduling you can call  appointment [phone] ([phone])    Locations for [place] [facility]: (or you can always go to another health system)   - [facility] [place]   - [facility]   - [facility]   - [facility]   - [facility]   - [facility]   - [facility]   - [facility]   - [facility] - Wound Care   - [place]

## 2021-07-20 NOTE — TELEPHONE ENCOUNTER
Referral received from work queue to be seen for the right toe.  
Spoke to patient and he does not drive and lives in [place] and [place].   I gave him Dr. Asher's office in [place] to call for an appt for his toe wound.  
No

## 2021-07-23 ENCOUNTER — HOSPITAL ENCOUNTER (OUTPATIENT)
Dept: PHYSICAL THERAPY | Facility: CLINIC | Age: 66
Setting detail: THERAPIES SERIES
End: 2021-07-23
Attending: PHYSICAL MEDICINE & REHABILITATION
Payer: COMMERCIAL

## 2021-07-23 PROCEDURE — 97110 THERAPEUTIC EXERCISES: CPT | Mod: GP | Performed by: PHYSICAL THERAPIST

## 2021-07-23 PROCEDURE — 97116 GAIT TRAINING THERAPY: CPT | Mod: GP | Performed by: PHYSICAL THERAPIST

## 2021-07-23 PROCEDURE — 97112 NEUROMUSCULAR REEDUCATION: CPT | Mod: GP | Performed by: PHYSICAL THERAPIST

## 2021-07-23 NOTE — PROGRESS NOTES
OUTPATIENT PHYSICAL THERAPY  PLAN OF TREATMENT FOR OUTPATIENT REHABILITATION AND PROGRESS NOTE           Patient's Last Name, First Name, Luis Mckeon Date of Birth  1955   Provider's Name  [facility] Medical Record No.  [id number]    Onset Date  9/1/2020  (Decline in function/gait over past year due to covid) Start of Care Date  03/23/21   Type:     _X_PT   ___OT   ___SLP Medical Diagnosis  CVA   PT Diagnosis  impaired participation in life roles and functional mobility due to R hemiparesis and sequelae of chronic CVA Plan of Treatment  Frequency/Duration: 2 x per week x 90 days  Certification date from 6/22/2021 to 9/20/21     Goals:  Goal Identifier Stairs   Goal Description Rogelio will be able to navigate up and down his stairs reciprocally with one rail with minimal circumduction of right LE in order for more efficient and safe access to all levels of his home as well as navigation of stairs in community areas.  (5/3/21 step to gait pattern due to incr pain in R pinky)   Target Date 06/21/21   Date Met   in progress   Progress (detail required for progress note):  Depending on pain in 5th digit, can go up reciprocally but with circumduction.  Facilitation needed to flex knee to clear step properly.       Goal Identifier Gait speed   Goal Description Rogelio will be able to ambulate 25 feet in 10 sec or less without and assistive device to demonstrate improved gait speed and efficiency for household and community mobility (5/3/21 35.69 sec, 24 steps)   Target Date 06/21/21   Date Met   in progress   Progress (detail required for progress note): 20.7 sec, 22 steps.  21.6 sec, 26 steps (more pain right 5th toe),  last trial with padding at right 5th toe - 18.6 sec and 20 steps.       Goal Identifier gait pattern/safety   Goal Description Rogelio will demonstrate initial contact on right with heel contact and decreased supination/inversion at ankle/foot determined  by clinical observation and pt report of no foot pain during gait for return to daily walks outside.   (5/3/21 Initial contact at lateral foot)   Target Date 06/21/21   Date Met   in progress   Progress (detail required for progress note):  Continues to have pain related to pressure on right 5th digit, some days  Worse than others.  On days it is better he is able to walk 20-25 minutes at a time.  Some days he cannot walk outside due to continuous pain.      Goal Identifier HEP   Goal Description Rogelio will be independent in a home ex and activity program to address his impairments and maintain functional gains made in physical therapy.     Target Date 06/21/21   Date Met    In progress   Progress (detail required for progress note): home program continues to be progressed and modified as appropriate/needed.       Beginning/End Dates of Progress Note Reporting Period:  5/4/2021 to 6/25/2021    Progress Toward Goals:   Progress this reporting period: improved gait speed.  Able to tolerate greater distance walking outside some days, although not all yet.      Progress limited due to ongoing lateral foot pain that worsens with gait due to lateral foot contact at initial contact. Hip and ankle joint stiffness and muscle spacticity and ongoing movement patterns during ambulation are contributing factors to this limitation. Additional time is needed to allow for adequate changes in muscle lengthening along with strengthening and retraining of movement patterns to allow for sufficient progress towards goals. Patient continues to display a large skilled therapy need and potential to progress in therapy but prolonged time will be needed due to stated factors.  Pt is encouraged to contact MD about continue pain and sore on right 5th toe.        Client Self (Subjective) Report for Progress Note Reporting Period: States he was able to walk a little bit this week but was limited by righ 5th toe pain.  Toe was ok yesterday but  started hurting again today.  Would like to walk more but limited by pain.      Objective Measurements:     Objective Measure: 25 foot walk   Details: 20.7 sec, 22 steps.  21.6 sec, 26 steps (more pain right 5th toe),  last trial with padding at right 5th toe - 18.6 sec and 20 steps.                           I CERTIFY THE NEED FOR THESE SERVICES FURNISHED UNDER        THIS PLAN OF TREATMENT AND WHILE UNDER MY CARE     (Physician co-signature of this document indicates review and certification of the therapy plan).                Referring Provider: Brigida Villanueva, PT

## 2021-07-26 ENCOUNTER — HOSPITAL ENCOUNTER (OUTPATIENT)
Dept: OCCUPATIONAL THERAPY | Facility: CLINIC | Age: 66
Setting detail: THERAPIES SERIES
End: 2021-07-26
Attending: PHYSICAL MEDICINE & REHABILITATION
Payer: COMMERCIAL

## 2021-07-26 ENCOUNTER — HOSPITAL ENCOUNTER (OUTPATIENT)
Dept: PHYSICAL THERAPY | Facility: CLINIC | Age: 66
Setting detail: THERAPIES SERIES
End: 2021-07-26
Attending: PHYSICAL MEDICINE & REHABILITATION
Payer: COMMERCIAL

## 2021-07-26 PROCEDURE — 97140 MANUAL THERAPY 1/> REGIONS: CPT | Mod: GO | Performed by: OCCUPATIONAL THERAPIST

## 2021-07-26 PROCEDURE — 97112 NEUROMUSCULAR REEDUCATION: CPT | Mod: GP | Performed by: PHYSICAL THERAPIST

## 2021-07-26 PROCEDURE — 97116 GAIT TRAINING THERAPY: CPT | Mod: GP | Performed by: PHYSICAL THERAPIST

## 2021-07-30 ENCOUNTER — HOSPITAL ENCOUNTER (OUTPATIENT)
Dept: PHYSICAL THERAPY | Facility: CLINIC | Age: 66
Setting detail: THERAPIES SERIES
End: 2021-07-30
Attending: PHYSICAL MEDICINE & REHABILITATION
Payer: COMMERCIAL

## 2021-07-30 PROCEDURE — 97110 THERAPEUTIC EXERCISES: CPT | Mod: GP | Performed by: PHYSICAL THERAPIST

## 2021-07-30 NOTE — PROGRESS NOTES
Outpatient Physical Therapy Progress Note     Patient: Luis Trinidad  : 1955    Beginning/End Dates of Reporting Period:  21 to 21    Referring Provider: Brigida Briseno MD     Therapy Diagnosis: impaired participation in life roles and functional mobility due to R hemiparesis and sequelae of chronic CVA     Client Self Report: Patient has a podiatry appointment set for 21. He reports his toe is feeling better but he agrees to still have it evaluated. Patient has been working on hip flexor strengthening at home, notes when he gets tired, his knee starts to extend more.     Objective Measurements:  Objective Measure: 25 foot walk   Details: 14.66 sec with 19 steps at normal speed, 13.03 sec with 19 steps at fast speed     Goals:  Goal Identifier Stairs   Goal Description Rogelio will be able to navigate up and down his stairs reciprocally with one rail with minimal circumduction of right LE in order for more efficient and safe access to all levels of his home as well as navigation of stairs in community areas.    Target Date 21   Date Met      Progress (detail required for progress note): Patient is able to negotiate stairs with one UE support and reciprocal gait but he relies on heavy right hip circumduction to achieve right foot clearance on the step     Goal Identifier Gait speed   Goal Description Rogelio will be able to ambulate 25 feet in 10 sec or less without and assistive device to demonstrate improved gait speed and efficiency for household and community mobility   Target Date 21   Date Met      Progress (detail required for progress note): Rogelio is able to ambulate 25 feet in 13.03 seconds at best, improved from 14 seconds at last assessment     Goal Identifier gait pattern/safety   Goal Description Rogelio will demonstrate initial contact on right with heel contact and decreased supination/inversion at ankle/foot determined by clinical observation and pt report of no foot pain  during gait for return to daily walks outside.     Target Date 09/20/21   Date Met      Progress (detail required for progress note): Rogelio demonstrates ability to achieve right heel strike intermittently with increased ankle inversion noted     Goal Identifier HEP   Goal Description Rogelio will be independent in a home ex and activity program to address his impairments and maintain functional gains made in physical therapy.     Target Date 09/20/21   Date Met      Progress (detail required for progress note): Rogelio is compliant with his HEP, continue to modify as appropriate         Plan:  Continue therapy per current plan of care.    Discharge:  No

## 2021-08-02 ENCOUNTER — HOSPITAL ENCOUNTER (OUTPATIENT)
Dept: OCCUPATIONAL THERAPY | Facility: CLINIC | Age: 66
Setting detail: THERAPIES SERIES
End: 2021-08-02
Attending: PHYSICAL MEDICINE & REHABILITATION
Payer: COMMERCIAL

## 2021-08-02 ENCOUNTER — HOSPITAL ENCOUNTER (OUTPATIENT)
Dept: PHYSICAL THERAPY | Facility: CLINIC | Age: 66
Setting detail: THERAPIES SERIES
End: 2021-08-02
Attending: PHYSICAL MEDICINE & REHABILITATION
Payer: COMMERCIAL

## 2021-08-02 PROCEDURE — 97140 MANUAL THERAPY 1/> REGIONS: CPT | Mod: GP | Performed by: PHYSICAL THERAPIST

## 2021-08-02 PROCEDURE — 97112 NEUROMUSCULAR REEDUCATION: CPT | Mod: GP | Performed by: PHYSICAL THERAPIST

## 2021-08-02 PROCEDURE — 97140 MANUAL THERAPY 1/> REGIONS: CPT | Mod: GO | Performed by: OCCUPATIONAL THERAPIST

## 2021-08-06 ENCOUNTER — HOSPITAL ENCOUNTER (OUTPATIENT)
Dept: PHYSICAL THERAPY | Facility: CLINIC | Age: 66
Setting detail: THERAPIES SERIES
End: 2021-08-06
Attending: PHYSICAL MEDICINE & REHABILITATION
Payer: COMMERCIAL

## 2021-08-06 PROCEDURE — 97112 NEUROMUSCULAR REEDUCATION: CPT | Mod: GP | Performed by: PHYSICAL THERAPIST

## 2021-08-06 PROCEDURE — 97110 THERAPEUTIC EXERCISES: CPT | Mod: GP | Performed by: PHYSICAL THERAPIST

## 2021-08-09 ENCOUNTER — HOSPITAL ENCOUNTER (OUTPATIENT)
Dept: OCCUPATIONAL THERAPY | Facility: CLINIC | Age: 66
Setting detail: THERAPIES SERIES
End: 2021-08-09
Attending: PHYSICAL MEDICINE & REHABILITATION
Payer: COMMERCIAL

## 2021-08-09 ENCOUNTER — HOSPITAL ENCOUNTER (OUTPATIENT)
Dept: PHYSICAL THERAPY | Facility: CLINIC | Age: 66
Setting detail: THERAPIES SERIES
End: 2021-08-09
Attending: PHYSICAL MEDICINE & REHABILITATION
Payer: COMMERCIAL

## 2021-08-09 PROCEDURE — 97112 NEUROMUSCULAR REEDUCATION: CPT | Mod: GP | Performed by: PHYSICAL THERAPIST

## 2021-08-09 PROCEDURE — 97116 GAIT TRAINING THERAPY: CPT | Mod: GP | Performed by: PHYSICAL THERAPIST

## 2021-08-09 PROCEDURE — 97110 THERAPEUTIC EXERCISES: CPT | Mod: GO,KX | Performed by: OCCUPATIONAL THERAPIST

## 2021-08-09 PROCEDURE — 97140 MANUAL THERAPY 1/> REGIONS: CPT | Mod: GO,KX | Performed by: OCCUPATIONAL THERAPIST

## 2021-08-13 ENCOUNTER — HOSPITAL ENCOUNTER (OUTPATIENT)
Dept: PHYSICAL THERAPY | Facility: CLINIC | Age: 66
Setting detail: THERAPIES SERIES
End: 2021-08-13
Attending: PHYSICAL MEDICINE & REHABILITATION
Payer: COMMERCIAL

## 2021-08-13 PROCEDURE — 97110 THERAPEUTIC EXERCISES: CPT | Mod: GP | Performed by: PHYSICAL THERAPIST

## 2021-08-13 PROCEDURE — 97112 NEUROMUSCULAR REEDUCATION: CPT | Mod: GP | Performed by: PHYSICAL THERAPIST

## 2021-08-16 ENCOUNTER — HOSPITAL ENCOUNTER (OUTPATIENT)
Dept: OCCUPATIONAL THERAPY | Facility: CLINIC | Age: 66
Setting detail: THERAPIES SERIES
End: 2021-08-16
Attending: PHYSICAL MEDICINE & REHABILITATION
Payer: COMMERCIAL

## 2021-08-16 ENCOUNTER — HOSPITAL ENCOUNTER (OUTPATIENT)
Dept: PHYSICAL THERAPY | Facility: CLINIC | Age: 66
Setting detail: THERAPIES SERIES
End: 2021-08-16
Attending: PHYSICAL MEDICINE & REHABILITATION
Payer: COMMERCIAL

## 2021-08-16 PROCEDURE — 97116 GAIT TRAINING THERAPY: CPT | Mod: GP | Performed by: PHYSICAL THERAPIST

## 2021-08-16 PROCEDURE — 97110 THERAPEUTIC EXERCISES: CPT | Mod: GO,KX | Performed by: OCCUPATIONAL THERAPIST

## 2021-08-16 PROCEDURE — 97140 MANUAL THERAPY 1/> REGIONS: CPT | Mod: GO,KX | Performed by: OCCUPATIONAL THERAPIST

## 2021-08-16 PROCEDURE — 97112 NEUROMUSCULAR REEDUCATION: CPT | Mod: GP | Performed by: PHYSICAL THERAPIST

## 2021-08-18 ENCOUNTER — OFFICE VISIT (OUTPATIENT)
Dept: PHYSICAL MEDICINE AND REHAB | Facility: CLINIC | Age: 66
End: 2021-08-18
Payer: COMMERCIAL

## 2021-08-18 VITALS — OXYGEN SATURATION: 98 % | HEART RATE: 98 BPM | RESPIRATION RATE: 16 BRPM | TEMPERATURE: 97.8 F

## 2021-08-18 DIAGNOSIS — G81.11 RIGHT SPASTIC HEMIPARESIS (H): Primary | ICD-10-CM

## 2021-08-18 DIAGNOSIS — I63.81 LACUNAR INFARCT, ACUTE (H): ICD-10-CM

## 2021-08-18 PROCEDURE — 64646 CHEMODENERV TRUNK MUSC 1-5: CPT | Performed by: PHYSICAL MEDICINE & REHABILITATION

## 2021-08-18 PROCEDURE — 95874 GUIDE NERV DESTR NEEDLE EMG: CPT | Performed by: PHYSICAL MEDICINE & REHABILITATION

## 2021-08-18 PROCEDURE — 64643 CHEMODENERV 1 EXTREM 1-4 EA: CPT | Performed by: PHYSICAL MEDICINE & REHABILITATION

## 2021-08-18 PROCEDURE — 64644 CHEMODENERV 1 EXTREM 5/> MUS: CPT | Performed by: PHYSICAL MEDICINE & REHABILITATION

## 2021-08-18 NOTE — LETTER
"8/18/2021       RE: Luis Trinidad  [address] [place] [id number]     Dear Colleague,    Thank you for referring your patient, Luis Trinidad, to the [facility] PHYSICAL MEDICINE AND REHABILITATION CLINIC [place] at [facility]. Please see a copy of my visit note below.    PM&R Clinic & Procedure Note:    Luis Trinidad is 65-year-old male with a history of stroke in Nov 2016 resulting in residual right spastic hemiparesis.  Rogelio was last seen on 5/24/2021 for Botox injections.     He reports good results with last series of injections. Notes 80-90% improvement in spasticity, which lasted 2.5 months - similar to previous series of injections. Denies any side effects. Denies any new medical issues. Continues on Baclofen 30 mg TID.     At last visit, Rogelio reported new pain at right 5th toe for the past 2-3 months. No falls or injury. Pain is present with standing and walking; no pain at rest. No associated swelling, erythema or fever/chills. PT has tried shoe insert, and kinesio taping with some benefits. He has some \"tightness\" at right ankle.     He saw his PCP for right foot pain. Xray was unremarkable. He was referred to podiatry clinic.     Pain is no at the lateral side of right leg and actually improves with walking. He thinks his little toe pain is different from his leg pain. No swelling or redness. No h/o gout or open wound.     Physical exam:  Pulse 98   Temp 97.8  F (36.6  C)   Resp 16   SpO2 98%      Alert, pleasant affect.   Increased tone noted in RUE with shoulder abd, EF, pronation, WF and FF - 2-3/4 per MAS  RLE exam also showed increased tone with HF, KF, PF and inversion.   Skin intact at the sites of injections          Assessment and recommendations:    Ischemic stroke at the lateral left thalamus and the posterior limb of the left internal capsule 11/2015    Residual right spastic hemiparesis     HTN     HLD    Questionable " SAMMY    He has increased tone at PF and invertor muscles which is contributing to his leg/foot pain. It's not clear why this is affecting his gait these many years after stroke. Will add posterior tibialis muscle and consider to add FDL at next visit. He might benefit from an AFO.       1. Work-up: none today  2. Therapy/equipment/braces: continue HEP regularly; continue OT and PT.  3. Medications: no change in meds today; on baclofen 30 tid.  4. Interventions: chemo-denervation today; procedure note below. No change in the total dose but adjusted the sites of injections as below.   5. Referral / follow up with other providers: to sleep medicine clinic when able for more evaluation of possible SAMMY as possible risk factor for stroke   6. Follow up: 12 weeks           Procedure:   Chemodenervation to right chest and RUE utilizing botulinum toxin.  Began with formal consent which he signed. Had formal time-out prior to procedure. 400 U of  Botox  Lot # [id number] with Expiration Date: 11/2023 was utilized. Diluted with normal saline in a 100 U:1mL ratio, total of 4 mL.  All areas were cleansed with chloroprep prior to injecting. EMG guidance was utilized to identify most active motor units while avoiding surrounding structures.     The following muscles were then injected, two body areas     Right trunk:  1. Right Pectoralis Major: 25 units  2. Right Pectoralis Minor: 15 units   3. Right Latissimus Dorsi: 10 units         Right arm  1. Biceps 60 units  2. Brachioradialis 40 units   3. FDS 50 units  4. FCR 50 units  5. FCU 50 units  7. Pronator Teres 70 units    Right leg  1. Posterior tibialis 30 units      Brigida Briseno MD  Physical Medicine & Rehabilitation       Again, thank you for allowing me to participate in the care of your patient.      Sincerely,    Brigida Briseno MD

## 2021-08-18 NOTE — NURSING NOTE
Chief Complaint   Patient presents with     Botox     Stroke     [facility] RETURN BOTOX- CVA       Avery Dasilva, EMT  
none

## 2021-08-18 NOTE — PROGRESS NOTES
"PM&R Clinic & Procedure Note:    Luis Trinidad is 65-year-old male with a history of stroke in Nov 2016 resulting in residual right spastic hemiparesis.  Rogelio was last seen on 5/24/2021 for Botox injections.     He reports good results with last series of injections. Notes 80-90% improvement in spasticity, which lasted 2.5 months - similar to previous series of injections. Denies any side effects. Denies any new medical issues. Continues on Baclofen 30 mg TID.     At last visit, Rogelio reported new pain at right 5th toe for the past 2-3 months. No falls or injury. Pain is present with standing and walking; no pain at rest. No associated swelling, erythema or fever/chills. PT has tried shoe insert, and kinesio taping with some benefits. He has some \"tightness\" at right ankle.     He saw his PCP for right foot pain. Xray was unremarkable. He was referred to podiatry clinic.     Pain is no at the lateral side of right leg and actually improves with walking. He thinks his little toe pain is different from his leg pain. No swelling or redness. No h/o gout or open wound.     Physical exam:  Pulse 98   Temp 97.8  F (36.6  C)   Resp 16   SpO2 98%      Alert, pleasant affect.   Increased tone noted in RUE with shoulder abd, EF, pronation, WF and FF - 2-3/4 per MAS  RLE exam also showed increased tone with HF, KF, PF and inversion.   Skin intact at the sites of injections          Assessment and recommendations:    Ischemic stroke at the lateral left thalamus and the posterior limb of the left internal capsule 11/2015    Residual right spastic hemiparesis     HTN     HLD    Questionable SAMMY    He has increased tone at PF and invertor muscles which is contributing to his leg/foot pain. It's not clear why this is affecting his gait these many years after stroke. Will add posterior tibialis muscle and consider to add FDL at next visit. He might benefit from an AFO.       1. Work-up: none today  2. Therapy/equipment/braces: " continue HEP regularly; continue OT and PT.  3. Medications: no change in meds today; on baclofen 30 tid.  4. Interventions: chemo-denervation today; procedure note below. No change in the total dose but adjusted the sites of injections as below.   5. Referral / follow up with other providers: to sleep medicine clinic when able for more evaluation of possible SAMMY as possible risk factor for stroke   6. Follow up: 12 weeks           Procedure:   Chemodenervation to right chest and RUE utilizing botulinum toxin.  Began with formal consent which he signed. Had formal time-out prior to procedure. 400 U of  Botox  Lot # [id number] with Expiration Date: 11/2023 was utilized. Diluted with normal saline in a 100 U:1mL ratio, total of 4 mL.  All areas were cleansed with chloroprep prior to injecting. EMG guidance was utilized to identify most active motor units while avoiding surrounding structures.     The following muscles were then injected, two body areas     Right trunk:  1. Right Pectoralis Major: 25 units  2. Right Pectoralis Minor: 15 units   3. Right Latissimus Dorsi: 10 units         Right arm  1. Biceps 60 units  2. Brachioradialis 40 units   3. FDS 50 units  4. FCR 50 units  5. FCU 50 units  7. Pronator Teres 70 units    Right leg  1. Posterior tibialis 30 units      Brigida Briseno MD  Physical Medicine & Rehabilitation

## 2021-08-20 ENCOUNTER — HOSPITAL ENCOUNTER (OUTPATIENT)
Dept: PHYSICAL THERAPY | Facility: CLINIC | Age: 66
Setting detail: THERAPIES SERIES
End: 2021-08-20
Attending: PHYSICAL MEDICINE & REHABILITATION
Payer: COMMERCIAL

## 2021-08-20 PROCEDURE — 97110 THERAPEUTIC EXERCISES: CPT | Mod: GP | Performed by: PHYSICAL THERAPIST

## 2021-08-20 PROCEDURE — 97116 GAIT TRAINING THERAPY: CPT | Mod: GP | Performed by: PHYSICAL THERAPIST

## 2021-08-20 PROCEDURE — 97112 NEUROMUSCULAR REEDUCATION: CPT | Mod: GP | Performed by: PHYSICAL THERAPIST

## 2021-08-23 ENCOUNTER — HOSPITAL ENCOUNTER (OUTPATIENT)
Dept: PHYSICAL THERAPY | Facility: CLINIC | Age: 66
Setting detail: THERAPIES SERIES
End: 2021-08-23
Attending: PHYSICAL MEDICINE & REHABILITATION
Payer: COMMERCIAL

## 2021-08-23 PROCEDURE — 97140 MANUAL THERAPY 1/> REGIONS: CPT | Mod: GP | Performed by: PHYSICAL THERAPIST

## 2021-08-23 PROCEDURE — 97116 GAIT TRAINING THERAPY: CPT | Mod: GP | Performed by: PHYSICAL THERAPIST

## 2021-08-25 ENCOUNTER — ANCILLARY PROCEDURE (OUTPATIENT)
Dept: GENERAL RADIOLOGY | Facility: CLINIC | Age: 66
End: 2021-08-25
Attending: PODIATRIST
Payer: COMMERCIAL

## 2021-08-25 ENCOUNTER — OFFICE VISIT (OUTPATIENT)
Dept: PODIATRY | Facility: CLINIC | Age: 66
End: 2021-08-25
Payer: COMMERCIAL

## 2021-08-25 VITALS
WEIGHT: 170 LBS | BODY MASS INDEX: 27.32 KG/M2 | SYSTOLIC BLOOD PRESSURE: 122 MMHG | DIASTOLIC BLOOD PRESSURE: 80 MMHG | HEIGHT: 66 IN

## 2021-08-25 DIAGNOSIS — L60.0 INGROWN NAIL OF FIFTH TOE OF RIGHT FOOT: ICD-10-CM

## 2021-08-25 DIAGNOSIS — G81.11 RIGHT SPASTIC HEMIPARESIS (H): ICD-10-CM

## 2021-08-25 DIAGNOSIS — M79.671 RIGHT FOOT PAIN: Primary | ICD-10-CM

## 2021-08-25 DIAGNOSIS — M21.372 FOOT DROP, LEFT: ICD-10-CM

## 2021-08-25 DIAGNOSIS — M79.671 RIGHT FOOT PAIN: ICD-10-CM

## 2021-08-25 PROCEDURE — 73630 X-RAY EXAM OF FOOT: CPT | Mod: RT | Performed by: RADIOLOGY

## 2021-08-25 PROCEDURE — 99203 OFFICE O/P NEW LOW 30 MIN: CPT | Mod: 25 | Performed by: PODIATRIST

## 2021-08-25 PROCEDURE — 11730 AVULSION NAIL PLATE SIMPLE 1: CPT | Mod: T9 | Performed by: PODIATRIST

## 2021-08-25 RX ORDER — SILVER SULFADIAZINE 10 MG/G
CREAM TOPICAL 2 TIMES DAILY
Qty: 25 G | Refills: 1 | Status: SHIPPED | OUTPATIENT
Start: 2021-08-25

## 2021-08-25 ASSESSMENT — MIFFLIN-ST. JEOR: SCORE: 1490.92

## 2021-08-25 NOTE — PATIENT INSTRUCTIONS
Thank you for choosing [facility]!    DR. STEWARD'S CLINIC:  [facility] SCHEDULE SURGERY: [phone]   [address] BILLING QUESTIONS: [phone]   [place] [address] APPOINTMENTS: [phone]   PH: [phone] CONSUMER PRICE LINE:[phone]   FAX: [phone]      Follow up: as needed    INGROWN TOENAILS  When a toenail is ingrown, it is curved and grows into the skin, usually at the nail borders (the sides of the nail). This  digging in  of the nail irritates the skin, often creating pain, redness, swelling, and warmth in the toe.  If an ingrown nail causes a break in the skin, bacteria may enter and cause an infection in the area, which is often marked by drainage and a foul odor. However, even if the toe isn t painful, red, swollen, or warm, a nail that curves downward into the skin can progress to an infection.  CAUSES:  Heredity: In many people, the tendency for ingrown toenails is inherited.   Trauma: Sometimes an ingrown toenail is the result of trauma, such as stubbing your toe, having an object fall on your toe, or engaging in activities that involve repeated pressure on the toes, such as kicking or running.   Improper Trimming:  The most common cause of ingrown toenails is cutting your nails too short. This encourages the skin next to the nail to fold over the nail.   Improperly Sized Footwear: Ingrown toenails can result from wearing socks and shoes that are tight or short.   Nail Conditions: Ingrown toenails can be caused by nail problems, such as fungal infections or losing a nail due to trauma.   TREATMENT: Sometimes initial treatment for ingrown toenails can be safely performed at home. However, home treatment is strongly discouraged if an infection is suspected, or for those who have medical conditions that put feet at high risk, such as diabetes, nerve damage in the foot, or poor circulation.  Home care: If you don t have an  infection or any of the above medical conditions, you can soak your foot in room-temperature water (adding Epsom s salt may be recommended by your doctor), and gently massage the side of the nail fold to help reduce the inflammation.  Avoid attempting  bathroom surgery.  Repeated cutting of the nail can cause the condition to worsen over time. If your symptoms fail to improve, it s time to see a foot and ankle surgeon.  Physician care: After examining the toe, the foot and ankle surgeon will select the treatment best suited for you. If an infection is present, an oral antibiotic may be prescribed.  Sometimes a minor surgical procedure, often performed in the office, will ease the pain and remove the offending nail. After applying a local anesthetic, the doctor removes part of the nail s side border. Some nails may become ingrown again, requiring removal of the nail root.  Following the nail procedure, a light bandage will be applied. Most people experience very little pain after surgery and may resume normal activity the next day. If your surgeon has prescribed an oral antibiotic, be sure to take all the medication, even if your symptoms have improved.  PREVENTION:  Proper Trimming: Cut toenails in a fairly straight line, and don t cut them too short. You should be able to get your fingernail under the sides and end of the nail.   Well-fitting Footwear: Don t wear shoes that are short or tight in the toe area. Avoid shoes that are loose, because they too cause pressure on the toes, especially when running or walking briskly.     INGROWN TOENAIL REMOVAL AFTERCARE     Go directly home and elevate the affected foot on one or two pillows for the remainder of the day/evening if possible. Your toe may stay numb anywhere from 2-8 hours.     Take Tylenol, ibuprofen or another anti-inflammatory as needed for pain.     Take antibiotic if that has been prescribed. Finish the entire prescribed antibiotic even if your symptoms  have improved.     The evening of the procedure, soak/wash the affected area in warm water (you may add Epsom salt) for 5 to 10 minutes. Do this twice a day for 2-4 weeks (6-8 weeks if you had phenol) (you may count showering/bathing as one soak).  After soaks, pat the area dry and then allow to airdry for a few minutes. Apply antibiotic ointment to the area and cover with 2 X 2 gauze and paper tape or band-aid.    You may pursue everyday activities as tolerated with either an open toe shoe or cut-out shoe as needed or you may wear regular shoes if no pain is noted.    Watch for any signs and symptoms of infection such as: redness, red streaks going up the foot/leg, swelling, pus or foul odor. Those that have had the phenol procedure, the toe will drain longer and will look like it is infected because it is a chemical burn.     Please call with questions.    [facility]  [facility]  [address]  [place] [address]  Phone: [phone]  Fax: [phone] [facility]  [address]  [place] [address]  Phone: [phone]   Fax: [phone]   [facility]  [address]  [place] [address]  Phone: [phone]  Fax: [phone] [facility]  [address]  [place] [address]  Phone: [phone]   Fax: [phone]   [facility]   [address]  [place] [address]  Phone: [phone]  Fax: [phone] * Please call any location listed to make an appointment for a casting/fitting. Your referral was sent to their central office and they will all have the order on file.     WEARING YOUR CUSTOM FOOT ORTHOTICS   Most insurance plans cover one pair of orthotics per year. You must check with your   insurance plan to see what your payment responsibility will be. Please call your   insurance company by calling the number on the  back of your insurance card.   Orthotic's are non-refundable and non-returnable.   Orthotics are made of various designs. Some orthotics are covered with material that extends beyond your toes. If your orthotic is of this design, you will likely need to trim the toe end to get a proper fit. The insole from your shoe can be used as a template. Simply overlay the shoe insert on top of the custom orthotic. Align the heel end while tracing the length of the insert onto the custom orthotic. Use a large scissor to trim the toe end until you get a proper fit in the shoe.   The orthotic needs to be pushed as far back in the shoe as possible. The heel portion should not ride forward so as not to irritate your heel.   Orthotics are designed to work with socks. Excessive perspiration will shorten the life span of the orthotics. Remove the orthotic from the shoe frequently for proper drying.   The break-in period lasts for weeks. People new to orthotics will likely experience new aches and pains. The orthotic is forcing your foot into a new position. Arch, foot and leg muscle aches and fatigue are common during these weeks. Minor discomfort can be considered normal break in phenomenon. Start wearing your orthotic around your home your first day. Limited activity for one to two hours is recommended. You can increase one or two additional hours each day provided the aches and pains are subsiding. The degree of discomfort, fatigue and problems will dictate the speed of break in. You may require multiple weeks to work up to full time use.   Do not continue wearing your orthotics if they are creating problems such as blisters or sores. Do not hesitate to call the clinic to speak with a nurse regarding orthotic   break in, fit, trimming, etc. You may also need to see the doctor if the orthotics are   simply not working out. Adjustments are sometimes made to improve orthotic   function.     Orthotics will only work in certain styles  and types of shoes. Orthotics rarely work in dress shoes. Slip-ons, clogs, sandals and heels are particularly troublesome. Specially designed orthotics may be necessary for these types of shoes. Your custom orthotic was designed for activities that require appropriate walking or running shoes. Lace up athletic shoes, walking shoes or work boots should work appropriately. You may need a wider or longer shoe. Shoes with a removable  or insert work best. In general, you want to remove an insert from the shoe before placing the orthotic into the shoe. Shoes without a removable liner may not work as well.     When purchasing new shoes, bring your orthotics along to get a proper fit. Shop at stores that are familiar with orthotics.   Frequent washing of the orthotic may shorten the life span of the top cover. The top cover can be replaced but will generally last one to five years depending on use and foot perspiration.

## 2021-08-25 NOTE — LETTER
8/25/2021         RE: Luis Trinidad  [address] [facility] [address]        Dear Colleague,    Thank you for referring your patient, Luis Trinidad, to the [facility]. Please see a copy of my visit note below.    PATIENT HISTORY:  Dr. Fish GOMES from [facility] requested I see this patient for their foot issue.  Luis Trinidad is a 65 year old male who presents to clinic for right foot wound.  Patient states that the pain is been going on for 6 months.  Noticed it after his stroke.  Pain is 8 out of 10 with weightbearing.  No pain when he is not on the foot.  Denies specific injury.  Here with his wife.  Wondering what is causing the pain and what can be done with it.    Review of Systems:  Patient denies fever, chills, rash, wound, stiffness, numbness, weakness, heart burn, blood in stool, chest pain with activity, calf pain when walking, shortness of breath with activity, chronic cough, easy bleeding/bruising, swelling of ankles, excessive thirst, fatigue, depression, anxiety.  Patient admits to being.     PAST MEDICAL HISTORY:   Past Medical History:   Diagnosis Date     Hypertension         PAST SURGICAL HISTORY: No past surgical history on file.     MEDICATIONS:   Current Outpatient Medications:      aspirin 81 MG EC tablet, Take 1 tablet (81 mg) by mouth daily, Disp: , Rfl:      atorvastatin (LIPITOR) 40 MG tablet, Take 40 mg by mouth, Disp: , Rfl:      baclofen (LIORESAL) 10 MG tablet, Take 3-4 tablets (30-40 mg) by mouth 3 times daily, Disp: 1080 tablet, Rfl: 3     botulinum toxin type A (BOTOX) 100 UNITS injection, Inject 400 Units into the muscle every 3 months, Disp: 400 Units, Rfl: 5     cholecalciferol 2000 UNITS tablet, Take 2,000 Units by mouth daily, Disp: 30 tablet, Rfl: 0     clopidogrel (PLAVIX) 75 MG tablet, Take 75 mg by mouth, Disp: , Rfl:      clopidogrel (PLAVIX) 75 MG tablet, Take 1 tablet (75 mg) by mouth daily, Disp: 30 tablet, Rfl: 0      co-enzyme Q-10 100 MG CAPS, Take 1 capsule (100 mg) by mouth daily, Disp: 30 capsule, Rfl: 0     lisinopril-hydrochlorothiazide (PRINZIDE,ZESTORETIC) 10-12.5 MG per tablet, Take 1 tablet by mouth, Disp: , Rfl:      lisinopril-hydrochlorothiazide (PRINZIDE/ZESTORETIC) 10-12.5 MG per tablet, Take 2 tablets by mouth, Disp: , Rfl:      lisinopril-hydrochlorothiazide (PRINZIDE/ZESTORETIC) 20-12.5 MG tablet, 2 tablets, Disp: , Rfl: 3     simvastatin (ZOCOR) 10 MG tablet, Take 1 tablet (10 mg) by mouth every evening, Disp: 30 tablet, Rfl: 0     tetrahydrozoline 0.05 % ophthalmic solution, Place 1 drop into both eyes 3 times daily as needed, Disp: , Rfl:     Current Facility-Administered Medications:      botulinum toxin type A (BOTOX) 100 units injection 400 Units, 400 Units, Intramuscular, Q90 Days, Brigida Briseno MD, 400 Units at 08/18/21 1613     ALLERGIES:  No Known Allergies     SOCIAL HISTORY:   Social History     Socioeconomic History     Marital status:      Spouse name: Not on file     Number of children: Not on file     Years of education: Not on file     Highest education level: Not on file   Occupational History     Not on file   Tobacco Use     Smoking status: Never Smoker     Smokeless tobacco: Never Used   Substance and Sexual Activity     Alcohol use: Yes     Alcohol/week: 0.0 standard drinks     Comment: occassionally     Drug use: No     Sexual activity: Not on file   Other Topics Concern     Not on file   Social History Narrative     Not on file     Social Determinants of Health     Financial Resource Strain:      Difficulty of Paying Living Expenses:    Food Insecurity:      Worried About Running Out of Food in the Last Year:      Ran Out of Food in the Last Year:    Transportation Needs:      Lack of Transportation (Medical):      Lack of Transportation (Non-Medical):    Physical Activity:      Days of Exercise per Week:      Minutes of Exercise per Session:    Stress:      Feeling of Stress :   "  Social Connections:      Frequency of Communication with Friends and Family:      Frequency of Social Gatherings with Friends and Family:      Attends Protestant Services:      Active Member of Clubs or Organizations:      Attends Club or Organization Meetings:      Marital Status:    Intimate Partner Violence:      Fear of Current or Ex-Partner:      Emotionally Abused:      Physically Abused:      Sexually Abused:         FAMILY HISTORY: No family history on file.     EXAM:Vitals: /80   Ht 1.664 m (5' 5.5\")   Wt 77.1 kg (170 lb)   BMI 27.86 kg/m        General appearance: Patient is alert and fully cooperative with history & exam.  No sign of distress is noted during the visit.     Psychiatric: Affect is pleasant & appropriate.  Patient appears motivated to improve health.     Respiratory: Breathing is regular & unlabored while sitting.     HEENT: Hearing is intact to spoken word.  Speech is clear.  No gross evidence of visual impairment that would impact ambulation.     Dermatologic: Incurvation of lateral border of right fifth toenail as well as localized thickened hyperkeratotic lesion to this area.  No open lesions or signs of infection but this is right where he has pain on palpation.     Vascular: DP & PT pulses are intact & regular bilaterally.  No significant edema or varicosities noted.  CFT and skin temperature is normal to both lower extremities.     Neurologic: Lower extremity sensation is intact to light touch.  No evidence of weakness or contracture in the lower extremities.  No evidence of neuropathy.     Musculoskeletal: Patient is ambulatory without assistive device or brace.  Patient unable to dorsiflex foot against resistance or without resistance.    Radiographs: Right foot x-ray - I personally reviewed the xrays-no fractures are noted.  X-ray otherwise unremarkable.     ASSESSMENT:    Right foot pain  Ingrown nail of fifth toe of right foot  Foot drop, left  Right spastic hemiparesis " (H)     Medical Decision Making/Plan:  Reviewed patient's chart in [facility]. The potential causes and nature of an ingrown toenail were discussed with the patient.  We reviewed the natural history/prognosis of the condition and potential risks if no treatment is provided.      Treatment options discussed included conservative management (oral antibiotics, soaking of foot, adequate width shoes)  as well as surgical management (partial or total nail removal).  The pros and cons of both forms of treatment were reviewed.      At this time I recommend removal of part of the nail to get pressure off of the toe where he is walking.  This should help with some of the pain.  He will soak the foot twice a day and apply Silvadene cream which was ordered and a Band-Aid.  We also discussed that we want to get pressure off of that fifth toe and with his dropfoot that I recommend an ankle-foot orthotic that he wear permanently to help stabilize his walking in foot and ankle.  Did put an order in for this.  All questions were answered to patient and patient's wife satisfaction and they will call further questions or concerns.      Procedure: After verbal consent, the right 5th toe was anesthetized with 5cc's of 1% lidocaine plain. A tourniquet was applied to the toe. The lateral border was then raised from the nail bed and then cut the length of the nail.  The offending nail border was then removed.   Bacitracin was applied to the nail bed.  The tourniquet was removed.  Bandage was applied to the toe.  The patient tolerated the procedure and anesthesia well.    Patient risk factor: Patient is at medium risk for infection.        Ember Asher DPM, Podiatry/Foot and Ankle Surgery        Again, thank you for allowing me to participate in the care of your patient.        Sincerely,        Ember Asher DPM, Podiatry/Foot and Ankle Surgery

## 2021-08-25 NOTE — PROGRESS NOTES
PATIENT HISTORY:  Dr. Fish GOMES from [facility] requested I see this patient for their foot issue.  Luis Trinidad is a 65 year old male who presents to clinic for right foot wound.  Patient states that the pain is been going on for 6 months.  Noticed it after his stroke.  Pain is 8 out of 10 with weightbearing.  No pain when he is not on the foot.  Denies specific injury.  Here with his wife.  Wondering what is causing the pain and what can be done with it.    Review of Systems:  Patient denies fever, chills, rash, wound, stiffness, numbness, weakness, heart burn, blood in stool, chest pain with activity, calf pain when walking, shortness of breath with activity, chronic cough, easy bleeding/bruising, swelling of ankles, excessive thirst, fatigue, depression, anxiety.  Patient admits to being.     PAST MEDICAL HISTORY:   Past Medical History:   Diagnosis Date     Hypertension         PAST SURGICAL HISTORY: No past surgical history on file.     MEDICATIONS:   Current Outpatient Medications:      aspirin 81 MG EC tablet, Take 1 tablet (81 mg) by mouth daily, Disp: , Rfl:      atorvastatin (LIPITOR) 40 MG tablet, Take 40 mg by mouth, Disp: , Rfl:      baclofen (LIORESAL) 10 MG tablet, Take 3-4 tablets (30-40 mg) by mouth 3 times daily, Disp: 1080 tablet, Rfl: 3     botulinum toxin type A (BOTOX) 100 UNITS injection, Inject 400 Units into the muscle every 3 months, Disp: 400 Units, Rfl: 5     cholecalciferol 2000 UNITS tablet, Take 2,000 Units by mouth daily, Disp: 30 tablet, Rfl: 0     clopidogrel (PLAVIX) 75 MG tablet, Take 75 mg by mouth, Disp: , Rfl:      clopidogrel (PLAVIX) 75 MG tablet, Take 1 tablet (75 mg) by mouth daily, Disp: 30 tablet, Rfl: 0     co-enzyme Q-10 100 MG CAPS, Take 1 capsule (100 mg) by mouth daily, Disp: 30 capsule, Rfl: 0     lisinopril-hydrochlorothiazide (PRINZIDE,ZESTORETIC) 10-12.5 MG per tablet, Take 1 tablet by mouth, Disp: , Rfl:      lisinopril-hydrochlorothiazide (PRINZIDE/ZESTORETIC)  10-12.5 MG per tablet, Take 2 tablets by mouth, Disp: , Rfl:      lisinopril-hydrochlorothiazide (PRINZIDE/ZESTORETIC) 20-12.5 MG tablet, 2 tablets, Disp: , Rfl: 3     simvastatin (ZOCOR) 10 MG tablet, Take 1 tablet (10 mg) by mouth every evening, Disp: 30 tablet, Rfl: 0     tetrahydrozoline 0.05 % ophthalmic solution, Place 1 drop into both eyes 3 times daily as needed, Disp: , Rfl:     Current Facility-Administered Medications:      botulinum toxin type A (BOTOX) 100 units injection 400 Units, 400 Units, Intramuscular, Q90 Days, Brigida Briseno MD, 400 Units at 08/18/21 1613     ALLERGIES:  No Known Allergies     SOCIAL HISTORY:   Social History     Socioeconomic History     Marital status:      Spouse name: Not on file     Number of children: Not on file     Years of education: Not on file     Highest education level: Not on file   Occupational History     Not on file   Tobacco Use     Smoking status: Never Smoker     Smokeless tobacco: Never Used   Substance and Sexual Activity     Alcohol use: Yes     Alcohol/week: 0.0 standard drinks     Comment: occassionally     Drug use: No     Sexual activity: Not on file   Other Topics Concern     Not on file   Social History Narrative     Not on file     Social Determinants of Health     Financial Resource Strain:      Difficulty of Paying Living Expenses:    Food Insecurity:      Worried About Running Out of Food in the Last Year:      Ran Out of Food in the Last Year:    Transportation Needs:      Lack of Transportation (Medical):      Lack of Transportation (Non-Medical):    Physical Activity:      Days of Exercise per Week:      Minutes of Exercise per Session:    Stress:      Feeling of Stress :    Social Connections:      Frequency of Communication with Friends and Family:      Frequency of Social Gatherings with Friends and Family:      Attends Zoroastrianism Services:      Active Member of Clubs or Organizations:      Attends Club or Organization Meetings:       "Marital Status:    Intimate Partner Violence:      Fear of Current or Ex-Partner:      Emotionally Abused:      Physically Abused:      Sexually Abused:         FAMILY HISTORY: No family history on file.     EXAM:Vitals: /80   Ht 1.664 m (5' 5.5\")   Wt 77.1 kg (170 lb)   BMI 27.86 kg/m        General appearance: Patient is alert and fully cooperative with history & exam.  No sign of distress is noted during the visit.     Psychiatric: Affect is pleasant & appropriate.  Patient appears motivated to improve health.     Respiratory: Breathing is regular & unlabored while sitting.     HEENT: Hearing is intact to spoken word.  Speech is clear.  No gross evidence of visual impairment that would impact ambulation.     Dermatologic: Incurvation of lateral border of right fifth toenail as well as localized thickened hyperkeratotic lesion to this area.  No open lesions or signs of infection but this is right where he has pain on palpation.     Vascular: DP & PT pulses are intact & regular bilaterally.  No significant edema or varicosities noted.  CFT and skin temperature is normal to both lower extremities.     Neurologic: Lower extremity sensation is intact to light touch.  No evidence of weakness or contracture in the lower extremities.  No evidence of neuropathy.     Musculoskeletal: Patient is ambulatory without assistive device or brace.  Patient unable to dorsiflex foot against resistance or without resistance.    Radiographs: Right foot x-ray - I personally reviewed the xrays-no fractures are noted.  X-ray otherwise unremarkable.     ASSESSMENT:    Right foot pain  Ingrown nail of fifth toe of right foot  Foot drop, left  Right spastic hemiparesis (H)     Medical Decision Making/Plan:  Review[facility] in [facility]. The potential causes and nature of an ingrown toenail were discussed with the patient.  We reviewed the natural history/prognosis of the condition and potential risks if no treatment is " provided.      Treatment options discussed included conservative management (oral antibiotics, soaking of foot, adequate width shoes)  as well as surgical management (partial or total nail removal).  The pros and cons of both forms of treatment were reviewed.      At this time I recommend removal of part of the nail to get pressure off of the toe where he is walking.  This should help with some of the pain.  He will soak the foot twice a day and apply Silvadene cream which was ordered and a Band-Aid.  We also discussed that we want to get pressure off of that fifth toe and with his dropfoot that I recommend an ankle-foot orthotic that he wear permanently to help stabilize his walking in foot and ankle.  Did put an order in for this.  All questions were answered to patient and patient's wife satisfaction and they will call further questions or concerns.      Procedure: After verbal consent, the right 5th toe was anesthetized with 5cc's of 1% lidocaine plain. A tourniquet was applied to the toe. The lateral border was then raised from the nail bed and then cut the length of the nail.  The offending nail border was then removed.   Bacitracin was applied to the nail bed.  The tourniquet was removed.  Bandage was applied to the toe.  The patient tolerated the procedure and anesthesia well.    Patient risk factor: Patient is at medium risk for infection.        Ember Asher DPM, Podiatry/Foot and Ankle Surgery

## 2021-08-30 NOTE — ADDENDUM NOTE
Encounter addended by: Yen Villanueva, PT on: 9/18/2018  9:38 AM<BR>     Actions taken: Flowsheet data copied forward, Flowsheet accepted 5

## 2021-09-03 ENCOUNTER — HOSPITAL ENCOUNTER (OUTPATIENT)
Dept: OCCUPATIONAL THERAPY | Facility: CLINIC | Age: 66
Setting detail: THERAPIES SERIES
End: 2021-09-03
Attending: PHYSICAL MEDICINE & REHABILITATION
Payer: COMMERCIAL

## 2021-09-03 ENCOUNTER — HOSPITAL ENCOUNTER (OUTPATIENT)
Dept: PHYSICAL THERAPY | Facility: CLINIC | Age: 66
Setting detail: THERAPIES SERIES
End: 2021-09-03
Attending: PHYSICAL MEDICINE & REHABILITATION
Payer: COMMERCIAL

## 2021-09-03 PROCEDURE — 97110 THERAPEUTIC EXERCISES: CPT | Mod: GP | Performed by: PHYSICAL THERAPIST

## 2021-09-03 PROCEDURE — 97112 NEUROMUSCULAR REEDUCATION: CPT | Mod: GP | Performed by: PHYSICAL THERAPIST

## 2021-09-03 PROCEDURE — 97116 GAIT TRAINING THERAPY: CPT | Mod: GP | Performed by: PHYSICAL THERAPIST

## 2021-09-03 PROCEDURE — 97110 THERAPEUTIC EXERCISES: CPT | Mod: GO,KX | Performed by: OCCUPATIONAL THERAPIST

## 2021-09-03 PROCEDURE — 97140 MANUAL THERAPY 1/> REGIONS: CPT | Mod: GO,KX | Performed by: OCCUPATIONAL THERAPIST

## 2021-09-07 ENCOUNTER — HOSPITAL ENCOUNTER (OUTPATIENT)
Dept: OCCUPATIONAL THERAPY | Facility: CLINIC | Age: 66
Setting detail: THERAPIES SERIES
End: 2021-09-07
Attending: PHYSICAL MEDICINE & REHABILITATION
Payer: COMMERCIAL

## 2021-09-07 PROCEDURE — 97140 MANUAL THERAPY 1/> REGIONS: CPT | Mod: GO,KX | Performed by: OCCUPATIONAL THERAPIST

## 2021-09-10 ENCOUNTER — HOSPITAL ENCOUNTER (OUTPATIENT)
Dept: PHYSICAL THERAPY | Facility: CLINIC | Age: 66
Setting detail: THERAPIES SERIES
End: 2021-09-10
Attending: PHYSICAL MEDICINE & REHABILITATION
Payer: COMMERCIAL

## 2021-09-10 PROCEDURE — 97112 NEUROMUSCULAR REEDUCATION: CPT | Mod: GP | Performed by: PHYSICAL THERAPIST

## 2021-09-10 PROCEDURE — 97032 APPL MODALITY 1+ESTIM EA 15: CPT | Mod: GP | Performed by: PHYSICAL THERAPIST

## 2021-09-10 PROCEDURE — 97116 GAIT TRAINING THERAPY: CPT | Mod: GP | Performed by: PHYSICAL THERAPIST

## 2021-09-13 ENCOUNTER — HOSPITAL ENCOUNTER (OUTPATIENT)
Dept: OCCUPATIONAL THERAPY | Facility: CLINIC | Age: 66
Setting detail: THERAPIES SERIES
End: 2021-09-13
Attending: PHYSICAL MEDICINE & REHABILITATION
Payer: COMMERCIAL

## 2021-09-13 PROCEDURE — 97110 THERAPEUTIC EXERCISES: CPT | Mod: GO,KX | Performed by: OCCUPATIONAL THERAPIST

## 2021-09-13 PROCEDURE — 97140 MANUAL THERAPY 1/> REGIONS: CPT | Mod: GO,KX | Performed by: OCCUPATIONAL THERAPIST

## 2021-09-17 ENCOUNTER — HOSPITAL ENCOUNTER (OUTPATIENT)
Dept: PHYSICAL THERAPY | Facility: CLINIC | Age: 66
Setting detail: THERAPIES SERIES
End: 2021-09-17
Attending: PHYSICAL MEDICINE & REHABILITATION
Payer: COMMERCIAL

## 2021-09-17 PROCEDURE — 97116 GAIT TRAINING THERAPY: CPT | Mod: GP | Performed by: PHYSICAL THERAPIST

## 2021-09-17 PROCEDURE — 97112 NEUROMUSCULAR REEDUCATION: CPT | Mod: GP | Performed by: PHYSICAL THERAPIST

## 2021-09-19 NOTE — PROGRESS NOTES
PROGRESS NOTE       09/17/21 0900   Signing Clinician's Name / Credentials   Signing clinician's name / credentials Danyelle Vidal, PT   Session Number   Session Number 10/10    Authorization status Medicare   Goal 1   Goal Identifier Stairs   Goal Description Rogelio will be able to navigate up and down his stairs reciprocally with one rail with minimal circumduction of right LE in order for more efficient and safe access to all levels of his home as well as navigation of stairs in community areas.    Goal Progress See below for details.    Target Date 09/20/21   Goal 2   Goal Identifier Gait speed   Goal Description Rogelio will be able to ambulate 25 feet in 10 sec or less without and assistive device to demonstrate improved gait speed and efficiency for household and community mobility   Goal Progress 9/17/2021  21.19 sec no shoes 23 steps, second time cues for faster pace 15.28 sec , 19 steps, with AFO on 15.84 sec 20 steps and 15.41 sec, 19 steps. 14.66 sec with 19 steps at normal speed, 13.03 sec with 19 steps at fast speed  8.23.21  17.6 sec   20 steps no device at start of the session , with ottoback AFO 19.6 sec, Ramon blue rocker AFO 16.5 sec and end of session no AFO 17.89 sec.    Target Date 09/20/21   Goal 3   Goal Identifier gait pattern/safety   Goal Description Rogelio will demonstrate initial contact on right with heel contact and decreased supination/inversion at ankle/foot determined by clinical observation and pt report of no foot pain during gait for return to daily walks outside.     Goal Progress better heel strike with Blue Rocker AFO , without the AFO he does have a heel strike but not as consistent and less dorsiflexion R ankle , he also has inversion of the ankle swing phase and initial contact.    Target Date 09/20/21   Goal 4   Goal Identifier HEP   Goal Description Rogelio will be independent in a home ex and activity program to address his impairments and maintain functional gains made in physical  therapy.     Goal Progress Rogelio is compliant with his HEP, continue to modify as appropriate   Target Date 09/20/21   Subjective Report   Subjective Report denies pain/foot /anywhere else.  Nothing new.   tried to use the estim at home but couldn't get the lead into the electrode that I left placed on his leg.  Forgot to bring it in today.    Objective Measure 1   Objective Measure 25 foot walk    Details 9/17/2021  21.19 sec no shoes 23 steps, second time cues for faster pace 15.28 sec , 19 steps, with AFO on 15.84 sec 20 steps and 15.41 sec, 19 steps. 14.66 sec with 19 steps at normal speed, 13.03 sec with 19 steps at fast speed   Vitals Signs   Heart Rate 108   Blood Pressure 110/68   Therapeutic Procedure/exercise   Therapeutic Procedures: strength, endurance, ROM, flexibillity minutes (43410) 4   Treatment Detail Stretch right ankle into dorsiflexion and eversion, forefoot mobility and toe extension.   Neuromuscular Re-education   Neuromuscular re-ed of mvmt, balance, coord, kinesthetic sense, posture, proprioception minutes (69876) 25   Skilled Intervention Facilitation and assist as noted   Patient Response Mild fatigue, improved neuromotor movement patterns.   Treatment Detail Asked to improve proper timing and sequencing of right hip knee and ankle motion and decrease/stand and use of upper body for movement and control.  Activities completed in regular stance, staggered stance with the left foot forward with and without a chair in front of him, progressing to holding small squat and staggered stance and reach the left hand towards the back of seat of chair facing him and return to stand with therapist facilitation of proper movement pattern.  Repeated cues not to use neck and back and facilitation given at hip joint as well as knee and ankle on the right.  Sustained staggered stance with left hand on the seat of the chair and performed weight shift forward and back in sagittal plane again working on proper  timing and sequencing of movement right lower extremity.   Standing staggered stance left hand on seat of chair furthest away from him and right hand on back rest of the chair performing weight shift forward and back in sagittal plane progressing to push pull of the chair with mod assist from therapist for chair movement as well as facilitation of proper movement pattern    Progress Much improved timing and sequencing of hip-knee-ankle movement pattern for improved forward center of mass past base of support   Gait Training   Gait Training Minutes, includes stair climbing (89594) 12   Skilled Intervention Facilitation, education, assessment   Patient Response Stopping mid to end stance on the left to actively bring the right leg forward in swing phase.   Treatment Detail Stairs without shoes or AFO on 1 railing/left upper extremity support single step forward leading with the left decreased toe clearance and circumduction of the right leg to step up to the step descending alternating stepping 1 railing center mass slightly held posterior.  Up and down stairs with AFO on the right lower extremity and patient able to perform alternating stepping with decreased circumduction on the right however still present.  Better foot clearance with AFO.  Ambulation without assistive device other than the AFO with focus on continuation of mid to end stance on the left lower extremity, and faster pace of gait.  25 foot walk completed with and without the AFO.   Manual Therapy   Manual Therapy: Mobilization, MFR, MLD, friction massage minutes (81909) 3   Skilled Intervention Manual skills   Patient Response No issues   Treatment Detail Posterior subtalar glide to increase dorsiflexion grade 3-4 right ankle calcaneal mobs to increase ankle E version   Education   Learner Patient   Readiness Acceptance   Method Explanation;Demonstration   Response Verbalizes Understanding;Demonstrates Understanding;Needs Reinforcement   Education  Comments Throughout session on proper movement pattern.   Plan   Home program Reinforced with patient doing the staggered stand exercise with forward center of mass facing his counter.   Plan for next session Patient to bring in his e-stim and assist with proper set up or use at home for anterior tibialis activation.  Continue with proper neuromotor function mid to end stance activities right and left lower extremity and dampening over activation   Comments   Comments PROGRESS NOTE - Rogelio is making slow but steady progress with skilled PT inteventions.  He demonstrates progression within each session but needs reinforcement of proepr movement mechanics, gait that declines b/w sessions. Barriers include his spasticity, impaired neuro motor control/grading of muscle activaiton.  He has acquired a Blue Rocker AFO which is assisting in improving his gait. He has also seen a podiatrist since last PN and had removal of ingrown toe nail which has helped iwth his foot pain.  We have also introduced Estim to treatement for ant tibialis activation/strengthening/neuro muscular retraining.  Rogelio is appropriate to continue with skilled PT intervention and is without questions.  ALl goals remain appropriate.    Total Session Time   Timed Code Treatment Minutes 44   Total Treatment Time (sum of timed and untimed services) 44

## 2021-09-20 ENCOUNTER — HOSPITAL ENCOUNTER (OUTPATIENT)
Dept: OCCUPATIONAL THERAPY | Facility: CLINIC | Age: 66
Setting detail: THERAPIES SERIES
End: 2021-09-20
Attending: PHYSICAL MEDICINE & REHABILITATION
Payer: COMMERCIAL

## 2021-09-20 ENCOUNTER — HOSPITAL ENCOUNTER (OUTPATIENT)
Dept: PHYSICAL THERAPY | Facility: CLINIC | Age: 66
Setting detail: THERAPIES SERIES
End: 2021-09-20
Attending: PHYSICAL MEDICINE & REHABILITATION
Payer: COMMERCIAL

## 2021-09-20 PROCEDURE — 97110 THERAPEUTIC EXERCISES: CPT | Mod: GO,KX | Performed by: OCCUPATIONAL THERAPIST

## 2021-09-20 PROCEDURE — 97116 GAIT TRAINING THERAPY: CPT | Mod: GP | Performed by: PHYSICAL THERAPIST

## 2021-09-20 PROCEDURE — 97110 THERAPEUTIC EXERCISES: CPT | Mod: GP | Performed by: PHYSICAL THERAPIST

## 2021-09-20 PROCEDURE — 97140 MANUAL THERAPY 1/> REGIONS: CPT | Mod: GO,KX | Performed by: OCCUPATIONAL THERAPIST

## 2021-09-20 PROCEDURE — 97032 APPL MODALITY 1+ESTIM EA 15: CPT | Mod: GP | Performed by: PHYSICAL THERAPIST

## 2021-09-20 NOTE — PROGRESS NOTES
Rehabilitation Services      OUTPATIENT PHYSICAL THERAPY  PLAN OF TREATMENT FOR OUTPATIENT REHABILITATION    Patient's Last Name, First Name, M.I.                YOB: 1955  Luis Trinidad                        Provider's Name  Danyelle Vidal, PT Medical Record No.  [id number]                               Onset Date: 9/1/2020   Start of Care Date: 3/23/21   Type:     _X_PT   ___OT   ___SLP Medical Diagnosis: CVA                       PT Diagnosis: impaired participation in life roles and functional mobility due to R hemiparesis and sequelae of chronic CVA      _________________________________________________________________________________  Plan of Treatment:    Frequency/Duration: 2 x a week x 90 days      Goals: Please see evaluation dated 9/17/21      Certification date from 9/21/21 to 12/20/21.    Danyelle Vidal, PT          I CERTIFY THE NEED FOR THESE SERVICES FURNISHED UNDER        THIS PLAN OF TREATMENT AND WHILE UNDER MY CARE     (Physician co-signature of this document indicates review and certification of the therapy plan).                Referring Provider: VIDHYA LAGOS

## 2021-09-22 DIAGNOSIS — R25.2 SPASTICITY: ICD-10-CM

## 2021-09-22 RX ORDER — BACLOFEN 10 MG/1
30-40 TABLET ORAL 3 TIMES DAILY
Qty: 1080 TABLET | Refills: 3 | Status: SHIPPED | OUTPATIENT
Start: 2021-09-22 | End: 2022-04-07

## 2021-09-22 NOTE — TELEPHONE ENCOUNTER
Last visit: botox on 8/18/21  Next appointment is 11/15/21 for Botox    Last baclofen tablet Rx refill at pharmacy: 6/18/21

## 2021-09-24 ENCOUNTER — HOSPITAL ENCOUNTER (OUTPATIENT)
Dept: PHYSICAL THERAPY | Facility: CLINIC | Age: 66
Setting detail: THERAPIES SERIES
End: 2021-09-24
Attending: PHYSICAL MEDICINE & REHABILITATION
Payer: COMMERCIAL

## 2021-09-24 PROCEDURE — 97112 NEUROMUSCULAR REEDUCATION: CPT | Mod: GP | Performed by: PHYSICAL THERAPIST

## 2021-09-24 PROCEDURE — 97116 GAIT TRAINING THERAPY: CPT | Mod: GP | Performed by: PHYSICAL THERAPIST

## 2021-09-24 PROCEDURE — 97110 THERAPEUTIC EXERCISES: CPT | Mod: GP | Performed by: PHYSICAL THERAPIST

## 2021-09-27 ENCOUNTER — HOSPITAL ENCOUNTER (OUTPATIENT)
Dept: OCCUPATIONAL THERAPY | Facility: CLINIC | Age: 66
Setting detail: THERAPIES SERIES
End: 2021-09-27
Attending: PHYSICAL MEDICINE & REHABILITATION
Payer: COMMERCIAL

## 2021-09-27 ENCOUNTER — HOSPITAL ENCOUNTER (OUTPATIENT)
Dept: PHYSICAL THERAPY | Facility: CLINIC | Age: 66
Setting detail: THERAPIES SERIES
End: 2021-09-27
Attending: PHYSICAL MEDICINE & REHABILITATION
Payer: COMMERCIAL

## 2021-09-27 PROCEDURE — 97110 THERAPEUTIC EXERCISES: CPT | Mod: GO,KX | Performed by: OCCUPATIONAL THERAPIST

## 2021-09-27 PROCEDURE — 97116 GAIT TRAINING THERAPY: CPT | Mod: GP,KX | Performed by: PHYSICAL THERAPIST

## 2021-09-27 PROCEDURE — 97140 MANUAL THERAPY 1/> REGIONS: CPT | Mod: GO,KX | Performed by: OCCUPATIONAL THERAPIST

## 2021-09-27 PROCEDURE — 97140 MANUAL THERAPY 1/> REGIONS: CPT | Mod: GP,KX | Performed by: PHYSICAL THERAPIST

## 2021-09-27 PROCEDURE — 97112 NEUROMUSCULAR REEDUCATION: CPT | Mod: GP | Performed by: PHYSICAL THERAPIST

## 2021-09-30 ENCOUNTER — HOSPITAL ENCOUNTER (OUTPATIENT)
Dept: PHYSICAL THERAPY | Facility: CLINIC | Age: 66
Setting detail: THERAPIES SERIES
End: 2021-09-30
Attending: PHYSICAL MEDICINE & REHABILITATION
Payer: COMMERCIAL

## 2021-09-30 PROCEDURE — 97116 GAIT TRAINING THERAPY: CPT | Mod: GP | Performed by: PHYSICAL THERAPIST

## 2021-09-30 PROCEDURE — 97112 NEUROMUSCULAR REEDUCATION: CPT | Mod: GP | Performed by: PHYSICAL THERAPIST

## 2021-09-30 PROCEDURE — 97110 THERAPEUTIC EXERCISES: CPT | Mod: GP | Performed by: PHYSICAL THERAPIST

## 2021-10-04 ENCOUNTER — HOSPITAL ENCOUNTER (OUTPATIENT)
Dept: PHYSICAL THERAPY | Facility: CLINIC | Age: 66
Setting detail: THERAPIES SERIES
End: 2021-10-04
Attending: PHYSICAL MEDICINE & REHABILITATION
Payer: COMMERCIAL

## 2021-10-04 ENCOUNTER — HOSPITAL ENCOUNTER (OUTPATIENT)
Dept: OCCUPATIONAL THERAPY | Facility: CLINIC | Age: 66
Setting detail: THERAPIES SERIES
End: 2021-10-04
Attending: PHYSICAL MEDICINE & REHABILITATION
Payer: COMMERCIAL

## 2021-10-04 PROCEDURE — 97116 GAIT TRAINING THERAPY: CPT | Mod: GP | Performed by: PHYSICAL THERAPIST

## 2021-10-04 PROCEDURE — 97140 MANUAL THERAPY 1/> REGIONS: CPT | Mod: GO,KX | Performed by: OCCUPATIONAL THERAPIST

## 2021-10-04 PROCEDURE — 97112 NEUROMUSCULAR REEDUCATION: CPT | Mod: GP | Performed by: PHYSICAL THERAPIST

## 2021-10-04 PROCEDURE — 97110 THERAPEUTIC EXERCISES: CPT | Mod: GP | Performed by: PHYSICAL THERAPIST

## 2021-10-04 PROCEDURE — 97140 MANUAL THERAPY 1/> REGIONS: CPT | Mod: GP | Performed by: PHYSICAL THERAPIST

## 2021-10-04 NOTE — PROGRESS NOTES
OUTPATIENT OCCUPATIONAL THERAPY  PLAN OF TREATMENT FOR OUTPATIENT REHABILITATION AND PROGRESS NOTE    Patient's Last Name, First Name, Luis Mckeon Date of Birth  1955   Provider's Name  [facility] Medical Record No.  [id number]    Onset Date  11/29/17 Start of Care Date  3/25/21   Type:     __PT   _X_OT   __SLP Medical Diagnosis  Right spastic hemiparesis (H) G81.11   OT Diagnosis  decreased ADL/IADL independence    Plan of Treatment  Frequency/Duration: 2x/week x 1 month after botox injections, then every other week 1x/week for remaining 8 weeks between injections. (16 visits in 90 days)  Certification date from 9/13/21 to 12/12/21     Goals:    Goal Identifier R shoulder strength   Goal Description Patient to increase R shoulder flexion AROM to 30 degrees for improved R UE function for ADL/IADLs (during dressing and grooming tasks, carrying items, putting groceries and dishes away).   Target Date 12/12/21   Date Met      Progress (detail required for progress note): Pt abducts R shoulder to 60 degrees AROM, 20 dgrees shoulder flexion, as needed to reach in front of himself  for  grooming, helping stabilze objects in his lap, on table.  (Has to manually lift his R UE up onto table if stabilizing on the table surface).       Goal Identifier R pinch strength   Goal Description Patient will demonstrate increased right hand pinch strength (both lateral and palmar) by 3# each for increased independence with ADL/IADLs such as opening containers, pull up pants, maintaining pinch to hold items.   Target Date 12/12/21   Date Met      Progress (detail required for progress note):  --improved to 46,35,35 ( from 32 in June 2021). Pinch Strength-- Key pinch R 16# L 23 #   (up 1 # R and stable L) 3 pt  pinch R 9 # (up one #)  L 20# (stable)..  Uses pinch/ strength to hold onto his shoulder pulley kit for SROM HEP.       Goal Identifier R GM/FM coordination    Goal Description Patient to improve R GM/FM coordination skills for increased independence during ADL/IADL tasks (dressing, kitchen tasks, feeding self) by completing the Box and Blocks Test with R UE in standing with 5 blocks    Target Date 12/12/21   Date Met      Progress (detail required for progress note): Pt is more able to extend his R thumb to release, adjust grasp  on grooming items.  Pt has limited full elbow extension related to his tone, shallow movement arc from 110-80 degrees elbow flexion.  Abilities fluctuate related to tone changes and where he is at in side of the botox cycle every 12 weeks.       Goal Identifier R UE as Gross Assist   Goal Description Patient to demonstrate functional tasks (feeding self, wiping the counter/table, washing dishes, etc.) with 20% use of R UE as gross stabilizer or gross assist for increased ADL/IADL independence and closer return to prior level of function.   Target Date 12/12/21   Date Met      Progress (detail required for progress note): Pt is using his right hand to  hold bag handle with <5# load. for 25 feet amd holding toothbrush as L hand loads tooth paste.           Beginning/End Dates of Progress Note Reporting Period:  6/18/21 to 9/13/21    Progress this reporting period: Rogelio is still very affected by high tone spasticity but makes steady gains towards use of R hand for daily tasks. He has fluctuating tone patterns, responding well to botox injections every 12 weeks, gets harder as the doses wear off for the last 4 weeks.  Trunk /rib cage and periscapular tone affect his trunk rotation, weightshifting during reaching, fluid gait pattern and affect his ability to re-right his balance.  He responds well to combination of botox and instrument assisted soft tissue mobilization/Graston work.     (Subjective) : Pt is still 7/10 sore at neck, trunk and R flank ( 4cm by 6cm large nodular contusion), fell in his kitchen last week moving quickly between cabinet  and fridge, foot got caught, did not strike his head. Elbow swelling is much better, less tender this week.        Objective Measurements:      Objective Measure:  strength   Details: Improved to 46,35,35 ( from 32 in June 2021)     Objective Measure: Pinch strength   Details: Key pinch R 16# L 23 #   (up 1 # R and stable L) 3 pt  pinch R 9 # (up one #)  L 20# (stable)     Objective Measure: R UE AROM   Details: After botox in mid August, requires 33% resistance to break tone at biceps affecting last 30 degree of EL EX.  , springy tone that releases  to lacking 5 degrees by end of session.  AAROM SH FL to 160 degrees,  HABD to 95 of 110 ( pec minor tone improved).      Objective Measure: Box and Blocks   Details: After botox 8/18/21, easier to extend his thumb  but harder to    control hand into  spherical grasp pattern to sustain  while moving blocks over barrier, getting 3 blocks moved  in 2:22 ( had been 1 min to 1:22 last testing on  6/18/21).   Using body to substitute for not being able to  flex elbow to carry load over  12in barrier.  (only has 30 degree movement arc of biceps, without a load, loses this when combined with gripping).           I CERTIFY THE NEED FOR THESE SERVICES FURNISHED UNDER        THIS PLAN OF TREATMENT AND WHILE UNDER MY CARE     (Physician co-signature of this document indicates review and certification of the therapy plan).                Referring Provider: Brigida Briseno. MD Danyelle Evans, OTR

## 2021-10-08 ENCOUNTER — HOSPITAL ENCOUNTER (OUTPATIENT)
Dept: PHYSICAL THERAPY | Facility: CLINIC | Age: 66
Setting detail: THERAPIES SERIES
End: 2021-10-08
Attending: PHYSICAL MEDICINE & REHABILITATION
Payer: COMMERCIAL

## 2021-10-08 PROCEDURE — 97116 GAIT TRAINING THERAPY: CPT | Mod: GP | Performed by: PHYSICAL THERAPIST

## 2021-10-08 PROCEDURE — 97110 THERAPEUTIC EXERCISES: CPT | Mod: GP | Performed by: PHYSICAL THERAPIST

## 2021-10-29 ENCOUNTER — HOSPITAL ENCOUNTER (OUTPATIENT)
Dept: PHYSICAL THERAPY | Facility: CLINIC | Age: 66
Setting detail: THERAPIES SERIES
End: 2021-10-29
Attending: PHYSICAL MEDICINE & REHABILITATION
Payer: COMMERCIAL

## 2021-10-29 PROCEDURE — 97116 GAIT TRAINING THERAPY: CPT | Mod: GP | Performed by: PHYSICAL THERAPIST

## 2021-10-29 PROCEDURE — 97112 NEUROMUSCULAR REEDUCATION: CPT | Mod: GP | Performed by: PHYSICAL THERAPIST

## 2021-10-29 PROCEDURE — 97110 THERAPEUTIC EXERCISES: CPT | Mod: GP | Performed by: PHYSICAL THERAPIST

## 2021-11-01 ENCOUNTER — HOSPITAL ENCOUNTER (OUTPATIENT)
Dept: PHYSICAL THERAPY | Facility: CLINIC | Age: 66
Setting detail: THERAPIES SERIES
End: 2021-11-01
Attending: PHYSICAL MEDICINE & REHABILITATION
Payer: COMMERCIAL

## 2021-11-01 ENCOUNTER — HOSPITAL ENCOUNTER (OUTPATIENT)
Dept: OCCUPATIONAL THERAPY | Facility: CLINIC | Age: 66
Setting detail: THERAPIES SERIES
End: 2021-11-01
Attending: PHYSICAL MEDICINE & REHABILITATION
Payer: COMMERCIAL

## 2021-11-01 PROCEDURE — 97110 THERAPEUTIC EXERCISES: CPT | Mod: GO,KX

## 2021-11-01 PROCEDURE — 97140 MANUAL THERAPY 1/> REGIONS: CPT | Mod: GO

## 2021-11-01 PROCEDURE — 97112 NEUROMUSCULAR REEDUCATION: CPT | Mod: GP | Performed by: PHYSICAL THERAPIST

## 2021-11-01 PROCEDURE — 97116 GAIT TRAINING THERAPY: CPT | Mod: GP | Performed by: PHYSICAL THERAPIST

## 2021-11-01 PROCEDURE — 97110 THERAPEUTIC EXERCISES: CPT | Mod: GP | Performed by: PHYSICAL THERAPIST

## 2021-11-05 ENCOUNTER — HOSPITAL ENCOUNTER (OUTPATIENT)
Dept: PHYSICAL THERAPY | Facility: CLINIC | Age: 66
Setting detail: THERAPIES SERIES
End: 2021-11-05
Attending: PHYSICAL MEDICINE & REHABILITATION
Payer: COMMERCIAL

## 2021-11-05 PROCEDURE — 97112 NEUROMUSCULAR REEDUCATION: CPT | Mod: GP

## 2021-11-05 PROCEDURE — 97110 THERAPEUTIC EXERCISES: CPT | Mod: GP

## 2021-11-05 PROCEDURE — 97116 GAIT TRAINING THERAPY: CPT | Mod: GP

## 2021-11-08 ENCOUNTER — HOSPITAL ENCOUNTER (OUTPATIENT)
Dept: OCCUPATIONAL THERAPY | Facility: CLINIC | Age: 66
Setting detail: THERAPIES SERIES
End: 2021-11-08
Attending: PHYSICAL MEDICINE & REHABILITATION
Payer: COMMERCIAL

## 2021-11-08 PROCEDURE — 97110 THERAPEUTIC EXERCISES: CPT | Mod: GO

## 2021-11-08 PROCEDURE — 97535 SELF CARE MNGMENT TRAINING: CPT | Mod: GO,KX

## 2021-11-12 ENCOUNTER — HOSPITAL ENCOUNTER (OUTPATIENT)
Dept: PHYSICAL THERAPY | Facility: CLINIC | Age: 66
Setting detail: THERAPIES SERIES
End: 2021-11-12
Attending: PHYSICAL MEDICINE & REHABILITATION
Payer: COMMERCIAL

## 2021-11-12 PROCEDURE — 97110 THERAPEUTIC EXERCISES: CPT | Mod: GP | Performed by: PHYSICAL THERAPIST

## 2021-11-12 PROCEDURE — 97116 GAIT TRAINING THERAPY: CPT | Mod: GP | Performed by: PHYSICAL THERAPIST

## 2021-11-15 ENCOUNTER — OFFICE VISIT (OUTPATIENT)
Dept: PHYSICAL MEDICINE AND REHAB | Facility: CLINIC | Age: 66
End: 2021-11-15
Payer: COMMERCIAL

## 2021-11-15 VITALS
TEMPERATURE: 98 F | HEART RATE: 100 BPM | WEIGHT: 171.9 LBS | OXYGEN SATURATION: 99 % | BODY MASS INDEX: 28.17 KG/M2 | RESPIRATION RATE: 16 BRPM | SYSTOLIC BLOOD PRESSURE: 115 MMHG | DIASTOLIC BLOOD PRESSURE: 77 MMHG

## 2021-11-15 DIAGNOSIS — I63.81 LACUNAR INFARCT, ACUTE (H): ICD-10-CM

## 2021-11-15 DIAGNOSIS — G81.11 RIGHT SPASTIC HEMIPARESIS (H): Primary | ICD-10-CM

## 2021-11-15 PROCEDURE — 64644 CHEMODENERV 1 EXTREM 5/> MUS: CPT | Mod: 59 | Performed by: PHYSICAL MEDICINE & REHABILITATION

## 2021-11-15 PROCEDURE — 64643 CHEMODENERV 1 EXTREM 1-4 EA: CPT | Mod: 59 | Performed by: PHYSICAL MEDICINE & REHABILITATION

## 2021-11-15 PROCEDURE — 96372 THER/PROPH/DIAG INJ SC/IM: CPT | Performed by: PHYSICAL MEDICINE & REHABILITATION

## 2021-11-15 PROCEDURE — 95874 GUIDE NERV DESTR NEEDLE EMG: CPT | Performed by: PHYSICAL MEDICINE & REHABILITATION

## 2021-11-15 PROCEDURE — 99212 OFFICE O/P EST SF 10 MIN: CPT | Mod: 25 | Performed by: PHYSICAL MEDICINE & REHABILITATION

## 2021-11-15 PROCEDURE — 64646 CHEMODENERV TRUNK MUSC 1-5: CPT | Mod: 59 | Performed by: PHYSICAL MEDICINE & REHABILITATION

## 2021-11-15 ASSESSMENT — PAIN SCALES - GENERAL: PAINLEVEL: NO PAIN (0)

## 2021-11-15 NOTE — PROGRESS NOTES
"PM&R Clinic & Procedure Note:    Luis Trinidad is 66-year-old male with a history of stroke in Nov 2016 resulting in residual right spastic hemiparesis.  Rogelio was last seen on 8/18/2021 for Botox injections.     He reports good results with last series of injections. Notes 80-90% improvement in spasticity, which lasted 2.5 months - similar to previous series of injections. Denies any side effects. Denies any new medical issues. Continues on Baclofen 30 mg TID.     Rogelio had new pain at right 5th toe for 2-3 months during the Spring and Summer. No falls or injury. Pain is present with standing and walking; no pain at rest. No associated swelling, erythema or fever/chills. PT has tried shoe insert, and kinesio taping with some benefits. He has some \"tightness\" at right ankle.     He saw his PCP for right foot pain. Xray was unremarkable. He was referred to podiatry clinic and was seen on 8/25. He had an ingrown toe nail which was removed and helped with his pain significantly. He also has a new anterior leaf spring AFO which has bene helping.     Today, he noted that his ingrown toe nail has recurred and has been causing pain. No falls or new issues.         Physical exam:  /77   Pulse 100   Temp 98  F (36.7  C)   Resp 16   Wt 78 kg (171 lb 14.4 oz)   SpO2 99%   BMI 28.17 kg/m       Alert, pleasant affect.   Increased tone noted in RUE with shoulder abd, EF, pronation, WF and FF - 2-3/4 per MAS  RLE exam also showed increased tone with HF, KF, PF and inversion.   Skin intact at the sites of injections          Assessment and recommendations:    Ischemic stroke at the lateral left thalamus and the posterior limb of the left internal capsule 11/2015    Residual right spastic hemiparesis     HTN     HLD    Questionable SAMMY    He has increased tone at PF and invertor muscles which is contributing to his leg/foot pain. It's not clear why this is affecting his gait these many years after stroke. We have added " posterior tibialis muscle and will consider to add FDL as well. He will continue using his AFO and contact podiatry team for follow up regarding his ingrown toenail. Sent a        1. Work-up: none today  2. Therapy/equipment/braces: continue HEP regularly; continue OT and PT.  3. Medications: no change in meds today; on baclofen 30 tid.  4. Interventions: chemo-denervation today; procedure note below. No change in the total dose or sites of injections today.   5. Referral / follow up with other providers: to sleep medicine clinic when able for more evaluation of possible SAMMY as possible risk factor for stroke   6. Follow up: 12 weeks           Procedure:   Chemodenervation to right chest and RUE utilizing botulinum toxin.  Began with formal consent which he signed. Had formal time-out prior to procedure. 400 U of  Botox  Lot # [id number] with Expiration Date: 9/2024 was utilized. Diluted with normal saline in a 100 U:1mL ratio, total of 4 mL.  All areas were cleansed with chloroprep prior to injecting. EMG guidance was utilized to identify most active motor units while avoiding surrounding structures.     The following muscles were then injected, two body areas     Right trunk:  1. Right Pectoralis Major: 25 units  2. Right Pectoralis Minor: 15 units   3. Right Latissimus Dorsi: 10 units         Right arm  1. Biceps 60 units  2. Brachioradialis 40 units   3. FDS 50 units  4. FCR 50 units  5. FCU 50 units  7. Pronator Teres 70 units    Right leg  1. Posterior tibialis 30 units      Brigida Briseno MD  Physical Medicine & Rehabilitation

## 2021-11-15 NOTE — LETTER
"11/15/2021       RE: Luis Trinidad  [id number] [facility] [id number]     Dear Colleague,    Thank you for referring your patient, Luis Trinidad, to the [facility] PHYSICAL MEDICINE [facility]ILITATION [facility]IN[facility] [place] [facility]. Please see a copy of my visit note below.    PM&R Clinic & Procedure Note:    Luis Trinidda is 66-year-old male with a history of stroke in Nov 2016 resulting in residual right spastic hemiparesis.  Rogelio was last seen on 8/18/2021 for Botox injections.     He reports good results with last series of injections. Notes 80-90% improvement in spasticity, which lasted 2.5 months - similar to previous series of injections. Denies any side effects. Denies any new medical issues. Continues on Baclofen 30 mg TID.     Rogelio had new pain at right 5th toe for 2-3 months during the Spring and Summer. No falls or injury. Pain is present with standing and walking; no pain at rest. No associated swelling, erythema or fever/chills. PT has tried shoe insert, and kinesio taping with some benefits. He has some \"tightness\" at right ankle.     He saw his PCP for right foot pain. Xray was unremarkable. He was referred to podiatry clinic and was seen on 8/25. He had an ingrown toe nail which was removed and helped with his pain significantly. He also has a new anterior leaf spring AFO which has bene helping.     Today, he noted that his ingrown toe nail has recurred and has been causing pain. No falls or new issues.         Physical exam:  /77   Pulse 100   Temp 98  F (36.7  C)   Resp 16   Wt 78 kg (171 lb 14.4 oz)   SpO2 99%   BMI 28.17 kg/m       Alert, pleasant affect.   Increased tone noted in RUE with shoulder abd, EF, pronation, WF and FF - 2-3/4 per MAS  RLE exam also showed increased tone with HF, KF, PF and inversion.   Skin intact at the sites of injections          Assessment and recommendations:    Ischemic stroke at the " lateral left thalamus and the posterior limb of the left internal capsule 11/2015    Residual right spastic hemiparesis     HTN     HLD    Questionable SAMMY    He has increased tone at PF and invertor muscles which is contributing to his leg/foot pain. It's not clear why this is affecting his gait these many years after stroke. We have added posterior tibialis muscle and will consider to add FDL as well. He will continue using his AFO and contact podiatry team for follow up regarding his ingrown toenail. Sent a        1. Work-up: none today  2. Therapy/equipment/braces: continue HEP regularly; continue OT and PT.  3. Medications: no change in meds today; on baclofen 30 tid.  4. Interventions: chemo-denervation today; procedure note below. No change in the total dose or sites of injections today.   5. Referral / follow up with other providers: to sleep medicine clinic when able for more evaluation of possible SAMMY as possible risk factor for stroke   6. Follow up: 12 weeks           Procedure:   Chemodenervation to right chest and RUE utilizing botulinum toxin.  Began with formal consent which he signed. Had formal time-out prior to procedure. 400 U of  Botox  Lot # [id number] with Expiration Date: 9/2024 was utilized. Diluted with normal saline in a 100 U:1mL ratio, total of 4 mL.  All areas were cleansed with chloroprep prior to injecting. EMG guidance was utilized to identify most active motor units while avoiding surrounding structures.     The following muscles were then injected, two body areas     Right trunk:  1. Right Pectoralis Major: 25 units  2. Right Pectoralis Minor: 15 units   3. Right Latissimus Dorsi: 10 units         Right arm  1. Biceps 60 units  2. Brachioradialis 40 units   3. FDS 50 units  4. FCR 50 units  5. FCU 50 units  7. Pronator Teres 70 units    Right leg  1. Posterior tibialis 30 units      Brigida Briseno MD  Physical Medicine & Rehabilitation

## 2021-11-19 ENCOUNTER — HOSPITAL ENCOUNTER (OUTPATIENT)
Dept: PHYSICAL THERAPY | Facility: CLINIC | Age: 66
Setting detail: THERAPIES SERIES
End: 2021-11-19
Attending: PHYSICAL MEDICINE & REHABILITATION
Payer: COMMERCIAL

## 2021-11-19 PROCEDURE — 97116 GAIT TRAINING THERAPY: CPT | Mod: GP | Performed by: PHYSICAL THERAPIST

## 2021-11-19 PROCEDURE — 97112 NEUROMUSCULAR REEDUCATION: CPT | Mod: GP | Performed by: PHYSICAL THERAPIST

## 2021-11-19 PROCEDURE — 97110 THERAPEUTIC EXERCISES: CPT | Mod: GP | Performed by: PHYSICAL THERAPIST

## 2021-11-22 ENCOUNTER — HOSPITAL ENCOUNTER (OUTPATIENT)
Dept: OCCUPATIONAL THERAPY | Facility: CLINIC | Age: 66
Setting detail: THERAPIES SERIES
End: 2021-11-22
Attending: PHYSICAL MEDICINE & REHABILITATION
Payer: COMMERCIAL

## 2021-11-22 ENCOUNTER — HOSPITAL ENCOUNTER (OUTPATIENT)
Dept: PHYSICAL THERAPY | Facility: CLINIC | Age: 66
Setting detail: THERAPIES SERIES
End: 2021-11-22
Attending: PHYSICAL MEDICINE & REHABILITATION
Payer: COMMERCIAL

## 2021-11-22 PROCEDURE — 97110 THERAPEUTIC EXERCISES: CPT | Mod: GP | Performed by: PHYSICAL THERAPIST

## 2021-11-22 PROCEDURE — 97110 THERAPEUTIC EXERCISES: CPT | Mod: GO,KX

## 2021-11-26 ENCOUNTER — HOSPITAL ENCOUNTER (OUTPATIENT)
Dept: PHYSICAL THERAPY | Facility: CLINIC | Age: 66
Setting detail: THERAPIES SERIES
End: 2021-11-26
Attending: PHYSICAL MEDICINE & REHABILITATION
Payer: COMMERCIAL

## 2021-11-26 ENCOUNTER — HOSPITAL ENCOUNTER (OUTPATIENT)
Dept: OCCUPATIONAL THERAPY | Facility: CLINIC | Age: 66
Setting detail: THERAPIES SERIES
End: 2021-11-26
Attending: PHYSICAL MEDICINE & REHABILITATION
Payer: COMMERCIAL

## 2021-11-26 PROCEDURE — 97112 NEUROMUSCULAR REEDUCATION: CPT | Mod: GP | Performed by: PHYSICAL THERAPIST

## 2021-11-26 PROCEDURE — 97110 THERAPEUTIC EXERCISES: CPT | Mod: GP | Performed by: PHYSICAL THERAPIST

## 2021-11-26 PROCEDURE — 97110 THERAPEUTIC EXERCISES: CPT | Mod: GO,KX

## 2021-11-29 ENCOUNTER — HOSPITAL ENCOUNTER (OUTPATIENT)
Dept: OCCUPATIONAL THERAPY | Facility: CLINIC | Age: 66
Setting detail: THERAPIES SERIES
End: 2021-11-29
Attending: PHYSICAL MEDICINE & REHABILITATION
Payer: COMMERCIAL

## 2021-11-29 ENCOUNTER — HOSPITAL ENCOUNTER (OUTPATIENT)
Dept: PHYSICAL THERAPY | Facility: CLINIC | Age: 66
Setting detail: THERAPIES SERIES
End: 2021-11-29
Attending: PHYSICAL MEDICINE & REHABILITATION
Payer: COMMERCIAL

## 2021-11-29 PROCEDURE — 97110 THERAPEUTIC EXERCISES: CPT | Mod: GP | Performed by: PHYSICAL THERAPIST

## 2021-11-29 PROCEDURE — 97140 MANUAL THERAPY 1/> REGIONS: CPT | Mod: GP | Performed by: PHYSICAL THERAPIST

## 2021-11-29 PROCEDURE — 97110 THERAPEUTIC EXERCISES: CPT | Mod: GO,KX

## 2021-11-29 PROCEDURE — 97116 GAIT TRAINING THERAPY: CPT | Mod: GP | Performed by: PHYSICAL THERAPIST

## 2021-12-03 ENCOUNTER — HOSPITAL ENCOUNTER (OUTPATIENT)
Dept: PHYSICAL THERAPY | Facility: CLINIC | Age: 66
Setting detail: THERAPIES SERIES
End: 2021-12-03
Attending: PHYSICAL MEDICINE & REHABILITATION
Payer: COMMERCIAL

## 2021-12-03 ENCOUNTER — HOSPITAL ENCOUNTER (OUTPATIENT)
Dept: OCCUPATIONAL THERAPY | Facility: CLINIC | Age: 66
Setting detail: THERAPIES SERIES
End: 2021-12-03
Attending: PHYSICAL MEDICINE & REHABILITATION
Payer: COMMERCIAL

## 2021-12-03 PROCEDURE — 97110 THERAPEUTIC EXERCISES: CPT | Mod: GO | Performed by: OCCUPATIONAL THERAPIST

## 2021-12-03 PROCEDURE — 97116 GAIT TRAINING THERAPY: CPT | Mod: GP | Performed by: PHYSICAL THERAPIST

## 2021-12-03 PROCEDURE — 97112 NEUROMUSCULAR REEDUCATION: CPT | Mod: GO,KX | Performed by: OCCUPATIONAL THERAPIST

## 2021-12-03 PROCEDURE — 97112 NEUROMUSCULAR REEDUCATION: CPT | Mod: GP | Performed by: PHYSICAL THERAPIST

## 2021-12-10 ENCOUNTER — HOSPITAL ENCOUNTER (OUTPATIENT)
Dept: PHYSICAL THERAPY | Facility: CLINIC | Age: 66
Setting detail: THERAPIES SERIES
End: 2021-12-10
Attending: PHYSICAL MEDICINE & REHABILITATION
Payer: COMMERCIAL

## 2021-12-10 ENCOUNTER — HOSPITAL ENCOUNTER (OUTPATIENT)
Dept: OCCUPATIONAL THERAPY | Facility: CLINIC | Age: 66
Setting detail: THERAPIES SERIES
End: 2021-12-10
Attending: PHYSICAL MEDICINE & REHABILITATION
Payer: COMMERCIAL

## 2021-12-10 PROCEDURE — 97112 NEUROMUSCULAR REEDUCATION: CPT | Mod: GP | Performed by: PHYSICAL THERAPIST

## 2021-12-10 PROCEDURE — 97110 THERAPEUTIC EXERCISES: CPT | Mod: GP | Performed by: PHYSICAL THERAPIST

## 2021-12-10 PROCEDURE — 97110 THERAPEUTIC EXERCISES: CPT | Mod: GO,KX

## 2021-12-10 PROCEDURE — 97116 GAIT TRAINING THERAPY: CPT | Mod: GP | Performed by: PHYSICAL THERAPIST

## 2021-12-10 NOTE — PROGRESS NOTES
[facility]    OUTPATIENT OCCUPATIONAL THERAPY  PLAN OF TREATMENT FOR OUTPATIENT REHABILITATION AND PROGRESS NOTE    Patient's Last Name, First Name, Luis Mckeon Date of Birth  1955   Provider's Name  [facility] Medical Record No.  [id number]    Onset Date  11/29/17 Start of Care Date  3/25/21   Type:     __PT   _X_OT   __SLP Medical Diagnosis  Right spastic hemiparesis (H) G81.11   OT Diagnosis  decreased ADL/IADL independence Plan of Treatment  Frequency/Duration: anticipate 1x/week for two weeks after Botox, then every other week for 1x/week (12 visits in 90 days)  Certification date from 12/10/21 to 03/04/2022     Goals:    Goal Identifier R shoulder strength   Goal Description Patient to increase R shoulder flexion AROM to 30 degrees for improved R UE function for ADL/IADLs (during dressing and grooming tasks, carrying items, putting groceries and dishes away).   Target Date 03/04/22   Date Met      Progress (detail required for progress note):  Goal progressing. Pt demonstrating increased SH ABD AROM combined with slight SH FL, very limited true SH FL AROM. Able to place RUE onto table when in standing position but continues to manually lift his RUE up onto table when sitting.     Goal Identifier R pinch strength   Goal Description Patient will demonstrate increased right hand pinch strength (both lateral and palmar) by 3# each for increased independence with ADL/IADLs such as opening containers, pull up pants, maintaining pinch to hold items.   Target Date 03/04/22   Date Met      Progress (detail required for progress note):  Goal progressing but limited change during this reporting period. Will focus on additional HEP exercises for focus on /pinch for pt to carryover skill into home environment.     Goal Identifier R GM/FM coordination    Goal Description Patient to improve R GM/FM coordination skills for increased independence during ADL/IADL tasks (dressing, kitchen tasks, feeding self) by completing the Box and Blocks Test with R UE in standing with 5 blocks    Target Date 03/04/22   Date Met      Progress (detail required for progress note):  Goal progressing. Pt demonstrating improved completion of Box and Blocks this date, transporting 4 blocks in 1 minute in standing position. Continues to have improved extension in R thumb for release of blocks but is limited by limited AROM in RUE. Will plan to continue to progress.     Goal Identifier R UE as Gross Assist   Goal Description Patient to demonstrate functional tasks (feeding self, wiping the counter/table, washing dishes, etc.) with 20% use of R UE as gross stabilizer or gross assist for increased ADL/IADL independence and closer return to prior level of function.   Target Date 03/04/22   Date Met      Progress (detail required for progress note):  Goal progressing. Pt reports using R hand to hold bag handle, wash dishes, use doorknobs, wash countertop/tabletop, and has attempted to use with toothbrush with minimal success.       Beginning/End Dates of Progress Note Reporting Period:  09/13/21 to 12/10/21    Progress Toward Goals:   Progress this reporting period: Pt demonstrating improved transport of items between two locations using RUE. Continues to be limited in AROM due to tone in RUE and limited progress with pinch strength this testing period. Anticipate future tapering of visits with strong focus on home program to promote IND with stretching and strengthening for functional use of RUE in home environment.    Client Self (Subjective) Report for Progress Note Reporting Period: Pt reports continued muscle tightening/spasms in R trunk along ribcage. Reports he is stretching, completing exercises, and trying to use hand at home for dishes, doorknobs, washing tabletop/countertop, and has  attempted hold toothbrush as well with minimal success (using tube  to build up handle).    Objective Measurements:      Objective Measure:  strength       Objective Measure: Pinch strength   Details: Lateral pinch: 12# R, 21# L. Palmar pinch: 3# R (pronated, mod-max A OTR for holding), 17# L. On 9/13/21: Key pinch R 16# L 23 #   (up 1 # R and stable L) 3 pt  pinch R 9 # (up one #)  L 20# (stable)     Objective Measure: R UE AROM   Details: Reaches 78 degrees AROM SH ABD. Unable to attain true shoulder flexion, compensates with shoulder abduction with each attempt. Elbow continues to lack about 10 degrees at end range of extension with PROM.       Objective Measure: Box and Blocks   Details: In 1 minute, pt gets 4 blocks R hand with significant use of trunk flexion to assist with moving RUE, 53 blocks L hand in standing position. On 9/13/21: After botox 8/18/21, easier to extend his thumb  but harder to    control hand into  spherical grasp pattern to sustain  while moving blocks over barrier, getting 3 blocks moved  in 2:22 ( had been 1 min to 1:22 last testing on  6/18/21).   Using body to substitute for not being able to  flex elbow to carry load over  12in barrier.  (only has 30 degree movement arc of biceps, without a load, loses this when combined with gripping).           I CERTIFY THE NEED FOR THESE SERVICES FURNISHED UNDER        THIS PLAN OF TREATMENT AND WHILE UNDER MY CARE     (Physician co-signature of this document indicates review and certification of the therapy plan).                Referring Provider: Brigida Briseno. MD Cailin De Souza, OTR

## 2021-12-13 ENCOUNTER — HOSPITAL ENCOUNTER (OUTPATIENT)
Dept: PHYSICAL THERAPY | Facility: CLINIC | Age: 66
Setting detail: THERAPIES SERIES
End: 2021-12-13
Attending: PHYSICAL MEDICINE & REHABILITATION
Payer: COMMERCIAL

## 2021-12-13 ENCOUNTER — HOSPITAL ENCOUNTER (OUTPATIENT)
Dept: OCCUPATIONAL THERAPY | Facility: CLINIC | Age: 66
Setting detail: THERAPIES SERIES
End: 2021-12-13
Attending: PHYSICAL MEDICINE & REHABILITATION
Payer: COMMERCIAL

## 2021-12-13 PROCEDURE — 97116 GAIT TRAINING THERAPY: CPT | Mod: GP | Performed by: PHYSICAL THERAPIST

## 2021-12-13 PROCEDURE — 97110 THERAPEUTIC EXERCISES: CPT | Mod: GP | Performed by: PHYSICAL THERAPIST

## 2021-12-13 PROCEDURE — 97112 NEUROMUSCULAR REEDUCATION: CPT | Mod: GP | Performed by: PHYSICAL THERAPIST

## 2021-12-13 PROCEDURE — 97535 SELF CARE MNGMENT TRAINING: CPT | Mod: GO,KX

## 2021-12-13 PROCEDURE — 97110 THERAPEUTIC EXERCISES: CPT | Mod: GO,KX

## 2021-12-17 ENCOUNTER — HOSPITAL ENCOUNTER (OUTPATIENT)
Dept: PHYSICAL THERAPY | Facility: CLINIC | Age: 66
Setting detail: THERAPIES SERIES
End: 2021-12-17
Attending: PHYSICAL MEDICINE & REHABILITATION
Payer: COMMERCIAL

## 2021-12-17 PROCEDURE — 97112 NEUROMUSCULAR REEDUCATION: CPT | Mod: GP,KX | Performed by: PHYSICAL THERAPIST

## 2021-12-17 PROCEDURE — 97110 THERAPEUTIC EXERCISES: CPT | Mod: GP,KX | Performed by: PHYSICAL THERAPIST

## 2021-12-17 PROCEDURE — 97116 GAIT TRAINING THERAPY: CPT | Mod: GP,KX | Performed by: PHYSICAL THERAPIST

## 2021-12-20 ENCOUNTER — HOSPITAL ENCOUNTER (OUTPATIENT)
Dept: OCCUPATIONAL THERAPY | Facility: CLINIC | Age: 66
Setting detail: THERAPIES SERIES
End: 2021-12-20
Attending: PHYSICAL MEDICINE & REHABILITATION
Payer: COMMERCIAL

## 2021-12-20 PROCEDURE — 97110 THERAPEUTIC EXERCISES: CPT | Mod: GO,KX

## 2021-12-31 ENCOUNTER — HOSPITAL ENCOUNTER (OUTPATIENT)
Dept: PHYSICAL THERAPY | Facility: CLINIC | Age: 66
Setting detail: THERAPIES SERIES
End: 2021-12-31
Attending: PHYSICAL MEDICINE & REHABILITATION
Payer: COMMERCIAL

## 2021-12-31 PROCEDURE — 97116 GAIT TRAINING THERAPY: CPT | Mod: GP | Performed by: PHYSICAL THERAPIST

## 2021-12-31 PROCEDURE — 97110 THERAPEUTIC EXERCISES: CPT | Mod: GP | Performed by: PHYSICAL THERAPIST

## 2021-12-31 PROCEDURE — 97112 NEUROMUSCULAR REEDUCATION: CPT | Mod: GP | Performed by: PHYSICAL THERAPIST

## 2022-01-03 ENCOUNTER — HOSPITAL ENCOUNTER (OUTPATIENT)
Dept: PHYSICAL THERAPY | Facility: CLINIC | Age: 67
Setting detail: THERAPIES SERIES
End: 2022-01-03
Attending: PHYSICAL MEDICINE & REHABILITATION
Payer: COMMERCIAL

## 2022-01-03 ENCOUNTER — HOSPITAL ENCOUNTER (OUTPATIENT)
Dept: OCCUPATIONAL THERAPY | Facility: CLINIC | Age: 67
Setting detail: THERAPIES SERIES
End: 2022-01-03
Attending: PHYSICAL MEDICINE & REHABILITATION
Payer: COMMERCIAL

## 2022-01-03 PROCEDURE — 97110 THERAPEUTIC EXERCISES: CPT | Mod: GP | Performed by: PHYSICAL THERAPIST

## 2022-01-03 PROCEDURE — 97112 NEUROMUSCULAR REEDUCATION: CPT | Mod: GP | Performed by: PHYSICAL THERAPIST

## 2022-01-03 PROCEDURE — 97116 GAIT TRAINING THERAPY: CPT | Mod: GP | Performed by: PHYSICAL THERAPIST

## 2022-01-03 PROCEDURE — 97110 THERAPEUTIC EXERCISES: CPT | Mod: GO,KX

## 2022-01-03 NOTE — PROGRESS NOTES
[facility]    OUTPATIENT PHYSICAL THERAPY  PLAN OF TREATMENT FOR OUTPATIENT REHABILITATION AND PROGRESS NOTE           Patient's Last Name, First Name, Luis Mckeon Date of Birth  1955   Provider's Name  [facility] Medical Record No.  [id number]    Onset Date  9/1/2021 Start of Care Date  3/23/21   Type:     _X_PT   ___OT   ___SLP Medical Diagnosis  CVA   PT Diagnosis  impaired participation in life roles and functional mobility due to R hemiparesis and sequelae of chronic CVA Plan of Treatment  Frequency/Duration: 2 x a week x 90 days  Certification date from 12/21/21 to 3/22/22     Goals:  Goal Identifier Stairs   Goal Description Rogelio will be able to navigate up and down his stairs reciprocally with one rail with minimal circumduction of right LE in order for more efficient and safe access to all levels of his home as well as navigation of stairs in community areas.    Target Date  3/22/22   Date Met      Progress (detail required for progress note): 1/3/22 Circumducts R LE stepping up , R leg abducted when placed on step and pulls with the L UE to advance up the steps.      Goal Identifier Gait speed   Goal Description Rogelio will be able to ambulate 25 feet in 10 sec or less without and assistive device to demonstrate improved gait speed and efficiency for household and community mobility   Target Date 3/22/22   Date Met      Progress (detail required for progress note): 1/3/22 15.38 sec no device,  AFO without AFO 14.8 sec, foot drag along floor R swing through  12/17/21 no AFO 13.16 sec 18 steps light tc L-s area, AFO initally 17.4 sec and then second rep light tc for faster pace 13.75 sec.  12/3/21 AFO on , no AD,  16 .22 sec, 22 steps,  15.60 sec , 21 steps at start of session. 12.97 sec , 18 steps facilitation of faster pace, no AFO  11/29/21  start of session 22 steps, 18.76 sec   At end of session 13.81 sec 18 steps without AFO or shoes on, with AFO and shoes 14.5 sec 19 steps     Goal Identifier gait pattern/safety   Goal Description Rogelio will demonstrate initial contact on right with heel contact and decreased supination/inversion at ankle/foot determined by clinical observation and pt report of no foot pain during gait for return to daily walks outside.     Target Date 3/22/22   Date Met      Progress (detail required for progress note): 1/3/22 R foot hurt a little bit when walking , inversion R LE swing through and IC, decreased mid to end stance R LE , R leg decreased hip and knee flexion on the R with swing through, ankle inversion through stance, holds R arm flexed at side.   Small improvments noted clinical observation all with AFO.   [place] AFO foot drag R     Goal Identifier HEP   Goal Description Rogelio will be independent in a home ex and activity program to address his impairments and maintain functional gains made in physical therapy.     Target Date 3/22/22   Date Met      Progress (detail required for progress note):         Beginning/End Dates of Progress Note Reporting Period:  9/18/21 to 1/3/22    Progress Toward Goals:   Progress this reporting period: as above in goals.  Will make progress during a session but limited from session to session    Client Self (Subjective) Report for Progress Note Reporting Period: Foot was sore over the weekend (R one) but when put on shoes this morning no longer had pain.              I CERTIFY THE NEED FOR THESE SERVICES FURNISHED UNDER        THIS PLAN OF TREATMENT AND WHILE UNDER MY CARE     (Physician co-signature of this document indicates review and certification of the therapy plan).                Referring Provider: Dr. Brigida Vidal, PT

## 2022-01-07 ENCOUNTER — HOSPITAL ENCOUNTER (OUTPATIENT)
Dept: PHYSICAL THERAPY | Facility: CLINIC | Age: 67
Setting detail: THERAPIES SERIES
End: 2022-01-07
Attending: PHYSICAL MEDICINE & REHABILITATION
Payer: COMMERCIAL

## 2022-01-07 PROCEDURE — 97110 THERAPEUTIC EXERCISES: CPT | Mod: GP | Performed by: PHYSICAL THERAPIST

## 2022-01-07 PROCEDURE — 97116 GAIT TRAINING THERAPY: CPT | Mod: GP | Performed by: PHYSICAL THERAPIST

## 2022-01-10 ENCOUNTER — HOSPITAL ENCOUNTER (OUTPATIENT)
Dept: PHYSICAL THERAPY | Facility: CLINIC | Age: 67
Setting detail: THERAPIES SERIES
End: 2022-01-10
Attending: PHYSICAL MEDICINE & REHABILITATION
Payer: COMMERCIAL

## 2022-01-10 ENCOUNTER — HOSPITAL ENCOUNTER (OUTPATIENT)
Dept: OCCUPATIONAL THERAPY | Facility: CLINIC | Age: 67
Setting detail: THERAPIES SERIES
End: 2022-01-10
Attending: PHYSICAL MEDICINE & REHABILITATION
Payer: COMMERCIAL

## 2022-01-10 PROCEDURE — 97110 THERAPEUTIC EXERCISES: CPT | Mod: GO,KX

## 2022-01-10 PROCEDURE — 97110 THERAPEUTIC EXERCISES: CPT | Mod: GP | Performed by: PHYSICAL THERAPIST

## 2022-01-10 PROCEDURE — 97140 MANUAL THERAPY 1/> REGIONS: CPT | Mod: GP | Performed by: PHYSICAL THERAPIST

## 2022-01-10 PROCEDURE — 97112 NEUROMUSCULAR REEDUCATION: CPT | Mod: GO,KX

## 2022-01-10 PROCEDURE — 97530 THERAPEUTIC ACTIVITIES: CPT | Mod: GO,KX

## 2022-01-17 ENCOUNTER — HOSPITAL ENCOUNTER (OUTPATIENT)
Dept: OCCUPATIONAL THERAPY | Facility: CLINIC | Age: 67
Setting detail: THERAPIES SERIES
End: 2022-01-17
Attending: PHYSICAL MEDICINE & REHABILITATION
Payer: COMMERCIAL

## 2022-01-17 ENCOUNTER — HOSPITAL ENCOUNTER (OUTPATIENT)
Dept: PHYSICAL THERAPY | Facility: CLINIC | Age: 67
Setting detail: THERAPIES SERIES
End: 2022-01-17
Attending: PHYSICAL MEDICINE & REHABILITATION
Payer: COMMERCIAL

## 2022-01-17 PROCEDURE — 97110 THERAPEUTIC EXERCISES: CPT | Mod: GP

## 2022-01-17 PROCEDURE — 97110 THERAPEUTIC EXERCISES: CPT | Mod: GO

## 2022-01-20 ENCOUNTER — HOSPITAL ENCOUNTER (OUTPATIENT)
Dept: PHYSICAL THERAPY | Facility: CLINIC | Age: 67
Setting detail: THERAPIES SERIES
End: 2022-01-20
Attending: PHYSICAL MEDICINE & REHABILITATION
Payer: COMMERCIAL

## 2022-01-20 PROCEDURE — 97116 GAIT TRAINING THERAPY: CPT | Mod: GP | Performed by: PHYSICAL THERAPIST

## 2022-01-20 PROCEDURE — 97112 NEUROMUSCULAR REEDUCATION: CPT | Mod: GP | Performed by: PHYSICAL THERAPIST

## 2022-01-20 PROCEDURE — 97110 THERAPEUTIC EXERCISES: CPT | Mod: GP | Performed by: PHYSICAL THERAPIST

## 2022-01-28 ENCOUNTER — HOSPITAL ENCOUNTER (OUTPATIENT)
Dept: PHYSICAL THERAPY | Facility: CLINIC | Age: 67
Setting detail: THERAPIES SERIES
End: 2022-01-28
Attending: PHYSICAL MEDICINE & REHABILITATION
Payer: COMMERCIAL

## 2022-01-28 PROCEDURE — 97110 THERAPEUTIC EXERCISES: CPT | Mod: GP | Performed by: PHYSICAL THERAPIST

## 2022-01-31 ENCOUNTER — HOSPITAL ENCOUNTER (OUTPATIENT)
Dept: OCCUPATIONAL THERAPY | Facility: CLINIC | Age: 67
Setting detail: THERAPIES SERIES
End: 2022-01-31
Attending: PHYSICAL MEDICINE & REHABILITATION
Payer: COMMERCIAL

## 2022-01-31 ENCOUNTER — HOSPITAL ENCOUNTER (OUTPATIENT)
Dept: PHYSICAL THERAPY | Facility: CLINIC | Age: 67
Setting detail: THERAPIES SERIES
End: 2022-01-31
Attending: PHYSICAL MEDICINE & REHABILITATION
Payer: COMMERCIAL

## 2022-01-31 PROCEDURE — 97110 THERAPEUTIC EXERCISES: CPT | Mod: GP

## 2022-01-31 PROCEDURE — 97112 NEUROMUSCULAR REEDUCATION: CPT | Mod: GP

## 2022-01-31 PROCEDURE — 97110 THERAPEUTIC EXERCISES: CPT | Mod: GO,KX

## 2022-02-04 ENCOUNTER — HOSPITAL ENCOUNTER (OUTPATIENT)
Dept: PHYSICAL THERAPY | Facility: CLINIC | Age: 67
Setting detail: THERAPIES SERIES
End: 2022-02-04
Attending: PHYSICAL MEDICINE & REHABILITATION
Payer: COMMERCIAL

## 2022-02-04 PROCEDURE — 97112 NEUROMUSCULAR REEDUCATION: CPT | Mod: GP | Performed by: PHYSICAL THERAPIST

## 2022-02-04 PROCEDURE — 97110 THERAPEUTIC EXERCISES: CPT | Mod: GP | Performed by: PHYSICAL THERAPIST

## 2022-02-07 ENCOUNTER — HOSPITAL ENCOUNTER (OUTPATIENT)
Dept: OCCUPATIONAL THERAPY | Facility: CLINIC | Age: 67
Setting detail: THERAPIES SERIES
End: 2022-02-07
Attending: PHYSICAL MEDICINE & REHABILITATION
Payer: COMMERCIAL

## 2022-02-07 PROCEDURE — 97110 THERAPEUTIC EXERCISES: CPT | Mod: GO

## 2022-02-14 ENCOUNTER — HOSPITAL ENCOUNTER (OUTPATIENT)
Dept: PHYSICAL THERAPY | Facility: CLINIC | Age: 67
Setting detail: THERAPIES SERIES
End: 2022-02-14
Attending: PHYSICAL MEDICINE & REHABILITATION
Payer: COMMERCIAL

## 2022-02-14 ENCOUNTER — HOSPITAL ENCOUNTER (OUTPATIENT)
Dept: OCCUPATIONAL THERAPY | Facility: CLINIC | Age: 67
Setting detail: THERAPIES SERIES
End: 2022-02-14
Attending: PHYSICAL MEDICINE & REHABILITATION
Payer: COMMERCIAL

## 2022-02-14 PROCEDURE — 97112 NEUROMUSCULAR REEDUCATION: CPT | Mod: GP

## 2022-02-14 PROCEDURE — 97116 GAIT TRAINING THERAPY: CPT | Mod: GP

## 2022-02-14 PROCEDURE — 97110 THERAPEUTIC EXERCISES: CPT | Mod: GO

## 2022-02-14 PROCEDURE — 97110 THERAPEUTIC EXERCISES: CPT | Mod: GP

## 2022-02-18 ENCOUNTER — HOSPITAL ENCOUNTER (OUTPATIENT)
Dept: PHYSICAL THERAPY | Facility: CLINIC | Age: 67
Setting detail: THERAPIES SERIES
End: 2022-02-18
Attending: PHYSICAL MEDICINE & REHABILITATION
Payer: COMMERCIAL

## 2022-02-18 PROCEDURE — 97116 GAIT TRAINING THERAPY: CPT | Mod: GP | Performed by: PHYSICAL THERAPIST

## 2022-02-18 PROCEDURE — 97110 THERAPEUTIC EXERCISES: CPT | Mod: GP | Performed by: PHYSICAL THERAPIST

## 2022-02-21 ENCOUNTER — HOSPITAL ENCOUNTER (OUTPATIENT)
Dept: PHYSICAL THERAPY | Facility: CLINIC | Age: 67
Setting detail: THERAPIES SERIES
End: 2022-02-21
Attending: PHYSICAL MEDICINE & REHABILITATION
Payer: COMMERCIAL

## 2022-02-21 ENCOUNTER — HOSPITAL ENCOUNTER (OUTPATIENT)
Dept: OCCUPATIONAL THERAPY | Facility: CLINIC | Age: 67
Setting detail: THERAPIES SERIES
End: 2022-02-21
Attending: PHYSICAL MEDICINE & REHABILITATION
Payer: COMMERCIAL

## 2022-02-21 PROCEDURE — 97112 NEUROMUSCULAR REEDUCATION: CPT | Mod: GP

## 2022-02-21 PROCEDURE — 97110 THERAPEUTIC EXERCISES: CPT | Mod: GP

## 2022-02-21 PROCEDURE — 97116 GAIT TRAINING THERAPY: CPT | Mod: GP

## 2022-02-21 PROCEDURE — 97110 THERAPEUTIC EXERCISES: CPT | Mod: GO

## 2022-02-28 ENCOUNTER — HOSPITAL ENCOUNTER (OUTPATIENT)
Dept: OCCUPATIONAL THERAPY | Facility: CLINIC | Age: 67
Setting detail: THERAPIES SERIES
End: 2022-02-28
Attending: PHYSICAL MEDICINE & REHABILITATION
Payer: COMMERCIAL

## 2022-02-28 PROCEDURE — 97110 THERAPEUTIC EXERCISES: CPT | Mod: GO

## 2022-02-28 NOTE — PROGRESS NOTES
[facility]     OUTPATIENT OCCUPATIONAL THERAPY  PLAN OF TREATMENT FOR OUTPATIENT REHABILITATION AND PROGRESS NOTE     Patient's Last Name, First Name, Luis Mckeon Date of Birth  1955   Provider's Name  [facility] Medical Record No.  [id number]    Onset Date  11/29/17 Start of Care Date  3/25/21   Type:     __PT   _X_OT   __SLP Medical Diagnosis  Right spastic hemiparesis (H) G81.11   OT Diagnosis  decreased ADL/IADL independence Plan of Treatment  Frequency/Duration: anticipate 1x/week for approximately 5 more weeks pending pt progress  Certification date from 02/28/22 to 04/08/2022      Goals:     Goal Identifier R shoulder strength   Goal Description Patient to increase R shoulder flexion AROM to 30 degrees for improved R UE function for ADL/IADLs (during dressing and grooming tasks, carrying items, putting groceries and dishes away).   Target Date 04/08/2022   Date Met      Progress (detail required for progress note):  Goal progressing. Pt demonstrating increased SH ABD AROM combined with slight SH FL since time of evaluation but progress remains approximately the same over the last progress period, remains very limited in true SH FL AROM. Able to place RUE onto table when in standing position but continues to manually lift his RUE up onto table when sitting. Requires significant trunk compensation for functional movements with RUE. Will plan to progress HEP appropriately to further promote functional use of RUE for ADLs/IADLs.      Goal Identifier R pinch strength   Goal Description Patient will demonstrate increased right hand pinch strength (both lateral and palmar) by 3# each for increased independence with ADL/IADLs such as opening containers, pull up pants, maintaining pinch to hold items.   Target Date 04/08/2022   Date Met       Progress (detail required for progress note):  Goal progressing but limited change during this reporting period. Will focus on additional HEP exercises for focus on /pinch for pt to carryover skill into home environment.      Goal Identifier R GM/FM coordination   Goal Description Patient to improve R GM/FM coordination skills for increased independence during ADL/IADL tasks (dressing, kitchen tasks, feeding self) by completing the Box and Blocks Test with R UE in standing with 5 blocks    Target Date 04/08/2022   Date Met      Progress (detail required for progress note):  Goal progressing overall but decreased performance on Box and Blocks this date (see objective measures below). Does have improved extension in R thumb for release of blocks overall but continues to be limited by limited AROM in RUE. Will plan to continue to progress HEP for promoting functional pinch for ADLs/IADLs.      Goal Identifier R UE as Gross Assist   Goal Description Patient to demonstrate functional tasks (feeding self, wiping the counter/table, washing dishes, etc.) with 20% use of R UE as gross stabilizer or gross assist for increased ADL/IADL independence and closer return to prior level of function.   Target Date 04/08/2022   Date Met      Progress (detail required for progress note):  Goal progressing. Pt reports using R hand to hold bag handle, wash dishes, use doorknobs, wash countertop/tabletop, and has attempted to use with toothbrush with minimal success. Will continue to educate pt on various ways to utilize RUE as gross assist and provide demonstration, as appropriate.         Beginning/End Dates of Progress Note Reporting Period:  12/10/21 to 02/28/22     Progress Toward Goals:   Progress this reporting period: Pt has been seen for a total of 50 outpatient OT visits with focus on above goal areas (see progress noted above). Pt continues to be limited in AROM due to flexor tone in RUE and limited progress with  /pinch strength this testing period. Due to limited progress, anticipate additional about 1 month of visits x 1/week with strong focus on home program to promote IND with stretching and strengthening for functional use of RUE in home environment. Anticipate likely discharge following this extension of POC pending pt progress in the next few weeks. Goal dates updated.    Client Self (Subjective) Report for Progress Note Reporting Period: Pt reports that he continues to have discomfort in R ribcage area and is looking forward to upcoming Botox. Reports he completes his home exercise program every day. Reports understanding regarding likely discharge in the next month with intention to continue with home program to further progression of skills.      Objective Measurements:      Objective Measure: /Pinch strength   Details: R hand: 25, 19#  strength (was 35,46); 14# lateral pinch (was 12#), 1# palmar pinch (was 3#)  - all completed in pronated position, both pinches required min A from OTR for supporting hand. L hand: 89#  strength, 22# lateral pinch (was 21#), 19# palmar pinch (was 17#).           Objective Measure: R UE AROM   Details: Reaches approximately 77-78 degrees AROM SH ABD (same as previous testing). No AROM SH FL (same as previous testing). Gets to approximately 78 degrees shoulder flexion with PROM on modified closed chain with crutch.         Objective Measure: Box and Blocks   Details: In 1 minute, pt gets 0 blocks on first attempt with R hand, then 1 block in 1 minute on second attempt, and 2 blocks in 1 minute with third attempt (was 4 blocks previous testing). With L hand, pt gets 58 blocks in 1 minute (was 53 blocks). Testing completed in standing with pt requiring significant compensatory trunk movements to promote functional use of RUE.         I CERTIFY THE NEED FOR THESE SERVICES FURNISHED UNDER        THIS PLAN OF TREATMENT AND WHILE UNDER MY CARE     (Physician co-signature of  this document indicates review and certification of the therapy plan).                Referring Provider: Brigida Briseno MD Megan Peterson, OTR

## 2022-03-04 ENCOUNTER — HOSPITAL ENCOUNTER (OUTPATIENT)
Dept: PHYSICAL THERAPY | Facility: CLINIC | Age: 67
Setting detail: THERAPIES SERIES
End: 2022-03-04
Attending: PHYSICAL MEDICINE & REHABILITATION
Payer: COMMERCIAL

## 2022-03-04 PROCEDURE — 97112 NEUROMUSCULAR REEDUCATION: CPT | Mod: GP | Performed by: PHYSICAL THERAPIST

## 2022-03-04 PROCEDURE — 97110 THERAPEUTIC EXERCISES: CPT | Mod: GP | Performed by: PHYSICAL THERAPIST

## 2022-03-07 ENCOUNTER — HOSPITAL ENCOUNTER (OUTPATIENT)
Dept: OCCUPATIONAL THERAPY | Facility: CLINIC | Age: 67
Setting detail: THERAPIES SERIES
End: 2022-03-07
Attending: PHYSICAL MEDICINE & REHABILITATION
Payer: COMMERCIAL

## 2022-03-07 PROCEDURE — 97110 THERAPEUTIC EXERCISES: CPT | Mod: GO

## 2022-03-07 NOTE — PROGRESS NOTES
"PM&R Clinic & Procedure Note:    Luis Trinidad is 66-year-old male with a history of stroke in Nov 2016 resulting in residual right spastic hemiparesis.  Rogelio was last seen on 11/15/2021 for Botox injections.     He reports good results with last series of injections. Notes 80-90% improvement in spasticity, which lasted 2.5 months - similar to previous series of injections. Denies any side effects. Denies any new medical issues. Continues on Baclofen 30 mg TID.     Rogelio had new pain at right 5th toe for 2-3 months during the Spring and Summer. No falls or injury. Pain was present with standing and walking; no pain at rest. No associated swelling, erythema or fever/chills. PT tried shoe insert, and kinesio taping with some benefits. He also had some \"tightness\" at right ankle.     He saw his PCP for right foot pain. Xray was unremarkable. He was referred to podiatry clinic and was seen on 8/25/2021. He had an ingrown toe nail which was removed and helped with his pain significantly. He also has a new anterior leaf spring AFO which has bene helping.     Today, he noted foot pain has improved and is manageable.     Got feedback from Cailin DOUGLASS that no change needed in Botox injections. He reported improvement of tightness but not ROM after the last series of Botox injections. Appreciate the communication.           Physical exam:  /80   Pulse 94   Resp 16   SpO2 99%      Alert, pleasant affect.   Increased tone noted in RUE with shoulder abd, EF, pronation, WF and FF - 2-3/4 per MAS  RLE exam also showed increased tone with HF, KF, PF and inversion.   Skin intact at the sites of injections          Assessment and recommendations:    Ischemic stroke at the lateral left thalamus and the posterior limb of the left internal capsule 11/2015    Residual right spastic hemiparesis     HTN     HLD    Questionable SAMMY    He has increased tone at PF and invertor muscles which is contributing to his leg/foot pain. It's not " clear why this is affecting his gait these many years after stroke. We have added posterior tibialis muscle and will consider to add FDL as well. He will continue using his AFO and contact podiatry team for follow up regarding his ingrown toenail. Sent a        1. Work-up: none today  2. Therapy/equipment/braces: continue HEP regularly after OT is completed. Will repeat PT and OT in 6 months for maintenance therapy.   3. Medications: no change in meds today; on baclofen 30 tid.  4. Interventions: chemo-denervation today; procedure note below. No change in the total dose or sites of injections today.   5. Referral / follow up with other providers: to sleep medicine clinic when able for more evaluation of possible SAMMY as possible risk factor for stroke   6. Follow up: 12 weeks           Procedure:   Chemodenervation to right chest and RUE utilizing botulinum toxin.  Began with formal consent which he signed. Had formal time-out prior to procedure. 400 U of  Botox  Lot # [id number] with Expiration Date: 02/2024 was utilized. Diluted with normal saline in a 100 U:1mL ratio, total of 4 mL.  All areas were cleansed with chloroprep prior to injecting. EMG guidance was utilized to identify most active motor units while avoiding surrounding structures.     The following muscles were then injected, two body areas     Right trunk:  1. Right Pectoralis Major: 25 units  2. Right Pectoralis Minor: 15 units   3. Right Latissimus Dorsi: 10 units         Right arm  1. Biceps 60 units  2. Brachioradialis 40 units   3. FDS 50 units  4. FCR 50 units  5. FCU 50 units  7. Pronator Teres 70 units    Right leg  1. Posterior tibialis 30 units      Brigida Briseno MD  Physical Medicine & Rehabilitation

## 2022-03-09 ENCOUNTER — OFFICE VISIT (OUTPATIENT)
Dept: PHYSICAL MEDICINE AND REHAB | Facility: CLINIC | Age: 67
End: 2022-03-09
Payer: COMMERCIAL

## 2022-03-09 VITALS
DIASTOLIC BLOOD PRESSURE: 80 MMHG | SYSTOLIC BLOOD PRESSURE: 127 MMHG | OXYGEN SATURATION: 99 % | RESPIRATION RATE: 16 BRPM | HEART RATE: 94 BPM

## 2022-03-09 DIAGNOSIS — I63.81 LACUNAR INFARCT, ACUTE (H): ICD-10-CM

## 2022-03-09 DIAGNOSIS — G81.11 RIGHT SPASTIC HEMIPARESIS (H): Primary | ICD-10-CM

## 2022-03-09 PROCEDURE — 96372 THER/PROPH/DIAG INJ SC/IM: CPT | Performed by: PHYSICAL MEDICINE & REHABILITATION

## 2022-03-09 PROCEDURE — 64643 CHEMODENERV 1 EXTREM 1-4 EA: CPT | Mod: 59 | Performed by: PHYSICAL MEDICINE & REHABILITATION

## 2022-03-09 PROCEDURE — 95874 GUIDE NERV DESTR NEEDLE EMG: CPT | Performed by: PHYSICAL MEDICINE & REHABILITATION

## 2022-03-09 PROCEDURE — 64644 CHEMODENERV 1 EXTREM 5/> MUS: CPT | Mod: 59 | Performed by: PHYSICAL MEDICINE & REHABILITATION

## 2022-03-09 PROCEDURE — 64646 CHEMODENERV TRUNK MUSC 1-5: CPT | Mod: 59 | Performed by: PHYSICAL MEDICINE & REHABILITATION

## 2022-03-09 ASSESSMENT — PAIN SCALES - GENERAL: PAINLEVEL: NO PAIN (0)

## 2022-03-09 NOTE — LETTER
"3/9/2022       RE: Luis Trinidad  [address] [facility] [address]     Dear Colleague,    Thank you for referring your patient, Luis Trinidad, to the [facility] PHYSICAL MEDICINE AND REHABILITATION CLINIC [place] at [facility]. Please see a copy of my visit note below.    PM&R Clinic & Procedure Note:    Luis Trinidad is 66-year-old male with a history of stroke in Nov 2016 resulting in residual right spastic hemiparesis.  Rogelio was last seen on 11/15/2021 for Botox injections.     He reports good results with last series of injections. Notes 80-90% improvement in spasticity, which lasted 2.5 months - similar to previous series of injections. Denies any side effects. Denies any new medical issues. Continues on Baclofen 30 mg TID.     Rogelio had new pain at right 5th toe for 2-3 months during the Spring and Summer. No falls or injury. Pain was present with standing and walking; no pain at rest. No associated swelling, erythema or fever/chills. PT tried shoe insert, and kinesio taping with some benefits. He also had some \"tightness\" at right ankle.     He saw his PCP for right foot pain. Xray was unremarkable. He was referred to podiatry clinic and was seen on 8/25/2021. He had an ingrown toe nail which was removed and helped with his pain significantly. He also has a new anterior leaf spring AFO which has bene helping.     Today, he noted foot pain has improved and is manageable.     Got feedback from Cailin DOUGLASS that no change needed in Botox injections. He reported improvement of tightness but not ROM after the last series of Botox injections. Appreciate the communication.       Physical exam:  /80   Pulse 94   Resp 16   SpO2 99%      Alert, pleasant affect.   Increased tone noted in RUE with shoulder abd, EF, pronation, WF and FF - 2-3/4 per MAS  RLE exam also showed increased tone with HF, KF, PF and inversion.   Skin intact at the sites of " injections          Assessment and recommendations:    Ischemic stroke at the lateral left thalamus and the posterior limb of the left internal capsule 11/2015    Residual right spastic hemiparesis     HTN     HLD    Questionable SAMMY    He has increased tone at PF and invertor muscles which is contributing to his leg/foot pain. It's not clear why this is affecting his gait these many years after stroke. We have added posterior tibialis muscle and will consider to add FDL as well. He will continue using his AFO and contact podiatry team for follow up regarding his ingrown toenail. Sent a    1. Work-up: none today  2. Therapy/equipment/braces: continue HEP regularly after OT is completed. Will repeat PT and OT in 6 months for maintenance therapy.   3. Medications: no change in meds today; on baclofen 30 tid.  4. Interventions: chemo-denervation today; procedure note below. No change in the total dose or sites of injections today.   5. Referral / follow up with other providers: to sleep medicine clinic when able for more evaluation of possible SAMMY as possible risk factor for stroke   6. Follow up: 12 weeks         Procedure:   Chemodenervation to right chest and RUE utilizing botulinum toxin.  Began with formal consent which he signed. Had formal time-out prior to procedure. 400 U of  Botox  Lot # [id number] with Expiration Date: 02/2024 was utilized. Diluted with normal saline in a 100 U:1mL ratio, total of 4 mL.  All areas were cleansed with chloroprep prior to injecting. EMG guidance was utilized to identify most active motor units while avoiding surrounding structures.     The following muscles were then injected, two body areas     Right trunk:  1. Right Pectoralis Major: 25 units  2. Right Pectoralis Minor: 15 units   3. Right Latissimus Dorsi: 10 units      Right arm  1. Biceps 60 units  2. Brachioradialis 40 units   3. FDS 50 units  4. FCR 50 units  5. FCU 50 units  7. Pronator Teres 70 units    Right leg  1.  Posterior tibialis 30 units        Brigida Briseno MD  Physical Medicine & Rehabilitation

## 2022-03-11 ENCOUNTER — HOSPITAL ENCOUNTER (OUTPATIENT)
Dept: PHYSICAL THERAPY | Facility: CLINIC | Age: 67
Setting detail: THERAPIES SERIES
Discharge: HOME | End: 2022-03-11
Attending: PHYSICAL MEDICINE & REHABILITATION
Payer: COMMERCIAL

## 2022-03-11 PROCEDURE — 97112 NEUROMUSCULAR REEDUCATION: CPT | Mod: GP

## 2022-03-11 PROCEDURE — 97110 THERAPEUTIC EXERCISES: CPT | Mod: GP

## 2022-03-14 ENCOUNTER — HOSPITAL ENCOUNTER (OUTPATIENT)
Dept: PHYSICAL THERAPY | Facility: CLINIC | Age: 67
Setting detail: THERAPIES SERIES
Discharge: HOME | End: 2022-03-14
Attending: PHYSICAL MEDICINE & REHABILITATION
Payer: COMMERCIAL

## 2022-03-14 PROCEDURE — 97110 THERAPEUTIC EXERCISES: CPT | Mod: GP

## 2022-03-14 PROCEDURE — 97112 NEUROMUSCULAR REEDUCATION: CPT | Mod: GP

## 2022-03-18 ENCOUNTER — HOSPITAL ENCOUNTER (OUTPATIENT)
Dept: PHYSICAL THERAPY | Facility: CLINIC | Age: 67
Setting detail: THERAPIES SERIES
Discharge: HOME | End: 2022-03-18
Attending: PHYSICAL MEDICINE & REHABILITATION
Payer: COMMERCIAL

## 2022-03-18 ENCOUNTER — HOSPITAL ENCOUNTER (OUTPATIENT)
Dept: OCCUPATIONAL THERAPY | Facility: CLINIC | Age: 67
Setting detail: THERAPIES SERIES
Discharge: HOME | End: 2022-03-18
Attending: PHYSICAL MEDICINE & REHABILITATION
Payer: COMMERCIAL

## 2022-03-18 PROCEDURE — 97110 THERAPEUTIC EXERCISES: CPT | Mod: GP

## 2022-03-18 PROCEDURE — 97110 THERAPEUTIC EXERCISES: CPT | Mod: GO

## 2022-03-18 PROCEDURE — 97112 NEUROMUSCULAR REEDUCATION: CPT | Mod: GP

## 2022-03-21 ENCOUNTER — HOSPITAL ENCOUNTER (OUTPATIENT)
Dept: PHYSICAL THERAPY | Facility: CLINIC | Age: 67
Setting detail: THERAPIES SERIES
Discharge: HOME | End: 2022-03-21
Attending: PHYSICAL MEDICINE & REHABILITATION
Payer: COMMERCIAL

## 2022-03-21 ENCOUNTER — HOSPITAL ENCOUNTER (OUTPATIENT)
Dept: OCCUPATIONAL THERAPY | Facility: CLINIC | Age: 67
Setting detail: THERAPIES SERIES
Discharge: HOME | End: 2022-03-21
Attending: PHYSICAL MEDICINE & REHABILITATION
Payer: COMMERCIAL

## 2022-03-21 PROCEDURE — 97110 THERAPEUTIC EXERCISES: CPT | Mod: GP

## 2022-03-21 PROCEDURE — 97116 GAIT TRAINING THERAPY: CPT | Mod: GP

## 2022-03-21 PROCEDURE — 97112 NEUROMUSCULAR REEDUCATION: CPT | Mod: GP

## 2022-03-21 PROCEDURE — 97110 THERAPEUTIC EXERCISES: CPT | Mod: GO

## 2022-03-25 ENCOUNTER — HOSPITAL ENCOUNTER (OUTPATIENT)
Dept: OCCUPATIONAL THERAPY | Facility: CLINIC | Age: 67
Setting detail: THERAPIES SERIES
Discharge: HOME | End: 2022-03-25
Attending: PHYSICAL MEDICINE & REHABILITATION
Payer: COMMERCIAL

## 2022-03-25 ENCOUNTER — HOSPITAL ENCOUNTER (OUTPATIENT)
Dept: PHYSICAL THERAPY | Facility: CLINIC | Age: 67
Setting detail: THERAPIES SERIES
Discharge: HOME | End: 2022-03-25
Attending: PHYSICAL MEDICINE & REHABILITATION
Payer: COMMERCIAL

## 2022-03-25 PROCEDURE — 97110 THERAPEUTIC EXERCISES: CPT | Mod: GP | Performed by: PHYSICAL THERAPIST

## 2022-03-25 PROCEDURE — 97116 GAIT TRAINING THERAPY: CPT | Mod: GP | Performed by: PHYSICAL THERAPIST

## 2022-03-25 PROCEDURE — 97110 THERAPEUTIC EXERCISES: CPT | Mod: GO

## 2022-03-25 PROCEDURE — 97112 NEUROMUSCULAR REEDUCATION: CPT | Mod: GP | Performed by: PHYSICAL THERAPIST

## 2022-03-28 ENCOUNTER — HOSPITAL ENCOUNTER (OUTPATIENT)
Dept: OCCUPATIONAL THERAPY | Facility: CLINIC | Age: 67
Setting detail: THERAPIES SERIES
Discharge: HOME | End: 2022-03-28
Attending: PHYSICAL MEDICINE & REHABILITATION
Payer: COMMERCIAL

## 2022-03-28 ENCOUNTER — HOSPITAL ENCOUNTER (OUTPATIENT)
Dept: PHYSICAL THERAPY | Facility: CLINIC | Age: 67
Setting detail: THERAPIES SERIES
Discharge: HOME | End: 2022-03-28
Attending: PHYSICAL MEDICINE & REHABILITATION
Payer: COMMERCIAL

## 2022-03-28 PROCEDURE — 97110 THERAPEUTIC EXERCISES: CPT | Mod: GO

## 2022-03-28 PROCEDURE — 97110 THERAPEUTIC EXERCISES: CPT | Mod: GP

## 2022-03-28 PROCEDURE — 97112 NEUROMUSCULAR REEDUCATION: CPT | Mod: GP

## 2022-03-28 NOTE — PROGRESS NOTES
"[facility] Rehabilitation Services    Outpatient Occupational Therapy Discharge Note  Patient: Luis Trinidad  : 1955    Beginning/End Dates of Reporting Period:  21 to 22 (with current reporting period being 22-22)    Referring Provider: Brigida Briseno MD    Therapy Diagnosis: decreased ADL/IADL independence    Client Self Report: Overall pt reports he is doing well. Reports he doesn't feel that he has noticed many effects from his most recent round of Botox but does feel somewhat improved regarding decreased \"tightness\" in muscle tone. Continues to c/o \"tightness\" in RUE (particularly elbow/bicep region) and R ribside/trunk. Pt is looking forward to warmer weather, so he can start walking outside again.    Objective Measurements:   Objective Measure: /Pinch strength   Details: As of 22: R hand: 25, 19#  strength (was 35,46); 14# lateral pinch (was 12#), 1# palmar pinch (was 3#)  - all completed in pronated position, both pinches required min A from OTR for supporting hand. L hand: 89#  strength, 22# lateral pinch (was 21#), 19# palmar pinch (was 17#).           Objective Measure: R UE AROM   Details: As of 22: Reaches approximately 77-78 degrees AROM SH ABD (same as previous testing). No AROM SH FL (same as previous testing). Gets to approximately 78 degrees shoulder flexion with PROM on modified closed chain with crutch.          Objective Measure: Box and Blocks   Details: As of 22: In 1 minute, pt gets 0 blocks on first attempt with R hand, then 1 block in 1 minute on second attempt, and 2 blocks in 1 minute with third attempt (was 4 blocks previous testing). With L hand, pt gets 58 blocks in 1 minute (was 53 blocks). Testing completed in standing with pt requiring significant compensatory trunk movements to promote functional use of RUE.    Goals:     Goal Identifier R " shoulder strength   Goal Description Patient to increase R shoulder flexion AROM to 30 degrees for improved R UE function for ADL/IADLs (during dressing and grooming tasks, carrying items, putting groceries and dishes away).   Target Date 04/08/22   Date Met      Progress (detail required for progress note): Goal progressing but not met. Pt demonstrating increased SH ABD AROM combined with slight SH FL since time of evaluation but progress remains approximately the same over the last progress period. Pt has very limited true SH FL AROM. Able to place RUE onto table when in standing position but continues to manually lift his RUE up onto table when sitting. Requires significant trunk compensation for functional movements with RUE. No further expectation of progress at this time. Plan for pt to continue with home program and follow up in 6 months or more for re-evaluation prn.     Goal Identifier R pinch strength   Goal Description Patient will demonstrate increased right hand pinch strength (both lateral and palmar) by 3# each for increased independence with ADL/IADLs such as opening containers, pull up pants, maintaining pinch to hold items.   Target Date 04/08/22   Date Met      Progress (detail required for progress note): Goal progressing but not met. Pt with small increase in pinch strength noted over time but progress remains limited. No further expectation of progress at this time. Pt to continue with HEP to further promote pinch strength and coordination for various forms of pinch (lateral, palmar, two-point, etc) for functional use with ADLs/IADLs.     Goal Identifier R GM/FM coordination   Goal Description Patient to improve R GM/FM coordination skills for increased independence during ADL/IADL tasks (dressing, kitchen tasks, feeding self) by completing the Box and Blocks Test with R UE in standing with 5 blocks    Target Date 04/08/22   Date Met      Progress (detail required for progress note): Goal  progressing but not met. Progressing overall with improved extension in R thumb for release of blocks but decreased performance on Box and Blocks recently (see objective measures below). Coordination and functional use of RUE remains limited due to decreased AROM from spasticity in RUE. No further expectation of progress at this time.     Goal Identifier R UE as Gross Assist   Goal Description Patient to demonstrate functional tasks (feeding self, wiping the counter/table, washing dishes, etc.) with 20% use of R UE as gross stabilizer or gross assist for increased ADL/IADL independence and closer return to prior level of function.   Target Date 04/08/22   Date Met      Progress (detail required for progress note): Goal partially met. Pt reports using R hand to hold bag handle, wash dishes, use doorknobs, wash countertop/tabletop, and has attempted to use with toothbrush with minimal success. Continues to require VC for use of RUE in session for functional tasks. Pt to continue trialing uses of RUE in functional daily activities as part of HEP.     Plan:  Discharge from therapy. Pt was seen for a total of 55 outpatient OT visits with focus on above goal areas (see progress noted above). Pt continues to be limited in AROM, strength, and coordination due to spasticity/flexor tone in RUE with limited progress noted over recent reporting period. Due to limited progress, finalized home program to promote tone management and progression of skills in home environment and completed discharge this date. Anticipate pt will need re-assessment in approximately 6 months or more, pending progress with PM&R, for updated HEP and tone management to protect joint integrity, promote functional use of RUE, and maximize benefits of Botox.    Discharge:    Reason for Discharge: No further expectation of progress at this time.    Equipment Issued: foam block, theraband    Discharge Plan: Patient to continue home program and continue with  Botox treatments as appropriate. Will plan to reassess, as needed, in about 6 months or more pending pt progress with PM&R.    Thank you for the referral of this patient.  If you have any questions regarding the information in this report, please feel free to contact me per the information provided below.      Cailin De Souza MA, OTR/L  Occupational Therapist  [address] [address]  Clinic Fax:  [phone]  Clinic Phone: [phone]

## 2022-04-06 ENCOUNTER — TELEPHONE (OUTPATIENT)
Dept: PHYSICAL MEDICINE AND REHAB | Facility: CLINIC | Age: 67
End: 2022-04-06

## 2022-04-06 NOTE — TELEPHONE ENCOUNTER
[facility] Call Center    Phone Message    May a detailed message be left on voicemail: yes     Reason for Call: Medication Question or concern regarding medication   Prescription Clarification  Name of Medication: baclofen (LIORESAL) 10 MG tablet  Prescribing Provider: Dr. Briseno   Pharmacy: [id number] [place], [place] - [address]   What on the order needs clarification? Luis is requesting a call back to discuss if there is a cheaper version of this medication. He stated that the price of this medication went up and he is not wanting to pay the higher price.    Action Taken: Message routed to:  [facility] ([facility]    Travel Screening: Not Applicable

## 2022-04-06 NOTE — TELEPHONE ENCOUNTER
Writer called [facility] to see if it is an option for us to go to 20 mg tablets or a combination of 20's and 10's (of his Baclofen) and be a cheaper option for the patient, however the pharmacist informed me that in this instance, it is the patient's deductible that is his obstacle. Writer called patient to inform him of this and let him know that until he has met his deductible, his medications will cost more.  Writer stated that he can discuss any alternative options with Dr. Briseno at his next appointment on 6/8, and patient thanked me for the call and also said he may check with another pharmacy to see if it would be cheaper for him in the meantime. Ronit Ward RN on 4/6/2022 at 4:09 PM

## 2022-04-07 DIAGNOSIS — R25.2 SPASTICITY: ICD-10-CM

## 2022-04-07 RX ORDER — BACLOFEN 10 MG/1
30-40 TABLET ORAL 3 TIMES DAILY
Qty: 1080 TABLET | Refills: 3 | Status: SHIPPED | OUTPATIENT
Start: 2022-04-07 | End: 2022-10-17

## 2022-04-07 NOTE — TELEPHONE ENCOUNTER
Patient called clinic to have us call in refill of Baclofen to a different pharmacy. Has refills remaining at [facility], but is concerned that the medication has been to expensive. Writer had called pharmacy on 4/6 regarding this, and was informed that the issue is that the patient has a high deductible.  Writer explained this to patient, however patient wants to have the refill sent to [facility] in [place].  He states that he has not had any tabs left of the Baclofen since last week. Will jin up the medication and have covering provider fill in Dr. Briseno's absence.    Last refill sent: 9/22/21 3 mo supply with 3 refills  Last OV: 3/9/22  Next OV: 6/8/22    Ronit Ward RN on 4/7/2022 at 1:01 PM

## 2022-04-13 ENCOUNTER — OFFICE VISIT (OUTPATIENT)
Dept: PODIATRY | Facility: CLINIC | Age: 67
End: 2022-04-13
Payer: COMMERCIAL

## 2022-04-13 VITALS
WEIGHT: 171 LBS | BODY MASS INDEX: 27.48 KG/M2 | DIASTOLIC BLOOD PRESSURE: 72 MMHG | SYSTOLIC BLOOD PRESSURE: 114 MMHG | HEIGHT: 66 IN

## 2022-04-13 DIAGNOSIS — M21.371 FOOT DROP, RIGHT: ICD-10-CM

## 2022-04-13 DIAGNOSIS — G81.11 RIGHT SPASTIC HEMIPARESIS (H): ICD-10-CM

## 2022-04-13 DIAGNOSIS — M79.671 RIGHT FOOT PAIN: Primary | ICD-10-CM

## 2022-04-13 DIAGNOSIS — L84 CALLUS: ICD-10-CM

## 2022-04-13 PROCEDURE — 11055 PARING/CUTG B9 HYPRKER LES 1: CPT | Performed by: PODIATRIST

## 2022-04-13 PROCEDURE — 99213 OFFICE O/P EST LOW 20 MIN: CPT | Mod: 25 | Performed by: PODIATRIST

## 2022-04-13 NOTE — PROGRESS NOTES
"Podiatry / Foot and Ankle Surgery Progress Note    April 13, 2022    Subject: Patient was seen for recurrent pain to right 5th toe. Here with wife.  Notes that after nail removal he had a lot of relief.  He notes the pain started a few weeks ago again.  Very sore with walking and the pressure to the outside of the right fifth toe.  Pain can be 10 out of 10.    Objective:  Vitals: /72   Ht 1.664 m (5' 5.5\")   Wt 77.6 kg (171 lb)   BMI 28.02 kg/m    BMI= Body mass index is 28.02 kg/m .    General:  Patient is alert and orientated.  NAD.    Dermatologic: Incurvation of lateral border of right fifth toenail as well as localized thickened hyperkeratotic lesion to this area.  No open lesions or signs of infection but this is right where he has pain on palpation.     Vascular: DP & PT pulses are intact & regular bilaterally.  No significant edema or varicosities noted.  CFT and skin temperature is normal to both lower extremities.     Neurologic: Lower extremity sensation is intact to light touch.  No evidence of weakness or contracture in the lower extremities.  No evidence of neuropathy.     Musculoskeletal: Patient is ambulatory without assistive device or brace.  Patient unable to dorsiflex foot against resistance or without resistance.     Radiographs: Right foot x-ray - I personally reviewed the xrays-no fractures are noted.  X-ray otherwise unremarkable.     ASSESSMENT:    Right foot pain  Ingrown nail of fifth toe of right foot  Foot drop, left  Right spastic hemiparesis (H)     Medical Decision Making/Plan:  Reviewed patient's chart in epic. Discussed causes of keratomas.  They are due to areas of increase friction.  Hammertoes can create these as they put more pressure to the metatarsal head.  Discussed treatments such as using foot file, pumice stone, metatarsal pads, orthotics, and not walking barefoot.     We discussed the cost structure of callus care if they were to come back and have it treated in " the clinic if insurance does not cover it and explained that they would be billed. They were also provided information on places to get the callus treatment.    At this time the calluses debrided.  Please see procedure note below.  Recommend a toe cover to the area to try to help it from building up and talked about having them use a pumice stone or a foot file to help keep the callus down.  Also recommended toe cover to help cushion the toe and keep the callus from building up.  All questions were answered to patient and patient's wife satisfaction and they will call for the questions or concerns.     Procedure: After verbal consent, the hyperkeratotic lesion was debrided using a #15 blade without incident. Patient tolerated procedure well.        Patient risk factor: Patient is at medium risk for infection.     Ember Asher DPM, Podiatry/Foot and Ankle Surgery

## 2022-04-13 NOTE — LETTER
"    4/13/2022         RE: Luis Trinidad  [address] [facility] [address]        Dear Colleague,    Thank you for referring your patient, Luis Trinidad, to the [facility]. Please see a copy of my visit note below.    Podiatry / Foot and Ankle Surgery Progress Note    April 13, 2022    Subject: Patient was seen for recurrent pain to right 5th toe. Here with wife.  Notes that after nail removal he had a lot of relief.  He notes the pain started a few weeks ago again.  Very sore with walking and the pressure to the outside of the right fifth toe.  Pain can be 10 out of 10.    Objective:  Vitals: /72   Ht 1.664 m (5' 5.5\")   Wt 77.6 kg (171 lb)   BMI 28.02 kg/m    BMI= Body mass index is 28.02 kg/m .    General:  Patient is alert and orientated.  NAD.    Dermatologic: Incurvation of lateral border of right fifth toenail as well as localized thickened hyperkeratotic lesion to this area.  No open lesions or signs of infection but this is right where he has pain on palpation.     Vascular: DP & PT pulses are intact & regular bilaterally.  No significant edema or varicosities noted.  CFT and skin temperature is normal to both lower extremities.     Neurologic: Lower extremity sensation is intact to light touch.  No evidence of weakness or contracture in the lower extremities.  No evidence of neuropathy.     Musculoskeletal: Patient is ambulatory without assistive device or brace.  Patient unable to dorsiflex foot against resistance or without resistance.     Radiographs: Right foot x-ray - I personally reviewed the xrays-no fractures are noted.  X-ray otherwise unremarkable.     ASSESSMENT:    Right foot pain  Ingrown nail of fifth toe of right foot  Foot drop, left  Right spastic hemiparesis (H)     Medical Decision Making/Plan:  Reviewed patient's chart in epic. Discussed causes of keratomas.  They are due to areas of increase friction.  Hammertoes can create these as " they put more pressure to the metatarsal head.  Discussed treatments such as using foot file, pumice stone, metatarsal pads, orthotics, and not walking barefoot.     We discussed the cost structure of callus care if they were to come back and have it treated in the clinic if insurance does not cover it and explained that they would be billed. They were also provided information on places to get the callus treatment.    At this time the calluses debrided.  Please see procedure note below.  Recommend a toe cover to the area to try to help it from building up and talked about having them use a pumice stone or a foot file to help keep the callus down.  Also recommended toe cover to help cushion the toe and keep the callus from building up.  All questions were answered to patient and patient's wife satisfaction and they will call for the questions or concerns.     Procedure: After verbal consent, the hyperkeratotic lesion was debrided using a #15 blade without incident. Patient tolerated procedure well.        Patient risk factor: Patient is at medium risk for infection.     Ember Asher DPM, Podiatry/Foot and Ankle Surgery        Again, thank you for allowing me to participate in the care of your patient.        Sincerely,        Ember Asher DPM, Podiatry/Foot and Ankle Surgery

## 2022-04-13 NOTE — PATIENT INSTRUCTIONS
Thank you for choosing [facility] Podiatry / Foot & Ankle Surgery!    DR STEWARD'S CLINIC:  [facility]   [address]   [place] [address]      TRIAGE LINE: [phone]  APPOINTMENTS: [phone]  RADIOLOGY: [phone]  SET UP SURGERY: [phone]  BILLING QUESTIONS: [phone]  FAX: [phone]       CALLUS / CORNS / IPKs  When there is excessive friction or pressure on the skin, the body responds by making the skin thicker to protect the deeper structures from becoming exposed. While this works well to protect the deeper structures, the thickened skin can increase pressure and pain.    CALLUS: Flat, diffuse thickening are simple calluses and they are usually caused by friction. Often these are the result of rubbing on a shoe or going barefoot.    CORNS: Calluses with a central core between the toes are called corns. These result from prominent joints on adjacent toes rubbing together. Theses are a symptom of bone malalignment and will always recur unless the underlying bones are addressed surgically.    IPKs: Calluses with a central core on the ball of the foot are usually IPKs (intractable plantar keratosis). These are caused by excessive pressure from the metatarsals, the bones that make up the ball of the foot. Often one of these bones is too long or too prominent.  Again, these will always recur unless the underlying bone issue is addressed. There is no cure for these. They will either go away by themselves, recur, or more could develop.    ROUTINE MAINTENANCE  1. File them down with a pumice stone or callus file a couple times a week.   2. An electric callus removing device. Amope Pedi Perfect Electronic Pedicure Foot File and Callus Remover can be a good option.   3. Lotion can be applied to soften the callus. A urea based cream such as Kersal or Vanicream or thicker cream with shea butter are good options.  4. Toe spacers or toe covers can be used for corns, gel  pads can be used for other lesions on the bottom of the foot.   If there is a surgical pathology noted, such as a prominent bone, often this needs to be addressed surgically to minimize recurrence. However, sometimes the lesion simply migrates to another spot after surgery, so it is not a guaranteed cure.     **If you come back to clinic for treatment, insurance does not cover it, and you would be billed. This charge could range from $100 - $160**    [email or web address] or call 4-011-PEDWestinghouse [employer] Corporation  TOE COVERS/CAPS

## 2022-06-08 ENCOUNTER — OFFICE VISIT (OUTPATIENT)
Dept: PHYSICAL MEDICINE AND REHAB | Facility: CLINIC | Age: 67
End: 2022-06-08
Payer: COMMERCIAL

## 2022-06-08 VITALS
SYSTOLIC BLOOD PRESSURE: 130 MMHG | HEART RATE: 89 BPM | RESPIRATION RATE: 16 BRPM | OXYGEN SATURATION: 99 % | TEMPERATURE: 98.4 F | DIASTOLIC BLOOD PRESSURE: 74 MMHG

## 2022-06-08 DIAGNOSIS — G47.33 OBSTRUCTIVE SLEEP APNEA SYNDROME: ICD-10-CM

## 2022-06-08 DIAGNOSIS — G81.11 RIGHT SPASTIC HEMIPARESIS (H): Primary | ICD-10-CM

## 2022-06-08 DIAGNOSIS — I63.81 LACUNAR INFARCT, ACUTE (H): ICD-10-CM

## 2022-06-08 PROCEDURE — 64646 CHEMODENERV TRUNK MUSC 1-5: CPT | Mod: 59 | Performed by: PHYSICAL MEDICINE & REHABILITATION

## 2022-06-08 PROCEDURE — 96372 THER/PROPH/DIAG INJ SC/IM: CPT | Performed by: PHYSICAL MEDICINE & REHABILITATION

## 2022-06-08 PROCEDURE — 95874 GUIDE NERV DESTR NEEDLE EMG: CPT | Performed by: PHYSICAL MEDICINE & REHABILITATION

## 2022-06-08 PROCEDURE — 64644 CHEMODENERV 1 EXTREM 5/> MUS: CPT | Mod: 59 | Performed by: PHYSICAL MEDICINE & REHABILITATION

## 2022-06-08 PROCEDURE — 64643 CHEMODENERV 1 EXTREM 1-4 EA: CPT | Mod: 59 | Performed by: PHYSICAL MEDICINE & REHABILITATION

## 2022-06-08 PROCEDURE — 99212 OFFICE O/P EST SF 10 MIN: CPT | Mod: 25 | Performed by: PHYSICAL MEDICINE & REHABILITATION

## 2022-06-08 ASSESSMENT — PAIN SCALES - GENERAL: PAINLEVEL: NO PAIN (0)

## 2022-06-08 NOTE — LETTER
"6/8/2022       RE: Luis Trinidad  [address] [facility] [address]     Dear Colleague,    Thank you for referring your patient, uLis Trinidad, to the [facility] PHYSICAL MEDICINE AND REHABILITATION CLINIC [place] at [facility]. Please see a copy of my visit note below.    PM&R Clinic & Procedure Note:    Luis Trinidad is 66-year-old male with a history of stroke in Nov 2016 resulting in residual right spastic hemiparesis.  Rogelio was last seen on 3/9/22 for Botox injections. He presented today with his brother Gerardo for repeat Botox injections and follow up on his rehab needs.     Rogelio had new pain at right 5th toe for 2-3 months during the Spring and Summer. No falls or injury. Pain was present with standing and walking; no pain at rest. No associated swelling, erythema or fever/chills. PT tried shoe insert, and kinesio taping with some benefits. He also had some \"tightness\" at right ankle.     He saw his PCP for right foot pain. Xray was unremarkable. He was referred to podiatry clinic; had an ingrown toe nail which was removed and helped with his pain significantly. He also has a new anterior leaf spring AFO which has bene helping.     Got feedback from Cailin DOUGLASS that no change needed in Botox injections.       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 Ingrown nail was treated and he has had no pain since.     He reports good results with last series of injections. Notes 80-90% improvement in spasticity, which lasted 2.5 months - similar to previous series of injections. Denies any side effects. Denies any new medical issues. Continues on Baclofen 30 mg TID.       Physical exam:  /74   Pulse 89   Temp 98.4  F (36.9  C)   Resp 16   SpO2 99%      Alert, pleasant affect.   Increased tone noted in RUE with " shoulder abd, EF, pronation, WF and FF - 2-3/4 per MAS  RLE exam also showed increased tone with HF, KF, PF and inversion.   Skin intact at the sites of injections          Assessment and recommendations:    Ischemic stroke at the lateral left thalamus and the posterior limb of the left internal capsule 11/2015    Residual right spastic hemiparesis     HTN     HLD    Questionable SAMMY    He has increased tone at PF and invertor muscles which is contributing to his leg/foot pain. It's not clear why this is affecting his gait these many years after stroke. We have added posterior tibialis muscle and will consider to add FDL as well. He will continue using his AFO and contact podiatry team for follow up regarding his ingrown toenail as needed.     Today, we reviewed his sleep pattern again. He has long standing issue with falling asleep but sleep maintenance is ok. He snores when sleeping on his back and is relatively refreshed in the morning.     We have discussed referral to sleep medicine clinic before for more evaluation of possible SAMMY as a risk factor for recurrent stroke. He agreed to see them and a referral was placed.       1. Work-up: none today  2. Therapy/equipment/braces: continue HEP regularly. Will repeat PT and OT in 6 months for maintenance therapy ~ Sep/Oct.   3. Medications: no change in meds today; on baclofen 30 tid.  4. Interventions: chemo-denervation today; procedure note below. No change in the total dose but adjusted the sites of injections today based on his exam.   5. Referral / follow up with other providers: to sleep medicine clinic as above.   6. Follow up: 12 weeks           Procedure:   Chemodenervation to right chest and RUE utilizing botulinum toxin.  Began with formal consent which he signed. Had formal time-out prior to procedure. 400 U of  Botox  Lot # [id number] with Expiration Date: 04/2024 was utilized. Diluted with normal saline in a 100 U:1mL ratio, total of 4 mL.  All areas were  cleansed with chloroprep prior to injecting. EMG guidance was utilized to identify most active motor units while avoiding surrounding structures.     The following muscles were then injected,      Right trunk:  1. Right Pectoralis Major: 20 units          Right arm  1. Biceps 70 units  2. Brachioradialis 40 units   3. FDS 50 units  4. FCR 50 units  5. FCU 50 units  7. Pronator Teres 90 units    Right leg  1. Posterior tibialis 30 units        Brigida Briseno MD  Physical Medicine & Rehabilitation

## 2022-06-08 NOTE — PROGRESS NOTES
"PM&R Clinic & Procedure Note:    Luis Trinidad is 66-year-old male with a history of stroke in Nov 2016 resulting in residual right spastic hemiparesis.  Rogelio was last seen on 3/9/22 for Botox injections. He presented today with his brother Gerardo for repeat Botox injections and follow up on his rehab needs.     Rogelio had new pain at right 5th toe for 2-3 months during the Spring and Summer. No falls or injury. Pain was present with standing and walking; no pain at rest. No associated swelling, erythema or fever/chills. PT tried shoe insert, and kinesio taping with some benefits. He also had some \"tightness\" at right ankle.     He saw his PCP for right foot pain. Xray was unremarkable. He was referred to podiatry clinic; had an ingrown toe nail which was removed and helped with his pain significantly. He also has a new anterior leaf spring AFO which has bene helping.     Got feedback from Cailin DOUGLASS that no change needed in Botox injections.       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 Ingrown nail was treated and he has had no pain since.     He reports good results with last series of injections. Notes 80-90% improvement in spasticity, which lasted 2.5 months - similar to previous series of injections. Denies any side effects. Denies any new medical issues. Continues on Baclofen 30 mg TID.       Physical exam:  /74   Pulse 89   Temp 98.4  F (36.9  C)   Resp 16   SpO2 99%      Alert, pleasant affect.   Increased tone noted in RUE with shoulder abd, EF, pronation, WF and FF - 2-3/4 per MAS  RLE exam also showed increased tone with HF, KF, PF and inversion.   Skin intact at the sites of injections          Assessment and recommendations:    Ischemic stroke at the lateral left thalamus and the posterior limb of the left internal capsule 11/2015    Residual right spastic hemiparesis "     HTN     HLD    Questionable SAMMY    He has increased tone at PF and invertor muscles which is contributing to his leg/foot pain. It's not clear why this is affecting his gait these many years after stroke. We have added posterior tibialis muscle and will consider to add FDL as well. He will continue using his AFO and contact podiatry team for follow up regarding his ingrown toenail as needed.     Today, we reviewed his sleep pattern again. He has long standing issue with falling asleep but sleep maintenance is ok. He snores when sleeping on his back and is relatively refreshed in the morning.     We have discussed referral to sleep medicine clinic before for more evaluation of possible SAMMY as a risk factor for recurrent stroke. He agreed to see them and a referral was placed.       1. Work-up: none today  2. Therapy/equipment/braces: continue HEP regularly. Will repeat PT and OT in 6 months for maintenance therapy ~ Sep/Oct.   3. Medications: no change in meds today; on baclofen 30 tid.  4. Interventions: chemo-denervation today; procedure note below. No change in the total dose but adjusted the sites of injections today based on his exam.   5. Referral / follow up with other providers: to sleep medicine clinic as above.   6. Follow up: 12 weeks           Procedure:   Chemodenervation to right chest and RUE utilizing botulinum toxin.  Began with formal consent which he signed. Had formal time-out prior to procedure. 400 U of  Botox  Lot # [id number] with Expiration Date: 04/2024 was utilized. Diluted with normal saline in a 100 U:1mL ratio, total of 4 mL.  All areas were cleansed with chloroprep prior to injecting. EMG guidance was utilized to identify most active motor units while avoiding surrounding structures.     The following muscles were then injected,      Right trunk:  1. Right Pectoralis Major: 20 units          Right arm  1. Biceps 70 units  2. Brachioradialis 40 units   3. FDS 50 units  4. FCR 50  units  5. FCU 50 units  7. Pronator Teres 90 units    Right leg  1. Posterior tibialis 30 units      Brigida Briseno MD  Physical Medicine & Rehabilitation

## 2022-07-13 NOTE — PROGRESS NOTES
[facility]    OUTPATIENT PHYSICAL THERAPY  PLAN OF TREATMENT FOR OUTPATIENT REHABILITATION AND PROGRESS NOTE           Patient's Last Name, First Name, Luis Mckeon Date of Birth  1955   Provider's Name  [facility] Medical Record No.  [id number]    Onset Date  9/1/2021 Start of Care Date  3/23/2021   Type:     _X_PT   ___OT   ___SLP Medical Diagnosis  CVA   PT Diagnosis  impaired participation in life roles and functional mobility due to R hemiparesis and sequelae of chronic CVA Plan of Treatment  Frequency/Duration: 1 more visit and then discharge  Certification date from 3/23/2022 to 3/28/2022         Therapy Diagnosis: impaired participation in life roles and functional mobility due to R hemiparesis and sequelae of chronic CVA     Client Self Report: Patient feels okay with today being last session. No questions right now. Plan right now to start slowly going back outside and then will increase from there.        Goals:  Goal Identifier Stairs   Goal Description Rogelio will be able to navigate up and down his stairs reciprocally with one rail with minimal circumduction of right LE in order for more efficient and safe access to all levels of his home as well as navigation of stairs in community areas.    Target Date 03/22/22   Date Met      Progress (detail required for progress note): 3/21/2022 circumducts R LE with stair navigation, improves with repeated practice, 2/21/2022 Circumducts R LE and supinates R foot when completeing stairs with reciprical pattern and single railing. Displays decreased circumduction with stepping when applied slight resistance into hip flexion and improved circumduction with cuing for correct motor pattern; 1/3/22 Circumducts R LE stepping up , R leg abducted when placed on step and pulls with the L UE to advance up the  steps.      Goal Identifier Gait speed   Goal Description Rogelio will be able to ambulate 25 feet in 10 sec or less without and assistive device to demonstrate improved gait speed and efficiency for household and community mobility   Target Date 03/22/22   Date Met      Progress (detail required for progress note): 3/21/2022 13.63 seconds no AD, AFO on, 2/21/2022 14.87 seconds no AD AFO on, 15.97 seconds no AD AFO off; 1/3/22 15.38 sec no device,  AFO without AFO 14.8 sec, foot drag along floor R swing through  12/17/21 no AFO 13.16 sec 18 steps light tc L-s area, AFO initally 17.4 sec and then second rep light tc for faster pace 13.75 sec.  12/3/21 AFO on , no AD,  16 .22 sec, 22 steps,  15.60 sec , 21 steps at start of session. 12.97 sec , 18 steps facilitation of faster pace, no AFO  11/29/21 start of session 22 steps, 18.76 sec   At end of session 13.81 sec 18 steps without AFO or shoes on, with AFO and shoes 14.5 sec 19 steps     Goal Identifier gait pattern/safety   Goal Description Rogelio will demonstrate initial contact on right with heel contact and decreased supination/inversion at ankle/foot determined by clinical observation and pt report of no foot pain during gait for return to daily walks outside.     Target Date 03/22/22   Date Met      Progress (detail required for progress note): 3/21/2022 patient reports decreased limit from pain with ambulation. 2/21/2022 5th digit bothering with ambulation today, improved heel contact with R initial contact, ongoing R LE circumduction and L lateral lean with R LE swing phace     Goal Identifier HEP   Goal Description Rogelio will be independent in a home ex and activity program to address his impairments and maintain functional gains made in physical therapy.     Target Date 03/22/22   Date Met      Progress (detail required for progress note): 3/21/2022 patient completing regular stretching and has began new exercises initiated by therapists     Beginning/End Dates of  Reporting Period:  12/21/2021 to 3/21/2022    Patient displayed improvement in gait speed and efficiency over plan of care which significantly impacts patient ability to complete daily tasks and ambulation. Patient has displayed a recent decrease in progress and would benefit from transition to independent completion of home program at this time.     Plan:  Patient will be seen for one more session to verify confidence with HEP and then discharged from skilled therapy.     Discharge:    Reason for Discharge: No further expectation of progress at this time.    Discharge Plan: Patient to continue home program.           I CERTIFY THE NEED FOR THESE SERVICES FURNISHED UNDER        THIS PLAN OF TREATMENT AND WHILE UNDER MY CARE     (Physician co-signature of this document indicates review and certification of the therapy plan).                Referring Provider: Dr. Suzanna Torres, PT

## 2022-09-23 NOTE — ADDENDUM NOTE
Encounter addended by: Yen Villanueva, PT on: 9/18/2018  9:34 AM<BR>     Actions taken: Flowsheet accepted No

## 2022-10-17 ENCOUNTER — DOCUMENTATION ONLY (OUTPATIENT)
Dept: PHYSICAL MEDICINE AND REHAB | Facility: CLINIC | Age: 67
End: 2022-10-17

## 2022-10-17 DIAGNOSIS — R25.2 SPASTICITY: ICD-10-CM

## 2022-10-17 NOTE — TELEPHONE ENCOUNTER
Baclofen 10 mg tab, take 3 tabs by mouth three times daily    Last written: 4/7/22, #1080, 3 RF  Last filled: 7/13/22, #810  Last OV: 8/31/22  Next OV: 11/30/22    Routing to Dr. Briseno for review and refill, if appropriate.    Ronit Ward RN on 10/17/2022 at 6:03 PM

## 2022-10-18 RX ORDER — BACLOFEN 10 MG/1
30-40 TABLET ORAL 3 TIMES DAILY
Qty: 1080 TABLET | Refills: 3 | Status: SHIPPED | OUTPATIENT
Start: 2022-10-18 | End: 2023-07-31

## 2022-11-30 ENCOUNTER — OFFICE VISIT (OUTPATIENT)
Dept: PHYSICAL MEDICINE AND REHAB | Facility: CLINIC | Age: 67
End: 2022-11-30
Payer: COMMERCIAL

## 2022-11-30 ENCOUNTER — TELEPHONE (OUTPATIENT)
Dept: PHYSICAL MEDICINE AND REHAB | Facility: CLINIC | Age: 67
End: 2022-11-30

## 2022-11-30 VITALS
BODY MASS INDEX: 25.99 KG/M2 | WEIGHT: 158.6 LBS | SYSTOLIC BLOOD PRESSURE: 141 MMHG | HEART RATE: 83 BPM | OXYGEN SATURATION: 98 % | DIASTOLIC BLOOD PRESSURE: 93 MMHG

## 2022-11-30 DIAGNOSIS — G81.11 RIGHT SPASTIC HEMIPARESIS (H): Primary | ICD-10-CM

## 2022-11-30 DIAGNOSIS — I69.30 HISTORY OF CVA WITH RESIDUAL DEFICIT: ICD-10-CM

## 2022-11-30 PROCEDURE — 95874 GUIDE NERV DESTR NEEDLE EMG: CPT | Performed by: PHYSICAL MEDICINE & REHABILITATION

## 2022-11-30 PROCEDURE — 64644 CHEMODENERV 1 EXTREM 5/> MUS: CPT | Mod: GC | Performed by: PHYSICAL MEDICINE & REHABILITATION

## 2022-11-30 PROCEDURE — 64643 CHEMODENERV 1 EXTREM 1-4 EA: CPT | Mod: GC | Performed by: PHYSICAL MEDICINE & REHABILITATION

## 2022-11-30 NOTE — LETTER
"11/30/2022       RE: Luis Trinidad  [address] [facility] [address]     Dear Colleague,    Thank you for referring your patient, Luis Trinidad, to the [facility] PHYSICAL MEDICINE AND REHABILITATION CLINIC [place] [facility]. Please see a copy of my visit note below.    PM&R Clinic & Procedure Note:    Luis Trinidad is 66-year-old male with a history of stroke in Nov 2016 resulting in residual right spastic hemiparesis and has been followed in our clinic since then for his rehab needs.     Per 6/8/22 visit:  Rogelio had new pain at right 5th toe for 2-3 months during the Spring and Summer. No falls or injury. Pain was present with standing and walking; no pain at rest. No associated swelling, erythema or fever/chills. PT tried shoe insert, and kinesio taping with some benefits. He also had some \"tightness\" at right ankle.     He noted that his foot pain recurred in April again; was seen and evaluated by podiatry team on 4/13 for hammer toe, and callus formation / keratoma due to friction. Ingrown nail was treated and he has had no pain since.     He reports good results with last series of injections. Notes 80-90% improvement in spasticity, which lasted 2.5 months - similar to previous series of injections. Denies any side effects. Denies any new medical issues. Continues on Baclofen 30 mg TID.     He saw his PCP for right foot pain. Xray was unremarkable. He was referred to podiatry clinic; had an ingrown toe nail which was removed and helped with his pain significantly. He also has a new anterior leaf spring AFO which has bene helping.     Got feedback from Cailin DOUGLASS that no change needed in Botox injections.     8/31/22 visit: missed appointment.       Today, he presents with his cousin Gerardo. He reports he is doing well. He requests a letter so that his wife can take time off from work to drive him to medical appointments, as it is difficult for " other family members due to distance. His last injections were over 5 months ago. He reports good results from his last injections in June with 90% improvement in spasticity, lasting about 2-2.5 months. He denies any side effects or weakness. He is doing a home exercise program. He does not feel like he feels PT or OT referrals.     Physical exam:  BP (!) 141/93 (BP Location: Left arm, Patient Position: Sitting, Cuff Size: Adult Large)   Pulse 83   Wt 71.9 kg (158 lb 9.6 oz)   SpO2 98%   BMI 25.99 kg/m       Alert, pleasant affect.   Increased tone noted in RUE with shoulder abduction, elbow flexion, forearm pronation, wrist and finger flexion: 2-3/4 per MAS  RLE exam also showed increased tone with HF, KF, PF and inversion. Passive knee extension to ~20 degrees from neutral.  Knee position neutral with standing.   Skin intact at the sites of injections      Assessment and recommendations:    Ischemic stroke at the lateral left thalamus and the posterior limb of the left internal capsule, 11/2015    Residual right spastic hemiparesis     HTN     HLD    Questionable SAMMY - has not seen sleep medicine     He is responding well to prior injection; unfortunately with lapse of >5 months due to missed visit in August 2022. He has reduced passive ROM to right knee extension and needs to manually lift the thigh to move his calf and foot for when sitting down, but knee position is neutral on standing. To avoid causing significant weakness and gait imbalance, we will resume with same sites as last visit.     1. Work-up: none today  2. Therapy/equipment/braces: continue HEP regularly. He declines interest in PT and OT. We will re-address next visit.   3. Medications: no change in meds today; on baclofen 30 TID.  4. Interventions: chemo-denervation today; procedure note below. No change in the total dose or sites today. He is on maximum dose.    5. Referral / follow up with other providers: Encouraged to schedule with sleep  medicine clinic per prior visit referral.   6. Follow up: 12 weeks. Letter also provided for his wife. We discussed that if his wife's work requires additional documentation that he may send those items to us for completion.          Procedure:   Chemodenervation to right chest and RUE utilizing botulinum toxin. Began with formal consent which he signed. Had formal time-out prior to procedure. 400 U of  Botox  Lot # [id number] with Expiration Date: 11/2024 was utilized. Diluted with normal saline in a 100 U:1mL ratio, total of 4 mL.  All areas were cleansed with chloroprep prior to injecting. EMG guidance was utilized to identify most active motor units while avoiding surrounding structures.     The following muscles were then injected,      Right trunk:  1. Right Pectoralis Major: 20 units     Right arm  1. Biceps 70 units  2. Brachioradialis 40 units   3. FDS 50 units  4. FCR 50 units  5. FCU 50 units  7. Pronator Teres 90 units    Right leg  1. Posterior tibialis 30 units    He was seen with Dr. Briseno.  Chayo Hughes MD  PM&R Wound Medicine Fellow      Physician Attestation   I, Brigida Briseno MD, saw this patient and agree with the findings and plan of care as documented in the note. I was present for and supervised the entire procedure.    It seems like he has co-contractions in knee ext and flex muscles in RLE. However his active ROM is fully preserved and the spasticity doesn't impact his gait or function. So no intervention is needed at this point. No change in the total dose or sites of injections today. Wrote a letter for his wife and can adjust that as needed.     Brigida Briseno MD

## 2022-11-30 NOTE — LETTER
November 30, 2022      Luis Trinidad  [address] [facility] [address]              To Whom It May Concern,    Please provide . Luis Trinidad's wife, Joy Trinidad, with time off from work so that she may drive her  to his medical appointments. Due to his medical condition, he is not able to drive himself and must be accompanied by a family member. He requires in-person medical visits on a regular basis, including every 3 months to my clinic, for management of his chronic medical conditions. Please do not hesitate to contact me with any questions.       Sincerely,    Chayo Briseno MD

## 2022-11-30 NOTE — PROGRESS NOTES
"PM&R Clinic & Procedure Note:    Luis Trinidad is 66-year-old male with a history of stroke in Nov 2016 resulting in residual right spastic hemiparesis and has been followed in our clinic since then for his rehab needs.     Per 6/8/22 visit:  Rogelio had new pain at right 5th toe for 2-3 months during the Spring and Summer. No falls or injury. Pain was present with standing and walking; no pain at rest. No associated swelling, erythema or fever/chills. PT tried shoe insert, and kinesio taping with some benefits. He also had some \"tightness\" at right ankle.     He noted that his foot pain recurred in April again; was seen and evaluated by podiatry team on 4/13 for hammer toe, and callus formation / keratoma due to friction. Ingrown nail was treated and he has had no pain since.     He reports good results with last series of injections. Notes 80-90% improvement in spasticity, which lasted 2.5 months - similar to previous series of injections. Denies any side effects. Denies any new medical issues. Continues on Baclofen 30 mg TID.     He saw his PCP for right foot pain. Xray was unremarkable. He was referred to podiatry clinic; had an ingrown toe nail which was removed and helped with his pain significantly. He also has a new anterior leaf spring AFO which has bene helping.     Got feedback from Cailin DOUGLASS that no change needed in Botox injections.     8/31/22 visit: missed appointment.       Today, he presents with his cousin Gerardo. He reports he is doing well. He requests a letter so that his wife can take time off from work to drive him to medical appointments, as it is difficult for other family members due to distance. His last injections were over 5 months ago. He reports good results from his last injections in June with 90% improvement in spasticity, lasting about 2-2.5 months. He denies any side effects or weakness. He is doing a home exercise program. He does not feel like he feels PT or OT referrals.     Physical " exam:  BP (!) 141/93 (BP Location: Left arm, Patient Position: Sitting, Cuff Size: Adult Large)   Pulse 83   Wt 71.9 kg (158 lb 9.6 oz)   SpO2 98%   BMI 25.99 kg/m       Alert, pleasant affect.   Increased tone noted in RUE with shoulder abduction, elbow flexion, forearm pronation, wrist and finger flexion: 2-3/4 per MAS  RLE exam also showed increased tone with HF, KF, PF and inversion. Passive knee extension to ~20 degrees from neutral.  Knee position neutral with standing.   Skin intact at the sites of injections      Assessment and recommendations:    Ischemic stroke at the lateral left thalamus and the posterior limb of the left internal capsule, 11/2015    Residual right spastic hemiparesis     HTN     HLD    Questionable SAMMY - has not seen sleep medicine     He is responding well to prior injection; unfortunately with lapse of >5 months due to missed visit in August 2022. He has reduced passive ROM to right knee extension and needs to manually lift the thigh to move his calf and foot for when sitting down, but knee position is neutral on standing. To avoid causing significant weakness and gait imbalance, we will resume with same sites as last visit.     1. Work-up: none today  2. Therapy/equipment/braces: continue HEP regularly. He declines interest in PT and OT. We will re-address next visit.   3. Medications: no change in meds today; on baclofen 30 TID.  4. Interventions: chemo-denervation today; procedure note below. No change in the total dose or sites today. He is on maximum dose.    5. Referral / follow up with other providers: Encouraged to schedule with sleep medicine clinic per prior visit referral.   6. Follow up: 12 weeks. Letter also provided for his wife. We discussed that if his wife's work requires additional documentation that he may send those items to us for completion.          Procedure:   Chemodenervation to right chest and RUE utilizing botulinum toxin. Began with formal consent  which he signed. Had formal time-out prior to procedure. 400 U of  Botox  Lot # [id number] with Expiration Date: 11/2024 was utilized. Diluted with normal saline in a 100 U:1mL ratio, total of 4 mL.  All areas were cleansed with chloroprep prior to injecting. EMG guidance was utilized to identify most active motor units while avoiding surrounding structures.     The following muscles were then injected,      Right trunk:  1. Right Pectoralis Major: 20 units     Right arm  1. Biceps 70 units  2. Brachioradialis 40 units   3. FDS 50 units  4. FCR 50 units  5. FCU 50 units  7. Pronator Teres 90 units    Right leg  1. Posterior tibialis 30 units    He was seen with Dr. Briseno.  Chayo Hughes MD  PM&R Wound Medicine Fellow      Physician Attestation   I, Brigida Briseno MD, saw this patient and agree with the findings and plan of care as documented in the note. I was present for and supervised the entire procedure.    It seems like he has co-contractions in knee ext and flex muscles in RLE. However his active ROM is fully preserved and the spasticity doesn't impact his gait or function. So no intervention is needed at this point. No change in the total dose or sites of injections today. Wrote a letter for his wife and can adjust that as needed.     Brigida Briseno MD

## 2022-11-30 NOTE — NURSING NOTE
Chief Complaint   Patient presents with     RECHECK     Botox injections confirmed with patient       Mariana Barraza CMA at 3:54 PM on 11/30/2022.

## 2023-03-21 NOTE — PROGRESS NOTES
"  [facility]     PM&R CLINIC NOTE  BOTULINUM TOXIN PROCEDURE      HPI  Chief Complaint   Patient presents with     Botox     Luis Trinidad is a 67 year old male with a history of stroke in November 2016 who presents to clinic for botulinum toxin injections for management of right spastic hemiparesis.     Background  Rogelio had new pain at right 5th toe for 2-3 months during the Spring and Summer 2022. No falls or injury. Pain was present with standing and walking; no pain at rest. No associated swelling, erythema or fever/chills. PT tried shoe insert, and kinesio taping with some benefits. He also had some \"tightness\" at right ankle.      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 Ingrown nail was treated and he has had no pain since.      He saw his PCP for right foot pain. Xray was unremarkable. He was referred to podiatry clinic; had an ingrown toe nail which was removed and helped with his pain significantly. He also has a new anterior leaf spring AFO which has bene helping.       SINCE LAST VISIT  Luis Trinidad was last seen here in clinic on November 30, 2022, at which time he received 400 units of Botox.    Patient denies new medical diagnoses, illnesses, hospitalizations, emergency room visits, and injuries since the previous injection with botulinum neurotoxin.    RESPONSE TO PREVIOUS TREATMENT  Side effects: No problems reported    Pain Improvement: Yes.  Percent Improvement: 80 %    Duration of Benefit:  10-11 weeks.    Spasticity Improvement: Yes.  Percent Improvement: 80 %    Duration of Benefit:  10-11 weeks.      PHYSICAL EXAM  VS: /75 (BP Location: Left arm, Patient Position: Sitting)   Pulse 88   SpO2 99%      Alert, pleasant affect.   Increased tone noted in RUE with shoulder abduction, elbow flexion, forearm pronation, wrist and finger flexion: 2-3/4 per MAS  RLE exam also " showed increased tone with HF, KF, PF and inversion. Passive knee extension to ~20 degrees from neutral.  Knee position neutral with standing.   Skin intact at the sites of injections      ALLERGIES  No Known Allergies    CURRENT MEDICATIONS    Current Outpatient Medications:      aspirin 81 MG EC tablet, Take 1 tablet (81 mg) by mouth daily, Disp: , Rfl:      atorvastatin (LIPITOR) 40 MG tablet, Take 40 mg by mouth, Disp: , Rfl:      baclofen (LIORESAL) 10 MG tablet, Take 3-4 tablets (30-40 mg) by mouth 3 times daily, Disp: 1080 tablet, Rfl: 3     botulinum toxin type A (BOTOX) 100 UNITS injection, Inject 400 Units into the muscle every 3 months, Disp: 400 Units, Rfl: 5     cholecalciferol 2000 UNITS tablet, Take 2,000 Units by mouth daily, Disp: 30 tablet, Rfl: 0     clopidogrel (PLAVIX) 75 MG tablet, Take 75 mg by mouth, Disp: , Rfl:      clopidogrel (PLAVIX) 75 MG tablet, Take 1 tablet (75 mg) by mouth daily, Disp: 30 tablet, Rfl: 0     co-enzyme Q-10 100 MG CAPS, Take 1 capsule (100 mg) by mouth daily, Disp: 30 capsule, Rfl: 0     lisinopril-hydrochlorothiazide (PRINZIDE,ZESTORETIC) 10-12.5 MG per tablet, Take 1 tablet by mouth, Disp: , Rfl:      lisinopril-hydrochlorothiazide (PRINZIDE/ZESTORETIC) 10-12.5 MG per tablet, Take 2 tablets by mouth, Disp: , Rfl:      lisinopril-hydrochlorothiazide (PRINZIDE/ZESTORETIC) 20-12.5 MG tablet, 2 tablets, Disp: , Rfl: 3     silver sulfADIAZINE (SILVADENE) 1 % external cream, Apply topically 2 times daily, Disp: 25 g, Rfl: 1     simvastatin (ZOCOR) 10 MG tablet, Take 1 tablet (10 mg) by mouth every evening, Disp: 30 tablet, Rfl: 0     tetrahydrozoline 0.05 % ophthalmic solution, Place 1 drop into both eyes 3 times daily as needed, Disp: , Rfl:     Current Facility-Administered Medications:      botulinum toxin type A (BOTOX) 100 units injection 400 Units, 400 Units, Intramuscular, Q90 Days, Brigida Briseno MD, 400 Units at 03/22/23 1704       BOTULINUM NEUROTOXIN INJECTION  PROCEDURES    VERIFICATION OF PATIENT IDENTIFICATION AND PROCEDURE     Initials   Patient Name PS   Patient  PS   Procedure Verified by: PS     Prior to the start of the procedure and with procedural staff participation, I verbally confirmed the patient s identity using two indicators, relevant allergies, that the procedure was appropriate and matched the consent or emergent situation, and that the correct equipment/implants were available. Immediately prior to starting the procedure I conducted the Time Out with the procedural staff and re-confirmed the patient s name, procedure, and site/side. (The Joint Commission universal protocol was followed.)  Yes    Sedation (Moderate or Deep): None    ABOVE ASSESSMENTS PERFORMED BY  Brigida Briseno MD      INDICATIONS FOR PROCEDURES  Luis Trinidad is a 67 year old patient with myriam spastic hemiparesis secondary to the diagnosis of stroke. His baseline symptoms have been recalcitrant to oral medications and conservative therapy.  He is here today for reinjection with Botox.    GOAL OF PROCEDURE  The goal of this procedure is to increase active range of motion, improve volitional motor control, decrease pain  and enhance functional independence.      TOTAL DOSE ADMINISTERED  Dose Administered: 400 units  Botox (Botulinum Toxin Type A)       1:1 Dilution   Unavoidable Drug Waste: No  Diluent Used:  Preservative Free Normal Saline  Total Volume of Diluent Used:  4 ml  See MAR  NDC #: Botox 100u ([address]-14)      CONSENT  The risks, benefits, and treatment options were discussed with Luis Trinidad and he agreed to proceed.    Written consent was obtained by PS.     EQUIPMENT USED  Needle-35mm stimulating/recording  EMG/NCS Machine    SKIN PREPARATION  Skin preparation was performed using an alcohol wipe.    GUIDANCE DESCRIPTION  Electro-myographic guidance was necessary throughout the procedure to accurately identify all areas of spastic muscles while avoiding injection  of non-spastic muscles and underlying muscles , neighboring nerves and nearby vascular structures.     AREA/MUSCLE INJECTED  1.  RIGHT UPPER EXTREMITY- total of 370 units Botox = Total Dose, 1:1 Dilution      Right pectoralis major - 20 units of Botox at 1 site/s.      Right bicep - 70 units of Botox at 1 site/s.      Right brachioradialis - 40 units of Botox at 1 site/s.      Right flexor digitorum superficialis - 50 units of Botox at 1 site/s.       Right flexor digitorum profundus - 50 units of Botox at 1 site/s.      Right FCU - 50 units of Botox at 1 site/s.      Right pronator teres - 90 units of Botox at 1 site/s.       2.  RIGHT LOWER EXTREMITY- total of 30 units Botox = Total Dose, 1:1 Dilution      Right posterior tibialis - 30 units of Botox at 1 site/s.         RESPONSE TO PROCEDURE  Luis Trinidad tolerated the procedure well and there were no immediate complications. He was allowed to recover for an appropriate period of time and was discharged home in stable condition.    ASSESSMENT AND PLAN     Ischemic stroke at the lateral left thalamus and the posterior limb of the left internal capsule, 11/2015    Residual right spastic hemiparesis     HTN     HLD    Questionable SAMMY - has not seen sleep medicine           1. Work-up: none today  2. Therapy/equipment/braces: continue HEP regularly.   We have offered outpatient physical and occupational therapy but he is not interested at this point  3. Medications: no change in meds today; on baclofen 30 TID.  4. Interventions: chemo-denervation today; procedure note below. No change in the total dose or sites today. He is on maximum dose.    5. Referral / follow up with other providers:  We have discussed referral to sleep medicine clinic but he is not interested at this point   6. Follow up: 12 weeks.         Brigida Briseno MD  Physical Medicine & Rehabilitation

## 2023-03-22 ENCOUNTER — OFFICE VISIT (OUTPATIENT)
Dept: PHYSICAL MEDICINE AND REHAB | Facility: CLINIC | Age: 68
End: 2023-03-22
Payer: COMMERCIAL

## 2023-03-22 VITALS — DIASTOLIC BLOOD PRESSURE: 75 MMHG | OXYGEN SATURATION: 99 % | SYSTOLIC BLOOD PRESSURE: 119 MMHG | HEART RATE: 88 BPM

## 2023-03-22 DIAGNOSIS — G81.11 RIGHT SPASTIC HEMIPARESIS (H): Primary | ICD-10-CM

## 2023-03-22 DIAGNOSIS — I69.30 HISTORY OF CVA WITH RESIDUAL DEFICIT: ICD-10-CM

## 2023-03-22 PROCEDURE — 64643 CHEMODENERV 1 EXTREM 1-4 EA: CPT | Performed by: PHYSICAL MEDICINE & REHABILITATION

## 2023-03-22 PROCEDURE — 64644 CHEMODENERV 1 EXTREM 5/> MUS: CPT | Performed by: PHYSICAL MEDICINE & REHABILITATION

## 2023-03-22 PROCEDURE — 95874 GUIDE NERV DESTR NEEDLE EMG: CPT | Performed by: PHYSICAL MEDICINE & REHABILITATION

## 2023-03-22 NOTE — LETTER
"3/22/2023       RE: Luis Trinidad  [address] [facility] [id number]     Dear Colleague,    Thank you for referring your patient, Luis Trinidad, to the [facility] PHYSICAL MEDICINE AND REHABILITATION CLINIC [place] [facility]. Please see a copy of my visit note below.      [facility]     PM&R CLINIC NOTE  BOTULINUM TOXIN PROCEDURE      HPI  Chief Complaint   Patient presents with     Botox     Luis Trinidad is a 67 year old male with a history of stroke in November 2016 who presents to clinic for botulinum toxin injections for management of right spastic hemiparesis.     Background  Rogelio had new pain at right 5th toe for 2-3 months during the Spring and Summer 2022. No falls or injury. Pain was present with standing and walking; no pain at rest. No associated swelling, erythema or fever/chills. PT tried shoe insert, and kinesio taping with some benefits. He also had some \"tightness\" at right ankle.      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 Ingrown nail was treated and he has had no pain since.      He saw his PCP for right foot pain. Xray was unremarkable. He was referred to podiatry clinic; had an ingrown toe nail which was removed and helped with his pain significantly. He also has a new anterior leaf spring AFO which has bene helping.       SINCE LAST VISIT  Luis Trinidad was last seen here in clinic on November 30, 2022, at which time he received 400 units of Botox.    Patient denies new medical diagnoses, illnesses, hospitalizations, emergency room visits, and injuries since the previous injection with botulinum neurotoxin.    RESPONSE TO PREVIOUS TREATMENT  Side effects: No problems reported    Pain Improvement: Yes.  Percent Improvement: 80 %    Duration of Benefit:  10-11 weeks.    Spasticity Improvement: Yes.  " Percent Improvement: 80 %    Duration of Benefit:  10-11 weeks.      PHYSICAL EXAM  VS: /75 (BP Location: Left arm, Patient Position: Sitting)   Pulse 88   SpO2 99%      Alert, pleasant affect.   Increased tone noted in RUE with shoulder abduction, elbow flexion, forearm pronation, wrist and finger flexion: 2-3/4 per MAS  RLE exam also showed increased tone with HF, KF, PF and inversion. Passive knee extension to ~20 degrees from neutral.  Knee position neutral with standing.   Skin intact at the sites of injections      ALLERGIES  No Known Allergies    CURRENT MEDICATIONS    Current Outpatient Medications:      aspirin 81 MG EC tablet, Take 1 tablet (81 mg) by mouth daily, Disp: , Rfl:      atorvastatin (LIPITOR) 40 MG tablet, Take 40 mg by mouth, Disp: , Rfl:      baclofen (LIORESAL) 10 MG tablet, Take 3-4 tablets (30-40 mg) by mouth 3 times daily, Disp: 1080 tablet, Rfl: 3     botulinum toxin type A (BOTOX) 100 UNITS injection, Inject 400 Units into the muscle every 3 months, Disp: 400 Units, Rfl: 5     cholecalciferol 2000 UNITS tablet, Take 2,000 Units by mouth daily, Disp: 30 tablet, Rfl: 0     clopidogrel (PLAVIX) 75 MG tablet, Take 75 mg by mouth, Disp: , Rfl:      clopidogrel (PLAVIX) 75 MG tablet, Take 1 tablet (75 mg) by mouth daily, Disp: 30 tablet, Rfl: 0     co-enzyme Q-10 100 MG CAPS, Take 1 capsule (100 mg) by mouth daily, Disp: 30 capsule, Rfl: 0     lisinopril-hydrochlorothiazide (PRINZIDE,ZESTORETIC) 10-12.5 MG per tablet, Take 1 tablet by mouth, Disp: , Rfl:      lisinopril-hydrochlorothiazide (PRINZIDE/ZESTORETIC) 10-12.5 MG per tablet, Take 2 tablets by mouth, Disp: , Rfl:      lisinopril-hydrochlorothiazide (PRINZIDE/ZESTORETIC) 20-12.5 MG tablet, 2 tablets, Disp: , Rfl: 3     silver sulfADIAZINE (SILVADENE) 1 % external cream, Apply topically 2 times daily, Disp: 25 g, Rfl: 1     simvastatin (ZOCOR) 10 MG tablet, Take 1 tablet (10 mg) by mouth every evening, Disp: 30 tablet, Rfl: 0      tetrahydrozoline 0.05 % ophthalmic solution, Place 1 drop into both eyes 3 times daily as needed, Disp: , Rfl:     Current Facility-Administered Medications:      botulinum toxin type A (BOTOX) 100 units injection 400 Units, 400 Units, Intramuscular, Q90 Days, Brigida Briseno MD, 400 Units at 23 1704       BOTULINUM NEUROTOXIN INJECTION PROCEDURES    VERIFICATION OF PATIENT IDENTIFICATION AND PROCEDURE     Initials   Patient Name PS   Patient  PS   Procedure Verified by: PS     Prior to the start of the procedure and with procedural staff participation, I verbally confirmed the patient s identity using two indicators, relevant allergies, that the procedure was appropriate and matched the consent or emergent situation, and that the correct equipment/implants were available. Immediately prior to starting the procedure I conducted the Time Out with the procedural staff and re-confirmed the patient s name, procedure, and site/side. (The Joint Commission universal protocol was followed.)  Yes    Sedation (Moderate or Deep): None    ABOVE ASSESSMENTS PERFORMED BY  Brigida Briseno MD      INDICATIONS FOR PROCEDURES  Luis Trinidad is a 67 year old patient with myriam spastic hemiparesis secondary to the diagnosis of stroke. His baseline symptoms have been recalcitrant to oral medications and conservative therapy.  He is here today for reinjection with Botox.    GOAL OF PROCEDURE  The goal of this procedure is to increase active range of motion, improve volitional motor control, decrease pain  and enhance functional independence.      TOTAL DOSE ADMINISTERED  Dose Administered: 400 units  Botox (Botulinum Toxin Type A)       1:1 Dilution   Unavoidable Drug Waste: No  Diluent Used:  Preservative Free Normal Saline  Total Volume of Diluent Used:  4 ml  See MAR  NDC #: Botox 100u ([address]-60)      CONSENT  The risks, benefits, and treatment options were discussed with Luis Trinidad and he agreed to  proceed.    Written consent was obtained by PS.     EQUIPMENT USED  Needle-35mm stimulating/recording  EMG/NCS Machine    SKIN PREPARATION  Skin preparation was performed using an alcohol wipe.    GUIDANCE DESCRIPTION  Electro-myographic guidance was necessary throughout the procedure to accurately identify all areas of spastic muscles while avoiding injection of non-spastic muscles and underlying muscles , neighboring nerves and nearby vascular structures.     AREA/MUSCLE INJECTED  1.  RIGHT UPPER EXTREMITY- total of 370 units Botox = Total Dose, 1:1 Dilution      Right pectoralis major - 20 units of Botox at 1 site/s.      Right bicep - 70 units of Botox at 1 site/s.      Right brachioradialis - 40 units of Botox at 1 site/s.      Right flexor digitorum superficialis - 50 units of Botox at 1 site/s.       Right flexor digitorum profundus - 50 units of Botox at 1 site/s.      Right FCU - 50 units of Botox at 1 site/s.      Right pronator teres - 90 units of Botox at 1 site/s.       2.  RIGHT LOWER EXTREMITY- total of 30 units Botox = Total Dose, 1:1 Dilution      Right posterior tibialis - 30 units of Botox at 1 site/s.         RESPONSE TO PROCEDURE  Luis Trinidad tolerated the procedure well and there were no immediate complications. He was allowed to recover for an appropriate period of time and was discharged home in stable condition.    ASSESSMENT AND PLAN     Ischemic stroke at the lateral left thalamus and the posterior limb of the left internal capsule, 11/2015    Residual right spastic hemiparesis     HTN     HLD    Questionable SAMMY - has not seen sleep medicine           1. Work-up: none today  2. Therapy/equipment/braces: continue HEP regularly.   We have offered outpatient physical and occupational therapy but he is not interested at this point  3. Medications: no change in meds today; on baclofen 30 TID.  4. Interventions: chemo-denervation today; procedure note below. No change in the total dose or  sites today. He is on maximum dose.    5. Referral / follow up with other providers:  We have discussed referral to sleep medicine clinic but he is not interested at this point   6. Follow up: 12 weeks.           Again, thank you for allowing me to participate in the care of your patient.      Sincerely,    Brigida Briseno MD

## 2023-06-28 ENCOUNTER — OFFICE VISIT (OUTPATIENT)
Dept: PHYSICAL MEDICINE AND REHAB | Facility: CLINIC | Age: 68
End: 2023-06-28
Payer: COMMERCIAL

## 2023-06-28 VITALS
RESPIRATION RATE: 18 BRPM | DIASTOLIC BLOOD PRESSURE: 73 MMHG | OXYGEN SATURATION: 98 % | HEART RATE: 87 BPM | SYSTOLIC BLOOD PRESSURE: 118 MMHG

## 2023-06-28 DIAGNOSIS — G81.11 RIGHT SPASTIC HEMIPARESIS (H): Primary | ICD-10-CM

## 2023-06-28 DIAGNOSIS — I69.30 HISTORY OF CVA WITH RESIDUAL DEFICIT: ICD-10-CM

## 2023-06-28 PROCEDURE — 95874 GUIDE NERV DESTR NEEDLE EMG: CPT | Performed by: PHYSICAL MEDICINE & REHABILITATION

## 2023-06-28 PROCEDURE — 64643 CHEMODENERV 1 EXTREM 1-4 EA: CPT | Performed by: PHYSICAL MEDICINE & REHABILITATION

## 2023-06-28 PROCEDURE — 64644 CHEMODENERV 1 EXTREM 5/> MUS: CPT | Performed by: PHYSICAL MEDICINE & REHABILITATION

## 2023-06-28 ASSESSMENT — PAIN SCALES - GENERAL: PAINLEVEL: NO PAIN (0)

## 2023-06-28 NOTE — PROGRESS NOTES
"  [facility]     PM&R CLINIC NOTE  BOTULINUM TOXIN PROCEDURE      HPI  Chief Complaint   Patient presents with     Procedure     Botox confirmed by patient       Luis Trinidad is a 67 year old male with a history of stroke in November 2016 who presents to clinic for botulinum toxin injections for management of right spastic hemiparesis.     Background  Rogelio had new pain at right 5th toe for 2-3 months during the Spring and Summer 2022. No falls or injury. Pain was present with standing and walking; no pain at rest. No associated swelling, erythema or fever/chills. PT tried shoe insert, and kinesio taping with some benefits. He also had some \"tightness\" at right ankle.      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 Ingrown nail was treated and he has had no pain since.      He saw his PCP for right foot pain. Xray was unremarkable. He was referred to podiatry clinic; had an ingrown toe nail which was removed and helped with his pain significantly. He also has a new anterior leaf spring AFO which has bene helping.       SINCE LAST VISIT  Luis Trinidad was last seen here in clinic on 3/22/23, at which time he received 400 units of Botox.    Patient denies new medical diagnoses, illnesses, hospitalizations, emergency room visits, and injuries since the previous injection with botulinum neurotoxin.     RESPONSE TO PREVIOUS TREATMENT  Side effects: No problems reported    Pain Improvement: Yes.  Percent Improvement: 80 %    Duration of Benefit:  10-11 weeks. - very similar to the last cycle    Spasticity Improvement: Yes.  Percent Improvement: 80 %    Duration of Benefit:  10-11 weeks.      PHYSICAL EXAM  VS: /73   Pulse 87   Resp 18   SpO2 98%      Alert, pleasant affect.   Increased tone noted in RUE with shoulder abduction, elbow flexion, forearm pronation, wrist and finger flexion: 2-3/4 per " MAS  RLE exam also showed increased tone with HF, KF, PF and inversion. Passive knee extension to ~20 degrees from neutral.  Knee position neutral with standing.   Skin intact at the sites of injections      ALLERGIES  No Known Allergies    CURRENT MEDICATIONS    Current Outpatient Medications:      aspirin 81 MG EC tablet, Take 1 tablet (81 mg) by mouth daily, Disp: , Rfl:      atorvastatin (LIPITOR) 40 MG tablet, Take 40 mg by mouth, Disp: , Rfl:      baclofen (LIORESAL) 10 MG tablet, Take 3-4 tablets (30-40 mg) by mouth 3 times daily, Disp: 1080 tablet, Rfl: 3     botulinum toxin type A (BOTOX) 100 UNITS injection, Inject 400 Units into the muscle every 3 months, Disp: 400 Units, Rfl: 5     cholecalciferol 2000 UNITS tablet, Take 2,000 Units by mouth daily, Disp: 30 tablet, Rfl: 0     clopidogrel (PLAVIX) 75 MG tablet, Take 75 mg by mouth, Disp: , Rfl:      clopidogrel (PLAVIX) 75 MG tablet, Take 1 tablet (75 mg) by mouth daily, Disp: 30 tablet, Rfl: 0     co-enzyme Q-10 100 MG CAPS, Take 1 capsule (100 mg) by mouth daily, Disp: 30 capsule, Rfl: 0     lisinopril-hydrochlorothiazide (PRINZIDE,ZESTORETIC) 10-12.5 MG per tablet, Take 1 tablet by mouth, Disp: , Rfl:      lisinopril-hydrochlorothiazide (PRINZIDE/ZESTORETIC) 10-12.5 MG per tablet, Take 2 tablets by mouth, Disp: , Rfl:      lisinopril-hydrochlorothiazide (PRINZIDE/ZESTORETIC) 20-12.5 MG tablet, 2 tablets, Disp: , Rfl: 3     silver sulfADIAZINE (SILVADENE) 1 % external cream, Apply topically 2 times daily, Disp: 25 g, Rfl: 1     simvastatin (ZOCOR) 10 MG tablet, Take 1 tablet (10 mg) by mouth every evening, Disp: 30 tablet, Rfl: 0     tetrahydrozoline 0.05 % ophthalmic solution, Place 1 drop into both eyes 3 times daily as needed, Disp: , Rfl:     Current Facility-Administered Medications:      botulinum toxin type A (BOTOX) 100 units injection 400 Units, 400 Units, Intramuscular, Q90 Days, Brigida Briseno MD, 400 Units at 06/28/23 1645       BOTULINUM  NEUROTOXIN INJECTION PROCEDURES    VERIFICATION OF PATIENT IDENTIFICATION AND PROCEDURE     Initials   Patient Name PS   Patient  PS   Procedure Verified by: PS     Prior to the start of the procedure and with procedural staff participation, I verbally confirmed the patient s identity using two indicators, relevant allergies, that the procedure was appropriate and matched the consent or emergent situation, and that the correct equipment/implants were available. Immediately prior to starting the procedure I conducted the Time Out with the procedural staff and re-confirmed the patient s name, procedure, and site/side. (The Joint Commission universal protocol was followed.)  Yes    Sedation (Moderate or Deep): None    ABOVE ASSESSMENTS PERFORMED BY  Brigida Briseno MD      INDICATIONS FOR PROCEDURES  uLis Trinidad is a 67 year old patient with myriam spastic hemiparesis secondary to the diagnosis of stroke. His baseline symptoms have been recalcitrant to oral medications and conservative therapy.  He is here today for reinjection with Botox.    GOAL OF PROCEDURE  The goal of this procedure is to increase active range of motion, improve volitional motor control, decrease pain  and enhance functional independence.      TOTAL DOSE ADMINISTERED  Dose Administered: 400 units  Botox (Botulinum Toxin Type A)       1:1 Dilution   Unavoidable Drug Waste: No  Diluent Used:  Preservative Free Normal Saline  Total Volume of Diluent Used:  4 ml  See MAR  NDC #: Botox 100u ([address]-62)      CONSENT  The risks, benefits, and treatment options were discussed with Luis Trinidad and he agreed to proceed.    Written consent was obtained by PS.     EQUIPMENT USED  Needle-35mm stimulating/recording  EMG/NCS Machine    SKIN PREPARATION  Skin preparation was performed using an alcohol wipe.    GUIDANCE DESCRIPTION  Electro-myographic guidance was necessary throughout the procedure to accurately identify all areas of spastic muscles  while avoiding injection of non-spastic muscles and underlying muscles , neighboring nerves and nearby vascular structures.     AREA/MUSCLE INJECTED  1.  RIGHT UPPER EXTREMITY- total of 370 units Botox = Total Dose, 1:1 Dilution      Right pectoralis major - 20 units of Botox at 1 site/s.      Right bicep - 70 units of Botox at 1 site/s.      Right brachioradialis - 40 units of Botox at 1 site/s.      Right flexor digitorum superficialis - 50 units of Botox at 1 site/s.       Right flexor digitorum profundus - 50 units of Botox at 1 site/s.      Right FCU - 50 units of Botox at 1 site/s.      Right pronator teres - 90 units of Botox at 1 site/s.       2.  RIGHT LOWER EXTREMITY- total of 30 units Botox = Total Dose, 1:1 Dilution      Right posterior tibialis - 30 units of Botox at 1 site/s.         RESPONSE TO PROCEDURE  Luis Trinidad tolerated the procedure well and there were no immediate complications. He was allowed to recover for an appropriate period of time and was discharged home in stable condition.    ASSESSMENT AND PLAN     Ischemic stroke at the lateral left thalamus and the posterior limb of the left internal capsule, 11/2015    Residual right spastic hemiparesis     HTN     HLD    Questionable SAMMY - has not seen sleep medicine           1. Work-up: none today  2. Therapy/equipment/braces: continue HEP regularly.   We have offered outpatient physical and occupational therapy but he is not interested at this point  3. Medications: no change in meds today; on baclofen 30 TID.  4. Interventions: chemo-denervation today; procedure note below. No change in the total dose or sites today. He is on maximum dose.    5. Referral / follow up with other providers:  We have discussed referral to sleep medicine clinic but he is not interested at this point   6. Follow up: 12 weeks. He is travelling to see his family for 2-3 months and will be back to clinic in Dec.        Brigida Briseno MD  Physical Medicine &  Rehabilitation

## 2023-06-28 NOTE — LETTER
"6/28/2023       RE: Luis Trinidad  [address] [facility] [id number]     Dear Colleague,    Thank you for referring your patient, Luis Trinidad, to the [facility] PH[facility] AND REHABILITATION [facility] [place] at [facility]. Please see a copy of my visit note below.      [facility]     PM&R CLINIC NOTE  BOTULINUM TOXIN PROCEDURE      HPI  Chief Complaint   Patient presents with    Procedure     Botox confirmed by patient       Luis Trinidad is a 67 year old male with a history of stroke in November 2016 who presents to clinic for botulinum toxin injections for management of right spastic hemiparesis.     Background  Rogelio had new pain at right 5th toe for 2-3 months during the Spring and Summer 2022. No falls or injury. Pain was present with standing and walking; no pain at rest. No associated swelling, erythema or fever/chills. PT tried shoe insert, and kinesio taping with some benefits. He also had some \"tightness\" at right ankle.      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 Ingrown nail was treated and he has had no pain since.      He saw his PCP for right foot pain. Xray was unremarkable. He was referred to podiatry clinic; had an ingrown toe nail which was removed and helped with his pain significantly. He also has a new anterior leaf spring AFO which has bene helping.       SINCE LAST VISIT  Luis Trinidad was last seen here in clinic on 3/22/23, at which time he received 400 units of Botox.    Patient denies new medical diagnoses, illnesses, hospitalizations, emergency room visits, and injuries since the previous injection with botulinum neurotoxin.     RESPONSE TO PREVIOUS TREATMENT  Side effects: No problems reported    Pain Improvement: Yes.  Percent Improvement: 80 %    Duration of Benefit:  10-11 weeks. - very " similar to the last cycle    Spasticity Improvement: Yes.  Percent Improvement: 80 %    Duration of Benefit:  10-11 weeks.      PHYSICAL EXAM  VS: /73   Pulse 87   Resp 18   SpO2 98%      Alert, pleasant affect.   Increased tone noted in RUE with shoulder abduction, elbow flexion, forearm pronation, wrist and finger flexion: 2-3/4 per MAS  RLE exam also showed increased tone with HF, KF, PF and inversion. Passive knee extension to ~20 degrees from neutral.  Knee position neutral with standing.   Skin intact at the sites of injections      ALLERGIES  No Known Allergies    CURRENT MEDICATIONS    Current Outpatient Medications:     aspirin 81 MG EC tablet, Take 1 tablet (81 mg) by mouth daily, Disp: , Rfl:     atorvastatin (LIPITOR) 40 MG tablet, Take 40 mg by mouth, Disp: , Rfl:     baclofen (LIORESAL) 10 MG tablet, Take 3-4 tablets (30-40 mg) by mouth 3 times daily, Disp: 1080 tablet, Rfl: 3    botulinum toxin type A (BOTOX) 100 UNITS injection, Inject 400 Units into the muscle every 3 months, Disp: 400 Units, Rfl: 5    cholecalciferol 2000 UNITS tablet, Take 2,000 Units by mouth daily, Disp: 30 tablet, Rfl: 0    clopidogrel (PLAVIX) 75 MG tablet, Take 75 mg by mouth, Disp: , Rfl:     clopidogrel (PLAVIX) 75 MG tablet, Take 1 tablet (75 mg) by mouth daily, Disp: 30 tablet, Rfl: 0    co-enzyme Q-10 100 MG CAPS, Take 1 capsule (100 mg) by mouth daily, Disp: 30 capsule, Rfl: 0    lisinopril-hydrochlorothiazide (PRINZIDE,ZESTORETIC) 10-12.5 MG per tablet, Take 1 tablet by mouth, Disp: , Rfl:     lisinopril-hydrochlorothiazide (PRINZIDE/ZESTORETIC) 10-12.5 MG per tablet, Take 2 tablets by mouth, Disp: , Rfl:     lisinopril-hydrochlorothiazide (PRINZIDE/ZESTORETIC) 20-12.5 MG tablet, 2 tablets, Disp: , Rfl: 3    silver sulfADIAZINE (SILVADENE) 1 % external cream, Apply topically 2 times daily, Disp: 25 g, Rfl: 1    simvastatin (ZOCOR) 10 MG tablet, Take 1 tablet (10 mg) by mouth every evening, Disp: 30 tablet,  Rfl: 0    tetrahydrozoline 0.05 % ophthalmic solution, Place 1 drop into both eyes 3 times daily as needed, Disp: , Rfl:     Current Facility-Administered Medications:     botulinum toxin type A (BOTOX) 100 units injection 400 Units, 400 Units, Intramuscular, Q90 Days, Brigida Briseno MD, 400 Units at 23 1645       BOTULINUM NEUROTOXIN INJECTION PROCEDURES    VERIFICATION OF PATIENT IDENTIFICATION AND PROCEDURE     Initials   Patient Name PS   Patient  PS   Procedure Verified by: PS     Prior to the start of the procedure and with procedural staff participation, I verbally confirmed the patient s identity using two indicators, relevant allergies, that the procedure was appropriate and matched the consent or emergent situation, and that the correct equipment/implants were available. Immediately prior to starting the procedure I conducted the Time Out with the procedural staff and re-confirmed the patient s name, procedure, and site/side. (The Joint Commission universal protocol was followed.)  Yes    Sedation (Moderate or Deep): None    ABOVE ASSESSMENTS PERFORMED BY  Brigida Briseno MD      INDICATIONS FOR PROCEDURES  Luis Trinidad is a 67 year old patient with myriam spastic hemiparesis secondary to the diagnosis of stroke. His baseline symptoms have been recalcitrant to oral medications and conservative therapy.  He is here today for reinjection with Botox.    GOAL OF PROCEDURE  The goal of this procedure is to increase active range of motion, improve volitional motor control, decrease pain  and enhance functional independence.      TOTAL DOSE ADMINISTERED  Dose Administered: 400 units  Botox (Botulinum Toxin Type A)       1:1 Dilution   Unavoidable Drug Waste: No  Diluent Used:  Preservative Free Normal Saline  Total Volume of Diluent Used:  4 ml  See MAR  NDC #: Botox 100u ([id number])      CONSENT  The risks, benefits, and treatment options were discussed with Luis Trinidad and he agreed to  proceed.    Written consent was obtained by PS.     EQUIPMENT USED  Needle-35mm stimulating/recording  EMG/NCS Machine    SKIN PREPARATION  Skin preparation was performed using an alcohol wipe.    GUIDANCE DESCRIPTION  Electro-myographic guidance was necessary throughout the procedure to accurately identify all areas of spastic muscles while avoiding injection of non-spastic muscles and underlying muscles , neighboring nerves and nearby vascular structures.     AREA/MUSCLE INJECTED  1.  RIGHT UPPER EXTREMITY- total of 370 units Botox = Total Dose, 1:1 Dilution      Right pectoralis major - 20 units of Botox at 1 site/s.      Right bicep - 70 units of Botox at 1 site/s.      Right brachioradialis - 40 units of Botox at 1 site/s.      Right flexor digitorum superficialis - 50 units of Botox at 1 site/s.       Right flexor digitorum profundus - 50 units of Botox at 1 site/s.      Right FCU - 50 units of Botox at 1 site/s.      Right pronator teres - 90 units of Botox at 1 site/s.       2.  RIGHT LOWER EXTREMITY- total of 30 units Botox = Total Dose, 1:1 Dilution      Right posterior tibialis - 30 units of Botox at 1 site/s.         RESPONSE TO PROCEDURE  Luis Trinidad tolerated the procedure well and there were no immediate complications. He was allowed to recover for an appropriate period of time and was discharged home in stable condition.    ASSESSMENT AND PLAN   Ischemic stroke at the lateral left thalamus and the posterior limb of the left internal capsule, 11/2015  Residual right spastic hemiparesis   HTN   HLD  Questionable SAMMY - has not seen sleep medicine           Work-up: none today  Therapy/equipment/braces: continue HEP regularly.   We have offered outpatient physical and occupational therapy but he is not interested at this point  Medications: no change in meds today; on baclofen 30 TID.  Interventions: chemo-denervation today; procedure note below. No change in the total dose or sites today. He is on  maximum dose.    Referral / follow up with other providers:  We have discussed referral to sleep medicine clinic but he is not interested at this point   Follow up: 12 weeks. He is travelling to see his family for 2-3 months and will be back to clinic in Dec.          Again, thank you for allowing me to participate in the care of your patient.      Sincerely,    Brigida Briseno MD

## 2023-06-28 NOTE — NURSING NOTE
Chief Complaint   Patient presents with     Procedure     Botox confirmed by patient     Tiara Ruiz

## 2023-07-31 DIAGNOSIS — R25.2 SPASTICITY: ICD-10-CM

## 2023-07-31 RX ORDER — BACLOFEN 10 MG/1
30-40 TABLET ORAL 3 TIMES DAILY
Qty: 1080 TABLET | Refills: 3 | Status: SHIPPED | OUTPATIENT
Start: 2023-07-31 | End: 2024-09-25

## 2023-07-31 NOTE — TELEPHONE ENCOUNTER
[facility] Call Center    Phone Message    May a detailed message be left on voicemail: yes     Reason for Call: Medication Refill Request    Has the patient contacted the pharmacy for the refill? Yes   Name of medication being requested: baclofen (LIORESAL) 10 MG tablet   Provider who prescribed the medication: Brigida Briseno MD   Pharmacy:   [facility] [address] [place] [id number] [facility]     Date medication is needed: 7/31/2023  Patient states he is traveling out of country for 3 months, and is requesting a 90 day supply. Please review.    Action Taken: Message routed to:  [facility] PM[facility]R    Travel Screening: Not Applicable

## 2023-07-31 NOTE — TELEPHONE ENCOUNTER
Refill request received from patient call    Medication: Baclofen  Directions: Take 3-4 tablets by mouth 3 times daily     Last ordered: 10/18/22   Qty: 1080  RF: 3  Last OV: 6/28/23  Next OV: 12/6/23    Routing to Dr. Briseno to review and sign if appropriate.    Ronit Ward, RN Care Coordinator  [facility]   PM & R Clinic main phone # [phone] fax # [phone]

## 2023-08-01 DIAGNOSIS — G81.11 RIGHT SPASTIC HEMIPARESIS (H): Primary | ICD-10-CM

## 2023-09-27 ENCOUNTER — TELEPHONE (OUTPATIENT)
Dept: PHYSICAL MEDICINE AND REHAB | Facility: CLINIC | Age: 68
End: 2023-09-27

## 2023-09-27 NOTE — TELEPHONE ENCOUNTER
[facility]    Phone Message    May a detailed message be left on voicemail: yes     Reason for Call: Other: Patient has questions regarding his cancelled appointment. Please call back when available.     Action Taken: Other: PMR    Travel Screening: Not Applicable

## 2023-10-02 ENCOUNTER — TELEPHONE (OUTPATIENT)
Dept: PHYSICAL MEDICINE AND REHAB | Facility: CLINIC | Age: 68
End: 2023-10-02

## 2023-10-02 NOTE — TELEPHONE ENCOUNTER
Please call to see if Luis can come in to see Dr Briseno on Thursday Oct 5 sat 11:40 for Botox (he called this morning to cancel due to no ride to bring him)    Another option is Mon Oct 16 at 1:50    These times are from Dr Briseno    Last botox was done ion 6/28    Thanks    Marcelina Sethi, RN on 10/2/2023 at 11:27 AM

## 2023-10-02 NOTE — TELEPHONE ENCOUNTER
Reached out to pt, had to LVM asked if he could call back and let us know which date would work for him   10/5 at 11:40  Or 10/16 at 1:50

## 2023-10-04 NOTE — TELEPHONE ENCOUNTER
Returned call to pt, he is not available, LVM confirming his appt for 10/16 at 1:50 with Dr Briseno is scheduled.

## 2023-10-04 NOTE — TELEPHONE ENCOUNTER
[facility]    Phone Message    May a detailed message be left on voicemail: yes     Reason for Call: Other: Patient returned phone call stating that he would like to take to 10/16 @1:50. Please schedule and call patient back to verify that this has been set up.      Action Taken: Message routed to:  [facility]    Travel Screening: Not Applicable

## 2023-10-16 ENCOUNTER — OFFICE VISIT (OUTPATIENT)
Dept: PHYSICAL MEDICINE AND REHAB | Facility: CLINIC | Age: 68
End: 2023-10-16
Payer: COMMERCIAL

## 2023-10-16 VITALS — OXYGEN SATURATION: 99 % | DIASTOLIC BLOOD PRESSURE: 74 MMHG | SYSTOLIC BLOOD PRESSURE: 127 MMHG | HEART RATE: 98 BPM

## 2023-10-16 DIAGNOSIS — I69.30 HISTORY OF CVA WITH RESIDUAL DEFICIT: ICD-10-CM

## 2023-10-16 DIAGNOSIS — G81.11 RIGHT SPASTIC HEMIPARESIS (H): ICD-10-CM

## 2023-10-16 DIAGNOSIS — R25.2 SPASTICITY: Primary | ICD-10-CM

## 2023-10-16 PROCEDURE — 64643 CHEMODENERV 1 EXTREM 1-4 EA: CPT | Performed by: PHYSICAL MEDICINE & REHABILITATION

## 2023-10-16 PROCEDURE — 64644 CHEMODENERV 1 EXTREM 5/> MUS: CPT | Performed by: PHYSICAL MEDICINE & REHABILITATION

## 2023-10-16 PROCEDURE — 95874 GUIDE NERV DESTR NEEDLE EMG: CPT | Performed by: PHYSICAL MEDICINE & REHABILITATION

## 2023-10-16 ASSESSMENT — PAIN SCALES - GENERAL: PAINLEVEL: NO PAIN (0)

## 2023-10-16 NOTE — NURSING NOTE
Chief Complaint   Patient presents with    RECHECK     Botox injections confirmed with patient   /74   Pulse 98   SpO2 99%     Mariana Barraza CMA at 4:49 PM on 10/16/2023.

## 2023-10-16 NOTE — PROGRESS NOTES
"  [facility]     PM&R CLINIC NOTE  BOTULINUM TOXIN PROCEDURE      HPI  No chief complaint on file.    Luis Trinidad is a 68 year old male with a history of stroke in November 2016 who presents to clinic for botulinum toxin injections for management of right spastic hemiparesis.     Background  Rogelio had new pain at right 5th toe for 2-3 months during the Spring and Summer 2022. No falls or injury. Pain was present with standing and walking; no pain at rest. No associated swelling, erythema or fever/chills. PT tried shoe insert, and kinesio taping with some benefits. He also had some \"tightness\" at right ankle.      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 Ingrown nail was treated and he has had no pain since.      He saw his PCP for right foot pain. Xray was unremarkable. He was referred to podiatry clinic; had an ingrown toe nail which was removed and helped with his pain significantly. He also has a new anterior leaf spring AFO which has bene helping.       SINCE LAST VISIT  Luis Trinidad was last seen here in clinic on 6/28/23, at which time he received 400 units of Botox.    Patient denies new medical diagnoses, illnesses, hospitalizations, emergency room visits, and injuries since the previous injection with botulinum neurotoxin.     He had a great trip to [place] with his wife to see his family. He may go again in Dec.       RESPONSE TO PREVIOUS TREATMENT  Side effects: No problems reported    Pain Improvement: Yes.  Percent Improvement: 80 %    Duration of Benefit:   10-11 weeks. - very similar to the last cycle    Spasticity Improvement: Yes.  Percent Improvement: 80 %    Duration of Benefit:   10-11 weeks.    His spasticity has been worse due to a big delay in injections.       PHYSICAL EXAM  VS: There were no vitals taken for this visit.     Alert, pleasant affect.   Increased tone noted in " RUE with shoulder abduction, elbow flexion, forearm pronation, wrist and finger flexion: 2-3/4 per MAS  RLE exam also showed increased tone with HF, KF, PF and inversion. Passive knee extension to ~20 degrees from neutral.  Knee position neutral with standing.   Skin intact at the sites of injections      ALLERGIES  No Known Allergies    CURRENT MEDICATIONS    Current Outpatient Medications:     aspirin 81 MG EC tablet, Take 1 tablet (81 mg) by mouth daily, Disp: , Rfl:     atorvastatin (LIPITOR) 40 MG tablet, Take 40 mg by mouth, Disp: , Rfl:     baclofen (LIORESAL) 10 MG tablet, Take 3-4 tablets (30-40 mg) by mouth 3 times daily, Disp: 1080 tablet, Rfl: 3    botulinum toxin type A (BOTOX) 100 UNITS injection, Inject 400 Units into the muscle every 3 months, Disp: 400 Units, Rfl: 5    cholecalciferol 2000 UNITS tablet, Take 2,000 Units by mouth daily, Disp: 30 tablet, Rfl: 0    clopidogrel (PLAVIX) 75 MG tablet, Take 75 mg by mouth, Disp: , Rfl:     clopidogrel (PLAVIX) 75 MG tablet, Take 1 tablet (75 mg) by mouth daily, Disp: 30 tablet, Rfl: 0    co-enzyme Q-10 100 MG CAPS, Take 1 capsule (100 mg) by mouth daily, Disp: 30 capsule, Rfl: 0    lisinopril-hydrochlorothiazide (PRINZIDE,ZESTORETIC) 10-12.5 MG per tablet, Take 1 tablet by mouth, Disp: , Rfl:     lisinopril-hydrochlorothiazide (PRINZIDE/ZESTORETIC) 10-12.5 MG per tablet, Take 2 tablets by mouth, Disp: , Rfl:     lisinopril-hydrochlorothiazide (PRINZIDE/ZESTORETIC) 20-12.5 MG tablet, 2 tablets, Disp: , Rfl: 3    silver sulfADIAZINE (SILVADENE) 1 % external cream, Apply topically 2 times daily, Disp: 25 g, Rfl: 1    simvastatin (ZOCOR) 10 MG tablet, Take 1 tablet (10 mg) by mouth every evening, Disp: 30 tablet, Rfl: 0    tetrahydrozoline 0.05 % ophthalmic solution, Place 1 drop into both eyes 3 times daily as needed, Disp: , Rfl:     Current Facility-Administered Medications:     botulinum toxin type A (BOTOX) 100 units injection 400 Units, 400 Units,  Intramuscular, Q90 Days, Brigida Briseno MD       BOTULINUM NEUROTOXIN INJECTION PROCEDURES    VERIFICATION OF PATIENT IDENTIFICATION AND PROCEDURE     Initials   Patient Name PS   Patient  PS   Procedure Verified by: PS     Prior to the start of the procedure and with procedural staff participation, I verbally confirmed the patient s identity using two indicators, relevant allergies, that the procedure was appropriate and matched the consent or emergent situation, and that the correct equipment/implants were available. Immediately prior to starting the procedure I conducted the Time Out with the procedural staff and re-confirmed the patient s name, procedure, and site/side. (The Joint Commission universal protocol was followed.)  Yes    Sedation (Moderate or Deep): None    ABOVE ASSESSMENTS PERFORMED BY  Brigida Briseno MD      INDICATIONS FOR PROCEDURES  Luis Trinidad is a 68 year old patient with myriam spastic hemiparesis secondary to the diagnosis of stroke. His baseline symptoms have been recalcitrant to oral medications and conservative therapy.  He is here today for reinjection with Botox.    GOAL OF PROCEDURE  The goal of this procedure is to increase active range of motion, improve volitional motor control, decrease pain  and enhance functional independence.      TOTAL DOSE ADMINISTERED  Dose Administered: 400 units  Botox (Botulinum Toxin Type A)       1:1 Dilution   Unavoidable Drug Waste: No  Diluent Used:  Preservative Free Normal Saline  Total Volume of Diluent Used:  4 ml  See MAR  NDC #: Botox 100u ([address]-92)      CONSENT  The risks, benefits, and treatment options were discussed with Luis Trinidad and he agreed to proceed.    Written consent was obtained by PS.     EQUIPMENT USED  Needle-35mm stimulating/recording (We didn't have 35mm needles available in clinic today so used 25mm for distal arm and 50mm for proximal arm and RLE)  EMG/NCS Machine    SKIN PREPARATION  Skin preparation was  performed using an alcohol wipe.    GUIDANCE DESCRIPTION  Electro-myographic guidance was necessary throughout the procedure to accurately identify all areas of spastic muscles while avoiding injection of non-spastic muscles and underlying muscles , neighboring nerves and nearby vascular structures.     AREA/MUSCLE INJECTED  1.  RIGHT UPPER EXTREMITY- total of 370 units Botox = Total Dose, 1:1 Dilution      Right pectoralis major - 20 units of Botox at 1 site/s.      Right bicep - 70 units of Botox at 1 site/s.      Right brachialis - 40 units of Botox at 1 site/s.     Right flexor digitorum superficialis - 50 units of Botox at 1 site/s.       Right flexor digitorum profundus - 50 units of Botox at 1 site/s.      Right FCU - 50 units of Botox at 1 site/s.      Right pronator teres - 90 units of Botox at 1 site/s.       2.  RIGHT LOWER EXTREMITY- total of 30 units Botox = Total Dose, 1:1 Dilution      Right posterior tibialis - 30 units of Botox at 1 site/s.         RESPONSE TO PROCEDURE  Luis Trinidad tolerated the procedure well and there were no immediate complications. He was allowed to recover for an appropriate period of time and was discharged home in stable condition.    ASSESSMENT AND PLAN   Ischemic stroke at the lateral left thalamus and the posterior limb of the left internal capsule, 11/2015  Residual right spastic hemiparesis   HTN   HLD  Questionable SAMMY - has not seen sleep medicine      He had questions about taking viagra. I reviewed basic precautions and contraindications. Also encouraged him to contact his PCP. I tried to send an outside message to Mic Ko PA but couldn't find her in the system.        Work-up: none today  Therapy/equipment/braces: continue HEP regularly.   We have offered outpatient physical and occupational therapy but he is not interested at this point  Medications: no change in meds today; on baclofen 30 TID.  Interventions: chemo-denervation today; procedure  note below. No change in the total dose but adjusted one site as above for better control of tone at EF muscles. He is on maximum dose.    Referral / follow up with other providers:  We have discussed referral to sleep medicine clinic but he is not interested at this point   Follow up: 12 weeks. He might travel again in Dec so his appointment might need to be rescheduled.         Brigida Briseno MD  Physical Medicine & Rehabilitation

## 2023-10-16 NOTE — LETTER
"10/16/2023       RE: Luis Trinidad  [id number] [facility] [id number]     Dear Colleague,    Thank you for referring your patient, Luis Trinidad, to the [facility]YSICAL MEDICINE [facility] REHABILITATION CLINIC [place] at [facility]. Please see a copy of my visit note below.      [facility]     PM&R CLINIC NOTE  BOTULINUM TOXIN PROCEDURE      HPI  No chief complaint on file.    Luis Trinidad is a 68 year old male with a history of stroke in November 2016 who presents to clinic for botulinum toxin injections for management of right spastic hemiparesis.     Background  Rogelio had new pain at right 5th toe for 2-3 months during the Spring and Summer 2022. No falls or injury. Pain was present with standing and walking; no pain at rest. No associated swelling, erythema or fever/chills. PT tried shoe insert, and kinesio taping with some benefits. He also had some \"tightness\" at right ankle.      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 Ingrown nail was treated and he has had no pain since.      He saw his PCP for right foot pain. Xray was unremarkable. He was referred to podiatry clinic; had an ingrown toe nail which was removed and helped with his pain significantly. He also has a new anterior leaf spring AFO which has bene helping.       SINCE LAST VISIT  Luis Trinidad was last seen here in clinic on 6/28/23, at which time he received 400 units of Botox.    Patient denies new medical diagnoses, illnesses, hospitalizations, emergency room visits, and injuries since the previous injection with botulinum neurotoxin.     He had a great trip to [place] with his wife to see his family. He may go again in Dec.       RESPONSE TO PREVIOUS TREATMENT  Side effects: No problems reported    Pain Improvement: Yes.  Percent Improvement: 80 %    Duration " of Benefit:   10-11 weeks. - very similar to the last cycle    Spasticity Improvement: Yes.  Percent Improvement: 80 %    Duration of Benefit:   10-11 weeks.    His spasticity has been worse due to a big delay in injections.       PHYSICAL EXAM  VS: There were no vitals taken for this visit.     Alert, pleasant affect.   Increased tone noted in RUE with shoulder abduction, elbow flexion, forearm pronation, wrist and finger flexion: 2-3/4 per MAS  RLE exam also showed increased tone with HF, KF, PF and inversion. Passive knee extension to ~20 degrees from neutral.  Knee position neutral with standing.   Skin intact at the sites of injections      ALLERGIES  No Known Allergies    CURRENT MEDICATIONS    Current Outpatient Medications:     aspirin 81 MG EC tablet, Take 1 tablet (81 mg) by mouth daily, Disp: , Rfl:     atorvastatin (LIPITOR) 40 MG tablet, Take 40 mg by mouth, Disp: , Rfl:     baclofen (LIORESAL) 10 MG tablet, Take 3-4 tablets (30-40 mg) by mouth 3 times daily, Disp: 1080 tablet, Rfl: 3    botulinum toxin type A (BOTOX) 100 UNITS injection, Inject 400 Units into the muscle every 3 months, Disp: 400 Units, Rfl: 5    cholecalciferol 2000 UNITS tablet, Take 2,000 Units by mouth daily, Disp: 30 tablet, Rfl: 0    clopidogrel (PLAVIX) 75 MG tablet, Take 75 mg by mouth, Disp: , Rfl:     clopidogrel (PLAVIX) 75 MG tablet, Take 1 tablet (75 mg) by mouth daily, Disp: 30 tablet, Rfl: 0    co-enzyme Q-10 100 MG CAPS, Take 1 capsule (100 mg) by mouth daily, Disp: 30 capsule, Rfl: 0    lisinopril-hydrochlorothiazide (PRINZIDE,ZESTORETIC) 10-12.5 MG per tablet, Take 1 tablet by mouth, Disp: , Rfl:     lisinopril-hydrochlorothiazide (PRINZIDE/ZESTORETIC) 10-12.5 MG per tablet, Take 2 tablets by mouth, Disp: , Rfl:     lisinopril-hydrochlorothiazide (PRINZIDE/ZESTORETIC) 20-12.5 MG tablet, 2 tablets, Disp: , Rfl: 3    silver sulfADIAZINE (SILVADENE) 1 % external cream, Apply topically 2 times daily, Disp: 25 g, Rfl: 1     simvastatin (ZOCOR) 10 MG tablet, Take 1 tablet (10 mg) by mouth every evening, Disp: 30 tablet, Rfl: 0    tetrahydrozoline 0.05 % ophthalmic solution, Place 1 drop into both eyes 3 times daily as needed, Disp: , Rfl:     Current Facility-Administered Medications:     botulinum toxin type A (BOTOX) 100 units injection 400 Units, 400 Units, Intramuscular, Q90 Days, Brigida Briseno MD       BOTULINUM NEUROTOXIN INJECTION PROCEDURES    VERIFICATION OF PATIENT IDENTIFICATION AND PROCEDURE     Initials   Patient Name PS   Patient  PS   Procedure Verified by: PS     Prior to the start of the procedure and with procedural staff participation, I verbally confirmed the patient s identity using two indicators, relevant allergies, that the procedure was appropriate and matched the consent or emergent situation, and that the correct equipment/implants were available. Immediately prior to starting the procedure I conducted the Time Out with the procedural staff and re-confirmed the patient s name, procedure, and site/side. (The Joint Commission universal protocol was followed.)  Yes    Sedation (Moderate or Deep): None    ABOVE ASSESSMENTS PERFORMED BY  Brigida Briseno MD      INDICATIONS FOR PROCEDURES  Luis Trinidad is a 68 year old patient with myriam spastic hemiparesis secondary to the diagnosis of stroke. His baseline symptoms have been recalcitrant to oral medications and conservative therapy.  He is here today for reinjection with Botox.    GOAL OF PROCEDURE  The goal of this procedure is to increase active range of motion, improve volitional motor control, decrease pain  and enhance functional independence.      TOTAL DOSE ADMINISTERED  Dose Administered: 400 units  Botox (Botulinum Toxin Type A)       1:1 Dilution   Unavoidable Drug Waste: No  Diluent Used:  Preservative Free Normal Saline  Total Volume of Diluent Used:  4 ml  See MAR  NDC #: Botox 100u ([address]-44)      CONSENT  The risks, benefits, and treatment  options were discussed with Luis Trinidad and he agreed to proceed.    Written consent was obtained by PS.     EQUIPMENT USED  Needle-35mm stimulating/recording (We didn't have 35mm needles available in clinic today so used 25mm for distal arm and 50mm for proximal arm and RLE)  EMG/NCS Machine    SKIN PREPARATION  Skin preparation was performed using an alcohol wipe.    GUIDANCE DESCRIPTION  Electro-myographic guidance was necessary throughout the procedure to accurately identify all areas of spastic muscles while avoiding injection of non-spastic muscles and underlying muscles , neighboring nerves and nearby vascular structures.     AREA/MUSCLE INJECTED  1.  RIGHT UPPER EXTREMITY- total of 370 units Botox = Total Dose, 1:1 Dilution      Right pectoralis major - 20 units of Botox at 1 site/s.      Right bicep - 70 units of Botox at 1 site/s.      Right brachialis - 40 units of Botox at 1 site/s.     Right flexor digitorum superficialis - 50 units of Botox at 1 site/s.       Right flexor digitorum profundus - 50 units of Botox at 1 site/s.      Right FCU - 50 units of Botox at 1 site/s.      Right pronator teres - 90 units of Botox at 1 site/s.       2.  RIGHT LOWER EXTREMITY- total of 30 units Botox = Total Dose, 1:1 Dilution      Right posterior tibialis - 30 units of Botox at 1 site/s.         RESPONSE TO PROCEDURE  Luis Trinidad tolerated the procedure well and there were no immediate complications. He was allowed to recover for an appropriate period of time and was discharged home in stable condition.    ASSESSMENT AND PLAN   Ischemic stroke at the lateral left thalamus and the posterior limb of the left internal capsule, 11/2015  Residual right spastic hemiparesis   HTN   HLD  Questionable SAMMY - has not seen sleep medicine      He had questions about taking viagra. I reviewed basic precautions and contraindications. Also encouraged him to contact his PCP. I tried to send an outside message to Mic Ko  HOLLY Narayanan but couldn't find her in the system.        Work-up: none today  Therapy/equipment/braces: continue HEP regularly.   We have offered outpatient physical and occupational therapy but he is not interested at this point  Medications: no change in meds today; on baclofen 30 TID.  Interventions: chemo-denervation today; procedure note below. No change in the total dose but adjusted one site as above for better control of tone at EF muscles. He is on maximum dose.    Referral / follow up with other providers:  We have discussed referral to sleep medicine clinic but he is not interested at this point   Follow up: 12 weeks. He might travel again in Dec so his appointment might need to be rescheduled.           Again, thank you for allowing me to participate in the care of your patient.      Sincerely,    Brigida Briseno MD

## 2023-12-04 ENCOUNTER — TELEPHONE (OUTPATIENT)
Dept: PHYSICAL MEDICINE AND REHAB | Facility: CLINIC | Age: 68
End: 2023-12-04
Payer: COMMERCIAL

## 2023-12-04 NOTE — TELEPHONE ENCOUNTER
[facility] Call Center    Phone Message    May a detailed message be left on voicemail: yes     Reason for Call: Other: Dunia called Ronit back from [place] and writer did read not Ronit left Dunia did state you don't have to call her back but if you have any questions you can call back.      Action Taken: Message routed to:  [facility] PMR    Travel Screening: Not Applicable

## 2023-12-04 NOTE — TELEPHONE ENCOUNTER
[facility] Call [facility]    Phone Message    May a detailed message be left on voicemail: yes     Reason for Call: Other: [facility] calling because Pt's chart reviewed by Adrien and asking  if it is okay with her if Adrien prescribes the Pt viagra. Please call back to advise      Action Taken: Message routed to:  [facility] PMR    Travel Screening: Not Applicable

## 2023-12-04 NOTE — TELEPHONE ENCOUNTER
Writer returned call to Adrien Ko's assistant, but they were unavailable.  Need to relay that Dr. Briseno reviewed patient's medication list and history and cannot see any contraindication with them ordering Viagra for this patient.    Spoke with  and left message with them to return call, as they do not have a voicemail to leave message.  Will await return call.    Brigida Briseno MD  You10 minutes ago (2:50 PM)     COLETTE Cottrell,    I reviewed his meds and history and can't see any contraindication.      Thanks,  Brigida Alfaro MD33 minutes ago (2:27 PM)     SERA  Dr. Briseno,    HOLLY Polanco's office calling to ask if it is okay with you if they prescribe Viagra for this patient?    Thank you,    Ronit Ward, RN on 12/4/2023 at 3:04 PM

## 2023-12-04 NOTE — TELEPHONE ENCOUNTER
Writer noting return call and that Dr. Briseno's message was relayed.  Closing this encounter.    Ronit Ward RN on 12/4/2023 at 3:38 PM

## 2023-12-05 NOTE — TELEPHONE ENCOUNTER
Reached out to pt. And had to LVM. Asked pt to call back regarding his 12/6 appt. Per Marcelina PATEL RN he is too early for Botox. Therefore we need to know if he is coming in to discuss other issues. Asked him to call me back at [phone].

## 2023-12-05 NOTE — TELEPHONE ENCOUNTER
Appointment error - last botox was given 10/16/23.    Dec 6 appointment cannot be botox - only 7 weeks + 2 days ago.    Please call pt to see if he needs to see Dr Briseno for any other concerns, if none, then may cancel this appt.    Dec 6 appointment was booked on 6/28/23 - because he planned to travel out of the country, later in October he called and added appt for the botox as was given on 10/16/23.    Next planned botox is booked for Jan 21, 2024    Thank you!    Marcelina Sethi, RN on 12/4/2023 at 6:43 PM

## 2023-12-05 NOTE — TELEPHONE ENCOUNTER
Called and spoke with pt, appointment canceled for 12/6 because too early for Botox - pt already has an appointment scheduled for 1/24/24.    Routing back to Yan as an FYI to see if we can fill that spot for tomorrow.

## 2024-01-11 ENCOUNTER — TELEPHONE (OUTPATIENT)
Dept: PHYSICAL MEDICINE AND REHAB | Facility: CLINIC | Age: 69
End: 2024-01-11
Payer: COMMERCIAL

## 2024-05-09 ENCOUNTER — TELEPHONE (OUTPATIENT)
Dept: PHYSICAL MEDICINE AND REHAB | Facility: CLINIC | Age: 69
End: 2024-05-09
Payer: COMMERCIAL

## 2024-05-09 NOTE — TELEPHONE ENCOUNTER
[facility] Call Center    Phone Message    May a detailed message be left on voicemail: yes     Reason for Call: Other: Patient called requesting to speak with someone in the clinic specifically to reschedule his missed appointment. Please review.      Action Taken: Message routed to:  [facility] PM&R    Travel Screening: Not Applicable

## 2024-05-09 NOTE — TELEPHONE ENCOUNTER
Returned call and spoke to pt, he asked to reschedule for 8/21/24 at 3:10 due to his availability as well as ride availability)

## 2024-07-26 DIAGNOSIS — G81.11 RIGHT SPASTIC HEMIPARESIS (H): Primary | ICD-10-CM

## 2024-08-21 ENCOUNTER — OFFICE VISIT (OUTPATIENT)
Dept: PHYSICAL MEDICINE AND REHAB | Facility: CLINIC | Age: 69
End: 2024-08-21
Payer: COMMERCIAL

## 2024-08-21 VITALS
SYSTOLIC BLOOD PRESSURE: 117 MMHG | DIASTOLIC BLOOD PRESSURE: 80 MMHG | HEART RATE: 95 BPM | RESPIRATION RATE: 16 BRPM | OXYGEN SATURATION: 95 %

## 2024-08-21 DIAGNOSIS — I69.30 HISTORY OF CVA WITH RESIDUAL DEFICIT: ICD-10-CM

## 2024-08-21 DIAGNOSIS — G81.11 SPASTIC HEMIPARESIS OF RIGHT DOMINANT SIDE (H): ICD-10-CM

## 2024-08-21 DIAGNOSIS — G81.11 RIGHT SPASTIC HEMIPARESIS (H): Primary | ICD-10-CM

## 2024-08-21 PROCEDURE — 64643 CHEMODENERV 1 EXTREM 1-4 EA: CPT | Mod: GC | Performed by: PHYSICAL MEDICINE & REHABILITATION

## 2024-08-21 PROCEDURE — 95874 GUIDE NERV DESTR NEEDLE EMG: CPT | Mod: GC | Performed by: PHYSICAL MEDICINE & REHABILITATION

## 2024-08-21 PROCEDURE — 64644 CHEMODENERV 1 EXTREM 5/> MUS: CPT | Mod: GC | Performed by: PHYSICAL MEDICINE & REHABILITATION

## 2024-08-21 ASSESSMENT — PAIN SCALES - GENERAL: PAINLEVEL: NO PAIN (0)

## 2024-08-21 NOTE — LETTER
"8/21/2024       RE: Luis Trinidad  [address] [facility] [address]     Dear Colleague,    Thank you for referring your patient, Luis Trinidad, to the [facility] [place] at [facility]. Please see a copy of my visit note below.      [facility]     PM&R CLINIC NOTE  BOTULINUM TOXIN PROCEDURE      HPI  Chief Complaint   Patient presents with     Botox     Luis Trinidad is a 68 year old male with a history of stroke in November 2016 who presents to clinic for botulinum toxin injections for management of right spastic hemiparesis.     Background  Rogelio had new pain at right 5th toe for 2-3 months during the Spring and Summer 2022. No falls or injury. Pain was present with standing and walking; no pain at rest. No associated swelling, erythema or fever/chills. PT tried shoe insert, and kinesio taping with some benefits. He also had some \"tightness\" at right ankle.      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 Ingrown nail was treated and he has had no pain since.      He saw his PCP for right foot pain. Xray was unremarkable. He was referred to podiatry clinic; had an ingrown toe nail which was removed and helped with his pain significantly. He also has a new anterior leaf spring AFO which has bene helping.       SINCE LAST VISIT  Luis Trinidad was last seen here in clinic on 10/16/2023, at which time he received 400 units of Botox.    Patient denies new medical diagnoses, illnesses, hospitalizations, emergency room visits, and injuries since the previous injection with botulinum neurotoxin.     He reports feeling well today, and feels like he has been dealing with this last round of injections.  Due to his trip to [place] as well as coordinating transportation to the clinic, he has had " difficulty getting in since last fall.  He is accompanied by his spouse, Joy today.    RESPONSE TO PREVIOUS TREATMENT  Side effects: No problems reported    Pain Improvement: Yes.  Percent Improvement: 80 %    Duration of Benefit:   10-11 weeks. - very similar to the last cycle    Spasticity Improvement: Yes.  Percent Improvement: 80 %    Duration of Benefit:   10-11 weeks.    His spasticity has been worse due to a long delay/increased time interval in between injections.       PHYSICAL EXAM  VS: /80   Pulse 95   Resp 16   SpO2 95%      Alert, pleasant affect.   Increased tone noted in right upper extremity, with his right arm held in elbow flexion, wrist flexion, fingers flexed.  MAS 4/4 in right elbow flexors, wrist flexors.  MAS 3/4 in right finger flexors.  Mild clonus noted with right ankle eversion, limited ankle range of motion    ALLERGIES  No Known Allergies    CURRENT MEDICATIONS    Current Outpatient Medications:      aspirin 81 MG EC tablet, Take 1 tablet (81 mg) by mouth daily, Disp: , Rfl:      atorvastatin (LIPITOR) 40 MG tablet, Take 40 mg by mouth, Disp: , Rfl:      baclofen (LIORESAL) 10 MG tablet, Take 3-4 tablets (30-40 mg) by mouth 3 times daily, Disp: 1080 tablet, Rfl: 3     botulinum toxin type A (BOTOX) 100 UNITS injection, Inject 400 Units into the muscle every 3 months, Disp: 400 Units, Rfl: 5     cholecalciferol 2000 UNITS tablet, Take 2,000 Units by mouth daily, Disp: 30 tablet, Rfl: 0     clopidogrel (PLAVIX) 75 MG tablet, Take 75 mg by mouth, Disp: , Rfl:      clopidogrel (PLAVIX) 75 MG tablet, Take 1 tablet (75 mg) by mouth daily, Disp: 30 tablet, Rfl: 0     co-enzyme Q-10 100 MG CAPS, Take 1 capsule (100 mg) by mouth daily, Disp: 30 capsule, Rfl: 0     lisinopril-hydrochlorothiazide (PRINZIDE,ZESTORETIC) 10-12.5 MG per tablet, Take 1 tablet by mouth, Disp: , Rfl:      lisinopril-hydrochlorothiazide (PRINZIDE/ZESTORETIC) 10-12.5 MG per tablet, Take 2 tablets by mouth,  Disp: , Rfl:      lisinopril-hydrochlorothiazide (PRINZIDE/ZESTORETIC) 20-12.5 MG tablet, 2 tablets, Disp: , Rfl: 3     silver sulfADIAZINE (SILVADENE) 1 % external cream, Apply topically 2 times daily, Disp: 25 g, Rfl: 1     simvastatin (ZOCOR) 10 MG tablet, Take 1 tablet (10 mg) by mouth every evening, Disp: 30 tablet, Rfl: 0     tetrahydrozoline 0.05 % ophthalmic solution, Place 1 drop into both eyes 3 times daily as needed, Disp: , Rfl:     Current Facility-Administered Medications:      botulinum toxin type A (BOTOX) 100 units injection 400 Units, 400 Units, Intramuscular, Q90 Days, Brigida Briseno MD, 400 Units at 24 1611     botulinum toxin type A (BOTOX) 100 units injection 400 Units, 400 Units, Intramuscular, Q90 Days, Brigida Briseno MD, 400 Units at 10/16/23 1621       BOTULINUM NEUROTOXIN INJECTION PROCEDURES    VERIFICATION OF PATIENT IDENTIFICATION AND PROCEDURE     Initials   Patient Name PS   Patient  PS   Procedure Verified by: PS     Prior to the start of the procedure and with procedural staff participation, I verbally confirmed the patient s identity using two indicators, relevant allergies, that the procedure was appropriate and matched the consent or emergent situation, and that the correct equipment/implants were available. Immediately prior to starting the procedure I conducted the Time Out with the procedural staff and re-confirmed the patient s name, procedure, and site/side. (The Joint Commission universal protocol was followed.)  Yes    Sedation (Moderate or Deep): None    ABOVE ASSESSMENTS PERFORMED BY  Brigida Briseno MD      INDICATIONS FOR PROCEDURES  Luis Trinidad is a 68 year old patient with myriam spastic hemiparesis secondary to the diagnosis of stroke. His baseline symptoms have been recalcitrant to oral medications and conservative therapy.  He is here today for reinjection with Botox.    GOAL OF PROCEDURE  The goal of this procedure is to increase active range of  motion, improve volitional motor control, decrease pain  and enhance functional independence.      TOTAL DOSE ADMINISTERED  Dose Administered: 400 units  Botox (Botulinum Toxin Type A)       1:1 Dilution   Unavoidable Drug Waste: Yes, 30ml  Diluent Used:  Preservative Free Normal Saline  Total Volume of Diluent Used:  4 ml  See MAR  NDC #: Botox 100u ([address]-55)      CONSENT  The risks, benefits, and treatment options were discussed with Luis Trinidad and he agreed to proceed.    Written consent was obtained by PS.     EQUIPMENT USED  Needle-35mm stimulating/recording (We didn't have 35mm needles available in clinic today so used 25mm for distal arm and 50mm for proximal arm and RLE)  EMG/NCS Machine    SKIN PREPARATION  Skin preparation was performed using an alcohol wipe.    GUIDANCE DESCRIPTION  Electro-myographic guidance was necessary throughout the procedure to accurately identify all areas of spastic muscles while avoiding injection of non-spastic muscles and underlying muscles , neighboring nerves and nearby vascular structures.     AREA/MUSCLE INJECTED  1.  RIGHT UPPER EXTREMITY- total of 370 units Botox = Total Dose, 1:1 Dilution      Right pectoralis major - 20 units of Botox at 1 site/s.      Right bicep - 70 units of Botox at 1 site/s.   Right brachialis - 40 units of Botox at 1 site/s.     Right flexor digitorum superficialis - 50 units of Botox at 1 site/s.       Right flexor digitorum profundus - 50 units of Botox at 1 site/s.      Right FCU - 50 units of Botox at 1 site/s.      Right pronator teres - 90 units of Botox at 1 site/s.       2.  RIGHT LOWER EXTREMITY- total of 30 units Botox = Total Dose, 1:1 Dilution      Right posterior tibialis - 30 units of Botox at 1 site/s.         RESPONSE TO PROCEDURE  Luis Trinidad tolerated the procedure well and there were no immediate complications. He was allowed to recover for an appropriate period of time and was discharged home in stable  condition.    ASSESSMENT AND PLAN   Ischemic stroke at the lateral left thalamus and the posterior limb of the left internal capsule, 11/2015  Residual right spastic hemiparesis   HTN   HLD  Questionable SAMMY - has not seen sleep medicine         Work-up: none today  Therapy/equipment/braces: continue HEP regularly.   We have offered outpatient physical and occupational therapy but he is not interested at this point  Medications: no change in meds today; on baclofen 30 TID.  Interventions: chemo-denervation today; procedure note below. No change in the total dose but adjusted one site as above for better control of tone at EF muscles. He is on maximum dose.    Referral / follow up with other providers:  We have discussed referral to sleep medicine clinic but he is not interested at this point   Follow up: 12 weeks.     Patient seen and discussed with PM&R staff attending, Dr. Suzanna Torrez,   PGY-4  Physical Medicine and Rehabilitation        Physician Attestation   I, Brigida Briseno MD, saw this patient and agree with the findings and plan of care as documented in the note.      Items personally reviewed/procedural attestation: I was present for and supervised the entire procedure.    Brigida Briseno MD      Again, thank you for allowing me to participate in the care of your patient.      Sincerely,    Brigida Briseno MD

## 2024-08-21 NOTE — PROGRESS NOTES
"  [facility]     PM&R CLINIC NOTE  BOTULINUM TOXIN PROCEDURE      HPI  Chief Complaint   Patient presents with    Botox     Luis Trinidad is a 68 year old right hand-dominant male with a history of stroke in November 2016 who presents to clinic for botulinum toxin injections for management of right spastic hemiparesis.     Background  Rogelio had new pain at right 5th toe for 2-3 months during the Spring and Summer 2022. No falls or injury. Pain was present with standing and walking; no pain at rest. No associated swelling, erythema or fever/chills. PT tried shoe insert, and kinesio taping with some benefits. He also had some \"tightness\" at right ankle.      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 Ingrown nail was treated and he has had no pain since.      He saw his PCP for right foot pain. Xray was unremarkable. He was referred to podiatry clinic; had an ingrown toe nail which was removed and helped with his pain significantly. He also has a new anterior leaf spring AFO which has bene helping.       SINCE LAST VISIT  Luis Trinidad was last seen here in clinic on 10/16/2023, at which time he received 400 units of Botox.    Patient denies new medical diagnoses, illnesses, hospitalizations, emergency room visits, and injuries since the previous injection with botulinum neurotoxin.     He reports feeling well today, and feels like he has been dealing with this last round of injections.  Due to his trip to [place] as well as coordinating transportation to the clinic, he has had difficulty getting in since last fall.  He is accompanied by his spouse, Joy today.    RESPONSE TO PREVIOUS TREATMENT  Side effects: No problems reported    Pain Improvement: Yes.  Percent Improvement: 80 %    Duration of Benefit:   10-11 weeks. - very similar to the last cycle    Spasticity Improvement: Yes.  Percent " Improvement: 80 %    Duration of Benefit:   10-11 weeks.    His spasticity has been worse due to a long delay/increased time interval in between injections.       PHYSICAL EXAM  VS: /80   Pulse 95   Resp 16   SpO2 95%      Alert, pleasant affect.   Increased tone noted in right upper extremity, with his right arm held in elbow flexion, wrist flexion, fingers flexed.  MAS 4/4 in right elbow flexors, wrist flexors.  MAS 3/4 in right finger flexors.  Mild clonus noted with right ankle eversion, limited ankle range of motion    ALLERGIES  No Known Allergies    CURRENT MEDICATIONS    Current Outpatient Medications:     aspirin 81 MG EC tablet, Take 1 tablet (81 mg) by mouth daily, Disp: , Rfl:     atorvastatin (LIPITOR) 40 MG tablet, Take 40 mg by mouth, Disp: , Rfl:     baclofen (LIORESAL) 10 MG tablet, Take 3-4 tablets (30-40 mg) by mouth 3 times daily, Disp: 1080 tablet, Rfl: 3    botulinum toxin type A (BOTOX) 100 UNITS injection, Inject 400 Units into the muscle every 3 months, Disp: 400 Units, Rfl: 5    cholecalciferol 2000 UNITS tablet, Take 2,000 Units by mouth daily, Disp: 30 tablet, Rfl: 0    clopidogrel (PLAVIX) 75 MG tablet, Take 75 mg by mouth, Disp: , Rfl:     clopidogrel (PLAVIX) 75 MG tablet, Take 1 tablet (75 mg) by mouth daily, Disp: 30 tablet, Rfl: 0    co-enzyme Q-10 100 MG CAPS, Take 1 capsule (100 mg) by mouth daily, Disp: 30 capsule, Rfl: 0    lisinopril-hydrochlorothiazide (PRINZIDE,ZESTORETIC) 10-12.5 MG per tablet, Take 1 tablet by mouth, Disp: , Rfl:     lisinopril-hydrochlorothiazide (PRINZIDE/ZESTORETIC) 10-12.5 MG per tablet, Take 2 tablets by mouth, Disp: , Rfl:     lisinopril-hydrochlorothiazide (PRINZIDE/ZESTORETIC) 20-12.5 MG tablet, 2 tablets, Disp: , Rfl: 3    silver sulfADIAZINE (SILVADENE) 1 % external cream, Apply topically 2 times daily, Disp: 25 g, Rfl: 1    simvastatin (ZOCOR) 10 MG tablet, Take 1 tablet (10 mg) by mouth every evening, Disp: 30 tablet, Rfl: 0     tetrahydrozoline 0.05 % ophthalmic solution, Place 1 drop into both eyes 3 times daily as needed, Disp: , Rfl:     Current Facility-Administered Medications:     botulinum toxin type A (BOTOX) 100 units injection 400 Units, 400 Units, Intramuscular, Q90 Days, Brigida Briseno MD, 400 Units at 24 1611    botulinum toxin type A (BOTOX) 100 units injection 400 Units, 400 Units, Intramuscular, Q90 Days, Brigida Briseno MD, 400 Units at 10/16/23 1621       BOTULINUM NEUROTOXIN INJECTION PROCEDURES    VERIFICATION OF PATIENT IDENTIFICATION AND PROCEDURE     Initials   Patient Name PS   Patient  PS   Procedure Verified by: PS     Prior to the start of the procedure and with procedural staff participation, I verbally confirmed the patient s identity using two indicators, relevant allergies, that the procedure was appropriate and matched the consent or emergent situation, and that the correct equipment/implants were available. Immediately prior to starting the procedure I conducted the Time Out with the procedural staff and re-confirmed the patient s name, procedure, and site/side. (The Joint Commission universal protocol was followed.)  Yes    Sedation (Moderate or Deep): None    ABOVE ASSESSMENTS PERFORMED BY  Brigida Briseno MD      INDICATIONS FOR PROCEDURES  Luis Trinidad is a 68 year old patient with myriam spastic hemiparesis secondary to the diagnosis of stroke. His baseline symptoms have been recalcitrant to oral medications and conservative therapy.  He is here today for reinjection with Botox.    GOAL OF PROCEDURE  The goal of this procedure is to increase active range of motion, improve volitional motor control, decrease pain  and enhance functional independence.      TOTAL DOSE ADMINISTERED  Dose Administered: 400 units  Botox (Botulinum Toxin Type A)       1:1 Dilution   Unavoidable Drug Waste: Yes, 30ml  Diluent Used:  Preservative Free Normal Saline  Total Volume of Diluent Used:  4 ml  See MAR  NDC #:  Botox 100u ([id number])      CONSENT  The risks, benefits, and treatment options were discussed with Luis Trinidad and he agreed to proceed.    Written consent was obtained by PS.     EQUIPMENT USED  Needle-35mm stimulating/recording (We didn't have 35mm needles available in clinic today so used 25mm for distal arm and 50mm for proximal arm and RLE)  EMG/NCS Machine    SKIN PREPARATION  Skin preparation was performed using an alcohol wipe.    GUIDANCE DESCRIPTION  Electro-myographic guidance was necessary throughout the procedure to accurately identify all areas of spastic muscles while avoiding injection of non-spastic muscles and underlying muscles , neighboring nerves and nearby vascular structures.     AREA/MUSCLE INJECTED  1.  RIGHT UPPER EXTREMITY- total of 370 units Botox = Total Dose, 1:1 Dilution      Right pectoralis major - 20 units of Botox at 1 site/s.      Right bicep - 70 units of Botox at 1 site/s.   Right brachialis - 40 units of Botox at 1 site/s.     Right flexor digitorum superficialis - 50 units of Botox at 1 site/s.       Right flexor digitorum profundus - 50 units of Botox at 1 site/s.      Right FCU - 50 units of Botox at 1 site/s.      Right pronator teres - 90 units of Botox at 1 site/s.       2.  RIGHT LOWER EXTREMITY- total of 30 units Botox = Total Dose, 1:1 Dilution      Right posterior tibialis - 30 units of Botox at 1 site/s.         RESPONSE TO PROCEDURE  Luis Trinidad tolerated the procedure well and there were no immediate complications. He was allowed to recover for an appropriate period of time and was discharged home in stable condition.    ASSESSMENT AND PLAN   Ischemic stroke at the lateral left thalamus and the posterior limb of the left internal capsule, 11/2015  Residual right spastic hemiparesis - dominant side  HTN   HLD  Questionable SAMMY - has not seen sleep medicine         Work-up: none today  Therapy/equipment/braces: continue HEP regularly.   We have offered  outpatient physical and occupational therapy but he is not interested at this point  Medications: no change in meds today; on baclofen 30 TID.  Interventions: chemo-denervation today; procedure note below. No change in the total dose but adjusted one site as above for better control of tone at EF muscles. He is on maximum dose.    Referral / follow up with other providers:  We have discussed referral to sleep medicine clinic but he is not interested at this point   Follow up: 12 weeks.     Patient seen and discussed with PM&R staff attending, Dr. Suzanna Torrez DO  PGY-4  Physical Medicine and Rehabilitation        Physician Attestation   I, Brigida Briseno MD, saw this patient and agree with the findings and plan of care as documented in the note.      Items personally reviewed/procedural attestation: I was present for and supervised the entire procedure.    Brigida Briseno MD

## 2024-09-25 DIAGNOSIS — R25.2 SPASTICITY: ICD-10-CM

## 2024-09-25 RX ORDER — BACLOFEN 10 MG/1
30-40 TABLET ORAL 3 TIMES DAILY
Qty: 1080 TABLET | Refills: 3 | Status: SHIPPED | OUTPATIENT
Start: 2024-09-25

## 2024-09-25 NOTE — TELEPHONE ENCOUNTER
Refill request received from [facility]    Medication: baclofen (LIORESAL) 10 MG tablet   Directions: Take 3-4 tablets (30-40 mg) by mouth 3 times daily      Last ordered: 7/31/23   Qty: #1080  RF: 3  Last OV: 8/21/24  Next OV: 11/26/24    Routing to Dr. Briseno to review and sign if appropriate.    Ronit Ward RN Care Coordinator  [facility]   PM [facility] R Clinic main phone # [phone] fax # [phone]

## 2024-11-26 ENCOUNTER — OFFICE VISIT (OUTPATIENT)
Dept: PHYSICAL MEDICINE AND REHAB | Facility: CLINIC | Age: 69
End: 2024-11-26
Payer: COMMERCIAL

## 2024-11-26 VITALS
HEART RATE: 95 BPM | RESPIRATION RATE: 16 BRPM | OXYGEN SATURATION: 98 % | DIASTOLIC BLOOD PRESSURE: 78 MMHG | SYSTOLIC BLOOD PRESSURE: 135 MMHG

## 2024-11-26 DIAGNOSIS — G81.11 RIGHT SPASTIC HEMIPARESIS (H): Primary | ICD-10-CM

## 2024-11-26 DIAGNOSIS — G81.11 SPASTIC HEMIPARESIS OF RIGHT DOMINANT SIDE (H): ICD-10-CM

## 2024-11-26 DIAGNOSIS — R25.2 SPASTICITY: ICD-10-CM

## 2024-11-26 RX ORDER — BACLOFEN 10 MG/1
30 TABLET ORAL 3 TIMES DAILY
Qty: 900 TABLET | Refills: 3 | Status: SHIPPED | OUTPATIENT
Start: 2024-11-26 | End: 2025-11-21

## 2024-11-26 NOTE — NURSING NOTE
Chief Complaint   Patient presents with    Procedure     Here for injections, confirmed with patient     Maida Rush

## 2024-11-26 NOTE — PROGRESS NOTES
"  [facility]     PM&R CLINIC NOTE  BOTULINUM TOXIN PROCEDURE      HPI  Chief Complaint   Patient presents with    Procedure     Here for injections, confirmed with patient     Luis Trinidad is a 69 year old right hand-dominant male with a history of stroke in November 2016 who presents to clinic for botulinum toxin injections for management of right spastic hemiparesis.     Background  Rogelio had new pain at right 5th toe for 2-3 months during the Spring and Summer 2022. No falls or injury. Pain was present with standing and walking; no pain at rest. No associated swelling, erythema or fever/chills. PT tried shoe insert, and kinesio taping with some benefits. He also had some \"tightness\" at right ankle.      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 Ingrown nail was treated and he has had no pain since.      He saw his PCP for right foot pain. Xray was unremarkable. He was referred to podiatry clinic; had an ingrown toe nail which was removed and helped with his pain significantly. He also has a new anterior leaf spring AFO which has bene helping.       SINCE LAST VISIT  Luis Trinidad was last seen here in clinic on 8/21/24, at which time he received 400 units of Botox.    Patient denies new medical diagnoses, illnesses, hospitalizations, emergency room visits, and injuries since the previous injection with botulinum neurotoxin.       RESPONSE TO PREVIOUS TREATMENT  Side effects: No problems reported    Pain Improvement: Yes.  Percent Improvement: 80 %    Duration of Benefit:   10-11 weeks. - very similar to the last cycle    Spasticity Improvement: Yes.  Percent Improvement: 80 %    Duration of Benefit:   10-11 weeks.    He is a lot more comfortable and functional with better control of his symptoms secondary to spasticity.       PHYSICAL EXAM  VS: /78 (BP Location: Right arm, Patient Position: " Sitting, Cuff Size: Adult Regular)   Pulse 95   Resp 16   SpO2 98%      Alert, pleasant affect.   Increased tone noted in right upper extremity, with his right arm held in elbow flexion, wrist flexion, fingers flexed.  MAS 4/4 in right elbow flexors, wrist flexors.  MAS 3/4 in right finger flexors.  Mild clonus noted with right ankle eversion, limited ankle range of motion    ALLERGIES  No Known Allergies    CURRENT MEDICATIONS    Current Outpatient Medications:     aspirin 81 MG EC tablet, Take 1 tablet (81 mg) by mouth daily, Disp: , Rfl:     atorvastatin (LIPITOR) 40 MG tablet, Take 40 mg by mouth, Disp: , Rfl:     baclofen (LIORESAL) 10 MG tablet, Take 3 tablets (30 mg) by mouth 3 times daily., Disp: 900 tablet, Rfl: 3    botulinum toxin type A (BOTOX) 100 UNITS injection, Inject 400 Units into the muscle every 3 months, Disp: 400 Units, Rfl: 5    cholecalciferol 2000 UNITS tablet, Take 2,000 Units by mouth daily, Disp: 30 tablet, Rfl: 0    clopidogrel (PLAVIX) 75 MG tablet, Take 75 mg by mouth, Disp: , Rfl:     clopidogrel (PLAVIX) 75 MG tablet, Take 1 tablet (75 mg) by mouth daily, Disp: 30 tablet, Rfl: 0    co-enzyme Q-10 100 MG CAPS, Take 1 capsule (100 mg) by mouth daily, Disp: 30 capsule, Rfl: 0    lisinopril-hydrochlorothiazide (PRINZIDE,ZESTORETIC) 10-12.5 MG per tablet, Take 1 tablet by mouth, Disp: , Rfl:     lisinopril-hydrochlorothiazide (PRINZIDE/ZESTORETIC) 10-12.5 MG per tablet, Take 2 tablets by mouth, Disp: , Rfl:     lisinopril-hydrochlorothiazide (PRINZIDE/ZESTORETIC) 20-12.5 MG tablet, 2 tablets, Disp: , Rfl: 3    silver sulfADIAZINE (SILVADENE) 1 % external cream, Apply topically 2 times daily, Disp: 25 g, Rfl: 1    simvastatin (ZOCOR) 10 MG tablet, Take 1 tablet (10 mg) by mouth every evening, Disp: 30 tablet, Rfl: 0    tetrahydrozoline 0.05 % ophthalmic solution, Place 1 drop into both eyes 3 times daily as needed, Disp: , Rfl:     Current Facility-Administered Medications:      botulinum toxin type A (BOTOX) 100 units injection 400 Units, 400 Units, Intramuscular, Q90 Days, Brigida Briseno MD, 370 Units at 24 1635    botulinum toxin type A (BOTOX) 100 units injection 400 Units, 400 Units, Intramuscular, Q90 Days, Brigida Briseno MD, 400 Units at 10/16/23 1621       BOTULINUM NEUROTOXIN INJECTION PROCEDURES    VERIFICATION OF PATIENT IDENTIFICATION AND PROCEDURE     Initials   Patient Name PS   Patient  PS   Procedure Verified by: PS     Prior to the start of the procedure and with procedural staff participation, I verbally confirmed the patient s identity using two indicators, relevant allergies, that the procedure was appropriate and matched the consent or emergent situation, and that the correct equipment/implants were available. Immediately prior to starting the procedure I conducted the Time Out with the procedural staff and re-confirmed the patient s name, procedure, and site/side. (The Joint Commission universal protocol was followed.)  Yes    Sedation (Moderate or Deep): None    ABOVE ASSESSMENTS PERFORMED BY  Brigida Briseno MD      INDICATIONS FOR PROCEDURES  Luis Trinidad is a 69 year old patient with myriam spastic hemiparesis secondary to the diagnosis of stroke. His baseline symptoms have been recalcitrant to oral medications and conservative therapy.  He is here today for reinjection with Botox.    GOAL OF PROCEDURE  The goal of this procedure is to increase active range of motion, improve volitional motor control, decrease pain  and enhance functional independence.      TOTAL DOSE ADMINISTERED  Dose Administered: 400 units  Botox (Botulinum Toxin Type A)       1:1 Dilution   Unavoidable Drug Waste: Yes, 30ml  Diluent Used:  Preservative Free Normal Saline  Total Volume of Diluent Used:  4 ml  See MAR  NDC #: Botox 100u ([address]-04)      CONSENT  The risks, benefits, and treatment options were discussed with Luis Trinidad and he agreed to proceed.    Written  consent was obtained by PS.     EQUIPMENT USED  Needle-35mm stimulating/recording (We didn't have 35mm needles available in clinic today so used 25mm for distal arm and 50mm for proximal arm and RLE)  EMG/NCS Machine    SKIN PREPARATION  Skin preparation was performed using an alcohol wipe.    GUIDANCE DESCRIPTION  Electro-myographic guidance was necessary throughout the procedure to accurately identify all areas of spastic muscles while avoiding injection of non-spastic muscles and underlying muscles , neighboring nerves and nearby vascular structures.     AREA/MUSCLE INJECTED  1.  RIGHT UPPER EXTREMITY- total of 370 units Botox = Total Dose, 1:1 Dilution      Right pectoralis major - 20 units of Botox at 1 site/s.      Right bicep - 70 units of Botox at 1 site/s.   Right brachialis - 40 units of Botox at 1 site/s.     Right flexor digitorum superficialis - 50 units of Botox at 1 site/s.       Right flexor digitorum profundus - 50 units of Botox at 1 site/s.      Right FCU - 50 units of Botox at 1 site/s.      Right pronator teres - 90 units of Botox at 1 site/s.       2.  RIGHT LOWER EXTREMITY- total of 30 units Botox = Total Dose, 1:1 Dilution      Right posterior tibialis - 30 units of Botox at 1 site/s.         RESPONSE TO PROCEDURE  Luis Trinidad tolerated the procedure well and there were no immediate complications. He was allowed to recover for an appropriate period of time and was discharged home in stable condition.    ASSESSMENT AND PLAN   Ischemic stroke at the lateral left thalamus and the posterior limb of the left internal capsule, 11/2015  Residual right spastic hemiparesis - dominant side  HTN   HLD  Questionable SAMMY - has not seen sleep medicine         Work-up: none today  Therapy/equipment/braces: continue HEP regularly.   We have offered outpatient physical and occupational therapy but he is not interested at this point  Medications: no change in meds today; on baclofen 30 TID.  Interventions:  No change in the total dose or sites of injections today. He is on maximum dose.    Referral / follow up with other providers:  We have discussed referral to sleep medicine clinic but he is not interested at this point   Follow up: 12 weeks.       Brigida Birseno MD  Physical Medicine & Rehabilitation

## 2024-11-26 NOTE — LETTER
"11/26/2024       RE: Luis Trinidad  [facility] [id number]     Dear Colleague,    Thank you for referring your patient, Luis Trinidad, to the [facility] PHYSICAL MEDICINE AND REHABILITATION CLINIC [place] [facility]. Please see a copy of my visit note below.      [facility]     PM&R CLINIC NOTE  BOTULINUM TOXIN PROCEDURE      HPI  Chief Complaint   Patient presents with     Procedure     Here for injections, confirmed with patient     Luis Trinidad is a 69 year old right hand-dominant male with a history of stroke in November 2016 who presents to clinic for botulinum toxin injections for management of right spastic hemiparesis.     Background  Rogelio had new pain at right 5th toe for 2-3 months during the Spring and Summer 2022. No falls or injury. Pain was present with standing and walking; no pain at rest. No associated swelling, erythema or fever/chills. PT tried shoe insert, and kinesio taping with some benefits. He also had some \"tightness\" at right ankle.      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 Ingrown nail was treated and he has had no pain since.      He saw his PCP for right foot pain. Xray was unremarkable. He was referred to podiatry clinic; had an ingrown toe nail which was removed and helped with his pain significantly. He also has a new anterior leaf spring AFO which has bene helping.       SINCE LAST VISIT  Luis Trinidad was last seen here in clinic on 8/21/24, at which time he received 400 units of Botox.    Patient denies new medical diagnoses, illnesses, hospitalizations, emergency room visits, and injuries since the previous injection with botulinum neurotoxin.       RESPONSE TO PREVIOUS TREATMENT  Side effects: No problems reported    Pain Improvement: Yes.  Percent Improvement: 80 %   "  Duration of Benefit:   10-11 weeks. - very similar to the last cycle    Spasticity Improvement: Yes.  Percent Improvement: 80 %    Duration of Benefit:   10-11 weeks.    He is a lot more comfortable and functional with better control of his symptoms secondary to spasticity.       PHYSICAL EXAM  VS: /78 (BP Location: Right arm, Patient Position: Sitting, Cuff Size: Adult Regular)   Pulse 95   Resp 16   SpO2 98%      Alert, pleasant affect.   Increased tone noted in right upper extremity, with his right arm held in elbow flexion, wrist flexion, fingers flexed.  MAS 4/4 in right elbow flexors, wrist flexors.  MAS 3/4 in right finger flexors.  Mild clonus noted with right ankle eversion, limited ankle range of motion    ALLERGIES  No Known Allergies    CURRENT MEDICATIONS    Current Outpatient Medications:      aspirin 81 MG EC tablet, Take 1 tablet (81 mg) by mouth daily, Disp: , Rfl:      atorvastatin (LIPITOR) 40 MG tablet, Take 40 mg by mouth, Disp: , Rfl:      baclofen (LIORESAL) 10 MG tablet, Take 3 tablets (30 mg) by mouth 3 times daily., Disp: 900 tablet, Rfl: 3     botulinum toxin type A (BOTOX) 100 UNITS injection, Inject 400 Units into the muscle every 3 months, Disp: 400 Units, Rfl: 5     cholecalciferol 2000 UNITS tablet, Take 2,000 Units by mouth daily, Disp: 30 tablet, Rfl: 0     clopidogrel (PLAVIX) 75 MG tablet, Take 75 mg by mouth, Disp: , Rfl:      clopidogrel (PLAVIX) 75 MG tablet, Take 1 tablet (75 mg) by mouth daily, Disp: 30 tablet, Rfl: 0     co-enzyme Q-10 100 MG CAPS, Take 1 capsule (100 mg) by mouth daily, Disp: 30 capsule, Rfl: 0     lisinopril-hydrochlorothiazide (PRINZIDE,ZESTORETIC) 10-12.5 MG per tablet, Take 1 tablet by mouth, Disp: , Rfl:      lisinopril-hydrochlorothiazide (PRINZIDE/ZESTORETIC) 10-12.5 MG per tablet, Take 2 tablets by mouth, Disp: , Rfl:      lisinopril-hydrochlorothiazide (PRINZIDE/ZESTORETIC) 20-12.5 MG tablet, 2 tablets, Disp: , Rfl: 3     silver  sulfADIAZINE (SILVADENE) 1 % external cream, Apply topically 2 times daily, Disp: 25 g, Rfl: 1     simvastatin (ZOCOR) 10 MG tablet, Take 1 tablet (10 mg) by mouth every evening, Disp: 30 tablet, Rfl: 0     tetrahydrozoline 0.05 % ophthalmic solution, Place 1 drop into both eyes 3 times daily as needed, Disp: , Rfl:     Current Facility-Administered Medications:      botulinum toxin type A (BOTOX) 100 units injection 400 Units, 400 Units, Intramuscular, Q90 Days, Brigida Briseno MD, 370 Units at 24 1635     botulinum toxin type A (BOTOX) 100 units injection 400 Units, 400 Units, Intramuscular, Q90 Days, Brigida Briseno MD, 400 Units at 10/16/23 1621       BOTULINUM NEUROTOXIN INJECTION PROCEDURES    VERIFICATION OF PATIENT IDENTIFICATION AND PROCEDURE     Initials   Patient Name PS   Patient  PS   Procedure Verified by: PS     Prior to the start of the procedure and with procedural staff participation, I verbally confirmed the patient s identity using two indicators, relevant allergies, that the procedure was appropriate and matched the consent or emergent situation, and that the correct equipment/implants were available. Immediately prior to starting the procedure I conducted the Time Out with the procedural staff and re-confirmed the patient s name, procedure, and site/side. (The Joint Commission universal protocol was followed.)  Yes    Sedation (Moderate or Deep): None    ABOVE ASSESSMENTS PERFORMED BY  Brigida Briseno MD      INDICATIONS FOR PROCEDURES  Luis Trinidad is a 69 year old patient with myriam spastic hemiparesis secondary to the diagnosis of stroke. His baseline symptoms have been recalcitrant to oral medications and conservative therapy.  He is here today for reinjection with Botox.    GOAL OF PROCEDURE  The goal of this procedure is to increase active range of motion, improve volitional motor control, decrease pain  and enhance functional independence.      TOTAL DOSE ADMINISTERED  Dose  Administered: 400 units  Botox (Botulinum Toxin Type A)       1:1 Dilution   Unavoidable Drug Waste: Yes, 30ml  Diluent Used:  Preservative Free Normal Saline  Total Volume of Diluent Used:  4 ml  See MAR  NDC #: Botox 100u ([id number])      CONSENT  The risks, benefits, and treatment options were discussed with Luis Trinidad and he agreed to proceed.    Written consent was obtained by PS.     EQUIPMENT USED  Needle-35mm stimulating/recording (We didn't have 35mm needles available in clinic today so used 25mm for distal arm and 50mm for proximal arm and RLE)  EMG/NCS Machine    SKIN PREPARATION  Skin preparation was performed using an alcohol wipe.    GUIDANCE DESCRIPTION  Electro-myographic guidance was necessary throughout the procedure to accurately identify all areas of spastic muscles while avoiding injection of non-spastic muscles and underlying muscles , neighboring nerves and nearby vascular structures.     AREA/MUSCLE INJECTED  1.  RIGHT UPPER EXTREMITY- total of 370 units Botox = Total Dose, 1:1 Dilution      Right pectoralis major - 20 units of Botox at 1 site/s.      Right bicep - 70 units of Botox at 1 site/s.   Right brachialis - 40 units of Botox at 1 site/s.     Right flexor digitorum superficialis - 50 units of Botox at 1 site/s.       Right flexor digitorum profundus - 50 units of Botox at 1 site/s.      Right FCU - 50 units of Botox at 1 site/s.      Right pronator teres - 90 units of Botox at 1 site/s.       2.  RIGHT LOWER EXTREMITY- total of 30 units Botox = Total Dose, 1:1 Dilution      Right posterior tibialis - 30 units of Botox at 1 site/s.         RESPONSE TO PROCEDURE  Luis Trinidad tolerated the procedure well and there were no immediate complications. He was allowed to recover for an appropriate period of time and was discharged home in stable condition.    ASSESSMENT AND PLAN   Ischemic stroke at the lateral left thalamus and the posterior limb of the left internal capsule,  11/2015  Residual right spastic hemiparesis - dominant side  HTN   HLD  Questionable SAMMY - has not seen sleep medicine         Work-up: none today  Therapy/equipment/braces: continue HEP regularly.   We have offered outpatient physical and occupational therapy but he is not interested at this point  Medications: no change in meds today; on baclofen 30 TID.  Interventions: No change in the total dose or sites of injections today. He is on maximum dose.    Referral / follow up with other providers:  We have discussed referral to sleep medicine clinic but he is not interested at this point   Follow up: 12 weeks.       Brigida Briseno MD  Physical Medicine & Rehabilitation       Again, thank you for allowing me to participate in the care of your patient.      Sincerely,    Brigida Briseno MD

## 2024-12-21 NOTE — ADDENDUM NOTE
Encounter addended by: Yen Villanueva, PT on: 9/12/2018  2:58 PM<BR>     Actions taken: Flowsheet data copied forward, Flowsheet accepted, Sign clinical note Buffa

## 2025-01-02 ENCOUNTER — TELEPHONE (OUTPATIENT)
Dept: PHYSICAL MEDICINE AND REHAB | Facility: CLINIC | Age: 70
End: 2025-01-02
Payer: COMMERCIAL

## 2025-01-02 DIAGNOSIS — R25.2 SPASTICITY: ICD-10-CM

## 2025-01-02 NOTE — TELEPHONE ENCOUNTER
[facility] Call Center    Phone Message    May a detailed message be left on voicemail: yes     Reason for Call: Medication Refill Request    Has the patient contacted the pharmacy for the refill? Yes   Name of medication being requested:   baclofen (LIORESAL) 10 MG tablet       Provider who prescribed the medication:   Brigida Briseno MD       Pharmacy: [facility] [address] [place] [address] [id number]   Date medication is needed: ASAP   Pt is stating that he is out of this medication    Action Taken: Message routed to:  [facility] PMR    Travel Screening: Not Applicable     Date of Service:

## 2025-01-02 NOTE — TELEPHONE ENCOUNTER
Refill request received from Patient    Medication: baclofen (LIORESAL) 10 MG tablet    Directions: Take 3 tablets (30 mg) by mouth 3 times daily.  Last ordered: 09/28/24   Qty: 90  RF: 0  Last OV: 11/26/24  Next OV: 02/18/25    Routing to Dr. Haines for Dr. Briseno  to review and sign if appropriate.    Susanne Smith RN Care Coordinator  [facility]   PM & R Clinic main phone # [phone] fax # [phone]

## 2025-01-22 RX ORDER — BACLOFEN 10 MG/1
30 TABLET ORAL 3 TIMES DAILY
Qty: 270 TABLET | Refills: 1 | Status: SHIPPED | OUTPATIENT
Start: 2025-01-22

## 2025-03-25 ENCOUNTER — TELEPHONE (OUTPATIENT)
Dept: PHYSICAL MEDICINE AND REHAB | Facility: CLINIC | Age: 70
End: 2025-03-25
Payer: COMMERCIAL

## 2025-03-25 DIAGNOSIS — R25.2 SPASTICITY: ICD-10-CM

## 2025-03-25 RX ORDER — BACLOFEN 10 MG/1
30 TABLET ORAL 3 TIMES DAILY
Qty: 270 TABLET | Refills: 5 | Status: SHIPPED | OUTPATIENT
Start: 2025-03-25

## 2025-03-25 NOTE — TELEPHONE ENCOUNTER
[facility] Call Center    Phone Message    May a detailed message be left on voicemail: yes     Reason for Call: Medication Refill Request    Has the patient contacted the pharmacy for the refill? Yes   Name of medication being requested: baclofen (LIORESAL) 10 MG tablet  Provider who prescribed the medication:   Pharmacy: [facility] [address] [place], [place] - [address] [facility]  Date medication is needed: 3/25/2025       Action Taken: [facility] PHYS MED & REHAB     Travel Screening: Not Applicable     Date of Service:

## 2025-03-25 NOTE — TELEPHONE ENCOUNTER
Last order was 1/22/25  Outpatient Medication Detail     Disp Refills Start End BOBBY   baclofen (LIORESAL) 10 MG tablet 270 tablet 1 1/22/2025 -- No   Sig - Route: Take 3 tablets (30 mg) by mouth 3 times daily. - Oral   Sent to pharmacy as: Baclofen 10 MG Oral Tablet (LIORESAL)   Class: E-Prescribe   Order: [id number]   E-Prescribing Status: Receipt confirmed by pharmacy (1/22/2025  9:10 AM CST)   Renewals    Renewal requests to authorizing provider (Jordy Haines MD) prohibited   Renewal provider: Brigida Briseno MD        (Pt does not see Dr Haines)    Last visit with DR Briseno 2/18/25 (botox)    Next booked is 5/27/25 for botox    9 tabs per day (90 mg/ 24 hours) requires 270 tabs per month  Refill fulfilled     New Rx due now    Order pended for Dr Briseno - added refills for a total of 6 months     Marcelina Sethi RN on 3/25/2025 at 3:33 PM

## 2025-04-30 ENCOUNTER — TELEPHONE (OUTPATIENT)
Dept: PHYSICAL MEDICINE AND REHAB | Facility: CLINIC | Age: 70
End: 2025-04-30
Payer: COMMERCIAL

## 2025-05-01 NOTE — TELEPHONE ENCOUNTER
Writer called clinic to inform them there are no necessary precautions or pre-medications needed for patients upcoming extraction appointment. Writer informed they may want to check with patient's PCP as well. Representative requested for be completed and returned as require documentation. Susanne Smith RN on 5/1/2025 at 9:38 AM